# Patient Record
Sex: MALE | Race: WHITE | NOT HISPANIC OR LATINO | Employment: OTHER | URBAN - METROPOLITAN AREA
[De-identification: names, ages, dates, MRNs, and addresses within clinical notes are randomized per-mention and may not be internally consistent; named-entity substitution may affect disease eponyms.]

---

## 2017-02-06 ENCOUNTER — ALLIED HEALTH/NURSE VISIT (OUTPATIENT)
Dept: CARDIOLOGY | Facility: CLINIC | Age: 82
End: 2017-02-06
Payer: MEDICARE

## 2017-02-06 DIAGNOSIS — Z95.0 CARDIAC PACEMAKER IN SITU: Primary | ICD-10-CM

## 2017-02-06 PROCEDURE — 93294 REM INTERROG EVL PM/LDLS PM: CPT | Performed by: INTERNAL MEDICINE

## 2017-02-06 PROCEDURE — 93296 REM INTERROG EVL PM/IDS: CPT | Performed by: INTERNAL MEDICINE

## 2017-02-07 NOTE — PROGRESS NOTES
[employer] PR5040 (S) Remote PPM Device Check  : 18%  Mode: VVI        Presenting Rhythm: varying VS events with tre occ   Heart Rate: adequate heart rates per histogram  Sensing: stable    Pacing Threshold: not performed    Impedance: stable  Battery Status: 10.1 - 11.9 years remaining  Atrial Arrhythmia: chronic Afib, taking Warfarin  Ventricular Arrhythmia: 1 vent high rate. EGM shows irregular VS events lasting 3 seconds, rates 160 - 210bpm. EF 65 -70%. Reviewed findings with Andrea RN     Care Plan: F/U Merlin q 3 months. Gave results and next transmission date over the phone to german YBARRA

## 2017-05-16 ENCOUNTER — ALLIED HEALTH/NURSE VISIT (OUTPATIENT)
Dept: CARDIOLOGY | Facility: CLINIC | Age: 82
End: 2017-05-16
Payer: MEDICARE

## 2017-05-16 DIAGNOSIS — Z95.0 CARDIAC PACEMAKER IN SITU: Primary | ICD-10-CM

## 2017-05-16 PROCEDURE — 93294 REM INTERROG EVL PM/LDLS PM: CPT | Performed by: INTERNAL MEDICINE

## 2017-05-16 PROCEDURE — 93296 REM INTERROG EVL PM/IDS: CPT | Performed by: INTERNAL MEDICINE

## 2017-05-16 NOTE — MR AVS SNAPSHOT
"              After Visit Summary   2017    Jake Duane Pfleiger    MRN: [id number]           Patient Information     Date Of Birth          1931        Visit Information        Provider Department      2017 4:15 PM NEW TECH1 [facility] HEART AT [place]        Today's Diagnoses     Cardiac pacemaker in situ    -  1       Follow-ups after your visit        Who to contact     If you have questions or need follow up information about today's clinic visit or your schedule please contact [facility] directly at [phone].  Normal or non-critical lab and imaging results will be communicated to you by [employer], letter or phone within 4 business days after the clinic has received the results. If you do not hear from us within 7 days, please contact the clinic through [employer] or phone. If you have a critical or abnormal lab result, we will notify you by phone as soon as possible.  Submit refill requests through [employer] or call your pharmacy and they will forward the refill request to us. Please allow 3 business days for your refill to be completed.          Additional Information About Your Visit        MyChart Information     [employer] lets you send messages to your doctor, view your test results, renew your prescriptions, schedule appointments and more. To sign up, go to [email or web address] . Click on \"Log in\" on the left side of the screen, which will take you to the Welcome page. Then click on \"Sign up Now\" on the right side of the page.     You will be asked to enter the access code listed below, as well as some personal information. Please follow the directions to create your username and password.     Your access code is: [email or web address]  Expires: 8/15/2017  8:38 AM     Your access code will  in 90 days. If you need help or a new code, please call your [place] clinic or [phone].        Care EveryWhere ID     This is your Care " EveryWhere ID. This could be used by other organizations to access your [place] medical records  [id number]         Blood Pressure from Last 3 Encounters:   10/05/16 124/82   04/11/16 117/68   09/23/15 142/68    Weight from Last 3 Encounters:   10/05/16 91.9 kg (202 lb 11.2 oz)   04/10/16 93.1 kg (205 lb 3.2 oz)   07/07/15 93.9 kg (207 lb)              We Performed the Following     INTERROGATION DEVICE EVAL REMOTE, PACER/ICD (87801)     PM DEVICE INTERROGATE REMOTE (95930)          Today's Medication Changes          These changes are accurate as of: 5/16/17 11:59 PM.  If you have any questions, ask your nurse or doctor.               These medicines have changed or have updated prescriptions.        Dose/Directions    acetaminophen 325 MG tablet   Commonly known as:  TYLENOL   This may have changed:    - when to take this  - reasons to take this  - additional instructions   Used for:  Knee pain        Dose:  650 mg   Take 2 tablets by mouth every 8 hours. Do not mix with other tylenol containing medications.   Quantity:  250 tablet   Refills:  0       atenolol 25 MG tablet   Commonly known as:  TENORMIN   This may have changed:  how much to take   Used for:  Atrial fibrillation (H)        Dose:  25 mg   Take 1 tablet by mouth daily.   Quantity:  30 tablet   Refills:  1                Primary Care Provider Office Phone # Fax #    Herve Sweet [phone]       [facility] [place] [address] [facility] [address]        Thank you!     Thank you for choosing [facility] AT [place]  for your care. Our goal is always to provide you with excellent care. Hearing back from our patients is one way we can continue to improve our services. Please take a few minutes to complete the written survey that you may receive in the mail after your visit with us. Thank you!             Your Updated Medication List - Protect others around you: Learn how to  safely use, store and throw away your medicines at www.disposemymeds.org.          This list is accurate as of: 5/16/17 11:59 PM.  Always use your most recent med list.                   Brand Name Dispense Instructions for use    acetaminophen 325 MG tablet    TYLENOL    250 tablet    Take 2 tablets by mouth every 8 hours. Do not mix with other tylenol containing medications.       AMBIEN PO      Take 10 mg by mouth nightly as needed.       atenolol 25 MG tablet    TENORMIN    30 tablet    Take 1 tablet by mouth daily.       fish oil-omega-3 fatty acids 1000 MG capsule      Take 2 g by mouth daily       furosemide 20 MG tablet    LASIX    30 tablet    Take 1 tablet (20 mg) by mouth daily       latanoprost 0.005 % ophthalmic solution    XALATAN     Place 1 drop into the right eye At Bedtime       OXYCODONE HCL PO      Take 5 mg by mouth every 6 hours as needed       PAXIL 20 MG tablet   Generic drug:  PARoxetine      Take 20 mg by mouth every morning.       polyethylene glycol Packet    MIRALAX/GLYCOLAX     Take 1 packet by mouth three times a week       PRILOSEC PO      Take 20 mg by mouth every morning.       PRINIVIL PO      Take 10 mg by mouth       SYNTHROID PO      Take 100 mcg by mouth daily.       TRAMADOL HCL PO      Take 50 mg by mouth every 6 hours as needed       warfarin 5 MG tablet    COUMADIN     Take 5 mg by mouth every evening Every day except Monday.

## 2017-05-17 NOTE — PROGRESS NOTES
[employer] (S) Remote PPM Device Check  : 20 %, Chronic Afib, taking Warfarin  Mode: VVI        Presenting Rhythm: VS with some , rates 60-93bpm  Heart Rate: Adequate rates per histogram  Sensing: Stable    Pacing Threshold: Stable    Impedance: Stable  Battery Status: 10.3-11.9 years  Atrial Arrhythmia: N/A  Ventricular Arrhythmia: None     Care Plan: F/u PPM Merlin q 3 months. Gave patient results over the phone.  Alma,CVT

## 2017-08-22 ENCOUNTER — ALLIED HEALTH/NURSE VISIT (OUTPATIENT)
Dept: CARDIOLOGY | Facility: CLINIC | Age: 82
End: 2017-08-22
Payer: MEDICARE

## 2017-08-22 DIAGNOSIS — Z95.0 CARDIAC PACEMAKER IN SITU: Primary | ICD-10-CM

## 2017-08-22 PROCEDURE — 93296 REM INTERROG EVL PM/IDS: CPT | Performed by: INTERNAL MEDICINE

## 2017-08-22 PROCEDURE — 93294 REM INTERROG EVL PM/LDLS PM: CPT | Performed by: INTERNAL MEDICINE

## 2017-08-22 NOTE — MR AVS SNAPSHOT
"              After Visit Summary   8/22/2017    Jake Duane Pfleiger    MRN: [id number]           Patient Information     Date Of Birth          6/4/1931        Visit Information        Provider Department      8/22/2017 3:15 PM NEW TECH1 [facility] HEART [facility]        Today's Diagnoses     Cardiac pacemaker in situ    -  1       Follow-ups after your visit        Your next 10 appointments already scheduled     Oct 18, 2017  2:20 PM CDT   Pacemaker Check with CASE TELLES   [facility] ([facility])    [address]  [facility] [address] [phone]            Oct 18, 2017  3:10 PM CDT   Return Visit with LAILA Harris CNP   [facility] ([facility])    [address]  [facility] [address] [phone]              Who to contact     If you have questions or need follow up information about today's clinic visit or your schedule please contact [facility] directly at [phone].  Normal or non-critical lab and imaging results will be communicated to you by [employer], letter or phone within 4 business days after the clinic has received the results. If you do not hear from us within 7 days, please contact the clinic through [employer] or phone. If you have a critical or abnormal lab result, we will notify you by phone as soon as possible.  Submit refill requests through [employer] or call your pharmacy and they will forward the refill request to us. Please allow 3 business days for your refill to be completed.          Additional Information About Your Visit        MyChart Information     [employer] lets you send messages to your doctor, view your test results, renew your prescriptions, schedule appointments and more. To sign up, go to [email or web address] . Click on \"Log in\" on the left side of the screen, which will take " "you to the Welcome page. Then click on \"Sign up Now\" on the right side of the page.     You will be asked to enter the access code listed below, as well as some personal information. Please follow the directions to create your username and password.     Your access code is: [email or web address]  Expires: 2017  9:33 AM     Your access code will  in 90 days. If you need help or a new code, please call your [place] clinic or [phone].        Care EveryWhere ID     This is your Care EveryWhere ID. This could be used by other organizations to access your [place] medical records  [id number]         Blood Pressure from Last 3 Encounters:   10/05/16 124/82   16 117/68   09/23/15 142/68    Weight from Last 3 Encounters:   10/05/16 91.9 kg (202 lb 11.2 oz)   04/10/16 93.1 kg (205 lb 3.2 oz)   07/07/15 93.9 kg (207 lb)              We Performed the Following     INTERROGATION DEVICE EVAL REMOTE, PACER/ICD (52244)     PM DEVICE INTERROGATE REMOTE (90137)          Today's Medication Changes          These changes are accurate as of: 17 11:59 PM.  If you have any questions, ask your nurse or doctor.               These medicines have changed or have updated prescriptions.        Dose/Directions    acetaminophen 325 MG tablet   Commonly known as:  TYLENOL   This may have changed:    - when to take this  - reasons to take this  - additional instructions   Used for:  Knee pain        Dose:  650 mg   Take 2 tablets by mouth every 8 hours. Do not mix with other tylenol containing medications.   Quantity:  250 tablet   Refills:  0       atenolol 25 MG tablet   Commonly known as:  TENORMIN   This may have changed:  how much to take   Used for:  Atrial fibrillation (H)        Dose:  25 mg   Take 1 tablet by mouth daily.   Quantity:  30 tablet   Refills:  1                Primary Care Provider Office Phone # Fax #    Herve Sweet [phone]       [facility] [place] [address]  [facility] [address]        Equal Access to Services     [facility] CAMILO : Hadii teresa chatterjee camryn Somera, waaxda luqadaha, qaybta kaalmacandace lipscombmiguel ángelamanda mariadionte elias phippsrosaaman espinosa. [facility] [phone].    ATENCIÓN: Si habla español, tiene a salazar disposición servicios gratuitos de asistencia lingüística. Ozzieame al [phone].    We comply with applicable federal civil rights laws and [place] laws. We do not discriminate on the basis of race, color, national origin, age, disability sex, sexual orientation or gender identity.            Thank you!     Thank you for choosing [facility] AT [place]  for your care. Our goal is always to provide you with excellent care. Hearing back from our patients is one way we can continue to improve our services. Please take a few minutes to complete the written survey that you may receive in the mail after your visit with us. Thank you!             Your Updated Medication List - Protect others around you: Learn how to safely use, store and throw away your medicines at www.disposemymeds.org.          This list is accurate as of: 8/22/17 11:59 PM.  Always use your most recent med list.                   Brand Name Dispense Instructions for use Diagnosis    acetaminophen 325 MG tablet    TYLENOL    250 tablet    Take 2 tablets by mouth every 8 hours. Do not mix with other tylenol containing medications.    Knee pain       AMBIEN PO      Take 10 mg by mouth nightly as needed.        atenolol 25 MG tablet    TENORMIN    30 tablet    Take 1 tablet by mouth daily.    Atrial fibrillation (H)       fish oil-omega-3 fatty acids 1000 MG capsule      Take 2 g by mouth daily        furosemide 20 MG tablet    LASIX    30 tablet    Take 1 tablet (20 mg) by mouth daily    Congestive heart failure, unspecified congestive heart failure chronicity, unspecified congestive heart failure type (H)       latanoprost 0.005 % ophthalmic solution    XALATAN     Place  1 drop into the right eye At Bedtime        OXYCODONE HCL PO      Take 5 mg by mouth every 6 hours as needed        PAXIL 20 MG tablet   Generic drug:  PARoxetine      Take 20 mg by mouth every morning.        polyethylene glycol Packet    MIRALAX/GLYCOLAX     Take 1 packet by mouth three times a week        PRILOSEC PO      Take 20 mg by mouth every morning.        PRINIVIL PO      Take 10 mg by mouth        SYNTHROID PO      Take 100 mcg by mouth daily.        TRAMADOL HCL PO      Take 50 mg by mouth every 6 hours as needed        warfarin 5 MG tablet    COUMADIN     Take 5 mg by mouth every evening Every day except Monday.

## 2017-08-23 NOTE — PROGRESS NOTES
[employer] SR 1110 (S) Remote PPM Device Check  : 20%  Mode: VVI        Presenting Rhythm: VS &   Heart Rate: adequate heart rates per histogram  Sensing: stable    Pacing Threshold: not performed    Impedance: stable  Battery Status: 9.4 - 11.1 years remaining  Atrial Arrhythmia: chronic Afib, taking Warfarin  Ventricular Arrhythmia: none     Care Plan: Annual threshold and OV scheduled in October. Gave results over the phone to german YBARRA

## 2017-11-01 ENCOUNTER — OFFICE VISIT (OUTPATIENT)
Dept: CARDIOLOGY | Facility: CLINIC | Age: 82
End: 2017-11-01
Attending: INTERNAL MEDICINE
Payer: MEDICARE

## 2017-11-01 VITALS
HEIGHT: 70 IN | HEART RATE: 80 BPM | WEIGHT: 201 LBS | DIASTOLIC BLOOD PRESSURE: 90 MMHG | SYSTOLIC BLOOD PRESSURE: 150 MMHG | BODY MASS INDEX: 28.77 KG/M2

## 2017-11-01 DIAGNOSIS — I50.9 CONGESTIVE HEART FAILURE, UNSPECIFIED CONGESTIVE HEART FAILURE CHRONICITY, UNSPECIFIED CONGESTIVE HEART FAILURE TYPE: ICD-10-CM

## 2017-11-01 DIAGNOSIS — Z95.0 CARDIAC PACEMAKER IN SITU: Primary | ICD-10-CM

## 2017-11-01 PROCEDURE — 93279 PRGRMG DEV EVAL PM/LDLS PM: CPT | Performed by: INTERNAL MEDICINE

## 2017-11-01 PROCEDURE — 99214 OFFICE O/P EST MOD 30 MIN: CPT | Performed by: NURSE PRACTITIONER

## 2017-11-01 NOTE — PROGRESS NOTES
HISTORY OF PRESENT ILLNESS:  This 86-year-old male presents to [facility] today for a followup visit.  He is a patient of Dr. Ratliff seen in our clinic for permanent atrial fibrillation, chronic diastolic heart failure, and hypertension.      Jorge Luis has a longstanding history of permanent atrial fibrillation.  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  Therefore, he underwent a single-chamber permanent pacemaker at that time.  He has been started back on atenolol.  He is on warfarin for CVA prophylaxis.  He is followed closely through our Device Clinic and is shown to be ventricular paced 17% of the time with an underlying rhythm of atrial fibrillation with a controlled ventricular response.  There has been no evidence of recent dysrhythmias.      Echocardiogram demonstrated normal left ventricular function, mild aortic stenosis and diastolic dysfunction.  Last year he had an episode of heart failure exacerbation and since then has been on Lasix.  He returns today for an annual reassessment.      Jorge Luis comes in with his family today.  In general, he has had a fairly uneventful year.  He is not active due to significant hip and knee arthritic complaints and uses a walker.  He is fairly sedentary.  He denies any exertional chest pain or shortness of breath.  Occasionally, he feels the need to take a deep breath.  He has not had any orthopnea or peripheral edema.  He watches his weight on a regular basis and has been quite stable at home.  He denies palpitations, lightheadedness, dizziness or near-syncope.      In reviewing records, it appears through his primary medical doctor, his blood pressure has been well controlled.  Laboratory work has been within normal limits.      PHYSICAL EXAMINATION:  His blood pressure today is 150/90, heart rate 80 beats per minute and is irregular.  His lungs are clear.  There is no peripheral edema.  Weight is  unchanged at 201 pounds.      IMPRESSION AND PLAN:   1.  Permanent atrial fibrillation.  Status post single-chamber permanent pacemaker for significant bradycardia.  Device interrogations show that he is 17% ventricular paced with underlying atrial fibrillation with a controlled ventricular response without any elevated heart rates.  We will continue with atenolol and warfarin for CVA prophylaxis.   2.  Chronic diastolic heart failure.  Hospitalization for heart failure exacerbation last year.  He has been stable now on Lasix.  There are no significant signs and symptoms of heart failure on today's examination.   3.  Hypertension.  Blood pressure elevated today.  However, in reviewing records, this is an outlying blood pressure reading.  I have asked him to have this reassessed at his primary medical doctor next week when he goes there for an INR or at his assisted living center.  He will notify his primary medical doctor or our clinic if he continues to have persistent elevated blood pressure readings, in which we would consider increasing his lisinopril.      Thank you for allowing me to participate in this patient's care.  We will have him return next year as planned.         LAILA BARRERA, OLIMPIA             D: 2017 15:40   T: 2017 18:04   MT: al      Name:     GLENN CERRATO   MRN:      [id number]        Account:      [id number]   :      1931           Service Date: 2017      Document: [id number]

## 2017-11-01 NOTE — LETTER
11/1/2017    [facility] [place]  [address] [place] [address]    RE: Jake Duane Pfleiger       Dear Colleague,    I had the pleasure of seeing Jake Duane Pfleiger in the [facility].    HISTORY OF PRESENT ILLNESS:  This 86-year-old male presents to [facility] today for a followup visit.  He is a patient of Dr. Ratliff seen in our clinic for permanent atrial fibrillation, chronic diastolic heart failure, and hypertension.      Jorge Luis has a longstanding history of permanent atrial fibrillation.  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  Therefore, he underwent a single-chamber permanent pacemaker at that time.  He has been started back on atenolol.  He is on warfarin for CVA prophylaxis.  He is followed closely through our Device Clinic and is shown to be ventricular paced 17% of the time with an underlying rhythm of atrial fibrillation with a controlled ventricular response.  There has been no evidence of recent dysrhythmias.      Echocardiogram demonstrated normal left ventricular function, mild aortic stenosis and diastolic dysfunction.  Last year he had an episode of heart failure exacerbation and since then has been on Lasix.  He returns today for an annual reassessment.      Jorge Luis comes in with his family today.  In general, he has had a fairly uneventful year.  He is not active due to significant hip and knee arthritic complaints and uses a walker.  He is fairly sedentary.  He denies any exertional chest pain or shortness of breath.  Occasionally, he feels the need to take a deep breath.  He has not had any orthopnea or peripheral edema.  He watches his weight on a regular basis and has been quite stable at home.  He denies palpitations, lightheadedness, dizziness or near-syncope.      In reviewing records, it appears through his primary medical doctor, his blood  pressure has been well controlled.  Laboratory work has been within normal limits.      PHYSICAL EXAMINATION:  His blood pressure today is 150/90, heart rate 80 beats per minute and is irregular.  His lungs are clear.  There is no peripheral edema.  Weight is unchanged at 201 pounds.   Outpatient Encounter Prescriptions as of 11/1/2017   Medication Sig Dispense Refill     furosemide (LASIX) 20 MG tablet Take 1 tablet (20 mg) by mouth daily 30 tablet 1     Lisinopril (PRINIVIL PO) Take 10 mg by mouth       polyethylene glycol (MIRALAX/GLYCOLAX) packet Take 1 packet by mouth three times a week        TRAMADOL HCL PO Take 50 mg by mouth every 6 hours as needed        latanoprost (XALATAN) 0.005 % ophthalmic solution Place 1 drop into the right eye At Bedtime       warfarin (COUMADIN) 5 MG tablet Take 5 mg by mouth every evening Every day except Monday.       Omeprazole (PRILOSEC PO) Take 20 mg by mouth every morning.       acetaminophen (TYLENOL) 325 MG tablet Take 2 tablets by mouth every 8 hours. Do not mix with other tylenol containing medications. (Patient taking differently: Take 650 mg by mouth every 8 hours as needed Do not mix with other tylenol containing medications.) 250 tablet      atenolol (TENORMIN) 25 MG tablet Take 1 tablet by mouth daily. (Patient taking differently: Take 50 mg by mouth daily ) 30 tablet 1     fish oil-omega-3 fatty acids (FISH OIL) 1000 MG capsule Take 2 g by mouth daily        paroxetine (PAXIL) 20 MG tablet Take 20 mg by mouth every morning.       Levothyroxine Sodium (SYNTHROID PO) Take 100 mcg by mouth daily.       Zolpidem Tartrate (AMBIEN PO) Take 5 mg by mouth nightly as needed        [DISCONTINUED] OXYCODONE HCL PO Take 5 mg by mouth every 6 hours as needed        No facility-administered encounter medications on file as of 11/1/2017.       IMPRESSION AND PLAN:   1.  Permanent atrial fibrillation.  Status post single-chamber permanent pacemaker for significant bradycardia.   Device interrogations show that he is 17% ventricular paced with underlying atrial fibrillation with a controlled ventricular response without any elevated heart rates.  We will continue with atenolol and warfarin for CVA prophylaxis.   2.  Chronic diastolic heart failure.  Hospitalization for heart failure exacerbation last year.  He has been stable now on Lasix.  There are no significant signs and symptoms of heart failure on today's examination.   3.  Hypertension.  Blood pressure elevated today.  However, in reviewing records, this is an outlying blood pressure reading.  I have asked him to have this reassessed at his primary medical doctor next week when he goes there for an INR or at his assisted living center.  He will notify his primary medical doctor or our clinic if he continues to have persistent elevated blood pressure readings, in which we would consider increasing his lisinopril.      Thank you for allowing me to participate in this patient's care.  We will have him return next year as planned.     Sincerely,    LAILA Pa CNP     [facility]

## 2017-11-01 NOTE — MR AVS SNAPSHOT
After Visit Summary   11/1/2017    Jake Duane Pfleiger    MRN: [id number]           Patient Information     Date Of Birth          6/4/1931        Visit Information        Provider Department      11/1/2017 2:20 PM RU DCR2 [facility]        Today's Diagnoses     Cardiac pacemaker in situ    -  1       Follow-ups after your visit        Your next 10 appointments already scheduled     Nov 01, 2017  3:10 PM CDT   Return Visit with LAILA Harris CNP   [facility] ([facility])    [address]  [facility] [id number] [phone]            Feb 05, 2018  9:15 AM CST   Remote PPM Check with NEW TECH1   [facility] ([facility])    [address]  [facility] [id number] [phone]           This appointment is for a remote check of your pacemaker.  This is not an appointment at the office.              Who to contact     If you have questions or need follow up information about today's clinic visit or your schedule please contact [facility] directly at [phone].  Normal or non-critical lab and imaging results will be communicated to you by [employer], letter or phone within 4 business days after the clinic has received the results. If you do not hear from us within 7 days, please contact the clinic through [employer] or phone. If you have a critical or abnormal lab result, we will notify you by phone as soon as possible.  Submit refill requests through [employer] or call your pharmacy and they will forward the refill request to us. Please allow 3 business days for your refill to be completed.          Additional Information About Your Visit        MyChart Information     [employer] lets you send messages to your doctor, view your test results, renew your prescriptions, schedule appointments and  "more. To sign up, go to [email or web address] . Click on \"Log in\" on the left side of the screen, which will take you to the Welcome page. Then click on \"Sign up Now\" on the right side of the page.     You will be asked to enter the access code listed below, as well as some personal information. Please follow the directions to create your username and password.     Your access code is: [email or web address]  Expires: 2017  9:33 AM     Your access code will  in 90 days. If you need help or a new code, please call your [place] clinic or [phone].        Care EveryWhere ID     This is your Care EveryWhere ID. This could be used by other organizations to access your [place] medical records  [id number]         Blood Pressure from Last 3 Encounters:   10/05/16 124/82   16 117/68   09/23/15 142/68    Weight from Last 3 Encounters:   10/05/16 91.9 kg (202 lb 11.2 oz)   04/10/16 93.1 kg (205 lb 3.2 oz)   07/07/15 93.9 kg (207 lb)              We Performed the Following     PM DEVICE PROGRAMMING EVAL, SINGLE LEAD PACER (03613)          Today's Medication Changes          These changes are accurate as of: 17  2:45 PM.  If you have any questions, ask your nurse or doctor.               These medicines have changed or have updated prescriptions.        Dose/Directions    acetaminophen 325 MG tablet   Commonly known as:  TYLENOL   This may have changed:    - when to take this  - reasons to take this  - additional instructions   Used for:  Knee pain        Dose:  650 mg   Take 2 tablets by mouth every 8 hours. Do not mix with other tylenol containing medications.   Quantity:  250 tablet   Refills:  0       atenolol 25 MG tablet   Commonly known as:  TENORMIN   This may have changed:  how much to take   Used for:  Atrial fibrillation (H)        Dose:  25 mg   Take 1 tablet by mouth daily.   Quantity:  30 tablet   Refills:  1                Primary Care Provider Office Phone # Fax #    Herve Sweet " [phone]       [facility] [address]  [place] [facility] [address]        Equal Access to Services     MELANY PAGE : Hadii teresa chatterjee kaydeno Sopreetali, waaxda luqadaha, qaybta kaalmada aderobin, casandra aguayoisis francisca. So [facility] [phone].    ATENCIÓN: Si habla español, tiene a salazar disposición servicios gratuitos de asistencia lingüística. Llame al [phone].    We comply with applicable federal civil rights laws and [place] laws. We do not discriminate on the basis of race, color, national origin, age, disability, sex, sexual orientation, or gender identity.            Thank you!     Thank you for choosing [facility]  for your care. Our goal is always to provide you with excellent care. Hearing back from our patients is one way we can continue to improve our services. Please take a few minutes to complete the written survey that you may receive in the mail after your visit with us. Thank you!             Your Updated Medication List - Protect others around you: Learn how to safely use, store and throw away your medicines at www.disposemymeds.org.          This list is accurate as of: 11/1/17  2:45 PM.  Always use your most recent med list.                   Brand Name Dispense Instructions for use Diagnosis    acetaminophen 325 MG tablet    TYLENOL    250 tablet    Take 2 tablets by mouth every 8 hours. Do not mix with other tylenol containing medications.    Knee pain       AMBIEN PO      Take 10 mg by mouth nightly as needed.        atenolol 25 MG tablet    TENORMIN    30 tablet    Take 1 tablet by mouth daily.    Atrial fibrillation (H)       fish oil-omega-3 fatty acids 1000 MG capsule      Take 2 g by mouth daily        furosemide 20 MG tablet    LASIX    30 tablet    Take 1 tablet (20 mg) by mouth daily    Congestive heart failure, unspecified congestive heart failure chronicity, unspecified congestive heart  failure type (H)       latanoprost 0.005 % ophthalmic solution    XALATAN     Place 1 drop into the right eye At Bedtime        OXYCODONE HCL PO      Take 5 mg by mouth every 6 hours as needed        PAXIL 20 MG tablet   Generic drug:  PARoxetine      Take 20 mg by mouth every morning.        polyethylene glycol Packet    MIRALAX/GLYCOLAX     Take 1 packet by mouth three times a week        PRILOSEC PO      Take 20 mg by mouth every morning.        PRINIVIL PO      Take 10 mg by mouth        SYNTHROID PO      Take 100 mcg by mouth daily.        TRAMADOL HCL PO      Take 50 mg by mouth every 6 hours as needed        warfarin 5 MG tablet    COUMADIN     Take 5 mg by mouth every evening Every day except Monday.

## 2017-11-01 NOTE — PROGRESS NOTES
[employer] Accent Pacemaker Device Check  : 17 %  Mode: VVI        Underlying Rhythm: afib with controlled vent rate  Heart Rate: adequate variation per histogram  Sensing: stable    Pacing Threshold: stable - auto capture off due to afib    Impedance: stable  Battery Status: 9 - 11 years  Atrial Arrhythmia: afib - on couamdin  Ventricular Arrhythmia: none  Setting Change: none    Care Plan: f/u 3 months remote PPM check and with Diamond NP today. Daysi

## 2017-11-01 NOTE — MR AVS SNAPSHOT
After Visit Summary   11/1/2017    Jake Duane Pfleiger    MRN: [id number]           Patient Information     Date Of Birth          6/4/1931        Visit Information        Provider Department      11/1/2017 3:10 PM Diamond Solo APRN CNP [facility]        Today's Diagnoses     Congestive heart failure, unspecified congestive heart failure chronicity, unspecified congestive heart failure type (H)           Follow-ups after your visit        Additional Services     Follow-Up with Electrophysiologist                 Your next 10 appointments already scheduled     Feb 05, 2018  9:15 AM CST   Remote PPM Check with NEW TECH1   [facility] ([facility])    [address]  [facility] [address] [phone]           This appointment is for a remote check of your pacemaker.  This is not an appointment at the office.              Future tests that were ordered for you today     Open Future Orders        Priority Expected Expires Ordered    Follow-Up with Electrophysiologist Routine 11/1/2018 11/2/2018 11/1/2017            Who to contact     If you have questions or need follow up information about today's clinic visit or your schedule please contact [facility] directly at [phone].  Normal or non-critical lab and imaging results will be communicated to you by MyChart, letter or phone within 4 business days after the clinic has received the results. If you do not hear from us within 7 days, please contact the clinic through MyChart or phone. If you have a critical or abnormal lab result, we will notify you by phone as soon as possible.  Submit refill requests through [employer] or call your pharmacy and they will forward the refill request to us. Please allow 3 business days for your refill to be completed.          Additional Information About Your Visit       "  MyChart Information     [employer] lets you send messages to your doctor, view your test results, renew your prescriptions, schedule appointments and more. To sign up, go to [email or web address] . Click on \"Log in\" on the left side of the screen, which will take you to the Welcome page. Then click on \"Sign up Now\" on the right side of the page.     You will be asked to enter the access code listed below, as well as some personal information. Please follow the directions to create your username and password.     Your access code is: [email or web address]  Expires: 2017  9:33 AM     Your access code will  in 90 days. If you need help or a new code, please call your [place] clinic or [phone].        Care EveryWhere ID     This is your Care EveryWhere ID. This could be used by other organizations to access your [place] medical records  [id number]        Your Vitals Were     Pulse Height BMI (Body Mass Index)             80 1.778 m (5' 10\") 28.84 kg/m2          Blood Pressure from Last 3 Encounters:   17 150/90   10/05/16 124/82   16 117/68    Weight from Last 3 Encounters:   17 91.2 kg (201 lb)   10/05/16 91.9 kg (202 lb 11.2 oz)   04/10/16 93.1 kg (205 lb 3.2 oz)              We Performed the Following     Follow-Up with Cardiac Advanced Practice Provider          Today's Medication Changes          These changes are accurate as of: 17  3:41 PM.  If you have any questions, ask your nurse or doctor.               These medicines have changed or have updated prescriptions.        Dose/Directions    acetaminophen 325 MG tablet   Commonly known as:  TYLENOL   This may have changed:    - when to take this  - reasons to take this  - additional instructions   Used for:  Knee pain        Dose:  650 mg   Take 2 tablets by mouth every 8 hours. Do not mix with other tylenol containing medications.   Quantity:  250 tablet   Refills:  0       atenolol 25 MG tablet   Commonly known as:  TENORMIN "   This may have changed:  how much to take   Used for:  Atrial fibrillation (H)        Dose:  25 mg   Take 1 tablet by mouth daily.   Quantity:  30 tablet   Refills:  1         Stop taking these medicines if you haven't already. Please contact your care team if you have questions.     OXYCODONE HCL PO   Stopped by:  Diamond Solo, LAILA CNP                    Primary Care Provider Office Phone # Fax #    Herve Sweet [phone]       [facility] [place] [id number]   [place] [facility] [address]        Equal Access to Services     [facility]: Hadii aad ku hadasho Soomaali, waaxda luqadaha, qaybta kaalmada adeegyada, waxay idiin hayaan deisi koch . So [facility] [phone].    ATENCIÓN: Si habla español, tiene a salazar disposición servicios gratuitos de asistencia lingüística. [facility] [phone].    We comply with applicable federal civil rights laws and [place] laws. We do not discriminate on the basis of race, color, national origin, age, disability, sex, sexual orientation, or gender identity.            Thank you!     Thank you for choosing [facility]  for your care. Our goal is always to provide you with excellent care. Hearing back from our patients is one way we can continue to improve our services. Please take a few minutes to complete the written survey that you may receive in the mail after your visit with us. Thank you!             Your Updated Medication List - Protect others around you: Learn how to safely use, store and throw away your medicines at www.disposemymeds.org.          This list is accurate as of: 11/1/17  3:41 PM.  Always use your most recent med list.                   Brand Name Dispense Instructions for use Diagnosis    acetaminophen 325 MG tablet    TYLENOL    250 tablet    Take 2 tablets by mouth every 8 hours. Do not mix with other tylenol containing medications.    Knee pain       AMBIEN PO       Take 5 mg by mouth nightly as needed        atenolol 25 MG tablet    TENORMIN    30 tablet    Take 1 tablet by mouth daily.    Atrial fibrillation (H)       fish oil-omega-3 fatty acids 1000 MG capsule      Take 2 g by mouth daily        furosemide 20 MG tablet    LASIX    30 tablet    Take 1 tablet (20 mg) by mouth daily    Congestive heart failure, unspecified congestive heart failure chronicity, unspecified congestive heart failure type (H)       latanoprost 0.005 % ophthalmic solution    XALATAN     Place 1 drop into the right eye At Bedtime        PAXIL 20 MG tablet   Generic drug:  PARoxetine      Take 20 mg by mouth every morning.        polyethylene glycol Packet    MIRALAX/GLYCOLAX     Take 1 packet by mouth three times a week        PRILOSEC PO      Take 20 mg by mouth every morning.        PRINIVIL PO      Take 10 mg by mouth        SYNTHROID PO      Take 100 mcg by mouth daily.        TRAMADOL HCL PO      Take 50 mg by mouth every 6 hours as needed        warfarin 5 MG tablet    COUMADIN     Take 5 mg by mouth every evening Every day except Monday.

## 2017-11-01 NOTE — PROGRESS NOTES
HPI and Plan: #[id number]  See dictation    Orders Placed This Encounter   Procedures     Follow-Up with Electrophysiologist       No orders of the defined types were placed in this encounter.      Medications Discontinued During This Encounter   Medication Reason     OXYCODONE HCL PO          Encounter Diagnosis   Name Primary?     Congestive heart failure, unspecified congestive heart failure chronicity, unspecified congestive heart failure type (H)        CURRENT MEDICATIONS:  Current Outpatient Prescriptions   Medication Sig Dispense Refill     furosemide (LASIX) 20 MG tablet Take 1 tablet (20 mg) by mouth daily 30 tablet 1     Lisinopril (PRINIVIL PO) Take 10 mg by mouth       polyethylene glycol (MIRALAX/GLYCOLAX) packet Take 1 packet by mouth three times a week        TRAMADOL HCL PO Take 50 mg by mouth every 6 hours as needed        latanoprost (XALATAN) 0.005 % ophthalmic solution Place 1 drop into the right eye At Bedtime       warfarin (COUMADIN) 5 MG tablet Take 5 mg by mouth every evening Every day except Monday.       Omeprazole (PRILOSEC PO) Take 20 mg by mouth every morning.       acetaminophen (TYLENOL) 325 MG tablet Take 2 tablets by mouth every 8 hours. Do not mix with other tylenol containing medications. (Patient taking differently: Take 650 mg by mouth every 8 hours as needed Do not mix with other tylenol containing medications.) 250 tablet      atenolol (TENORMIN) 25 MG tablet Take 1 tablet by mouth daily. (Patient taking differently: Take 50 mg by mouth daily ) 30 tablet 1     fish oil-omega-3 fatty acids (FISH OIL) 1000 MG capsule Take 2 g by mouth daily        paroxetine (PAXIL) 20 MG tablet Take 20 mg by mouth every morning.       Levothyroxine Sodium (SYNTHROID PO) Take 100 mcg by mouth daily.       Zolpidem Tartrate (AMBIEN PO) Take 5 mg by mouth nightly as needed          ALLERGIES   No Known Allergies    PAST MEDICAL HISTORY:  Past Medical History:   Diagnosis Date     Anxiety       "Arthritis      Atrial fibrillation (H)      Cardiomyopathy (H)      Gastro-oesophageal reflux disease      Hypertension      Pacemaker     [employer] Dual Pacemaker     Right patella fracture      Tachy-jairo syndrome (H)      Thyroid disease     Hypothyrodism        PAST SURGICAL HISTORY:  Past Surgical History:   Procedure Laterality Date     ARTHROPLASTY HIP  1/21/2014    left hip replacement     ARTHROPLASTY KNEE      right      CHOLECYSTECTOMY       EYE SURGERY      right eye cataract removal      IMPLANT PACEMAKER  03/2012    dual chamber      ORTHOPEDIC SURGERY      back surgery        FAMILY HISTORY:  Family History   Problem Relation Age of Onset     Other Cancer Mother      Unknown/Adopted Father        SOCIAL HISTORY:  Social History     Social History     Marital status:      Spouse name: N/A     Number of children: N/A     Years of education: N/A     Social History Main Topics     Smoking status: Former Smoker     Packs/day: 0.50     Years: 25.00     Types: Cigarettes, Pipe, Cigars     Quit date: 1/13/1982     Smokeless tobacco: Never Used     Alcohol use No     Drug use: No     Sexual activity: Not Asked     Other Topics Concern     Caffeine Concern No     occ - mostly decaf     Special Diet Yes     smaller portions     Exercise No     some walking     Social History Narrative       Review of Systems:  Skin:  Positive for bruising     Eyes:  Positive for glasses    ENT:  Positive for hearing loss    Respiratory:  Positive for shortness of breath;dyspnea on exertion     Cardiovascular:    edema;Positive for edema ankles  Gastroenterology: Positive for constipation    Genitourinary:         Musculoskeletal:  Positive for back pain;joint pain shoulder pain knee pain hip pain  Neurologic:         Psychiatric:  Negative for      Heme/Lymph/Imm:  Negative      Endocrine:  Positive for thyroid disorder      Physical Exam:  Vitals: /90 (BP Location: Left arm)  Pulse 80  Ht 1.778 m (5' 10\")  Wt " 91.2 kg (201 lb)  BMI 28.84 kg/m2    Constitutional:  cooperative;in no acute distress overweight      Skin:  warm and dry to the touch   pacemaker incision in the left infraclavicular area was well-healed      Head:  normocephalic        Eyes:  pupils equal and round        Lymph:      ENT:  no pallor or cyanosis, dentition good        Neck:  JVP normal        Respiratory:  clear to auscultation;normal symmetry         Cardiac: normal S1 and S2 irregularly irregular rhythm distant heart sounds            pulses full and equal                                        GI:  abdomen soft;BS normoactive        Extremities and Muscular Skeletal:  no edema              Neurological:  affect appropriate   uses walker    Psych:  affect appropriate, oriented to time, person and place          [facility]  [address] [place] [address]

## 2018-02-13 ENCOUNTER — ALLIED HEALTH/NURSE VISIT (OUTPATIENT)
Dept: CARDIOLOGY | Facility: CLINIC | Age: 83
End: 2018-02-13
Payer: MEDICARE

## 2018-02-13 DIAGNOSIS — Z95.0 CARDIAC PACEMAKER IN SITU: Primary | ICD-10-CM

## 2018-02-13 PROCEDURE — 93296 REM INTERROG EVL PM/IDS: CPT | Performed by: INTERNAL MEDICINE

## 2018-02-13 PROCEDURE — 93294 REM INTERROG EVL PM/LDLS PM: CPT | Performed by: INTERNAL MEDICINE

## 2018-02-13 NOTE — MR AVS SNAPSHOT
"              After Visit Summary   2018    Jake Duane Pfleiger    MRN: [id number]           Patient Information     Date Of Birth          1931        Visit Information        Provider Department      2018 7:45 AM NEW TECH1 [facility]        Today's Diagnoses     Cardiac pacemaker in situ    -  1       Follow-ups after your visit        Who to contact     If you have questions or need follow up information about today's clinic visit or your schedule please contact [facility] directly at [phone].  Normal or non-critical lab and imaging results will be communicated to you by Copan Systemshart, letter or phone within 4 business days after the clinic has received the results. If you do not hear from us within 7 days, please contact the clinic through Semant.iot or phone. If you have a critical or abnormal lab result, we will notify you by phone as soon as possible.  Submit refill requests through Efficas or call your pharmacy and they will forward the refill request to us. Please allow 3 business days for your refill to be completed.          Additional Information About Your Visit        MyChart Information     Efficas lets you send messages to your doctor, view your test results, renew your prescriptions, schedule appointments and more. To sign up, go to [email or web address] . Click on \"Log in\" on the left side of the screen, which will take you to the Welcome page. Then click on \"Sign up Now\" on the right side of the page.     You will be asked to enter the access code listed below, as well as some personal information. Please follow the directions to create your username and password.     Your access code is: [email or web address]  Expires: 5/15/2018  8:12 AM     Your access code will  in 90 days. If you need help or a new code, please call your [place] clinic or [phone].        Care EveryWhere ID     This is your Care " EveryWhere ID. This could be used by other organizations to access your [place] medical records  [id number]         Blood Pressure from Last 3 Encounters:   11/01/17 150/90   10/05/16 124/82   04/11/16 117/68    Weight from Last 3 Encounters:   11/01/17 91.2 kg (201 lb)   10/05/16 91.9 kg (202 lb 11.2 oz)   04/10/16 93.1 kg (205 lb 3.2 oz)              We Performed the Following     INTERROGATION DEVICE EVAL REMOTE, PACER/ICD (65379)     PM DEVICE INTERROGATE REMOTE (43147)          Today's Medication Changes          These changes are accurate as of 2/13/18 11:59 PM.  If you have any questions, ask your nurse or doctor.               These medicines have changed or have updated prescriptions.        Dose/Directions    acetaminophen 325 MG tablet   Commonly known as:  TYLENOL   This may have changed:    - when to take this  - reasons to take this  - additional instructions   Used for:  Knee pain        Dose:  650 mg   Take 2 tablets by mouth every 8 hours. Do not mix with other tylenol containing medications.   Quantity:  250 tablet   Refills:  0       atenolol 25 MG tablet   Commonly known as:  TENORMIN   This may have changed:  how much to take   Used for:  Atrial fibrillation (H)        Dose:  25 mg   Take 1 tablet by mouth daily.   Quantity:  30 tablet   Refills:  1                Primary Care Provider Office Phone # Fax Linwood Sweet [phone]       [facility] [place] [address] [place] [address]        Equal Access to Services     MELANY PAGE AH: Hadii aad ku hadasho Soomaali, waaxda luqadaha, qaybta kaalmada adeegyada, waxay idiin haymallyn deisi espinosa. So [facility] [phone].    ATENCIÓN: Si habla español, tiene a salazar disposición servicios gratuitos de asistencia lingüística. Llame al [phone].    We comply with applicable federal civil rights laws and [place] laws. We do not discriminate on the basis of race, color, national origin, age,  disability, sex, sexual orientation, or gender identity.            Thank you!     Thank you for choosing [facility]  for your care. Our goal is always to provide you with excellent care. Hearing back from our patients is one way we can continue to improve our services. Please take a few minutes to complete the written survey that you may receive in the mail after your visit with us. Thank you!             Your Updated Medication List - Protect others around you: Learn how to safely use, store and throw away your medicines at www.disposemymeds.org.          This list is accurate as of 2/13/18 11:59 PM.  Always use your most recent med list.                   Brand Name Dispense Instructions for use Diagnosis    acetaminophen 325 MG tablet    TYLENOL    250 tablet    Take 2 tablets by mouth every 8 hours. Do not mix with other tylenol containing medications.    Knee pain       AMBIEN PO      Take 5 mg by mouth nightly as needed        atenolol 25 MG tablet    TENORMIN    30 tablet    Take 1 tablet by mouth daily.    Atrial fibrillation (H)       fish oil-omega-3 fatty acids 1000 MG capsule      Take 2 g by mouth daily        furosemide 20 MG tablet    LASIX    30 tablet    Take 1 tablet (20 mg) by mouth daily    Congestive heart failure, unspecified congestive heart failure chronicity, unspecified congestive heart failure type (H)       latanoprost 0.005 % ophthalmic solution    XALATAN     Place 1 drop into the right eye At Bedtime        PAXIL 20 MG tablet   Generic drug:  PARoxetine      Take 20 mg by mouth every morning.        polyethylene glycol Packet    MIRALAX/GLYCOLAX     Take 1 packet by mouth three times a week        PRILOSEC PO      Take 20 mg by mouth every morning.        PRINIVIL PO      Take 10 mg by mouth        SYNTHROID PO      Take 100 mcg by mouth daily.        TRAMADOL HCL PO      Take 50 mg by mouth every 6 hours as needed        warfarin 5 MG tablet     COUMADIN     Take 5 mg by mouth every evening Every day except Monday.

## 2018-02-14 NOTE — PROGRESS NOTES
[facility] [employer] (S) Remote PPM Device Check  : 15 %, chronic Afib, taking Warfarin  Mode: VVI        Presenting Rhythm: Varying VS events, rates 64-105bpm  Heart Rate: Adequate rates per histogram  Sensing: Stable     Pacing Threshold: Stable    Impedance: Stable  Battery Status: 9.7-11.4 years  Atrial Arrhythmia: N/A  Ventricular Arrhythmia: None     Care Plan: F/u PPM Merlin q 3 months. Gave patient results over the phone. Alma,CVT

## 2018-05-22 ENCOUNTER — ALLIED HEALTH/NURSE VISIT (OUTPATIENT)
Dept: CARDIOLOGY | Facility: CLINIC | Age: 83
End: 2018-05-22
Payer: MEDICARE

## 2018-05-22 DIAGNOSIS — Z95.0 CARDIAC PACEMAKER IN SITU: Primary | ICD-10-CM

## 2018-05-22 PROCEDURE — 93296 REM INTERROG EVL PM/IDS: CPT | Performed by: INTERNAL MEDICINE

## 2018-05-22 PROCEDURE — 93294 REM INTERROG EVL PM/LDLS PM: CPT | Performed by: INTERNAL MEDICINE

## 2018-05-22 NOTE — MR AVS SNAPSHOT
"              After Visit Summary   5/22/2018    Jake Duane Pfleiger    MRN: [id number]           Patient Information     Date Of Birth          6/4/1931        Visit Information        Provider Department      5/22/2018 4:00 PM VIRGEN EDWARDS [facility]        Today's Diagnoses     Cardiac pacemaker in situ    -  1       Follow-ups after your visit        Your next 10 appointments already scheduled     Aug 28, 2018 11:45 AM CDT   Remote PPM Check with VIRGEN EDWARDS   [facility] ([facility])    [address] [place], [place] [id number]  [facility] [id number] [phone] OPT 2           This appointment is for a remote check of your pacemaker.  This is not an appointment at the office.              Who to contact     If you have questions or need follow up information about today's clinic visit or your schedule please contact [facility] directly at [phone].  Normal or non-critical lab and imaging results will be communicated to you by Cubiclehart, letter or phone within 4 business days after the clinic has received the results. If you do not hear from us within 7 days, please contact the clinic through Cubiclehart or phone. If you have a critical or abnormal lab result, we will notify you by phone as soon as possible.  Submit refill requests through Klir Technologies or call your pharmacy and they will forward the refill request to us. Please allow 3 business days for your refill to be completed.          Additional Information About Your Visit        CubicleharCrush on original products Information     Klir Technologies lets you send messages to your doctor, view your test results, renew your prescriptions, schedule appointments and more. To sign up, go to [email or web address] . Click on \"Log in\" on the left side of the screen, which will take you to the Welcome page. Then click on \"Sign up Now\" on the right side of the page.     You will be asked to enter " the access code listed below, as well as some personal information. Please follow the directions to create your username and password.     Your access code is: [email or web address]  Expires: 2018  9:48 AM     Your access code will  in 90 days. If you need help or a new code, please call your [place] clinic or [phone].        Care EveryWhere ID     This is your Care EveryWhere ID. This could be used by other organizations to access your [place] medical records  [id number]         Blood Pressure from Last 3 Encounters:   17 150/90   10/05/16 124/82   16 117/68    Weight from Last 3 Encounters:   17 91.2 kg (201 lb)   10/05/16 91.9 kg (202 lb 11.2 oz)   04/10/16 93.1 kg (205 lb 3.2 oz)              We Performed the Following     INTERROGATION DEVICE EVAL REMOTE, PACER/ICD (17920)     PM DEVICE INTERROGATE REMOTE (17286)          Today's Medication Changes          These changes are accurate as of 18 11:59 PM.  If you have any questions, ask your nurse or doctor.               These medicines have changed or have updated prescriptions.        Dose/Directions    acetaminophen 325 MG tablet   Commonly known as:  TYLENOL   This may have changed:    - when to take this  - reasons to take this  - additional instructions   Used for:  Knee pain        Dose:  650 mg   Take 2 tablets by mouth every 8 hours. Do not mix with other tylenol containing medications.   Quantity:  250 tablet   Refills:  0       atenolol 25 MG tablet   Commonly known as:  TENORMIN   This may have changed:  how much to take   Used for:  Atrial fibrillation (H)        Dose:  25 mg   Take 1 tablet by mouth daily.   Quantity:  30 tablet   Refills:  1                Primary Care Provider Office Phone # Fax #    Herve FerraraChaunceykaydengiuseppe [phone]       [facility] [place] [address] [facility] [address]        Equal Access to Services     MELANY PAGE AH: haley Reza  jasonstevo osman connercasandra graves ah. So [facility] [phone].    ATENCIÓN: Si dilia jackson, tiene a salazar disposición servicios gratuitos de asistencia lingüística. Rachel al [phone].    We comply with applicable federal civil rights laws and [place] laws. We do not discriminate on the basis of race, color, national origin, age, disability, sex, sexual orientation, or gender identity.            Thank you!     Thank you for choosing [facility]  for your care. Our goal is always to provide you with excellent care. Hearing back from our patients is one way we can continue to improve our services. Please take a few minutes to complete the written survey that you may receive in the mail after your visit with us. Thank you!             Your Updated Medication List - Protect others around you: Learn how to safely use, store and throw away your medicines at www.disposemymeds.org.          This list is accurate as of 5/22/18 11:59 PM.  Always use your most recent med list.                   Brand Name Dispense Instructions for use Diagnosis    acetaminophen 325 MG tablet    TYLENOL    250 tablet    Take 2 tablets by mouth every 8 hours. Do not mix with other tylenol containing medications.    Knee pain       AMBIEN PO      Take 5 mg by mouth nightly as needed        atenolol 25 MG tablet    TENORMIN    30 tablet    Take 1 tablet by mouth daily.    Atrial fibrillation (H)       fish oil-omega-3 fatty acids 1000 MG capsule      Take 2 g by mouth daily        furosemide 20 MG tablet    LASIX    30 tablet    Take 1 tablet (20 mg) by mouth daily    Congestive heart failure, unspecified congestive heart failure chronicity, unspecified congestive heart failure type (H)       latanoprost 0.005 % ophthalmic solution    XALATAN     Place 1 drop into the right eye At Bedtime        PAXIL 20 MG tablet   Generic drug:  PARoxetine      Take 20 mg by mouth  every morning.        polyethylene glycol Packet    MIRALAX/GLYCOLAX     Take 1 packet by mouth three times a week        PRILOSEC PO      Take 20 mg by mouth every morning.        PRINIVIL PO      Take 10 mg by mouth        SYNTHROID PO      Take 100 mcg by mouth daily.        TRAMADOL HCL PO      Take 50 mg by mouth every 6 hours as needed        warfarin 5 MG tablet    COUMADIN     Take 5 mg by mouth every evening Every day except Monday.

## 2018-05-23 NOTE — PROGRESS NOTES
[employer] SR 1110 (S) Remote PPM Device Check  : 11%  Mode: VVI        Presenting Rhythm: permanent Afib with varying vent response (75 - 95bpm)  Heart Rate: adequate heart rates per histogram  Sensing: stable    Pacing Threshold: not performed    Impedance: stable  Battery Status: 9.3 - 11.3 years remaining  Atrial Arrhythmia: permanent Afib, taking Warfarin  Ventricular Arrhythmia: none     Care Plan: F/U Merlin q 3 months. Order in for annual OV to be scheduled in November.  Gave results and next transmission date over the phone to german YBARRA

## 2018-08-28 ENCOUNTER — ALLIED HEALTH/NURSE VISIT (OUTPATIENT)
Dept: CARDIOLOGY | Facility: CLINIC | Age: 83
End: 2018-08-28
Payer: MEDICARE

## 2018-08-28 DIAGNOSIS — Z95.0 CARDIAC PACEMAKER IN SITU: Primary | ICD-10-CM

## 2018-08-28 PROCEDURE — 93294 REM INTERROG EVL PM/LDLS PM: CPT | Performed by: INTERNAL MEDICINE

## 2018-08-28 PROCEDURE — 93296 REM INTERROG EVL PM/IDS: CPT | Performed by: INTERNAL MEDICINE

## 2018-08-28 NOTE — MR AVS SNAPSHOT
"              After Visit Summary   8/28/2018    Jake Duane Pfleiger    MRN: [id number]           Patient Information     Date Of Birth          6/4/1931        Visit Information        Provider Department      8/28/2018 11:45 AM NEW TECH1 [facility]        Today's Diagnoses     Cardiac pacemaker in situ    -  1       Follow-ups after your visit        Additional Services     Follow-Up with Device Clinic                 Future tests that were ordered for you today     Open Future Orders        Priority Expected Expires Ordered    Follow-Up with Device Clinic Routine 12/5/2018 8/29/2019 8/29/2018            Who to contact     If you have questions or need follow up information about today's clinic visit or your schedule please contact [facility] directly at [phone].  Normal or non-critical lab and imaging results will be communicated to you by EcoSwarmhart, letter or phone within 4 business days after the clinic has received the results. If you do not hear from us within 7 days, please contact the clinic through EcoSwarmhart or phone. If you have a critical or abnormal lab result, we will notify you by phone as soon as possible.  Submit refill requests through Hycrete or call your pharmacy and they will forward the refill request to us. Please allow 3 business days for your refill to be completed.          Additional Information About Your Visit        EcoSwarmhart Information     Hycrete lets you send messages to your doctor, view your test results, renew your prescriptions, schedule appointments and more. To sign up, go to [email or web address] . Click on \"Log in\" on the left side of the screen, which will take you to the Welcome page. Then click on \"Sign up Now\" on the right side of the page.     You will be asked to enter the access code listed below, as well as some personal information. Please follow the directions to create your username and " password.     Your access code is: [email or web address]  Expires: 2018  8:39 AM     Your access code will  in 90 days. If you need help or a new code, please call your [place] clinic or [phone].        Care EveryWhere ID     This is your Care EveryWhere ID. This could be used by other organizations to access your [place] medical records  [id number]         Blood Pressure from Last 3 Encounters:   17 150/90   10/05/16 124/82   16 117/68    Weight from Last 3 Encounters:   17 91.2 kg (201 lb)   10/05/16 91.9 kg (202 lb 11.2 oz)   04/10/16 93.1 kg (205 lb 3.2 oz)              We Performed the Following     INTERROGATION DEVICE EVAL REMOTE, PACER/ICD (02663)     PM DEVICE INTERROGATE REMOTE (12796)          Today's Medication Changes          These changes are accurate as of 18 11:59 PM.  If you have any questions, ask your nurse or doctor.               These medicines have changed or have updated prescriptions.        Dose/Directions    acetaminophen 325 MG tablet   Commonly known as:  TYLENOL   This may have changed:    - when to take this  - reasons to take this  - additional instructions   Used for:  Knee pain        Dose:  650 mg   Take 2 tablets by mouth every 8 hours. Do not mix with other tylenol containing medications.   Quantity:  250 tablet   Refills:  0       atenolol 25 MG tablet   Commonly known as:  TENORMIN   This may have changed:  how much to take   Used for:  Atrial fibrillation (H)        Dose:  25 mg   Take 1 tablet by mouth daily.   Quantity:  30 tablet   Refills:  1                Primary Care Provider Office Phone # Fax #    Herve Sweet [phone]       [facility] [place] [address] [facility] [address]        Equal Access to Services     [facility] CAMILO AH: Lalo moniqueo Somera, waaxda luqadaha, qaybta kaalmada adeegyada, waxay elias espinosa. So [facility] [phone].    ATENCIÓN: Si taviala  español, tiene a salazar disposición servicios gratuitos de asistencia lingüística. Rachel nicholas [phone].    We comply with applicable federal civil rights laws and [place] laws. We do not discriminate on the basis of race, color, national origin, age, disability, sex, sexual orientation, or gender identity.            Thank you!     Thank you for choosing [facility]  for your care. Our goal is always to provide you with excellent care. Hearing back from our patients is one way we can continue to improve our services. Please take a few minutes to complete the written survey that you may receive in the mail after your visit with us. Thank you!             Your Updated Medication List - Protect others around you: Learn how to safely use, store and throw away your medicines at www.disposemymeds.org.          This list is accurate as of 8/28/18 11:59 PM.  Always use your most recent med list.                   Brand Name Dispense Instructions for use Diagnosis    acetaminophen 325 MG tablet    TYLENOL    250 tablet    Take 2 tablets by mouth every 8 hours. Do not mix with other tylenol containing medications.    Knee pain       AMBIEN PO      Take 5 mg by mouth nightly as needed        atenolol 25 MG tablet    TENORMIN    30 tablet    Take 1 tablet by mouth daily.    Atrial fibrillation (H)       fish oil-omega-3 fatty acids 1000 MG capsule      Take 2 g by mouth daily        furosemide 20 MG tablet    LASIX    30 tablet    Take 1 tablet (20 mg) by mouth daily    Congestive heart failure, unspecified congestive heart failure chronicity, unspecified congestive heart failure type (H)       latanoprost 0.005 % ophthalmic solution    XALATAN     Place 1 drop into the right eye At Bedtime        PAXIL 20 MG tablet   Generic drug:  PARoxetine      Take 20 mg by mouth every morning.        polyethylene glycol Packet    MIRALAX/GLYCOLAX     Take 1 packet by mouth three times a week         PRILOSEC PO      Take 20 mg by mouth every morning.        PRINIVIL PO      Take 10 mg by mouth        SYNTHROID PO      Take 100 mcg by mouth daily.        TRAMADOL HCL PO      Take 50 mg by mouth every 6 hours as needed        warfarin 5 MG tablet    COUMADIN     Take 5 mg by mouth every evening Every day except Monday.

## 2018-08-29 NOTE — PROGRESS NOTES
[facility] [employer] (S) Remote PPM Device Check  : 17 %, Permanent AFib, taking Warfarin  Mode: VVI        Presenting Rhythm: VS (AFIB), rates 61-94bpm  Heart Rate: Adequate rates per histogram  Sensing: Stable    Pacing Threshold: Stable    Impedance: Stable  Battery Status: 9.5-11.3 years  Atrial Arrhythmia: N/A  Ventricular Arrhythmia: None     Care Plan: F/u annual threshold in 3 months, order placed. Gave patient results over the phone.  EJuventinoRdz,CVT

## 2018-10-02 ENCOUNTER — HOSPITAL ENCOUNTER (INPATIENT)
Facility: CLINIC | Age: 83
LOS: 4 days | Discharge: HOME | DRG: 378 | End: 2018-10-07
Attending: EMERGENCY MEDICINE | Admitting: HOSPITALIST
Payer: MEDICARE

## 2018-10-02 DIAGNOSIS — K92.2 LOWER GI BLEED: ICD-10-CM

## 2018-10-02 LAB
ALBUMIN SERPL-MCNC: 3.4 G/DL (ref 3.4–5)
ALP SERPL-CCNC: 65 U/L (ref 40–150)
ALT SERPL W P-5'-P-CCNC: 15 U/L (ref 0–70)
ANION GAP SERPL CALCULATED.3IONS-SCNC: 4 MMOL/L (ref 3–14)
AST SERPL W P-5'-P-CCNC: 17 U/L (ref 0–45)
BASOPHILS # BLD AUTO: 0 10E9/L (ref 0–0.2)
BASOPHILS NFR BLD AUTO: 0.4 %
BILIRUB SERPL-MCNC: 0.6 MG/DL (ref 0.2–1.3)
BUN SERPL-MCNC: 19 MG/DL (ref 7–30)
CALCIUM SERPL-MCNC: 8.2 MG/DL (ref 8.5–10.1)
CHLORIDE SERPL-SCNC: 104 MMOL/L (ref 94–109)
CO2 SERPL-SCNC: 28 MMOL/L (ref 20–32)
CREAT SERPL-MCNC: 0.94 MG/DL (ref 0.66–1.25)
DIFFERENTIAL METHOD BLD: ABNORMAL
EOSINOPHIL # BLD AUTO: 0 10E9/L (ref 0–0.7)
EOSINOPHIL NFR BLD AUTO: 0.4 %
ERYTHROCYTE [DISTWIDTH] IN BLOOD BY AUTOMATED COUNT: 14.2 % (ref 10–15)
GFR SERPL CREATININE-BSD FRML MDRD: 75 ML/MIN/1.7M2
GLUCOSE SERPL-MCNC: 91 MG/DL (ref 70–99)
HCT VFR BLD AUTO: 32.8 % (ref 40–53)
HGB BLD-MCNC: 10.2 G/DL (ref 13.3–17.7)
HGB BLD-MCNC: 8.8 G/DL (ref 13.3–17.7)
HGB BLD-MCNC: 9.3 G/DL (ref 13.3–17.7)
IMM GRANULOCYTES # BLD: 0 10E9/L (ref 0–0.4)
IMM GRANULOCYTES NFR BLD: 0.6 %
INR PPP: 2.53 (ref 0.86–1.14)
INR PPP: 2.64 (ref 0.86–1.14)
INTERPRETATION ECG - MUSE: NORMAL
LACTATE BLD-SCNC: 1.1 MMOL/L (ref 0.7–2)
LYMPHOCYTES # BLD AUTO: 0.7 10E9/L (ref 0.8–5.3)
LYMPHOCYTES NFR BLD AUTO: 9.4 %
MCH RBC QN AUTO: 28.3 PG (ref 26.5–33)
MCHC RBC AUTO-ENTMCNC: 31.1 G/DL (ref 31.5–36.5)
MCV RBC AUTO: 91 FL (ref 78–100)
MONOCYTES # BLD AUTO: 0.6 10E9/L (ref 0–1.3)
MONOCYTES NFR BLD AUTO: 8.4 %
NEUTROPHILS # BLD AUTO: 5.7 10E9/L (ref 1.6–8.3)
NEUTROPHILS NFR BLD AUTO: 80.8 %
NRBC # BLD AUTO: 0 10*3/UL
NRBC BLD AUTO-RTO: 0 /100
PLATELET # BLD AUTO: 200 10E9/L (ref 150–450)
POTASSIUM SERPL-SCNC: 4.7 MMOL/L (ref 3.4–5.3)
PROT SERPL-MCNC: 6 G/DL (ref 6.8–8.8)
RBC # BLD AUTO: 3.6 10E12/L (ref 4.4–5.9)
SODIUM SERPL-SCNC: 136 MMOL/L (ref 133–144)
WBC # BLD AUTO: 7 10E9/L (ref 4–11)

## 2018-10-02 PROCEDURE — 96361 HYDRATE IV INFUSION ADD-ON: CPT

## 2018-10-02 PROCEDURE — 85610 PROTHROMBIN TIME: CPT | Performed by: EMERGENCY MEDICINE

## 2018-10-02 PROCEDURE — 86923 COMPATIBILITY TEST ELECTRIC: CPT | Performed by: EMERGENCY MEDICINE

## 2018-10-02 PROCEDURE — 96374 THER/PROPH/DIAG INJ IV PUSH: CPT

## 2018-10-02 PROCEDURE — 86850 RBC ANTIBODY SCREEN: CPT | Performed by: EMERGENCY MEDICINE

## 2018-10-02 PROCEDURE — 85025 COMPLETE CBC W/AUTO DIFF WBC: CPT | Performed by: EMERGENCY MEDICINE

## 2018-10-02 PROCEDURE — 25000132 ZZH RX MED GY IP 250 OP 250 PS 637: Mod: GY | Performed by: PHYSICIAN ASSISTANT

## 2018-10-02 PROCEDURE — 99219 ZZC INITIAL OBSERVATION CARE,LEVL II: CPT | Performed by: PHYSICIAN ASSISTANT

## 2018-10-02 PROCEDURE — 93005 ELECTROCARDIOGRAM TRACING: CPT

## 2018-10-02 PROCEDURE — 36415 COLL VENOUS BLD VENIPUNCTURE: CPT | Performed by: PHYSICIAN ASSISTANT

## 2018-10-02 PROCEDURE — 25000128 H RX IP 250 OP 636: Performed by: EMERGENCY MEDICINE

## 2018-10-02 PROCEDURE — 96365 THER/PROPH/DIAG IV INF INIT: CPT

## 2018-10-02 PROCEDURE — 25000128 H RX IP 250 OP 636: Performed by: PHYSICIAN ASSISTANT

## 2018-10-02 PROCEDURE — 99285 EMERGENCY DEPT VISIT HI MDM: CPT | Mod: 25

## 2018-10-02 PROCEDURE — 80053 COMPREHEN METABOLIC PANEL: CPT | Performed by: EMERGENCY MEDICINE

## 2018-10-02 PROCEDURE — A9270 NON-COVERED ITEM OR SERVICE: HCPCS | Mod: GY | Performed by: PHYSICIAN ASSISTANT

## 2018-10-02 PROCEDURE — G0378 HOSPITAL OBSERVATION PER HR: HCPCS

## 2018-10-02 PROCEDURE — 86900 BLOOD TYPING SEROLOGIC ABO: CPT | Performed by: PHYSICIAN ASSISTANT

## 2018-10-02 PROCEDURE — 86900 BLOOD TYPING SEROLOGIC ABO: CPT | Performed by: EMERGENCY MEDICINE

## 2018-10-02 PROCEDURE — C9113 INJ PANTOPRAZOLE SODIUM, VIA: HCPCS | Performed by: EMERGENCY MEDICINE

## 2018-10-02 PROCEDURE — 83605 ASSAY OF LACTIC ACID: CPT | Performed by: EMERGENCY MEDICINE

## 2018-10-02 PROCEDURE — 85610 PROTHROMBIN TIME: CPT | Performed by: PHYSICIAN ASSISTANT

## 2018-10-02 PROCEDURE — 86901 BLOOD TYPING SEROLOGIC RH(D): CPT | Performed by: EMERGENCY MEDICINE

## 2018-10-02 PROCEDURE — 85018 HEMOGLOBIN: CPT | Performed by: PHYSICIAN ASSISTANT

## 2018-10-02 PROCEDURE — 86901 BLOOD TYPING SEROLOGIC RH(D): CPT | Performed by: PHYSICIAN ASSISTANT

## 2018-10-02 PROCEDURE — 86850 RBC ANTIBODY SCREEN: CPT | Performed by: PHYSICIAN ASSISTANT

## 2018-10-02 PROCEDURE — 96376 TX/PRO/DX INJ SAME DRUG ADON: CPT

## 2018-10-02 RX ORDER — LEVOTHYROXINE SODIUM 100 UG/1
100 TABLET ORAL DAILY
Status: DISCONTINUED | OUTPATIENT
Start: 2018-10-03 | End: 2018-10-07 | Stop reason: HOSPADM

## 2018-10-02 RX ORDER — POLYETHYLENE GLYCOL 3350 17 G/17G
17 POWDER, FOR SOLUTION ORAL DAILY PRN
Status: DISCONTINUED | OUTPATIENT
Start: 2018-10-02 | End: 2018-10-07 | Stop reason: HOSPADM

## 2018-10-02 RX ORDER — LISINOPRIL 10 MG/1
10 TABLET ORAL DAILY
Status: DISCONTINUED | OUTPATIENT
Start: 2018-10-03 | End: 2018-10-07 | Stop reason: HOSPADM

## 2018-10-02 RX ORDER — ATENOLOL 25 MG/1
25 TABLET ORAL DAILY
Status: DISCONTINUED | OUTPATIENT
Start: 2018-10-03 | End: 2018-10-07 | Stop reason: HOSPADM

## 2018-10-02 RX ORDER — ONDANSETRON 2 MG/ML
4 INJECTION INTRAMUSCULAR; INTRAVENOUS EVERY 30 MIN PRN
Status: DISCONTINUED | OUTPATIENT
Start: 2018-10-02 | End: 2018-10-02

## 2018-10-02 RX ORDER — PAROXETINE 20 MG/1
20 TABLET, FILM COATED ORAL EVERY MORNING
Status: DISCONTINUED | OUTPATIENT
Start: 2018-10-03 | End: 2018-10-07 | Stop reason: HOSPADM

## 2018-10-02 RX ORDER — ATENOLOL 50 MG/1
25 TABLET ORAL DAILY
Status: ON HOLD | COMMUNITY
End: 2019-11-30

## 2018-10-02 RX ORDER — NYSTATIN 100000 U/G
CREAM TOPICAL 2 TIMES DAILY PRN
Status: ON HOLD | COMMUNITY
End: 2019-03-08

## 2018-10-02 RX ORDER — SODIUM CHLORIDE 9 MG/ML
INJECTION, SOLUTION INTRAVENOUS CONTINUOUS
Status: DISCONTINUED | OUTPATIENT
Start: 2018-10-02 | End: 2018-10-03

## 2018-10-02 RX ORDER — NALOXONE HYDROCHLORIDE 0.4 MG/ML
.1-.4 INJECTION, SOLUTION INTRAMUSCULAR; INTRAVENOUS; SUBCUTANEOUS
Status: DISCONTINUED | OUTPATIENT
Start: 2018-10-02 | End: 2018-10-07 | Stop reason: HOSPADM

## 2018-10-02 RX ORDER — ONDANSETRON 4 MG/1
4 TABLET, ORALLY DISINTEGRATING ORAL EVERY 6 HOURS PRN
Status: DISCONTINUED | OUTPATIENT
Start: 2018-10-02 | End: 2018-10-07 | Stop reason: HOSPADM

## 2018-10-02 RX ORDER — ONDANSETRON 2 MG/ML
4 INJECTION INTRAMUSCULAR; INTRAVENOUS EVERY 6 HOURS PRN
Status: DISCONTINUED | OUTPATIENT
Start: 2018-10-02 | End: 2018-10-07 | Stop reason: HOSPADM

## 2018-10-02 RX ORDER — BRIMONIDINE TARTRATE 1.5 MG/ML
1 SOLUTION/ DROPS OPHTHALMIC 2 TIMES DAILY
Status: DISCONTINUED | OUTPATIENT
Start: 2018-10-02 | End: 2018-10-03 | Stop reason: CLARIF

## 2018-10-02 RX ORDER — ACETAMINOPHEN 325 MG/1
650 TABLET ORAL 2 TIMES DAILY
Status: DISCONTINUED | OUTPATIENT
Start: 2018-10-02 | End: 2018-10-07 | Stop reason: HOSPADM

## 2018-10-02 RX ORDER — SODIUM CHLORIDE 9 MG/ML
1000 INJECTION, SOLUTION INTRAVENOUS CONTINUOUS
Status: DISCONTINUED | OUTPATIENT
Start: 2018-10-02 | End: 2018-10-02

## 2018-10-02 RX ORDER — BRIMONIDINE TARTRATE 1.5 MG/ML
1 SOLUTION/ DROPS OPHTHALMIC 2 TIMES DAILY
Status: ON HOLD | COMMUNITY
End: 2021-10-09

## 2018-10-02 RX ORDER — SENNOSIDES 8.6 MG
650 CAPSULE ORAL 2 TIMES DAILY
Status: ON HOLD | COMMUNITY
End: 2019-03-08

## 2018-10-02 RX ORDER — ZOLPIDEM TARTRATE 5 MG/1
5 TABLET ORAL AT BEDTIME
Status: DISCONTINUED | OUTPATIENT
Start: 2018-10-02 | End: 2018-10-03

## 2018-10-02 RX ORDER — LIDOCAINE 40 MG/G
CREAM TOPICAL
Status: DISCONTINUED | OUTPATIENT
Start: 2018-10-02 | End: 2018-10-02

## 2018-10-02 RX ORDER — ACETAMINOPHEN 325 MG/1
650 TABLET ORAL EVERY 4 HOURS PRN
Status: DISCONTINUED | OUTPATIENT
Start: 2018-10-02 | End: 2018-10-07 | Stop reason: HOSPADM

## 2018-10-02 RX ORDER — TRAMADOL HYDROCHLORIDE 50 MG/1
50 TABLET ORAL EVERY 6 HOURS PRN
Status: DISCONTINUED | OUTPATIENT
Start: 2018-10-02 | End: 2018-10-07 | Stop reason: HOSPADM

## 2018-10-02 RX ADMIN — PANTOPRAZOLE SODIUM 40 MG: 40 INJECTION, POWDER, FOR SOLUTION INTRAVENOUS at 11:50

## 2018-10-02 RX ADMIN — ACETAMINOPHEN 650 MG: 325 TABLET, FILM COATED ORAL at 20:30

## 2018-10-02 RX ADMIN — SODIUM CHLORIDE: 9 INJECTION, SOLUTION INTRAVENOUS at 15:58

## 2018-10-02 RX ADMIN — POLYETHYLENE GLYCOL-3350 AND ELECTROLYTES 4000 ML: 236; 6.74; 5.86; 2.97; 22.74 POWDER, FOR SOLUTION ORAL at 20:25

## 2018-10-02 RX ADMIN — PHYTONADIONE 2 MG: 10 INJECTION, EMULSION INTRAMUSCULAR; INTRAVENOUS; SUBCUTANEOUS at 20:22

## 2018-10-02 RX ADMIN — SODIUM CHLORIDE 1000 ML: 9 INJECTION, SOLUTION INTRAVENOUS at 11:39

## 2018-10-02 ASSESSMENT — ENCOUNTER SYMPTOMS
SHORTNESS OF BREATH: 0
NAUSEA: 0
ANAL BLEEDING: 1
VOMITING: 0
ABDOMINAL PAIN: 0
FEVER: 0
BLOOD IN STOOL: 1
DIARRHEA: 1

## 2018-10-02 NOTE — PLAN OF CARE
Problem: Patient Care Overview  Goal: Plan of Care/Patient Progress Review  Outcome: No Change  ROOM # 227    Living Situation (if not independent, order SW consult): [facility]   Facility name: [facility] [place]   : Duane (son) - [phone] or Monica (Daughter in Law) - [phone]    Activity level at baseline: Rolling Walker   Activity level on admit: SBA with Walker       Patient registered to observation; given Patient Bill of Rights; given the opportunity to ask questions about observation status and their plan of care.  Patient has been oriented to the observation room, bathroom and call light is in place.    Discussed discharge goals and expectations with patient/family.

## 2018-10-02 NOTE — IP AVS SNAPSHOT
MRN:[id number]                      After Visit Summary   10/2/2018    Jake Duane Pfleiger    MRN: [id number]           Thank you!     Thank you for choosing [facility] for your care. Our goal is always to provide you with excellent care. Hearing back from our patients is one way we can continue to improve our services. Please take a few minutes to complete the written survey that you may receive in the mail after you visit. If you would like to speak to someone directly about your visit please contact Patient Relations at [phone]. Thank you!          Patient Information     Date Of Birth          6/4/1931        Designated Caregiver       Most Recent Value    Caregiver    Will someone help with your care after discharge? yes    Name of designated caregiver Son-Duane    Phone number of caregiver unknown    Caregiver address unknown      About your hospital stay     You were admitted on:  October 2, 2018 You last received care in the:  [place] [address] [facility]    You were discharged on:  October 7, 2018        Reason for your hospital stay       You were admitted for lower GI bleeding which appears to have been due to diverticula.  You underwent 2 colonoscopies and was seen by a GI specialist.  After discussion, it seems most reasonable not to restart her Coumadin in the short-term but rather recheck lab work in clinic and reevaluate for any evidence of ongoing bleeding.                  Who to Call     For medical emergencies, please call 541.  For non-urgent questions about your medical care, please call your primary care provider or clinic, [phone]  For questions related to your surgery, please call your surgery clinic        Attending Provider     Provider Specialty    Wil Avery MD Emergency Medicine    Luis Berrios MD Internal Medicine       Primary Care Provider Office Phone # Fax #    Herve Micki [phone]      After Care  Instructions     Activity       Your activity upon discharge: activity as tolerated            Diet       Follow this diet upon discharge: Orders Placed This Encounter  Regular diet                  Follow-up Appointments     Follow-up and recommended labs and tests        Follow up with primary care provider, Herve Sweet, within 7-14 days for hospital follow- up.  The following labs/tests are recommended: Recheck hemoglobin and discuss whether eventually restarting your Coumadin is reasonable at that point..                  Further instructions from your care team         Understanding Diverticulosis and Diverticulitis     Pouches or diverticula usually occur in the lower part of the colon called the sigmoid.      Diverticulitis occurs when the pouches become inflamed.     The colon (large intestine) is the last part of the digestive tract. It absorbs water from stool and changes it from a liquid to a solid. In certain cases, small pouches called diverticula can form in the colon wall. This condition is called diverticulosis. The pouches can become infected. If this happens, it becomes a more serious problem called diverticulitis. These problems can be painful. But they can be managed.   Managing Your Condition  Diet changes or taking medications are often tried first. These may be enough to bring relief. If the case is bad, surgery may be done. You and your doctor can discuss the plan that is best for you.  If You Have Diverticulosis  Diet changes are often enough to control symptoms. The main changes are adding fiber (roughage) and drinking more water. Fiber absorbs water as it travels through your colon. This helps your stool stay soft and move smoothly. Water helps this process. If needed, you may be told to take over-the-counter stool softeners. To help relieve pain, antispasmodic medications may be prescribed.  If You Have Diverticulitis  Treatment depends on how bad your symptoms are.  For mild  symptoms: You may be put on a liquid diet for a short time. You may also be prescribed antibiotics. If these two steps relieve your symptoms, you may then be prescribed a high-fiber diet. If you still have symptoms, your doctor will discuss further treatment options with you.  For severe symptoms: You may need to be admitted to the hospital. There, you can be given IV antibiotics and fluids. Once symptoms are under control, the above treatments may be tried. If these don t control your condition, your doctor may discuss the option of having surgery with you.  [place] to Colon Health  Help keep your colon healthy with a diet that includes plenty of high-fiber fruits, vegetables, and whole grains. Drink plenty of liquids like water and juice. Your doctor may also recommend avoiding seeds and nuts.          [id number] [facility], [address] [place] [address], [place] [id number]. All rights reserved. This information is not intended as a substitute for professional medical care. Always follow your healthcare professional's instructions.        HIGH FIBER DIET  Fiber is present in all fruits, vegetables, cereals and grains. Fiber passes through the body undigested. A high fiber diet helps food move through the intestinal tract. The added bulk is helpful in preventing constipation. In people with diverticulosis it serves to clean out the pouches along the colon wall while preventing new ones from forming. A high fiber diet also reduces the risk of colon cancer, decreases blood cholesterol and prevents high blood sugar in people with diabetes.    The foods listed below are high in fiber and should be included in your diet. If you are not used to high fiber foods, start with 1 or 2 foods from this list. Every 3-4 days add a new one to your diet until you are eating 4 high fiber foods per day. This should give you 20-35 Gm of fiber/day. It is also important to drink a lot of water when you are on this diet (6-8 glasses a day).  Water causes the fiber to swell and increases the benefit.    FOODS HIGH IN DIETARY FIBER:  BREADS: Made with 100% whole wheat flour; digna, wheat or rye crackers; tortillas, bran muffins  CEREALS: Whole grain cereal with bran (Chex, Raisin Bran, Corn Bran), oatmeal, rolled oats, granola, wheat flakes, brown rice  NUTS: Any nuts  FRUITS: All fresh fruits along with edible skins, (bananas, citrus fruit, mangoes, pears, prunes, raisins, apples, pineapple, apricot, melon, jams and marmalades), fruit juices (especially prune juice)  VEGETABLES: All types, preferably raw or lightly cooked: especially, celery, eggplant, potatoes, spinach, broccoli, brussel sprouts, winter squash, carrots, cauliflower, soybeans, lentils, fresh and dried beans of all kinds  OTHER: Popcorn, any spices      [id number] [place] [address]. All rights reserved. This information is not intended as a substitute for professional medical care. Always follow your healthcare professional's instructions.    Pending Results     No orders found from 9/30/2018 to 10/3/2018.            Statement of Approval     Ordered          10/07/18 1021  I have reviewed and agree with all the recommendations and orders detailed in this document.  EFFECTIVE NOW     Approved and electronically signed by:  Allen Mart MD             Admission Information     Date & Time Provider Department Dept. Phone    10/2/2018 Luis Berrios MD [place] [address] [facility] [phone]      Your Vitals Were     Blood Pressure Pulse Temperature Respirations Height Weight    160/83 (BP Location: Right arm) 95 96.8  F (36  C) (Oral) 16 1.829 m (6') 87.1 kg (192 lb)    Pulse Oximetry BMI (Body Mass Index)                97% 26.04 kg/m2          MyChart Information     MyChart lets you send messages to your doctor, view your test results, renew your prescriptions, schedule appointments and more. To sign up, go to  [email or web address] . Click on \"Log in\" on the left side of the screen, which will take you to the Welcome page. Then click on \"Sign up Now\" on the right side of the page.     You will be asked to enter the access code listed below, as well as some personal information. Please follow the directions to create your username and password.     Your access code is: [email or web address]  Expires: 2018  8:39 AM     Your access code will  in 90 days. If you need help or a new code, please call your [place] clinic or [phone].        Care EveryWhere ID     This is your Care EveryWhere ID. This could be used by other organizations to access your [place] medical records  [id number]        Equal Access to Services     MELANY PAGE : Lalo Rodriguez, haley caraballo, osman vasquez, casandra koch . [facility] [phone].    ATENCIÓN: Si habla español, tiene a salazar disposición servicios gratuitos de asistencia lingüística. Rachel al [phone].    We comply with applicable federal civil rights laws and [place] laws. We do not discriminate on the basis of race, color, national origin, age, disability, sex, sexual orientation, or gender identity.               Review of your medicines      CONTINUE these medicines which have NOT CHANGED        Dose / Directions    AMBIEN PO        Dose:  5 mg   Take 5 mg by mouth At Bedtime   Refills:  0       atenolol 25 MG tablet   Commonly known as:  TENORMIN        Dose:  25 mg   Take 25 mg by mouth daily   Refills:  0       brimonidine 0.15 % ophthalmic solution   Commonly known as:  ALPHAGAN-P        Dose:  1 drop   Place 1 drop into the right eye 2 times daily   Refills:  0       fish oil-omega-3 fatty acids 1000 MG capsule        Dose:  2 g   Take 2 g by mouth daily   Refills:  0       furosemide 20 MG tablet   Commonly known as:  LASIX   Used for:  Congestive heart failure, unspecified congestive heart failure chronicity, " unspecified congestive heart failure type        Dose:  20 mg   Take 1 tablet (20 mg) by mouth daily   Quantity:  30 tablet   Refills:  1       nystatin cream   Commonly known as:  MYCOSTATIN        Apply topically 2 times daily as needed for dry skin   Refills:  0       PAXIL 20 MG tablet   Generic drug:  PARoxetine        Dose:  20 mg   Take 20 mg by mouth every morning.   Refills:  0       polyethylene glycol Packet   Commonly known as:  MIRALAX/GLYCOLAX        Dose:  1 packet   Take 1 packet by mouth as needed   Refills:  0       PRILOSEC PO        Dose:  20 mg   Take 20 mg by mouth every morning.   Refills:  0       PRINIVIL PO        Dose:  10 mg   Take 10 mg by mouth daily   Refills:  0       SYNTHROID PO        Dose:  100 mcg   Take 100 mcg by mouth daily.   Refills:  0       TRAMADOL HCL PO        Dose:  50 mg   Take 50 mg by mouth as needed   Refills:  0       TYLENOL 8 HOUR ARTHRITIS PAIN 650 MG CR tablet   Generic drug:  acetaminophen        Dose:  650 mg   Take 650 mg by mouth 2 times daily   Refills:  0         STOP taking     warfarin 5 MG tablet   Commonly known as:  COUMADIN                    Protect others around you: Learn how to safely use, store and throw away your medicines at www.disposemymeds.org.             Medication List: This is a list of all your medications and when to take them. Check marks below indicate your daily home schedule. Keep this list as a reference.      Medications           Morning Afternoon Evening Bedtime As Needed    AMBIEN PO   Take 5 mg by mouth At Bedtime   Last time this was given:  5 mg on 10/7/2018  1:19 AM   Next Dose Due:  Resume as Prescribed tonight 10/7/2018 at bedtime                                atenolol 25 MG tablet   Commonly known as:  TENORMIN   Take 25 mg by mouth daily   Last time this was given:  25 mg on 10/7/2018  7:56 AM   Next Dose Due:  Resume as Prescribed tomorrow 10/8/2018                                brimonidine 0.15 % ophthalmic  solution   Commonly known as:  ALPHAGAN-P   Place 1 drop into the right eye 2 times daily   Next Dose Due:  Resume as Prescribed                                fish oil-omega-3 fatty acids 1000 MG capsule   Take 2 g by mouth daily   Next Dose Due:  Resume as Prescribed                                furosemide 20 MG tablet   Commonly known as:  LASIX   Take 1 tablet (20 mg) by mouth daily   Next Dose Due:  Resume as Prescribed                                nystatin cream   Commonly known as:  MYCOSTATIN   Apply topically 2 times daily as needed for dry skin   Next Dose Due:  Resume as Prescribed                                 PAXIL 20 MG tablet   Take 20 mg by mouth every morning.   Last time this was given:  20 mg on 10/7/2018  7:56 AM   Generic drug:  PARoxetine   Next Dose Due:  Resume as Prescribed tomorrow 10/8/2018                                polyethylene glycol Packet   Commonly known as:  MIRALAX/GLYCOLAX   Take 1 packet by mouth as needed   Next Dose Due:  Resume as Prescribed                                PRILOSEC PO   Take 20 mg by mouth every morning.   Next Dose Due:  Resume as Prescribed                                 PRINIVIL PO   Take 10 mg by mouth daily   Last time this was given:  10 mg on 10/7/2018  7:56 AM   Next Dose Due:  Resume as Prescribed tomorrow 10/8/2018                                SYNTHROID PO   Take 100 mcg by mouth daily.   Last time this was given:  100 mcg on 10/7/2018  7:56 AM   Next Dose Due:  Resume as Prescribed tomorrow 10/8/2018                                TRAMADOL HCL PO   Take 50 mg by mouth as needed   Next Dose Due:  Resume as Prescribed                                TYLENOL 8 HOUR ARTHRITIS PAIN 650 MG CR tablet   Take 650 mg by mouth 2 times daily   Generic drug:  acetaminophen   Next Dose Due:  Resume as Prescribed

## 2018-10-02 NOTE — ED NOTES
Patient presents from home with rectal bleeding. Patient states bright, red, diarrhea stool started yesterday. Patient does have a history of hemorrhoids. Patient states he has also felt dizzy and weak since yesterday. ABCDs intact, alert and oriented x 4.

## 2018-10-02 NOTE — ED PROVIDER NOTES
History     Chief Complaint:  Rectal Bleeding    HPI   Jake Duane Pfleiger is a 87 year old male anticoagulated on Coumadin for afib who presents to the emergency department today for evaluation of rectal bleeding. The patient reports that yesterday he started having bloody diarrhea, he describes it as bright red and some darker stools. He reports one episode of diarrhea today and 3 yesterday. He notes a similar episode of bleeding which only lasted one day as well as a history of hemorrhoids. He denies any abdominal pain, fever, vomiting, chest pain, or shortness of breath.     Allergies:  No Known Drug Allergies     Medications:    Tylenol  Tenormin  Lasix  Xalatan  Synthroid  Lisinopril  Prilosec  Paxil  Miralax  Tramadol  Coumadin  Ambien    Past Medical History:    Anxiety  Arthritis  Atrial fibrillation  Cardiomyopathy  GERD  Hypertension  Pacemaker  Right patella fracture  Tachy-jairo syndrome  Thyroid disease    Past Surgical History:    Left hip replacement  Right knee replacement  Cholecystectomy  Right eye cataract removal   Dual chamber pacemaker implant  Back surgery    Family History:    Mother: Cancer    Social History:  Smoking Status: Former Smoker, quit in 1982  Smokeless Tobacco: Never Used  Alcohol Use: Negative  Marital Status:     PCP: Dr. Saenz    Review of Systems   Constitutional: Negative for fever.   Respiratory: Negative for shortness of breath.    Cardiovascular: Negative for chest pain.   Gastrointestinal: Positive for anal bleeding, blood in stool and diarrhea. Negative for abdominal pain, nausea and vomiting.   All other systems reviewed and are negative.      Physical Exam     Patient Vitals for the past 24 hrs:   BP Temp Temp src Heart Rate Resp SpO2   10/02/18 1300 - - - 77 20 99 %   10/02/18 1245 135/90 - - 79 - 97 %   10/02/18 1230 114/80 - - - - -   10/02/18 1215 118/75 - - 71 - 93 %   10/02/18 1200 130/69 - - 77 13 100 %   10/02/18 1147 - - - 72 - 98 %   10/02/18 1146  - - - - - 97 %   10/02/18 1145 128/76 - - - - -   10/02/18 1130 111/70 - - 74 14 98 %   10/02/18 1115 121/57 - - - - (!) 85 %   10/02/18 1106 121/69 98  F (36.7  C) Oral 70 16 98 %         Physical Exam   Constitutional: He appears well-developed and well-nourished.   HENT:   Right Ear: External ear normal.   Left Ear: External ear normal.   Mouth/Throat: Oropharynx is clear and moist. No oropharyngeal exudate.   TM's clear bilaterally   Eyes: Conjunctivae are normal. Pupils are equal, round, and reactive to light. No scleral icterus.   Neck: Normal range of motion. Neck supple.   Cardiovascular: Normal rate, regular rhythm, normal heart sounds and intact distal pulses.  Exam reveals no gallop and no friction rub.    No murmur heard.  Pulmonary/Chest: Effort normal and breath sounds normal. No respiratory distress. He has no wheezes. He has no rales.   Abdominal: Soft. Bowel sounds are normal. He exhibits no distension and no mass. There is no tenderness.   Genitourinary:   Genitourinary Comments: Bright red blood in the rectum. No black stool.    Musculoskeletal: Normal range of motion. He exhibits no edema.   Neurological: He is alert.   Skin: Skin is warm and dry. No rash noted.   Psychiatric: He has a normal mood and affect.       Emergency Department Course     ECG:  ECG taken at 1119, ECG read at 1124  Atrial fibrillation with occasional ventricular-paced complexes  Abnormal ECG  Rate 66 bpm. HI interval * ms. QRS duration 80 ms. QT/QTc 402/421 ms. P-R-T axes * 25 34.    Laboratory:  Laboratory findings were communicated with the patient who voiced understanding of the findings.    CBC: WBC 7.0, HGB 10.2 (L),   CMP: Calcium 8.2 (L), Protein Total 6.0 (L) o/w WNL (Creatinine 0.94)  INR: 2.64 (H)  Lactic Acid (Resulted: 1151): 1.1  ABO/Rh type and screen: ABO: O, Rh(D): Pos, Antibody Screen: Neg    Interventions:  1139 NS 1000 ml IV  1150 Protonix 40 mg IV    Emergency Department Course:    1105 Nursing  notes and vitals reviewed.    1111 I performed an exam of the patient as documented above.     1132 IV was inserted and blood was drawn for laboratory testing, results above.    1256 I spoke with Dr. Burroughs of the gastroenterology service regarding patient's presentation, findings, and plan of care.    1310 I discussed the treatment plan with the patient. They expressed understanding of this plan and consented to admission. I discussed the patient with PRACHI Adan for Dr. Berrios, who will admit the patient to a monitored bed for further evaluation and treatment.    I personally reviewed the laboratory and ECG results with the patient and answered all related questions prior to admission.    Impression & Plan      Medical Decision Making:  Jake Duane Pfleiger is a 87 year old male who presents to the emergency department today for evaluation of lower GI bleeding since yesterday. Patient had bright blood in rectum on my exam and eventually had one bowel movement that was soft and mushy and mixed in with red blood. He had no abdominal pain therefore likely lower GI bleed probably due to Coumadin use. He was not hypotensive or tachycardic. Blood pressure has been stable. He was kept NPO here. I did talk to Dr. Burroughs who will evaluated him. Patient admitted to the observation unit for further evaluation and treatment.     Diagnosis:    ICD-10-CM    1. Lower GI bleed K92.2      Disposition:   The patient is admitted into the care of Dr. Berrios.    Scribe Disclosure:  uSzi PERRY, am serving as a scribe at 11:23 AM on 10/2/2018 to document services personally performed by Wil Avery MD based on my observations and the provider's statements to me.    [facility] EMERGENCY DEPARTMENT       Wil Avery MD  10/02/18 6241

## 2018-10-02 NOTE — PLAN OF CARE
Problem: Patient Care Overview  Goal: Plan of Care/Patient Progress Review  Outcome: No Change  PRIMARY DIAGNOSIS: GI BLEED    OUTPATIENT/OBSERVATION GOALS TO BE MET BEFORE DISCHARGE  Orthostatic performed: Yes:            Lying Orthostatic BP: 132/53, HR 76          Sitting Orthostatic BP: 142/71, HR 82           Standing Orthostatic BP: 140/63, HR 85     1. Stable Hgb Yes.   Recent Labs   Lab Test  10/02/18   1132     HGB  10.2*         2. Resolved or declined bleeding episodes: Yes Last episode: In ED this AM.    3. Appropriate testing complete: N/A    4. Cleared for discharge by consultants (if involved): No - GI following.     5. Safe discharge environment identified: Yes    Vitals stable. Reports very mild abdominal discomfort with palpation, otherwise denies pain. Denies nausea. No bowel movement since admission to unit. Reports mild dizziness with position changes, but resolves quickly. Orthos negative (see above). IVF infusing at 75 mL/hour. Continue clear liquid diet, monitor for stools, and re-check hemoglobin at 1800.     Discharge Planner Nurse   Safe discharge environment identified: Yes  Barriers to discharge: No       Entered by: Davida Strauss 10/02/2018      Please review provider order for any additional goals.   Nurse to notify provider when observation goals have been met and patient is ready for discharge.

## 2018-10-02 NOTE — H&P
[facility]  Observation Unit  H & P      Patient Name: Jake Duane Pfleiger MRN# [id number]   Age: 87 year old YOB: 1931     Date of Admission:10/2/2018    Primary care provider: Herve Sweet  Date of Service: 10/2/2018         Assessment and Plan:   Jake Duane Pfleiger is a 87 year old male with a history of Chronic Atrial Fibrillation s/p PPM, HTN, Diastolic CHF, GERD, Hypothyroidism, Depression, Insomnia who presents to the ED today 2/2 Rectal Bleeding.      Acute Gastrointestinal Bleeding -  Pt presenting with BRBPR for two days with no associated abdominal pain.  History of hemorrhoids, diverticulosis and GERD currently on warfarin for chronic afib.  Hemoglobin on admission 10.2 with baseline hemoglobin ~12.  Suspect lower source based on painless bright red bleeding per history.  GI consulted in the ED and will see in evaluation.    - 2 large bore IV  - type and screen/cross  - serial hemoglobin  - hold warfarin  - IV Protonix  - npo until seen by GI  - GI consult    Chronic Atrial Fibrillation - rate controlled.  Continue home Atenolol.  INR therapeutic.  Will hold in the setting of recent bleeding.  Monitor on telemetry.    Chronic Anemia - hgb 10.2 on admission with recent baseline 10-12. Continue serial hgb checks as above.    Bradycardia s/p Single Chamber PPM - monitor on telemetry    Chronic Diastolic CHF, HTN - appears euvolemic.  BP stable.  Continue home Lisinopril and Atenolol.  Hold Lasix for now.    Hypothyroidism - continue home Levothyroxine    Depression, Anxiety, Insomnia - stable.  Continue home Ambien, Paroxetine.      CODE: Full  Diet/IVF: npo  GI ppx:  protonix  DVT ppx: scd    Dalia Knight MS PA-C  Physician Assistant   Hospitalist Service  Pager: [phone]           Chief Complaint:   Rectal Bleeding         HPI:   87 year old male with a history of Chronic Atrial Fibrillation s/p PPM, HTN, Diastolic CHF, GERD, Hypothyroidism, Depression,  Insomnia who presents to the ED today 2/2 Rectal Bleeding.    Patient reports he developed rectal bleeding yesterday morning.  He had three soft stools with some dark red stool and bright red blood present.  He has had two stools so far today which he reports are more bright red in color.  He denies abdominal pain, nausea, emesis, chest pain, shortness of breath, lightheadedness, fevers, chills.  He reports a similar bleeding episode 4-5 months ago which resolved spontaneously, so he did not seek treatment.  He has a history of hemorrhoids.  Last colonoscopy was approximately 7 years ago.  He denies any recent reflux symptoms, etoh use, NSAIDs use.  He is on warfarin for afib and reports his INR has been stable recently.      ED work up revealed patient hemodynamically stable, afebrile on room air.  Laboratory work up revealed Hgb 10.2, INR 2.64 with otherwise normal CMP, Lactic Acid, CBC.  Patient received IVF, Protonix and admitted for further management.         Past Medical History:     Past Medical History:   Diagnosis Date     Anxiety      Arthritis      Atrial fibrillation (H)      Cardiomyopathy (H)      Gastro-oesophageal reflux disease      Hypertension      Pacemaker     [employer] Dual Pacemaker     Right patella fracture      Tachy-jairo syndrome (H)      Thyroid disease     Hypothyrodism           Past Surgical History:     Past Surgical History:   Procedure Laterality Date     ARTHROPLASTY HIP  1/21/2014    left hip replacement     ARTHROPLASTY KNEE      right      CHOLECYSTECTOMY       EYE SURGERY      right eye cataract removal      IMPLANT PACEMAKER  03/2012    dual chamber      ORTHOPEDIC SURGERY      back surgery           Social History:     Social History     Social History     Marital status:      Spouse name: N/A     Number of children: N/A     Years of education: N/A     Occupational History     Not on file.     Social History Main Topics     Smoking status: Former Smoker     Packs/day:  0.50     Years: 25.00     Types: Cigarettes, Pipe, Cigars     Quit date: 1/13/1982     Smokeless tobacco: Never Used     Alcohol use No     Drug use: No     Sexual activity: Not on file     Other Topics Concern     Caffeine Concern No     occ - mostly decaf     Special Diet Yes     smaller portions     Exercise No     some walking     Social History Narrative          Family History:     Family History   Problem Relation Age of Onset     Other Cancer Mother      Unknown/Adopted Father           Allergies:    No Known Allergies       Medications:     Prior to Admission medications    Medication Sig Last Dose Taking? Auth Provider   acetaminophen (TYLENOL) 325 MG tablet Take 2 tablets by mouth every 8 hours. Do not mix with other tylenol containing medications.  Patient taking differently: Take 650 mg by mouth every 8 hours as needed Do not mix with other tylenol containing medications.  Yes Sarabjit Euceda, DO   atenolol (TENORMIN) 25 MG tablet Take 1 tablet by mouth daily.  Patient taking differently: Take 50 mg by mouth daily   Yes Sarabjit Euceda,    fish oil-omega-3 fatty acids (FISH OIL) 1000 MG capsule Take 2 g by mouth daily   Yes Unknown, Entered By History   furosemide (LASIX) 20 MG tablet Take 1 tablet (20 mg) by mouth daily  Yes Juwan Maher MD   latanoprost (XALATAN) 0.005 % ophthalmic solution Place 1 drop into the right eye At Bedtime  Yes Unknown, Entered By History   Levothyroxine Sodium (SYNTHROID PO) Take 100 mcg by mouth daily.  Yes Unknown, Entered By History   Lisinopril (PRINIVIL PO) Take 10 mg by mouth  Yes Reported, Patient   Omeprazole (PRILOSEC PO) Take 20 mg by mouth every morning.  Yes Reported, Patient   paroxetine (PAXIL) 20 MG tablet Take 20 mg by mouth every morning.  Yes Unknown, Entered By History   polyethylene glycol (MIRALAX/GLYCOLAX) packet Take 1 packet by mouth three times a week   Yes Reported, Patient   TRAMADOL HCL PO Take 50 mg by mouth every 6 hours as needed    Yes Reported, Patient   warfarin (COUMADIN) 5 MG tablet Take 5 mg by mouth every evening Every day except Monday.  Yes Sarabjit Euceda DO   Zolpidem Tartrate (AMBIEN PO) Take 10 mg by mouth nightly as needed   Yes Unknown, Entered By History          Review of Systems:   A complete ROS was performed and is negative other than what is stated in the HPI.       Physical Exam:   Blood pressure 135/90, temperature 98  F (36.7  C), temperature source Oral, resp. rate 20, SpO2 99 %. on room air  General: Alert, interactive, NAD, lying in bed with family at the bedside  HEENT: AT/NC, sclera anicteric, PERRL, EOMI  Chest/Resp: clear to auscultation bilaterally, no crackles or wheezes  Heart/CV: irregular rate and rhythm, 2/6 murmur  Abdomen/GI: Soft, nontender, nondistended. +BS.  No rebound or guarding.  Extremities/MSK: Trace bilateral LE ankle edema  Skin: Warm and dry  Neuro: Alert & oriented, no focal deficits, moves all extremities equally         Labs:   ROUTINE ICU LABS (Last four results)  CMP  Recent Labs  Lab 10/02/18  1132      POTASSIUM 4.7   CHLORIDE 104   CO2 28   ANIONGAP 4   GLC 91   BUN 19   CR 0.94   GFRESTIMATED 75   GFRESTBLACK >90   MANAS 8.2*   PROTTOTAL 6.0*   ALBUMIN 3.4   BILITOTAL 0.6   ALKPHOS 65   AST 17   ALT 15     CBC  Recent Labs  Lab 10/02/18  1132   WBC 7.0   RBC 3.60*   HGB 10.2*   HCT 32.8*   MCV 91   MCH 28.3   MCHC 31.1*   RDW 14.2        INR  Recent Labs  Lab 10/02/18  1132   INR 2.64*

## 2018-10-02 NOTE — CONSULTS
Gastroenterology Consultation      Jake Duane Pfleiger MRN# [id number]   YOB: 1931 Age: 87 year old   Date of Admission: 10/2/2018     Reason for consult: I was asked by Dalia Knight to evaluate this patient for gastrointestinal bleeding.               History of Present Illness:   This patient is a 87 year old male who presents with a GI bleed.  He had fresh blood one day a few months ago.  Yesterday he had three stools that were all bright red liquid.  Today, he had two more.  He has been a little dizzy, otherwise he feels well.  He takes Prilosec daily which controls his heartburn.  He has no dysphagia or problems eating.  He takes no NSAIDS and has never had an ulcer.  His last colonoscopy was about ten years ago.   He has diverticulosis.  There is no family history of colon cancer.  He has not had any abdominal pain.  His vitals are stable and his hemoglobin is down three grams from baseline.  He is therapeutic on coumadin.              Past Medical History:     Past Medical History:   Diagnosis Date     Anxiety      Arthritis      Atrial fibrillation (H)      Cardiomyopathy (H)      Gastro-oesophageal reflux disease      Hypertension      Pacemaker     [employer] Dual Pacemaker     Right patella fracture      Tachy-jairo syndrome (H)      Thyroid disease     Hypothyrodism              Past Surgical History:     Past Surgical History:   Procedure Laterality Date     ARTHROPLASTY HIP  1/21/2014    left hip replacement     ARTHROPLASTY KNEE      right      CHOLECYSTECTOMY       EYE SURGERY      right eye cataract removal      IMPLANT PACEMAKER  03/2012    dual chamber      ORTHOPEDIC SURGERY      back surgery                Social History:     Social History     Social History     Marital status:      Spouse name: N/A     Number of children: N/A     Years of education: N/A     Occupational History     Not on file.     Social History Main Topics     Smoking status: Former Smoker     Packs/day:  0.50     Years: 25.00     Types: Cigarettes, Pipe, Cigars     Quit date: 1/13/1982     Smokeless tobacco: Never Used     Alcohol use No     Drug use: No     Sexual activity: Not on file     Other Topics Concern     Caffeine Concern No     occ - mostly decaf     Special Diet Yes     smaller portions     Exercise No     some walking     Social History Narrative             Family History:     Family History   Problem Relation Age of Onset     Other Cancer Mother      Unknown/Adopted Father              Allergies:    No Known Allergies          Medications:     Current Outpatient Prescriptions   Medication Sig Dispense Refill     acetaminophen (TYLENOL) 325 MG tablet Take 2 tablets by mouth every 8 hours. Do not mix with other tylenol containing medications. (Patient taking differently: Take 650 mg by mouth every 8 hours as needed Do not mix with other tylenol containing medications.) 250 tablet      atenolol (TENORMIN) 25 MG tablet Take 1 tablet by mouth daily. (Patient taking differently: Take 50 mg by mouth daily ) 30 tablet 1     fish oil-omega-3 fatty acids (FISH OIL) 1000 MG capsule Take 2 g by mouth daily        furosemide (LASIX) 20 MG tablet Take 1 tablet (20 mg) by mouth daily 30 tablet 1     latanoprost (XALATAN) 0.005 % ophthalmic solution Place 1 drop into the right eye At Bedtime       Levothyroxine Sodium (SYNTHROID PO) Take 100 mcg by mouth daily.       Lisinopril (PRINIVIL PO) Take 10 mg by mouth       Omeprazole (PRILOSEC PO) Take 20 mg by mouth every morning.       paroxetine (PAXIL) 20 MG tablet Take 20 mg by mouth every morning.       polyethylene glycol (MIRALAX/GLYCOLAX) packet Take 1 packet by mouth three times a week        TRAMADOL HCL PO Take 50 mg by mouth every 6 hours as needed        warfarin (COUMADIN) 5 MG tablet Take 5 mg by mouth every evening Every day except Monday.       Zolpidem Tartrate (AMBIEN PO) Take 10 mg by mouth nightly as needed                Review of Systems:   10-point  review of systems negative except as noted in HPI.            Physical Exam:   Vitals were reviewed  /80  Temp 98  F (36.7  C) (Oral)  Resp (!) 7  SpO2 92%  Constitutional:   No distress     Heent:   NC/AT, sclera anicteric     Neck:   No adenopathy, thyroid not palpable   Respiratory:   CTA anteriorly     Cardiovascular:   RRR, no murmur     Gastrointestinal:   Active BS, soft, minimal tenderness LLQ, no HSM     Extremities:   No clubbing, cyanosis or edema   Neuro:   Grossly nonfocal     Skin:   No rashes or ecchymoses   Psychiatry:        No evidence of anxiety or depression         Data:     ROUTINE IP LABS  BMP  Last Comprehensive Metabolic Panel:  Sodium   Date Value Ref Range Status   10/02/2018 136 133 - 144 mmol/L Final     Potassium   Date Value Ref Range Status   10/02/2018 4.7 3.4 - 5.3 mmol/L Final     Chloride   Date Value Ref Range Status   10/02/2018 104 94 - 109 mmol/L Final     Carbon Dioxide   Date Value Ref Range Status   10/02/2018 28 20 - 32 mmol/L Final     Anion Gap   Date Value Ref Range Status   10/02/2018 4 3 - 14 mmol/L Final     Glucose   Date Value Ref Range Status   10/02/2018 91 70 - 99 mg/dL Final     Urea Nitrogen   Date Value Ref Range Status   10/02/2018 19 7 - 30 mg/dL Final     Creatinine   Date Value Ref Range Status   10/02/2018 0.94 0.66 - 1.25 mg/dL Final     GFR Estimate   Date Value Ref Range Status   10/02/2018 75 >60 mL/min/1.7m2 Final     Comment:     Non  GFR Calc     Calcium   Date Value Ref Range Status   10/02/2018 8.2 (L) 8.5 - 10.1 mg/dL Final       CBC  Lab Results   Component Value Date    WBC 7.0 10/02/2018     Lab Results   Component Value Date    RBC 3.60 10/02/2018     Lab Results   Component Value Date    HGB 10.2 10/02/2018     Lab Results   Component Value Date    HCT 32.8 10/02/2018     No components found for: MCT  Lab Results   Component Value Date    MCV 91 10/02/2018     Lab Results   Component Value Date    MCH 28.3  10/02/2018     Lab Results   Component Value Date    MCHC 31.1 10/02/2018     Lab Results   Component Value Date    RDW 14.2 10/02/2018     Lab Results   Component Value Date     10/02/2018       INR  Lab Results   Component Value Date    INR 2.64 10/02/2018       PANCREASNo lab results found in last 7 days.  LFT  Recent Labs  Lab 10/02/18  1132   PROTTOTAL 6.0*   ALBUMIN 3.4   BILITOTAL 0.6   ALKPHOS 65   AST 17   ALT 15                Assessment and Plan:   I suspect the bleeding is diverticular, or at least colonic.  Prilosec should prevent most upper GI sources of bleeding, and if he was bleeding bright red from an UGI source, his hemoglobin should be lower and his vitals unstable.  I would like to hold his coumadin and watch him on a clear liquid diet.  As long as the bleeding tapers off, I would not do anything further.  If there is increased bleeding, I would give him Golytely.     Kobi Burroughs MD  [facility]  Office:  [phone]

## 2018-10-02 NOTE — PHARMACY-ADMISSION MEDICATION HISTORY
Admission medication history interview status for this patient is complete. See [facility] admission navigator for allergy information, prior to admission medications and immunization status.     Medication history interview source(s):Patient & daughter-in-law  Medication history resources (including written lists, pill bottles, clinic record): Care Everywhere records, pt's own med list.  Primary pharmacy: [facility] mail order    Changes made to PTA medication list:  Added:  Nystatin cream, Alphagan P  Deleted:  Xalatan  Changed:   - Tylenol 650 q8h ---> 650mg BID  - Miralax three times a week ---> prn  - Tramadol 50mg q6h prn ---> 50mg prn  - Warfarin 5mg daily except Monday -----> 5mg daily  - Ambien 10mg at bedtime prn ---> 5mg qhs    Actions taken by pharmacist (provider contacted, etc): Called [facility] mail order to verify eye drops.     Additional medication history information: None    Medication reconciliation/reorder completed by provider prior to medication history? No    Do you take OTC medications (eg tylenol, ibuprofen, fish oil, eye/ear drops, etc)?  Yes, as listed.    For patients on insulin therapy:  No     Prior to Admission medications    Medication Sig Last Dose Taking? Auth Provider   acetaminophen (TYLENOL 8 HOUR ARTHRITIS PAIN) 650 MG CR tablet Take 650 mg by mouth 2 times daily 10/1/2018 at   Yes Unknown, Entered By History   atenolol (TENORMIN) 25 MG tablet Take 25 mg by mouth daily 10/2/2018 at am Yes Unknown, Entered By History   brimonidine (ALPHAGAN-P) 0.15 % ophthalmic solution Place 1 drop into the right eye 2 times daily 10/1/2018 at pm Yes Unknown, Entered By History   fish oil-omega-3 fatty acids (FISH OIL) 1000 MG capsule Take 2 g by mouth daily  10/1/2018 at   Yes Unknown, Entered By History   furosemide (LASIX) 20 MG tablet Take 1 tablet (20 mg) by mouth daily 10/2/2018 at am Yes Juwan Maher MD   Levothyroxine Sodium (SYNTHROID PO) Take 100 mcg by mouth daily. 10/2/2018 at am Yes  Unknown, Entered By History   Lisinopril (PRINIVIL PO) Take 10 mg by mouth daily  10/2/2018 at am Yes Reported, Patient   nystatin (MYCOSTATIN) cream Apply topically 2 times daily as needed for dry skin prn Yes Unknown, Entered By History   Omeprazole (PRILOSEC PO) Take 20 mg by mouth every morning. 10/2/2018 at am Yes Reported, Patient   paroxetine (PAXIL) 20 MG tablet Take 20 mg by mouth every morning. 10/2/2018 at am Yes Unknown, Entered By History   polyethylene glycol (MIRALAX/GLYCOLAX) packet Take 1 packet by mouth as needed  prn Yes Reported, Patient   TRAMADOL HCL PO Take 50 mg by mouth as needed  prn Yes Reported, Patient   warfarin (COUMADIN) 5 MG tablet Take 5 mg by mouth every evening  10/1/2018 at pm Yes Sarabjit Euceda DO   Zolpidem Tartrate (AMBIEN PO) Take 5 mg by mouth At Bedtime  10/1/2018 at hs Yes Unknown, Entered By History

## 2018-10-02 NOTE — IP AVS SNAPSHOT
[place] [address] [facility]    [address]    [facility] [address]    Phone:  [phone]    Fax:  [phone]                                       After Visit Summary   10/2/2018    Jake Duane Pfleiger    MRN: [id number]           After Visit Summary Signature Page     I have received my discharge instructions, and my questions have been answered. I have discussed any challenges I see with this plan with the nurse or doctor.    ..........................................................................................................................................  Patient/Patient Representative Signature      ..........................................................................................................................................  Patient Representative Print Name and Relationship to Patient    ..................................................               ................................................  Date                                   Time    ..........................................................................................................................................  Reviewed by Signature/Title    ...................................................              ..............................................  Date                                               Time          22EPIC Rev 08/18

## 2018-10-02 NOTE — ED NOTES
"[facility]  ED Nurse Handoff Report    Jake Duane Pfleiger is a 87 year old male anticoagulated on Coumadin for afib who presents to the emergency department today for evaluation of rectal bleeding. The patient reports that yesterday he started having bloody diarrhea, he describes it as bright red and some darker stools. He reports one episode of diarrhea today and 3 yesterday. He notes a similar episode of bleeding which only lasted one day as well as a history of hemorrhoids. He denies any abdominal pain, fever, vomiting, chest pain, or shortness of breath.       ED Chief complaint: Rectal Bleeding  . ED Diagnosis:   Final diagnoses:   None     Allergies: No Known Allergies    Code Status: Full Code  Activity level - Baseline/Home:  Independent. Activity Level - Current:   Stand with Assist. Lift room needed: No. Bariatric: No   Needed: No   Isolation: No. Infection: Not Applicable.     Vital Signs:   Vitals:    10/02/18 1215 10/02/18 1230 10/02/18 1245 10/02/18 1300   BP: 118/75 114/80 135/90    Resp:    20   Temp:       TempSrc:       SpO2: 93%  97% 99%       Cardiac Rhythm:  ,      Pain level: 0-10 Pain Scale: 0  Patient confused: No. Patient Falls Risk: Yes.   Elimination Status: Has voided   Patient Report - Initial Complaint: \"bright red blood\" in my stool. Focused Assessment: Patient presents from home with rectal bleeding. Patient states bright, red, diarrhea stool started yesterday. Patient does have a history of hemorrhoids. Patient states he has also felt dizzy and weak since yesterday. ABCDs intact, alert and oriented x 4.  Tests Performed: blood labs. Abnormal Results:     Labs Ordered and Resulted from Time of ED Arrival Up to the Time of Departure from the ED   CBC WITH PLATELETS DIFFERENTIAL - Abnormal; Notable for the following:        Result Value    RBC Count 3.60 (*)     Hemoglobin 10.2 (*)     Hematocrit 32.8 (*)     MCHC 31.1 (*)     Absolute Lymphocytes 0.7 (*)     " All other components within normal limits   COMPREHENSIVE METABOLIC PANEL - Abnormal; Notable for the following:     Calcium 8.2 (*)     Protein Total 6.0 (*)     All other components within normal limits   INR - Abnormal; Notable for the following:     INR 2.64 (*)     All other components within normal limits   LACTIC ACID WHOLE BLOOD   CARDIAC CONTINUOUS MONITORING   PULSE OXIMETRY NURSING   PERIPHERAL IV CATHETER   ABO/RH TYPE AND SCREEN     .   Treatments provided: IV fluids, protonix  Family Comments: Daughter in law at bedside   OBS brochure/video discussed/provided to patient:  Yes  ED Medications:   Medications   0.9% sodium chloride BOLUS (0 mLs Intravenous Stopped 10/2/18 1305)     Followed by   sodium chloride 0.9% infusion (not administered)   ondansetron (ZOFRAN) injection 4 mg (not administered)   pantoprazole (PROTONIX) 40 mg IV push injection (40 mg Intravenous Given 10/2/18 1150)     Drips infusing:  No  For the majority of the shift, the patient's behavior Green. Interventions performed were frequent rounding.     Severe Sepsis OR Septic Shock Diagnosis Present: No      ED Nurse Name/Phone Number: Mariann Mondragon,   1:06 PM    RECEIVING UNIT ED HANDOFF REVIEW    Above ED Nurse Handoff Report was reviewed: Yes  Reviewed by: Mercedes Woods on October 2, 2018 at 2:20 PM

## 2018-10-02 NOTE — ED NOTES
Bed: ED02  Expected date: 10/2/18  Expected time:   Means of arrival: Ambulance  Comments:  Alistair 54

## 2018-10-03 LAB
ABO + RH BLD: NORMAL
ABO + RH BLD: NORMAL
BLD GP AB SCN SERPL QL: NORMAL
BLD PROD TYP BPU: NORMAL
BLD PROD TYP BPU: NORMAL
BLD UNIT ID BPU: 0
BLOOD BANK CMNT PATIENT-IMP: NORMAL
BLOOD PRODUCT CODE: NORMAL
BPU ID: NORMAL
COLONOSCOPY: NORMAL
HGB BLD-MCNC: 7.5 G/DL (ref 13.3–17.7)
HGB BLD-MCNC: 9 G/DL (ref 13.3–17.7)
INR PPP: 1.76 (ref 0.86–1.14)
NUM BPU REQUESTED: 2
SPECIMEN EXP DATE BLD: NORMAL
TRANSFUSION STATUS PATIENT QL: NORMAL
TRANSFUSION STATUS PATIENT QL: NORMAL

## 2018-10-03 PROCEDURE — A9270 NON-COVERED ITEM OR SERVICE: HCPCS | Mod: GY | Performed by: PHYSICIAN ASSISTANT

## 2018-10-03 PROCEDURE — P9016 RBC LEUKOCYTES REDUCED: HCPCS | Performed by: EMERGENCY MEDICINE

## 2018-10-03 PROCEDURE — 25000128 H RX IP 250 OP 636: Performed by: PHYSICIAN ASSISTANT

## 2018-10-03 PROCEDURE — 36415 COLL VENOUS BLD VENIPUNCTURE: CPT | Performed by: PHYSICIAN ASSISTANT

## 2018-10-03 PROCEDURE — 85610 PROTHROMBIN TIME: CPT | Performed by: PHYSICIAN ASSISTANT

## 2018-10-03 PROCEDURE — 96361 HYDRATE IV INFUSION ADD-ON: CPT

## 2018-10-03 PROCEDURE — 12000000 ZZH R&B MED SURG/OB

## 2018-10-03 PROCEDURE — G0500 MOD SEDAT ENDO SERVICE >5YRS: HCPCS | Performed by: INTERNAL MEDICINE

## 2018-10-03 PROCEDURE — 45378 DIAGNOSTIC COLONOSCOPY: CPT | Performed by: INTERNAL MEDICINE

## 2018-10-03 PROCEDURE — G0378 HOSPITAL OBSERVATION PER HR: HCPCS

## 2018-10-03 PROCEDURE — C9113 INJ PANTOPRAZOLE SODIUM, VIA: HCPCS | Performed by: PHYSICIAN ASSISTANT

## 2018-10-03 PROCEDURE — 25000132 ZZH RX MED GY IP 250 OP 250 PS 637: Mod: GY | Performed by: PHYSICIAN ASSISTANT

## 2018-10-03 PROCEDURE — 85018 HEMOGLOBIN: CPT | Performed by: PHYSICIAN ASSISTANT

## 2018-10-03 PROCEDURE — 99233 SBSQ HOSP IP/OBS HIGH 50: CPT | Performed by: PHYSICIAN ASSISTANT

## 2018-10-03 PROCEDURE — 25000128 H RX IP 250 OP 636: Performed by: INTERNAL MEDICINE

## 2018-10-03 PROCEDURE — 0DJD8ZZ INSPECTION OF LOWER INTESTINAL TRACT, VIA NATURAL OR ARTIFICIAL OPENING ENDOSCOPIC: ICD-10-PCS | Performed by: INTERNAL MEDICINE

## 2018-10-03 PROCEDURE — 99207 ZZC CDG-MDM COMPONENT: MEETS MODERATE - UP CODED: CPT | Performed by: PHYSICIAN ASSISTANT

## 2018-10-03 PROCEDURE — 96376 TX/PRO/DX INJ SAME DRUG ADON: CPT

## 2018-10-03 RX ORDER — LIDOCAINE 40 MG/G
CREAM TOPICAL
Status: DISCONTINUED | OUTPATIENT
Start: 2018-10-03 | End: 2018-10-07 | Stop reason: HOSPADM

## 2018-10-03 RX ORDER — NALOXONE HYDROCHLORIDE 0.4 MG/ML
.1-.4 INJECTION, SOLUTION INTRAMUSCULAR; INTRAVENOUS; SUBCUTANEOUS
Status: ACTIVE | OUTPATIENT
Start: 2018-10-03 | End: 2018-10-04

## 2018-10-03 RX ORDER — ZOLPIDEM TARTRATE 5 MG/1
5 TABLET ORAL
Status: DISCONTINUED | OUTPATIENT
Start: 2018-10-03 | End: 2018-10-07 | Stop reason: HOSPADM

## 2018-10-03 RX ORDER — BRIMONIDINE TARTRATE 2 MG/ML
1 SOLUTION/ DROPS OPHTHALMIC 2 TIMES DAILY
Status: DISCONTINUED | OUTPATIENT
Start: 2018-10-03 | End: 2018-10-07 | Stop reason: HOSPADM

## 2018-10-03 RX ORDER — FENTANYL CITRATE 50 UG/ML
INJECTION, SOLUTION INTRAMUSCULAR; INTRAVENOUS PRN
Status: DISCONTINUED | OUTPATIENT
Start: 2018-10-03 | End: 2018-10-03 | Stop reason: HOSPADM

## 2018-10-03 RX ORDER — FLUMAZENIL 0.1 MG/ML
0.2 INJECTION, SOLUTION INTRAVENOUS
Status: ACTIVE | OUTPATIENT
Start: 2018-10-03 | End: 2018-10-04

## 2018-10-03 RX ADMIN — ACETAMINOPHEN 650 MG: 325 TABLET, FILM COATED ORAL at 10:00

## 2018-10-03 RX ADMIN — ATENOLOL 25 MG: 25 TABLET ORAL at 10:00

## 2018-10-03 RX ADMIN — ZOLPIDEM TARTRATE 5 MG: 5 TABLET, COATED ORAL at 00:04

## 2018-10-03 RX ADMIN — LISINOPRIL 10 MG: 10 TABLET ORAL at 10:00

## 2018-10-03 RX ADMIN — PAROXETINE HYDROCHLORIDE 20 MG: 20 TABLET, FILM COATED ORAL at 10:01

## 2018-10-03 RX ADMIN — PANTOPRAZOLE SODIUM 40 MG: 40 INJECTION, POWDER, FOR SOLUTION INTRAVENOUS at 00:05

## 2018-10-03 RX ADMIN — ACETAMINOPHEN 650 MG: 325 TABLET, FILM COATED ORAL at 20:25

## 2018-10-03 RX ADMIN — PANTOPRAZOLE SODIUM 40 MG: 40 INJECTION, POWDER, FOR SOLUTION INTRAVENOUS at 15:39

## 2018-10-03 RX ADMIN — LEVOTHYROXINE SODIUM 100 MCG: 100 TABLET ORAL at 10:00

## 2018-10-03 ASSESSMENT — ACTIVITIES OF DAILY LIVING (ADL)
BATHING: 1-->ASSISTIVE EQUIPMENT
SWALLOWING: 0-->SWALLOWS FOODS/LIQUIDS WITHOUT DIFFICULTY
FALL_HISTORY_WITHIN_LAST_SIX_MONTHS: YES
NUMBER_OF_TIMES_PATIENT_HAS_FALLEN_WITHIN_LAST_SIX_MONTHS: 2
WHICH_OF_THE_ABOVE_FUNCTIONAL_RISKS_HAD_A_RECENT_ONSET_OR_CHANGE?: AMBULATION
TOILETING: 1-->ASSISTIVE EQUIPMENT
RETIRED_COMMUNICATION: 0-->UNDERSTANDS/COMMUNICATES WITHOUT DIFFICULTY
COGNITION: 0 - NO COGNITION ISSUES REPORTED
AMBULATION: 1-->ASSISTIVE EQUIPMENT
TRANSFERRING: 1-->ASSISTIVE EQUIPMENT
RETIRED_EATING: 0-->INDEPENDENT
DRESS: 0-->INDEPENDENT

## 2018-10-03 NOTE — PLAN OF CARE
Problem: Patient Care Overview  Goal: Plan of Care/Patient Progress Review  Outcome: No Change  PRIMARY DIAGNOSIS: GI BLEED    OUTPATIENT/OBSERVATION GOALS TO BE MET BEFORE DISCHARGE  Orthostatic performed: Yes:     Lying Orthostatic BP: 132/53    Sitting Orthostatic BP: 142/71    Standing Orthostatic BP: 140/63     1. Stable Hgb No.   Recent Labs   Lab Test  10/02/18   1800  10/02/18   1132    HGB  9.3*  10.2*        2. Resolved or declined bleeding episodes: No,  1 large bloody stool this evening. (400 mL of loose stool) Last episode: around 5625-9767    3. Appropriate testing complete: No    4. Cleared for discharge by consultants (if involved): No    5. Safe discharge environment identified: Yes    Denies pain. 1 episode of large bloody stool this evening; PA notified. Vitamin K given, Go-lytely prep started at 20:30. Hemoglobin/INR re-check ordered for 2200. Asymptomatic after large bloody stool; vitals remained stable (Temp: 98  F (36.7  C) Temp src: Oral BP: 133/80   Heart Rate: 78 Resp: 16 SpO2: 92 % O2 Device: None (Room air)  ). Await lab re-checks, monitor for symptoms. GI following.     Discharge Planner Nurse   Safe discharge environment identified: Yes  Barriers to discharge: No       Entered by: Davida Strauss 10/02/2018 8:49 PM     Please review provider order for any additional goals.   Nurse to notify provider when observation goals have been met and patient is ready for discharge.

## 2018-10-03 NOTE — PROGRESS NOTES
[facility]  Observation Unit  Progress Note    Date of Service: 10/3/2018    Patient: Jake Duane Pfleiger  MRN: [id number]  Admission Date: 10/2/2018  Hospital Day # 2    Assessment & Plan: Jake Duane Pfleiger is a 87 year old male with a history of Chronic Atrial Fibrillation s/p PPM, HTN, Diastolic CHF, GERD, Hypothyroidism, Depression, Insomnia, Diverticulosis who presented to the ED on 10/2 2/2 Rectal Bleeding.    Acute GI Bleed, Acute Blood Loss Anemia - pt presented with a two day history of BRBPR with no associated abdominal pain.  History of hemorrhoids, diverticulosis and GERD on warfarin for chronic afib pta.  Hgb on admission 10.2 down to 7.5 this morning with additional bloody stools overnight.  Patient given Vitamin K and a colonoscopy prep overnight.  GI consulted with plan for Colonoscopy today.  - continue to hold warfarin  - continue IV Protonix  - npo  - colonoscopy today  - transfuse one PRBC now and repeat hgb later today    Chronic Atrial Fibrillation - rate controlled.  Continue home Atenolol.  - required vitamin K due to ongoing bleeding on 10/2.    - continue to hold warfarin due to acute bleed  - repeat INR in am  - telemetry monitoring     S/p Single Chamber PPM - monitor on telemetry     Chronic Diastolic CHF, HTN - appears euvolemic.  BP stable.  Continue home Lisinopril and Atenolol.  Hold Lasix.     Hypothyroidism - continue home Levothyroxine     Depression, Anxiety, Insomnia - stable.  Continue home Ambien, Paroxetine    CODE: Full  DVT: scd  Diet/fluids: npo  GI prophylaxis:  protonix      Dalia Knight MS PA-C  Hospitalist Physician Assistant  [facility]  Pager: [phone]      Subjective & Interval Hx:    Patient denies pain.  Had some dizziness with ambulation but reports this is his chronic dizziness when initially standing.  No chest pain, shortness of breath, nausea.  Son present and wishing to proceed with colonoscopy.    Last 24 hr care team  notes reviewed.   ROS:  4 point ROS including Respiratory, CV, GI and , other than that noted in the HPI, is negative.    Physical Exam:    Blood pressure 148/74, temperature 96.9  F (36.1  C), temperature source Oral, resp. rate 16, height 1.829 m (6'), weight 87.1 kg (192 lb), SpO2 96 %.  General: Alert, interactive, NAD, lying in bed with son at the bedside.  HEENT: AT/NC, sclera anicteric, PERRL, EOMI  Resp: clear to auscultation bilaterally, no crackles or wheezes  Cardiac: regular rate and rhythm, no murmur  Abdomen: Soft, nontender, nondistended. +BS.  No HSM or masses, no rebound or guarding.  Extremities: No LE edema  Skin: Warm and dry, pale  Neuro: Alert & oriented, moves all extremities equally    Labs & Images:  Reviewed in Epic   Medications:    Current Facility-Administered Medications   Medication     acetaminophen (TYLENOL) tablet 650 mg     acetaminophen (TYLENOL) tablet 650 mg     atenolol (TENORMIN) tablet 25 mg     brimonidine (ALPHAGAN) 0.2 % ophthalmic solution 1 drop     HOLD: warfarin (COUMADIN) therapy     influenza Vac Split High-Dose (FLUZONE) injection 0.5 mL     levothyroxine (SYNTHROID/LEVOTHROID) tablet 100 mcg     lidocaine (LMX4) cream     lidocaine 1 % 1 mL     lisinopril (PRINIVIL/ZESTRIL) tablet 10 mg     May continue current IV fluids if patient has IV fluids infusing.     May take regular AM medications except those listed below     melatonin tablet 1 mg     naloxone (NARCAN) injection 0.1-0.4 mg     ondansetron (ZOFRAN-ODT) ODT tab 4 mg    Or     ondansetron (ZOFRAN) injection 4 mg     pantoprazole (PROTONIX) 40 mg IV push injection     PARoxetine (PAXIL) tablet 20 mg     polyethylene glycol (MIRALAX/GLYCOLAX) Packet 17 g     sodium chloride (PF) 0.9% PF flush 3 mL     sodium chloride (PF) 0.9% PF flush 3 mL     traMADol (ULTRAM) tablet 50 mg     zolpidem (AMBIEN) tablet 5 mg

## 2018-10-03 NOTE — PROGRESS NOTES
GASTROENTEROLOGY PROGRESS NOTE        SUBJECTIVE:  No abdominal pain, nausea, vomiting.  Passing red blood per rectum last evening     OBJECTIVE:    /73 (BP Location: Left arm)  Temp 98.3  F (36.8  C) (Oral)  Resp 16  Ht 1.829 m (6')  Wt 87.1 kg (192 lb)  SpO2 95%  BMI 26.04 kg/m2  Temp (24hrs), Av.1  F (36.7  C), Min:97.9  F (36.6  C), Max:98.3  F (36.8  C)    Patient Vitals for the past 72 hrs:   Weight   10/02/18 1500 87.1 kg (192 lb)       Intake/Output Summary (Last 24 hours) at 10/03/18 1046  Last data filed at 10/03/18 0950   Gross per 24 hour   Intake                0 ml   Output             4375 ml   Net            -4375 ml        PHYSICAL EXAM     Constitutional: Pale, no distress  Abdomen: Soft,  nontender         Additional Comments:  ROS, FH, SH: See initial GI consult for details.     I have reviewed the patient's new clinical lab results:     Recent Labs   Lab Test  10/03/18   0643  10/02/18   2217  10/02/18   1800  10/02/18   1132  16   0650  04/10/16   1136   WBC   --    --    --   7.0  6.7  5.9   HGB  7.5*  8.8*  9.3*  10.2*  10.4*  10.5*   MCV   --    --    --   91  83  83   PLT   --    --    --   200  238  239   INR  1.76*  2.53*   --   2.64*  2.48*  2.82*     Recent Labs   Lab Test  10/02/18   1132  10/05/16   1437  16   0650   POTASSIUM  4.7  4.6  3.6   CHLORIDE  104  102  102   CO2  28  30  29   BUN  19  17  17   ANIONGAP  4  4  7     Recent Labs   Lab Test  10/02/18   1132  12   1329   ALBUMIN  3.4  4.1   BILITOTAL  0.6  1.2   ALT  15  18   AST  17  29        ASSESSMENT/ PLAN  Jake Duane Pfleiger is an 87-year-old male who presented to the hospital with hematochezia found to be anemic with a dropping hemoglobin.    1.  Hematochezia: Suspect diverticular bleed.  Given dropping hemoglobin patient and son would like to proceed with colonoscopy.  Continue to pass red blood overnight.  He has been prepping with [facility].    --We will add for colonoscopy this  afternoon  --Monitor hemoglobin, transfusions per medicine he will be receiving 1 unit of packed red cells      Discussed with Dr. Manjeet Diaz, MARC  [facility]

## 2018-10-03 NOTE — PROCEDURES
Pre-Endoscopy History and Physical     Jake Duane Pfleiger MRN# [id number]   YOB: 1931 Age: 87 year old     Date of Procedure: 10/2/2018  Primary care provider: Herve Sweet  Type of Endoscopy: colonoscopy  Reason for Procedure: rectal bleeding  Type of Anesthesia Anticipated: Moderate (conscious) sedation    HPI:    Jorge Luis is a 87 year old male who will be undergoing the above procedure.      A history and physical has been performed. The patient's medications and allergies have been reviewed. The risks and benefits of the procedure and the sedation options and risks were discussed with the patient.  All questions were answered and informed consent was obtained.      No Known Allergies     Current Facility-Administered Medications   Medication     acetaminophen (TYLENOL) tablet 650 mg     acetaminophen (TYLENOL) tablet 650 mg     atenolol (TENORMIN) tablet 25 mg     brimonidine (ALPHAGAN) 0.2 % ophthalmic solution 1 drop     HOLD: warfarin (COUMADIN) therapy     influenza Vac Split High-Dose (FLUZONE) injection 0.5 mL     levothyroxine (SYNTHROID/LEVOTHROID) tablet 100 mcg     lidocaine (LMX4) cream     lidocaine 1 % 1 mL     lisinopril (PRINIVIL/ZESTRIL) tablet 10 mg     May continue current IV fluids if patient has IV fluids infusing.     May take regular AM medications except those listed below     melatonin tablet 1 mg     naloxone (NARCAN) injection 0.1-0.4 mg     ondansetron (ZOFRAN-ODT) ODT tab 4 mg    Or     ondansetron (ZOFRAN) injection 4 mg     pantoprazole (PROTONIX) 40 mg IV push injection     PARoxetine (PAXIL) tablet 20 mg     polyethylene glycol (MIRALAX/GLYCOLAX) Packet 17 g     sodium chloride (PF) 0.9% PF flush 3 mL     sodium chloride (PF) 0.9% PF flush 3 mL     traMADol (ULTRAM) tablet 50 mg     zolpidem (AMBIEN) tablet 5 mg       Patient Active Problem List   Diagnosis     Atrial fibrillation (H)     Hip arthritis     Cardiomyopathy (H)     Hypertension     Tachy-jairo  syndrome (H)     CHF (congestive heart failure) (H)     GI bleed        Past Medical History:   Diagnosis Date     Anxiety      Arthritis      Atrial fibrillation (H)      Cardiomyopathy (H)      Gastro-oesophageal reflux disease      Hypertension      Pacemaker     [employer] Dual Pacemaker     Right patella fracture      Tachy-jairo syndrome (H)      Thyroid disease     Hypothyrodism         Past Surgical History:   Procedure Laterality Date     ARTHROPLASTY HIP  1/21/2014    left hip replacement     ARTHROPLASTY KNEE      right      CHOLECYSTECTOMY       EYE SURGERY      right eye cataract removal      IMPLANT PACEMAKER  03/2012    dual chamber      ORTHOPEDIC SURGERY      back surgery        Social History   Substance Use Topics     Smoking status: Former Smoker     Packs/day: 0.50     Years: 25.00     Types: Cigarettes, Pipe, Cigars     Quit date: 1/13/1982     Smokeless tobacco: Never Used     Alcohol use No       Family History   Problem Relation Age of Onset     Other Cancer Mother      Unknown/Adopted Father             Medications:     Prescriptions Prior to Admission   Medication Sig Dispense Refill Last Dose     acetaminophen (TYLENOL 8 HOUR ARTHRITIS PAIN) 650 MG CR tablet Take 650 mg by mouth 2 times daily   10/1/2018 at       atenolol (TENORMIN) 25 MG tablet Take 25 mg by mouth daily   10/2/2018 at am     brimonidine (ALPHAGAN-P) 0.15 % ophthalmic solution Place 1 drop into the right eye 2 times daily   10/1/2018 at pm     fish oil-omega-3 fatty acids (FISH OIL) 1000 MG capsule Take 2 g by mouth daily    10/1/2018 at       furosemide (LASIX) 20 MG tablet Take 1 tablet (20 mg) by mouth daily 30 tablet 1 10/2/2018 at am     Levothyroxine Sodium (SYNTHROID PO) Take 100 mcg by mouth daily.   10/2/2018 at am     Lisinopril (PRINIVIL PO) Take 10 mg by mouth daily    10/2/2018 at am     nystatin (MYCOSTATIN) cream Apply topically 2 times daily as needed for dry skin   prn     Omeprazole (PRILOSEC PO) Take 20  mg by mouth every morning.   10/2/2018 at am     paroxetine (PAXIL) 20 MG tablet Take 20 mg by mouth every morning.   10/2/2018 at am     polyethylene glycol (MIRALAX/GLYCOLAX) packet Take 1 packet by mouth as needed    prn     TRAMADOL HCL PO Take 50 mg by mouth as needed    prn     warfarin (COUMADIN) 5 MG tablet Take 5 mg by mouth every evening    10/1/2018 at pm     Zolpidem Tartrate (AMBIEN PO) Take 5 mg by mouth At Bedtime    10/1/2018 at hs       Scheduled Medications:    acetaminophen  650 mg Oral BID     atenolol  25 mg Oral Daily     brimonidine  1 drop Right Eye BID     influenza Vac Split High-Dose  0.5 mL Intramuscular Prior to discharge     levothyroxine (SYNTHROID/LEVOTHROID) tablet 100 mcg  100 mcg Oral Daily     lisinopril (PRINIVIL/ZESTRIL) tablet 10 mg  10 mg Oral Daily     pantoprazole  40 mg Intravenous Q12H     PARoxetine  20 mg Oral QAM     sodium chloride (PF)  3 mL Intracatheter Q8H       PRN:  acetaminophen, HOLD MEDICATION, lidocaine 4%, lidocaine (buffered or not buffered), - MEDICATION INSTRUCTIONS -, - MEDICATION INSTRUCTIONS -, melatonin, naloxone, ondansetron **OR** ondansetron, polyethylene glycol, sodium chloride (PF), traMADol (ULTRAM) tablet 50 mg, zolpidem (AMBIEN) tablet 5 mg    PHYSICAL EXAM:   /75  Pulse 80  Temp 97.6  F (36.4  C) (Oral)  Resp 17  Ht 1.829 m (6')  Wt 87.1 kg (192 lb)  SpO2 98%  BMI 26.04 kg/m2 Estimated body mass index is 26.04 kg/(m^2) as calculated from the following:    Height as of this encounter: 1.829 m (6').    Weight as of this encounter: 87.1 kg (192 lb).   RESP: lungs clear to auscultation - no rales, rhonchi or wheezes  CV: regular rates and rhythm    IMPRESSION   ASA Class 2 - Mild systemic disease      Signed Electronically by: Reese Burroughs MD  October 3, 2018    .

## 2018-10-03 NOTE — DISCHARGE INSTRUCTIONS
Understanding Diverticulosis and Diverticulitis     Pouches or diverticula usually occur in the lower part of the colon called the sigmoid.      Diverticulitis occurs when the pouches become inflamed.     The colon (large intestine) is the last part of the digestive tract. It absorbs water from stool and changes it from a liquid to a solid. In certain cases, small pouches called diverticula can form in the colon wall. This condition is called diverticulosis. The pouches can become infected. If this happens, it becomes a more serious problem called diverticulitis. These problems can be painful. But they can be managed.   Managing Your Condition  Diet changes or taking medications are often tried first. These may be enough to bring relief. If the case is bad, surgery may be done. You and your doctor can discuss the plan that is best for you.  If You Have Diverticulosis  Diet changes are often enough to control symptoms. The main changes are adding fiber (roughage) and drinking more water. Fiber absorbs water as it travels through your colon. This helps your stool stay soft and move smoothly. Water helps this process. If needed, you may be told to take over-the-counter stool softeners. To help relieve pain, antispasmodic medications may be prescribed.  If You Have Diverticulitis  Treatment depends on how bad your symptoms are.  For mild symptoms: You may be put on a liquid diet for a short time. You may also be prescribed antibiotics. If these two steps relieve your symptoms, you may then be prescribed a high-fiber diet. If you still have symptoms, your doctor will discuss further treatment options with you.  For severe symptoms: You may need to be admitted to the hospital. There, you can be given IV antibiotics and fluids. Once symptoms are under control, the above treatments may be tried. If these don t control your condition, your doctor may discuss the option of having surgery with you.  [place] to Colon  Health  Help keep your colon healthy with a diet that includes plenty of high-fiber fruits, vegetables, and whole grains. Drink plenty of liquids like water and juice. Your doctor may also recommend avoiding seeds and nuts.          [id number] Patricia Doll, [address] [place] [address], [place] [address]. All rights reserved. This information is not intended as a substitute for professional medical care. Always follow your healthcare professional's instructions.        HIGH FIBER DIET  Fiber is present in all fruits, vegetables, cereals and grains. Fiber passes through the body undigested. A high fiber diet helps food move through the intestinal tract. The added bulk is helpful in preventing constipation. In people with diverticulosis it serves to clean out the pouches along the colon wall while preventing new ones from forming. A high fiber diet also reduces the risk of colon cancer, decreases blood cholesterol and prevents high blood sugar in people with diabetes.    The foods listed below are high in fiber and should be included in your diet. If you are not used to high fiber foods, start with 1 or 2 foods from this list. Every 3-4 days add a new one to your diet until you are eating 4 high fiber foods per day. This should give you 20-35 Gm of fiber/day. It is also important to drink a lot of water when you are on this diet (6-8 glasses a day). Water causes the fiber to swell and increases the benefit.    FOODS HIGH IN DIETARY FIBER:  BREADS: Made with 100% whole wheat flour; dgina, wheat or rye crackers; tortillas, bran muffins  CEREALS: Whole grain cereal with bran (Chex, Raisin Bran, Corn Bran), oatmeal, rolled oats, granola, wheat flakes, brown rice  NUTS: Any nuts  FRUITS: All fresh fruits along with edible skins, (bananas, citrus fruit, mangoes, pears, prunes, raisins, apples, pineapple, apricot, melon, jams and marmalades), fruit juices (especially prune juice)  VEGETABLES: All types, preferably raw or  lightly cooked: especially, celery, eggplant, potatoes, spinach, broccoli, brussel sprouts, winter squash, carrots, cauliflower, soybeans, lentils, fresh and dried beans of all kinds  OTHER: Popcorn, any spices      [id number] Patricia Doll, [address] [place] [address]. All rights reserved. This information is not intended as a substitute for professional medical care. Always follow your healthcare professional's instructions.

## 2018-10-03 NOTE — PLAN OF CARE
Problem: Patient Care Overview  Goal: Plan of Care/Patient Progress Review  Outcome: No Change  Problem: Patient Care Overview  Goal: Plan of Care/Patient Progress Review  Outcome: No Change  PRIMARY DIAGNOSIS: Rectal BLEED     OUTPATIENT/OBSERVATION GOALS TO BE MET BEFORE DISCHARGE  1. Orthostatic performed: Yes:    2.       Lying Orthostatic BP: 132/53   3.       Sitting Orthostatic BP: 142/71   1.       Standing Orthostatic BP: 140/63      2. Stable Hgb No.         Recent Labs   Lab Test  10/02/18   2217  10/02/18   1800  10/02/18   1132   HGB  8.8*  9.3*  10.2*         3. Resolved or declined bleeding episodes: No Last episode: 2246     Appropriate testing complete: No     4. Cleared for discharge by consultants (if involved): No     5. Safe discharge environment identified: Yes     Discharge Planner Nurse   Safe discharge environment identified: No  Barriers to discharge: Yes       Entered by: Ursula Tinsley 10/03/2018 3:34 AM  Please review provider order for any additional goals.   Nurse to notify provider when observation goals have been met and patient is ready for discharge.     Patient is Alert and Oriented x4. Vitals are Temp: 98  F (36.7  C) Temp src: Oral BP: 125/59   Heart Rate: 81 Resp: 16 SpO2: 99 % O2 Device: None (Room air)   Denying pain.  They are SBA with Gait Belt and Walker. Pt is a NPO Patient has Normal Saline 0.9% running at 75 mL per hour.  MD stated to have pt continue golytely until 0200 and then NPO after. Pt denied dizziness. Pt stated they feel  weak. Pt had 3 bloody stools, 1,100cc overnight. Plan: monitor Bleeding episodes, monitor BP. Hgb and INR to be taken in AM. Unless SBP is less than 100. Per PA verbal orders.

## 2018-10-03 NOTE — PLAN OF CARE
Problem: Patient Care Overview  Goal: Plan of Care/Patient Progress Review  PRIMARY DIAGNOSIS: GI BLEED    OUTPATIENT/OBSERVATION GOALS TO BE MET BEFORE DISCHARGE  1. Orthostatic performed: No    2. Stable Hgb Yes.   Recent Labs   Lab Test  10/03/18   0643  10/02/18   2217  10/02/18   1800   HGB  7.5*  8.8*  9.3*       3. Resolved or declined bleeding episodes: with moderate bleeding Last episode:2 am     4. Appropriate testing complete: No- GI consult     5. Cleared for discharge by consultants (if involved): No- GI following     6. Safe discharge environment identified: Yes    Discharge Planner Nurse   Safe discharge environment identified: Yes  Barriers to discharge: Yes- hgb 7.5       Entered by: Nikki Burger 10/03/2018 11:56 AM     Please review provider order for any additional goals.   Nurse to notify provider when observation goals have been met and patient is ready for discharge.    Hgb trending downward- now 7.5 was 10.2 on admit. Dizziness with ambulation. Patient had 1200 of stool out overnight. Lives alone at senior living facility. Type and screen already done. SL now. GI following. Alert and oriented.

## 2018-10-03 NOTE — PLAN OF CARE
Problem: Patient Care Overview  Goal: Plan of Care/Patient Progress Review  PRIMARY DIAGNOSIS: GI BLEED     OUTPATIENT/OBSERVATION GOALS TO BE MET BEFORE DISCHARGE  1. Orthostatic performed: No     2. Stable Hgb Yes.         Recent Labs   Lab Test  10/03/18   0643  10/02/18   2217  10/02/18   1800   HGB  7.5*  8.8*  9.3*         3. Resolved or declined bleeding episodes: with moderate bleeding Last episode:9 am- doing GI prep though      4. Appropriate testing complete: No- GI consult      5. Cleared for discharge by consultants (if involved): No- GI following      6. Safe discharge environment identified: Yes     Discharge Planner Nurse   Safe discharge environment identified: Yes  Barriers to discharge: Yes- hgb 7.5       Entered by: Nikki Burger 10/03/2018 11:56 AM  Please review provider order for any additional goals.   Nurse to notify provider when observation goals have been met and patient is ready for discharge.     Hgb trending downward- now 7.5 was 10.2 on admit. Dizziness with ambulation. Will get 1 unit of blood, do hgb check, and reassess  if 2nd unit of blood is needed per PA. Patient finishing bowel prep- had 1/3 left this morning. Lives alone at senior living facility. SL now. GI following and will do scope. Alert and oriented. Hemoglobin checks now ordered q6. On tele- afib controlled hr 70-90s. IV protonix ordered BID. NPO except ice chips and meds.

## 2018-10-03 NOTE — PLAN OF CARE
Problem: Patient Care Overview  Goal: Plan of Care/Patient Progress Review  Outcome: No Change  PRIMARY DIAGNOSIS: Rectal BLEED    OUTPATIENT/OBSERVATION GOALS TO BE MET BEFORE DISCHARGE  Orthostatic performed: Yes:          Lying Orthostatic BP: 132/53         Sitting Orthostatic BP: 142/71   1.       Standing Orthostatic BP: 140/63     2. Stable Hgb No.   Recent Labs   Lab Test  10/02/18   2217  10/02/18   1800  10/02/18   1132   HGB  8.8*  9.3*  10.2*       3. Resolved or declined bleeding episodes: No Last episode: 2246    Appropriate testing complete: No    4. Cleared for discharge by consultants (if involved): No    5. Safe discharge environment identified: Yes    Discharge Planner Nurse   Safe discharge environment identified: No  Barriers to discharge: Yes       Entered by: Ursula Tinsley 10/03/2018 3:34 AM     Please review provider order for any additional goals.   Nurse to notify provider when observation goals have been met and patient is ready for discharge.    Patient is Alert and Oriented x4. Vitals are Temp: 98  F (36.7  C) Temp src: Oral BP: 125/59   Heart Rate: 81 Resp: 16 SpO2: 99 % O2 Device: None (Room air)   denying pain.  They are SBA with Gait Belt and Walker.    Pt is a NPO Patient has Normal Saline 0.9% running at 75 mL per hour.  MD stated to have pt continue golytely until 0200 and then NPO after. Pt denied dizziness. Pt stated they feel  weak. Plan: monitor Bleeding episodes, monitor BP. Hgb and INR to be taken in AM. Unless SBP is less than 100.

## 2018-10-04 LAB
ANION GAP SERPL CALCULATED.3IONS-SCNC: 6 MMOL/L (ref 3–14)
BLD PROD TYP BPU: NORMAL
BLD UNIT ID BPU: 0
BLOOD PRODUCT CODE: NORMAL
BPU ID: NORMAL
BUN SERPL-MCNC: 9 MG/DL (ref 7–30)
CALCIUM SERPL-MCNC: 8.1 MG/DL (ref 8.5–10.1)
CHLORIDE SERPL-SCNC: 104 MMOL/L (ref 94–109)
CO2 SERPL-SCNC: 26 MMOL/L (ref 20–32)
CREAT SERPL-MCNC: 0.81 MG/DL (ref 0.66–1.25)
ERYTHROCYTE [DISTWIDTH] IN BLOOD BY AUTOMATED COUNT: 14.3 % (ref 10–15)
GFR SERPL CREATININE-BSD FRML MDRD: >90 ML/MIN/1.7M2
GLUCOSE SERPL-MCNC: 77 MG/DL (ref 70–99)
HCT VFR BLD AUTO: 25.8 % (ref 40–53)
HGB BLD-MCNC: 10.6 G/DL (ref 13.3–17.7)
HGB BLD-MCNC: 8.3 G/DL (ref 13.3–17.7)
HGB BLD-MCNC: 8.4 G/DL (ref 13.3–17.7)
INR PPP: 1.29 (ref 0.86–1.14)
MCH RBC QN AUTO: 28.8 PG (ref 26.5–33)
MCHC RBC AUTO-ENTMCNC: 32.2 G/DL (ref 31.5–36.5)
MCV RBC AUTO: 90 FL (ref 78–100)
PLATELET # BLD AUTO: 158 10E9/L (ref 150–450)
POTASSIUM SERPL-SCNC: 3.6 MMOL/L (ref 3.4–5.3)
RBC # BLD AUTO: 2.88 10E12/L (ref 4.4–5.9)
SODIUM SERPL-SCNC: 136 MMOL/L (ref 133–144)
TRANSFUSION STATUS PATIENT QL: NORMAL
TRANSFUSION STATUS PATIENT QL: NORMAL
WBC # BLD AUTO: 6.3 10E9/L (ref 4–11)

## 2018-10-04 PROCEDURE — 85610 PROTHROMBIN TIME: CPT | Performed by: INTERNAL MEDICINE

## 2018-10-04 PROCEDURE — 25000128 H RX IP 250 OP 636: Performed by: PHYSICIAN ASSISTANT

## 2018-10-04 PROCEDURE — 25000132 ZZH RX MED GY IP 250 OP 250 PS 637: Mod: GY | Performed by: PHYSICIAN ASSISTANT

## 2018-10-04 PROCEDURE — 85027 COMPLETE CBC AUTOMATED: CPT | Performed by: INTERNAL MEDICINE

## 2018-10-04 PROCEDURE — 80048 BASIC METABOLIC PNL TOTAL CA: CPT | Performed by: INTERNAL MEDICINE

## 2018-10-04 PROCEDURE — 85018 HEMOGLOBIN: CPT | Performed by: INTERNAL MEDICINE

## 2018-10-04 PROCEDURE — 36415 COLL VENOUS BLD VENIPUNCTURE: CPT | Performed by: PHYSICIAN ASSISTANT

## 2018-10-04 PROCEDURE — P9016 RBC LEUKOCYTES REDUCED: HCPCS | Performed by: EMERGENCY MEDICINE

## 2018-10-04 PROCEDURE — A9270 NON-COVERED ITEM OR SERVICE: HCPCS | Mod: GY | Performed by: PHYSICIAN ASSISTANT

## 2018-10-04 PROCEDURE — 85018 HEMOGLOBIN: CPT | Performed by: PHYSICIAN ASSISTANT

## 2018-10-04 PROCEDURE — C9113 INJ PANTOPRAZOLE SODIUM, VIA: HCPCS | Performed by: PHYSICIAN ASSISTANT

## 2018-10-04 PROCEDURE — 99207 ZZC CDG-MDM COMPONENT: MEETS MODERATE - UP CODED: CPT | Performed by: PHYSICIAN ASSISTANT

## 2018-10-04 PROCEDURE — 36415 COLL VENOUS BLD VENIPUNCTURE: CPT | Performed by: INTERNAL MEDICINE

## 2018-10-04 PROCEDURE — 99233 SBSQ HOSP IP/OBS HIGH 50: CPT | Performed by: PHYSICIAN ASSISTANT

## 2018-10-04 PROCEDURE — 12000000 ZZH R&B MED SURG/OB

## 2018-10-04 RX ADMIN — PANTOPRAZOLE SODIUM 40 MG: 40 INJECTION, POWDER, FOR SOLUTION INTRAVENOUS at 19:08

## 2018-10-04 RX ADMIN — LEVOTHYROXINE SODIUM 100 MCG: 100 TABLET ORAL at 08:53

## 2018-10-04 RX ADMIN — ACETAMINOPHEN 650 MG: 325 TABLET, FILM COATED ORAL at 19:08

## 2018-10-04 RX ADMIN — ZOLPIDEM TARTRATE 5 MG: 5 TABLET, COATED ORAL at 23:17

## 2018-10-04 RX ADMIN — LISINOPRIL 10 MG: 10 TABLET ORAL at 08:53

## 2018-10-04 RX ADMIN — PANTOPRAZOLE SODIUM 40 MG: 40 INJECTION, POWDER, FOR SOLUTION INTRAVENOUS at 05:08

## 2018-10-04 RX ADMIN — ACETAMINOPHEN 650 MG: 325 TABLET, FILM COATED ORAL at 08:53

## 2018-10-04 RX ADMIN — PAROXETINE HYDROCHLORIDE 20 MG: 20 TABLET, FILM COATED ORAL at 08:53

## 2018-10-04 RX ADMIN — ZOLPIDEM TARTRATE 5 MG: 5 TABLET, COATED ORAL at 00:19

## 2018-10-04 RX ADMIN — ATENOLOL 25 MG: 25 TABLET ORAL at 08:53

## 2018-10-04 ASSESSMENT — ACTIVITIES OF DAILY LIVING (ADL)
ADLS_ACUITY_SCORE: 15
ADLS_ACUITY_SCORE: 15

## 2018-10-04 NOTE — PLAN OF CARE
Problem: Patient Care Overview  Goal: Plan of Care/Patient Progress Review  Vitals: wnl  Labs:  hgb 7.5 now 9 after 1 unit of blood  Neuro: wnl  GI: wnl  : did bowel prep for colonscopy today. No stools after colonscopy. Denies abd pain or nausea. Placed on full liquids after scope.   Skin: wnl, scattered bruises and scabs on body  Activity: SBA  Diet: Full liquids   Pain: denies   Plan: GI following, IV protonix, serial hemoglobins, full liquids

## 2018-10-04 NOTE — PROGRESS NOTES
[facility]  Internal Medicine  Progress Note    Date of Service: 10/4/2018    Patient: Jake Duane Pfleiger  MRN: [id number]  Admission Date: 10/2/2018  Hospital Day # 3    Assessment & Plan: Jake Duane Pfleiger is a 87 year old male with a history of Chronic Atrial Fibrillation s/p PPM, HTN, Diastolic CHF, GERD, Hypothyroidism, Depression, Insomnia, Diverticulosis who presented to the ED on 10/2 2/2 Rectal Bleeding.     Acute GI Bleed, Acute Blood Loss Anemia - pt presented 10/2 with a two day history of BRBPR with no associated abdominal pain.  Pt does have a history of hemorrhoids, diverticulosis and GERD on warfarin for chronic afib pta.    Hgb on admission 10.2 down to 7.5 s/p one unit of PRBC on 10/3 and vitamin K given on 10/2.  Colonoscopy 10/3 with Diverticulosis, one polyp with no signs of active bleeding.  Diverticular bleed suspected.    Hgb today 8.3 with ongoing bright red blood stool this morning.  - transfuse one unit prbc now  - serial hgb monitoring  - continue to hold warfarin  - continue IV Protonix  - change diet to npo  - GI notified of rebleeding episode      Chronic Atrial Fibrillation - rate controlled.    - required vitamin K due to ongoing bleeding on 10/2.  INR today 1.29    - continue to hold warfarin due to ongoing GI bleed  - repeat INR in am  - telemetry monitoring  - continue home Atenolol.      S/p Single Chamber PPM - monitor on telemetry      Chronic Diastolic CHF, HTN - appears euvolemic.  BP stable.  Continue home Lisinopril and Atenolol.  Lasix on hold.      Hypothyroidism - continue home Levothyroxine      Depression, Anxiety, Insomnia - stable.  Continue home Ambien, Paroxetine     CODE: Full  DVT: scd  Diet/fluids: npo  GI prophylaxis:  protonix      Dalia Knight MS PA-C  Hospitalist Physician Assistant  [facility]  Pager: [phone]      Subjective & Interval Hx:    Patient reports generalized fatigue.  He denies lightheadedness with  ambulation.  Denies chest pain, shortness of breath, abdominal pain, nausea.  Tolerated a liquid diet well.  Had bright red bloody output this morning.     Last 24 hr care team notes reviewed.   ROS:  4 point ROS including Respiratory, CV, GI and , other than that noted in the HPI, is negative.    Physical Exam:    Blood pressure 134/80, pulse 80, temperature 98.7  F (37.1  C), temperature source Oral, resp. rate 22, height 1.829 m (6'), weight 87.1 kg (192 lb), SpO2 95 %.  General: Alert, interactive, NAD, pale appearing  Resp: clear to auscultation bilaterally, no crackles or wheezes  Cardiac: irregular rate and rhythm, no murmur  Abdomen: Soft, nontender, nondistended. +BS.  No HSM or masses, no rebound or guarding.  Extremities: No LE edema  Skin: Warm and dry, pale  Neuro: Alert & oriented, moves all extremities equally    Labs & Images:  Reviewed in Epic   Medications:    Current Facility-Administered Medications   Medication     acetaminophen (TYLENOL) tablet 650 mg     acetaminophen (TYLENOL) tablet 650 mg     atenolol (TENORMIN) tablet 25 mg     brimonidine (ALPHAGAN) 0.2 % ophthalmic solution 1 drop     HOLD: warfarin (COUMADIN) therapy     influenza Vac Split High-Dose (FLUZONE) injection 0.5 mL     levothyroxine (SYNTHROID/LEVOTHROID) tablet 100 mcg     lidocaine (LMX4) cream     lidocaine 1 % 1 mL     lisinopril (PRINIVIL/ZESTRIL) tablet 10 mg     May continue current IV fluids if patient has IV fluids infusing.     May continue current IV fluids if patient has IV fluids infusing.     May take regular AM medications except those listed below     melatonin tablet 1 mg     naloxone (NARCAN) injection 0.1-0.4 mg     naloxone (NARCAN) injection 0.1-0.4 mg     ondansetron (ZOFRAN-ODT) ODT tab 4 mg    Or     ondansetron (ZOFRAN) injection 4 mg     pantoprazole (PROTONIX) 40 mg IV push injection     PARoxetine (PAXIL) tablet 20 mg     polyethylene glycol (MIRALAX/GLYCOLAX) Packet 17 g     sodium chloride  (PF) 0.9% PF flush 3 mL     sodium chloride (PF) 0.9% PF flush 3 mL     sodium chloride (PF) 0.9% PF flush 3 mL     traMADol (ULTRAM) tablet 50 mg     zolpidem (AMBIEN) tablet 5 mg

## 2018-10-04 NOTE — PLAN OF CARE
Problem: Patient Care Overview  Goal: Plan of Care/Patient Progress Review  Outcome: Improving  Pt A/O.  Up to bathroom with SBA and walker.  Last hemoglobin 9.  VSS.  Denies any pain.  No BM this evening. Ongoing assessment.

## 2018-10-04 NOTE — PROGRESS NOTES
GASTROENTEROLOGY PROGRESS NOTE        SUBJECTIVE: Last BM prior to colon prep. No abdominal pain or rectal bleeding. Tolerating FULL liquids.      OBJECTIVE:    /80 (BP Location: Right arm)  Pulse 80  Temp 98.7  F (37.1  C) (Oral)  Resp 22  Ht 1.829 m (6')  Wt 87.1 kg (192 lb)  SpO2 95%  BMI 26.04 kg/m2  Temp (24hrs), Av.1  F (36.7  C), Min:97.9  F (36.6  C), Max:98.3  F (36.8  C)    Patient Vitals for the past 72 hrs:   Weight   10/02/18 1500 87.1 kg (192 lb)       Intake/Output Summary (Last 24 hours) at 10/03/18 1046  Last data filed at 10/03/18 0950   Gross per 24 hour   Intake                0 ml   Output             4375 ml   Net            -4375 ml        PHYSICAL EXAM     Constitutional: No distress  Abdomen: Soft,  nontender         Additional Comments:  ROS, FH, SH: See initial GI consult for details.     I have reviewed the patient's new clinical lab results:     Recent Labs   Lab Test  10/04/18   0617  10/04/18   0016  10/03/18   1554  10/03/18   0643  10/02/18   2217   10/02/18   1132  16   0650   WBC  6.3   --    --    --    --    --   7.0  6.7   HGB  8.3*  8.4*  9.0*  7.5*  8.8*   < >  10.2*  10.4*   MCV  90   --    --    --    --    --   91  83   PLT  158   --    --    --    --    --   200  238   INR  1.29*   --    --   1.76*  2.53*   --   2.64*  2.48*    < > = values in this interval not displayed.     Recent Labs   Lab Test  10/04/18   0617  10/02/18   1132  10/05/16   1437   POTASSIUM  3.6  4.7  4.6   CHLORIDE  104  104  102   CO2  26  28  30   BUN  9  19  17   ANIONGAP  6  4  4     Recent Labs   Lab Test  10/02/18   1132  12   1329   ALBUMIN  3.4  4.1   BILITOTAL  0.6  1.2   ALT  15  18   AST  17  29     ENDOSCOPY   10/3 Colonoscopy                                                                                    Impression:        - One 3 mm polyp in the ascending colon.                             - Diverticulosis in the sigmoid colon.                             -  No specimens collected.     ASSESSMENT/ PLAN  Jake Duane Pfleiger is an 87-year-old male who presented to the hospital with hematochezia found to be anemic with a dropping hemoglobin.    1.  Hematochezia: Suspect diverticular bleed.  Colonoscopy without evidence of acute bleeding. Diverticulosis in the sigmoid colon. HgB 8.3. No further rectal bleeding. Advance diet today and can discontinue today if stable. Restart coumadin today.       Discussed with Dr. Manjeet Diaz, PA-C  [facility]

## 2018-10-04 NOTE — PLAN OF CARE
Problem: Patient Care Overview  Goal: Plan of Care/Patient Progress Review  /84  Pulse 95  Temp 98.3  F (36.8  C) (Oral)  Resp 18  Ht 1.829 m (6')  Wt 87.1 kg (192 lb)  SpO2 95%  BMI 26.04 kg/m2  Pt alert and oriented, up SBA with walker. Denies pain, numbness, tingling.  Colonoscopy 10/4  BM this morning - bright red blood  1 unit PRBC given today for Hgb 8.3 - tolerated well.  IV saline locked.  Tolerating clear liquids.  Monitor stools, Hgb - next check at 1800.  Plan to transfer to 5th floor this evening. Report called and giving to oncoming RN.

## 2018-10-04 NOTE — PLAN OF CARE
Problem: Patient Care Overview  Goal: Plan of Care/Patient Progress Review  VSS.  No pain.  No BM this shift.  SBA/W to BR.  Hgb 8.3 this AM.  Continue monitor.

## 2018-10-04 NOTE — PROGRESS NOTES
Had one bloody stool this morning, none since.  I would give him clear liquids today.  If he bleeds again, will do a colonoscopy unprepped with possible EGD.    Active BS, soft, NT/ND

## 2018-10-04 NOTE — PLAN OF CARE
Problem: Patient Care Overview  Goal: Plan of Care/Patient Progress Review  Patient transferred to 5-Med/Surg Oncology Unit with Cardiac monitor  due to change in status to Inpatient. Patient belongings given to: nursing staff to bring to new room.   Report called and given to MEME Garcia.  Family called and updated YES, spoke to son Duane about transfer to new floor.

## 2018-10-05 LAB
ANION GAP SERPL CALCULATED.3IONS-SCNC: 4 MMOL/L (ref 3–14)
BUN SERPL-MCNC: 6 MG/DL (ref 7–30)
CALCIUM SERPL-MCNC: 8.1 MG/DL (ref 8.5–10.1)
CHLORIDE SERPL-SCNC: 105 MMOL/L (ref 94–109)
CO2 SERPL-SCNC: 30 MMOL/L (ref 20–32)
COLONOSCOPY: NORMAL
CREAT SERPL-MCNC: 0.8 MG/DL (ref 0.66–1.25)
ERYTHROCYTE [DISTWIDTH] IN BLOOD BY AUTOMATED COUNT: 14.8 % (ref 10–15)
ERYTHROCYTE [DISTWIDTH] IN BLOOD BY AUTOMATED COUNT: 14.9 % (ref 10–15)
GFR SERPL CREATININE-BSD FRML MDRD: >90 ML/MIN/1.7M2
GLUCOSE SERPL-MCNC: 79 MG/DL (ref 70–99)
HCT VFR BLD AUTO: 29.8 % (ref 40–53)
HCT VFR BLD AUTO: 31.8 % (ref 40–53)
HGB BLD-MCNC: 10 G/DL (ref 13.3–17.7)
HGB BLD-MCNC: 10.1 G/DL (ref 13.3–17.7)
HGB BLD-MCNC: 9.8 G/DL (ref 13.3–17.7)
MCH RBC QN AUTO: 28.8 PG (ref 26.5–33)
MCH RBC QN AUTO: 29.3 PG (ref 26.5–33)
MCHC RBC AUTO-ENTMCNC: 31.8 G/DL (ref 31.5–36.5)
MCHC RBC AUTO-ENTMCNC: 32.9 G/DL (ref 31.5–36.5)
MCV RBC AUTO: 89 FL (ref 78–100)
MCV RBC AUTO: 91 FL (ref 78–100)
PLATELET # BLD AUTO: 155 10E9/L (ref 150–450)
PLATELET # BLD AUTO: 193 10E9/L (ref 150–450)
POTASSIUM SERPL-SCNC: 3.7 MMOL/L (ref 3.4–5.3)
RBC # BLD AUTO: 3.34 10E12/L (ref 4.4–5.9)
RBC # BLD AUTO: 3.51 10E12/L (ref 4.4–5.9)
SODIUM SERPL-SCNC: 139 MMOL/L (ref 133–144)
WBC # BLD AUTO: 10.7 10E9/L (ref 4–11)
WBC # BLD AUTO: 5.3 10E9/L (ref 4–11)

## 2018-10-05 PROCEDURE — 25000132 ZZH RX MED GY IP 250 OP 250 PS 637: Mod: GY | Performed by: HOSPITALIST

## 2018-10-05 PROCEDURE — 25000128 H RX IP 250 OP 636: Performed by: INTERNAL MEDICINE

## 2018-10-05 PROCEDURE — G0500 MOD SEDAT ENDO SERVICE >5YRS: HCPCS | Performed by: INTERNAL MEDICINE

## 2018-10-05 PROCEDURE — A9270 NON-COVERED ITEM OR SERVICE: HCPCS | Mod: GY | Performed by: PHYSICIAN ASSISTANT

## 2018-10-05 PROCEDURE — 12000000 ZZH R&B MED SURG/OB

## 2018-10-05 PROCEDURE — 85027 COMPLETE CBC AUTOMATED: CPT | Performed by: PHYSICIAN ASSISTANT

## 2018-10-05 PROCEDURE — 25000125 ZZHC RX 250: Performed by: PHYSICIAN ASSISTANT

## 2018-10-05 PROCEDURE — C9113 INJ PANTOPRAZOLE SODIUM, VIA: HCPCS | Performed by: PHYSICIAN ASSISTANT

## 2018-10-05 PROCEDURE — 85018 HEMOGLOBIN: CPT | Performed by: PHYSICIAN ASSISTANT

## 2018-10-05 PROCEDURE — 0DJD8ZZ INSPECTION OF LOWER INTESTINAL TRACT, VIA NATURAL OR ARTIFICIAL OPENING ENDOSCOPIC: ICD-10-PCS | Performed by: INTERNAL MEDICINE

## 2018-10-05 PROCEDURE — 99233 SBSQ HOSP IP/OBS HIGH 50: CPT | Performed by: HOSPITALIST

## 2018-10-05 PROCEDURE — 25000128 H RX IP 250 OP 636: Performed by: PHYSICIAN ASSISTANT

## 2018-10-05 PROCEDURE — 36415 COLL VENOUS BLD VENIPUNCTURE: CPT | Performed by: PHYSICIAN ASSISTANT

## 2018-10-05 PROCEDURE — 25000132 ZZH RX MED GY IP 250 OP 250 PS 637: Mod: GY | Performed by: PHYSICIAN ASSISTANT

## 2018-10-05 PROCEDURE — 36415 COLL VENOUS BLD VENIPUNCTURE: CPT | Performed by: HOSPITALIST

## 2018-10-05 PROCEDURE — 45378 DIAGNOSTIC COLONOSCOPY: CPT | Performed by: INTERNAL MEDICINE

## 2018-10-05 PROCEDURE — 85027 COMPLETE CBC AUTOMATED: CPT | Performed by: HOSPITALIST

## 2018-10-05 PROCEDURE — 80048 BASIC METABOLIC PNL TOTAL CA: CPT | Performed by: PHYSICIAN ASSISTANT

## 2018-10-05 RX ORDER — BRIMONIDINE TARTRATE 1.5 MG/ML
1 SOLUTION/ DROPS OPHTHALMIC 2 TIMES DAILY
Status: DISCONTINUED | OUTPATIENT
Start: 2018-10-05 | End: 2018-10-05

## 2018-10-05 RX ORDER — FENTANYL CITRATE 50 UG/ML
INJECTION, SOLUTION INTRAMUSCULAR; INTRAVENOUS PRN
Status: DISCONTINUED | OUTPATIENT
Start: 2018-10-05 | End: 2018-10-05 | Stop reason: HOSPADM

## 2018-10-05 RX ORDER — NALOXONE HYDROCHLORIDE 0.4 MG/ML
.1-.4 INJECTION, SOLUTION INTRAMUSCULAR; INTRAVENOUS; SUBCUTANEOUS
Status: ACTIVE | OUTPATIENT
Start: 2018-10-05 | End: 2018-10-06

## 2018-10-05 RX ORDER — FLUMAZENIL 0.1 MG/ML
0.2 INJECTION, SOLUTION INTRAVENOUS
Status: ACTIVE | OUTPATIENT
Start: 2018-10-05 | End: 2018-10-06

## 2018-10-05 RX ORDER — LIDOCAINE 40 MG/G
CREAM TOPICAL
Status: DISCONTINUED | OUTPATIENT
Start: 2018-10-05 | End: 2018-10-07 | Stop reason: HOSPADM

## 2018-10-05 RX ADMIN — ACETAMINOPHEN 650 MG: 325 TABLET, FILM COATED ORAL at 19:31

## 2018-10-05 RX ADMIN — ATENOLOL 25 MG: 25 TABLET ORAL at 07:57

## 2018-10-05 RX ADMIN — PANTOPRAZOLE SODIUM 40 MG: 40 INJECTION, POWDER, FOR SOLUTION INTRAVENOUS at 18:48

## 2018-10-05 RX ADMIN — LISINOPRIL 10 MG: 10 TABLET ORAL at 07:57

## 2018-10-05 RX ADMIN — PAROXETINE HYDROCHLORIDE 20 MG: 20 TABLET, FILM COATED ORAL at 07:57

## 2018-10-05 RX ADMIN — ZOLPIDEM TARTRATE 5 MG: 5 TABLET, COATED ORAL at 23:02

## 2018-10-05 RX ADMIN — MICONAZOLE NITRATE: 2 POWDER TOPICAL at 19:31

## 2018-10-05 RX ADMIN — PANTOPRAZOLE SODIUM 40 MG: 40 INJECTION, POWDER, FOR SOLUTION INTRAVENOUS at 05:52

## 2018-10-05 RX ADMIN — BRIMONIDINE TARTRATE 1 DROP: 2 SOLUTION/ DROPS OPHTHALMIC at 19:35

## 2018-10-05 RX ADMIN — LEVOTHYROXINE SODIUM 100 MCG: 100 TABLET ORAL at 07:57

## 2018-10-05 RX ADMIN — ACETAMINOPHEN 650 MG: 325 TABLET, FILM COATED ORAL at 07:57

## 2018-10-05 ASSESSMENT — ACTIVITIES OF DAILY LIVING (ADL)
ADLS_ACUITY_SCORE: 15

## 2018-10-05 NOTE — PLAN OF CARE
Problem: Patient Care Overview  Goal: Plan of Care/Patient Progress Review  Outcome: No Change  Pt tachycardic @ times. Denies pain. Denies N/V. On tele: Afib w/VVR/demand V paced PVC's ('s). D[place] & exp wheezes noted, improved w/rest, denies SOB. BS +ve x4, passing gas, no BM. BLE (toes) turned purplish & cool when sitting on the toilet, resolved when back in bed. Adequate UOP. Up w/SBA. PIV SL.

## 2018-10-05 NOTE — PROGRESS NOTES
[facility]  Internal Medicine  Progress Note      Patient: Jake Duane Pfleiger  MRN: [id number]  Admission Date: 10/2/2018  Date of Service: 10/05/2018    Jake Duane Pfleiger is a 87 year old male with a history of Chronic Atrial Fibrillation s/p PPM, HTN, Diastolic CHF, GERD, Hypothyroidism, Depression, Insomnia, Diverticulosis who presented to the ED on 10/2 2/2 Rectal Bleeding.  Patient was initially admitted to the observation unit for monitoring of diverticular bleed however he continued to have ongoing bleeding requiring 1 U PRBC transfusion and eventual transfer to inpatient for an unprepped colonoscopy today per GI. Colonoscopy showed e/o recent bleeding in the left colon but no active bleeding, site of bleed could not be localized. Plan to monitor patient overnight, if he has further bleeding-he may need repeat colonoscopy vs IR angiogram.      Assessment & Plan:   Acute GI Bleed, Acute Blood Loss Anemia 2/2 diverticular bleed - pt presented 10/2 with a two day history of BRBPR with no associated abdominal pain.  Pt does has a history of hemorrhoids, diverticulosis and GERD on warfarin for chronic afib pta.    Hgb on admission 10.2 down to 7.5 s/p one unit of PRBC on 10/3 and vitamin K given on 10/2.  Colonoscopy 10/3 with Diverticulosis, one polyp with no signs of active bleeding.  Diverticular bleed suspected. Hgb  8.3 yesterday s/p 1 U PRBC. He continued to bleed so repeat colonoscopy 10/5, with recent bleeding noted  - repeat Hb this evening then tomorrow AM  - continue to hold warfarin for 4 weeks  - continue IV Protonix  - change diet to clears  -Gi following  - Plan to monitor patient overnight, if he has further bleeding-he may need repeat colonoscopy vs IR angiogram.     Chronic Atrial Fibrillation - rate controlled.    - required vitamin K due to ongoing bleeding on 10/2.  INR 1.29 yesterday   - continue to hold warfarin due to ongoing GI bleed  - repeat INR in am  - telemetry  monitoring  - continue home Atenolol.      S/p Single Chamber PPM - monitor on telemetry      Chronic Diastolic CHF, HTN - appears euvolemic.  BP stable.  Continue home Lisinopril and Atenolol.  Lasix on hold.      Hypothyroidism - continue home Levothyroxine      Depression, Anxiety, Insomnia - stable.  Continue home Ambien, Paroxetine     CODE: Full  DVT: scd  Diet/fluids: clears  GI prophylaxis:  protonix      Subjective & Interval Hx:    Pt seen after colonoscopy  No further bloody stools post procedure   No abd pain    Plan of care discussed with pt and daughter in law over the phone.     Last 24 hr care team notes reviewed.   ROS:  4 point ROS including Respiratory, CV, GI and , other than that noted in the HPI, is negative.    Physical Exam:    Blood pressure 156/88, pulse 95, temperature 97.6  F (36.4  C), temperature source Oral, resp. rate 18, height 1.829 m (6'), weight 87.1 kg (192 lb), SpO2 95 %.  General: Alert, interactive, NAD, pale appearing  Resp: clear to auscultation bilaterally, no crackles or wheezes  Cardiac: irregular rate and rhythm, no murmur  Abdomen: Soft, nontender, nondistended. +BS.  No HSM or masses, no rebound or guarding.  Extremities: No LE edema  Skin: Warm and dry, pale  Neuro: Alert & oriented, moves all extremities equally    Labs & Images:  Reviewed in Epic   Medications:    Current Facility-Administered Medications   Medication     acetaminophen (TYLENOL) tablet 650 mg     acetaminophen (TYLENOL) tablet 650 mg     atenolol (TENORMIN) tablet 25 mg     brimonidine (ALPHAGAN) 0.2 % ophthalmic solution 1 drop     HOLD: warfarin (COUMADIN) therapy     influenza Vac Split High-Dose (FLUZONE) injection 0.5 mL     levothyroxine (SYNTHROID/LEVOTHROID) tablet 100 mcg     lidocaine (LMX4) cream     lidocaine 1 % 1 mL     lisinopril (PRINIVIL/ZESTRIL) tablet 10 mg     May continue current IV fluids if patient has IV fluids infusing.     May continue current IV fluids if patient has IV  fluids infusing.     May continue current IV fluids if patient has IV fluids infusing.     May take regular AM medications except those listed below     May take regular AM medications except those listed below     melatonin tablet 1 mg     naloxone (NARCAN) injection 0.1-0.4 mg     ondansetron (ZOFRAN-ODT) ODT tab 4 mg    Or     ondansetron (ZOFRAN) injection 4 mg     pantoprazole (PROTONIX) 40 mg IV push injection     PARoxetine (PAXIL) tablet 20 mg     polyethylene glycol (MIRALAX/GLYCOLAX) Packet 17 g     sodium chloride (PF) 0.9% PF flush 3 mL     sodium chloride (PF) 0.9% PF flush 3 mL     sodium chloride (PF) 0.9% PF flush 3 mL     traMADol (ULTRAM) tablet 50 mg     zolpidem (AMBIEN) tablet 5 mg

## 2018-10-05 NOTE — PROVIDER NOTIFICATION
Paged GI. 200 ml loose red bloody stool w/ clots.     Will continue to monitor for now. We are to call GI if another episode occurs.

## 2018-10-05 NOTE — PROGRESS NOTES
GASTROENTEROLOGY PROGRESS NOTE        SUBJECTIVE: No abdominal pain. Hematochezia last evening.      OBJECTIVE:    /88  Pulse 95  Temp 97.6  F (36.4  C) (Oral)  Resp 18  Ht 1.829 m (6')  Wt 87.1 kg (192 lb)  SpO2 95%  BMI 26.04 kg/m2  Temp (24hrs), Av.1  F (36.7  C), Min:97.9  F (36.6  C), Max:98.3  F (36.8  C)    Patient Vitals for the past 72 hrs:   Weight   10/02/18 1500 87.1 kg (192 lb)       Intake/Output Summary (Last 24 hours) at 10/03/18 1046  Last data filed at 10/03/18 0950   Gross per 24 hour   Intake                0 ml   Output             4375 ml   Net            -4375 ml        PHYSICAL EXAM     Constitutional: No distress  Abdomen: Soft,  nontender         Additional Comments:  ROS, FH, SH: See initial GI consult for details.     I have reviewed the patient's new clinical lab results:     Recent Labs   Lab Test  10/05/18   0734  10/05/18   0006  10/04/18   1826  10/04/18   0617   10/03/18   0643  10/02/18   2217   10/02/18   1132   WBC  5.3   --    --   6.3   --    --    --    --   7.0   HGB  9.8*  10.0*  10.6*  8.3*   < >  7.5*  8.8*   < >  10.2*   MCV  89   --    --   90   --    --    --    --   91   PLT  155   --    --   158   --    --    --    --   200   INR   --    --    --   1.29*   --   1.76*  2.53*   --   2.64*    < > = values in this interval not displayed.     Recent Labs   Lab Test  10/05/18   0734  10/04/18   0617  10/02/18   1132   POTASSIUM  3.7  3.6  4.7   CHLORIDE  105  104  104   CO2  30  26  28   BUN  6*  9  19   ANIONGAP  4  6  4     Recent Labs   Lab Test  10/02/18   1132  12   1329   ALBUMIN  3.4  4.1   BILITOTAL  0.6  1.2   ALT  15  18   AST  17  29     ENDOSCOPY   10/3 Colonoscopy                                                                                    Impression:        - One 3 mm polyp in the ascending colon.                             - Diverticulosis in the sigmoid colon.                             - No specimens collected.     ASSESSMENT/  OLIVIA  Jake Duane Pfleiger is an 87-year-old male who presented to the hospital with hematochezia found to be anemic with a dropping hemoglobin.    1.  Hematochezia: Suspect diverticular bleed.  Colonoscopy without evidence of acute bleeding. Diverticulosis in the sigmoid colon. HgB 8.3. No further rectal bleeding. Given hematochezia x 2 over past 24 hrs, repeat un prepped colonoscopy and possible EGD.        Discussed with Dr. Burroughs.   Jeannette Diaz PA-C  [facility]

## 2018-10-05 NOTE — PLAN OF CARE
Problem: Patient Care Overview  Goal: Plan of Care/Patient Progress Review  Outcome: No Change  Pt transferred to our floor this shift. Hgb 10.6. One loose red bloody stool with clots. GI paged, will continue to monitor. VSS. Clear liquid diet, tolerated well. Tele in place, Afib paced per tele tech. Nursing will continue to monitor.

## 2018-10-05 NOTE — PROCEDURES
Pre-Endoscopy History and Physical     Jake Duane Pfleiger MRN# [id number]   YOB: 1931 Age: 87 year old     Date of Procedure: 10/2/2018  Primary care provider: Herve Sweet  Type of Endoscopy: colonoscopy  Reason for Procedure: rectal bleeding  Type of Anesthesia Anticipated: Moderate (conscious) sedation    HPI:    Jorge Luis is a 87 year old male who will be undergoing the above procedure.      A history and physical has been performed. The patient's medications and allergies have been reviewed. The risks and benefits of the procedure and the sedation options and risks were discussed with the patient.  All questions were answered and informed consent was obtained.      No Known Allergies     Current Facility-Administered Medications   Medication     acetaminophen (TYLENOL) tablet 650 mg     acetaminophen (TYLENOL) tablet 650 mg     atenolol (TENORMIN) tablet 25 mg     brimonidine (ALPHAGAN) 0.2 % ophthalmic solution 1 drop     HOLD: warfarin (COUMADIN) therapy     influenza Vac Split High-Dose (FLUZONE) injection 0.5 mL     levothyroxine (SYNTHROID/LEVOTHROID) tablet 100 mcg     lidocaine (LMX4) cream     lidocaine (LMX4) kit     lidocaine 1 % 1 mL     lidocaine 1 % 1 mL     lisinopril (PRINIVIL/ZESTRIL) tablet 10 mg     May continue current IV fluids if patient has IV fluids infusing.     May continue current IV fluids if patient has IV fluids infusing.     May continue current IV fluids if patient has IV fluids infusing.     May take regular AM medications except those listed below     May take regular AM medications except those listed below     melatonin tablet 1 mg     naloxone (NARCAN) injection 0.1-0.4 mg     ondansetron (ZOFRAN-ODT) ODT tab 4 mg    Or     ondansetron (ZOFRAN) injection 4 mg     pantoprazole (PROTONIX) 40 mg IV push injection     PARoxetine (PAXIL) tablet 20 mg     polyethylene glycol (MIRALAX/GLYCOLAX) Packet 17 g     sodium chloride (PF) 0.9% PF flush 3 mL     sodium  chloride (PF) 0.9% PF flush 3 mL     sodium chloride (PF) 0.9% PF flush 3 mL     sodium chloride (PF) 0.9% PF flush 3 mL     sodium chloride (PF) 0.9% PF flush 3 mL     traMADol (ULTRAM) tablet 50 mg     zolpidem (AMBIEN) tablet 5 mg       Patient Active Problem List   Diagnosis     Atrial fibrillation (H)     Hip arthritis     Cardiomyopathy (H)     Hypertension     Tachy-jairo syndrome (H)     CHF (congestive heart failure) (H)     GI bleed        Past Medical History:   Diagnosis Date     Anxiety      Arthritis      Atrial fibrillation (H)      Cardiomyopathy (H)      Gastro-oesophageal reflux disease      Hypertension      Pacemaker     [employer] Dual Pacemaker     Right patella fracture      Tachy-jairo syndrome (H)      Thyroid disease     Hypothyrodism         Past Surgical History:   Procedure Laterality Date     ARTHROPLASTY HIP  1/21/2014    left hip replacement     ARTHROPLASTY KNEE      right      CHOLECYSTECTOMY       COLONOSCOPY N/A 10/3/2018    Procedure: COLONOSCOPY;  COLONOSCOPY Rm.#[id number];  Surgeon: Reese Burroughs MD;  Location:  GI     EYE SURGERY      right eye cataract removal      IMPLANT PACEMAKER  03/2012    dual chamber      ORTHOPEDIC SURGERY      back surgery        Social History   Substance Use Topics     Smoking status: Former Smoker     Packs/day: 0.50     Years: 25.00     Types: Cigarettes, Pipe, Cigars     Quit date: 1/13/1982     Smokeless tobacco: Never Used     Alcohol use No       Family History   Problem Relation Age of Onset     Other Cancer Mother      Unknown/Adopted Father             Medications:     Prescriptions Prior to Admission   Medication Sig Dispense Refill Last Dose     acetaminophen (TYLENOL 8 HOUR ARTHRITIS PAIN) 650 MG CR tablet Take 650 mg by mouth 2 times daily   10/1/2018 at       atenolol (TENORMIN) 25 MG tablet Take 25 mg by mouth daily   10/2/2018 at am     brimonidine (ALPHAGAN-P) 0.15 % ophthalmic solution Place 1 drop into the right eye 2 times  daily   10/1/2018 at pm     fish oil-omega-3 fatty acids (FISH OIL) 1000 MG capsule Take 2 g by mouth daily    10/1/2018 at       furosemide (LASIX) 20 MG tablet Take 1 tablet (20 mg) by mouth daily 30 tablet 1 10/2/2018 at am     Levothyroxine Sodium (SYNTHROID PO) Take 100 mcg by mouth daily.   10/2/2018 at am     Lisinopril (PRINIVIL PO) Take 10 mg by mouth daily    10/2/2018 at am     nystatin (MYCOSTATIN) cream Apply topically 2 times daily as needed for dry skin   prn     Omeprazole (PRILOSEC PO) Take 20 mg by mouth every morning.   10/2/2018 at am     paroxetine (PAXIL) 20 MG tablet Take 20 mg by mouth every morning.   10/2/2018 at am     polyethylene glycol (MIRALAX/GLYCOLAX) packet Take 1 packet by mouth as needed    prn     TRAMADOL HCL PO Take 50 mg by mouth as needed    prn     warfarin (COUMADIN) 5 MG tablet Take 5 mg by mouth every evening    10/1/2018 at pm     Zolpidem Tartrate (AMBIEN PO) Take 5 mg by mouth At Bedtime    10/1/2018 at hs       Scheduled Medications:    acetaminophen  650 mg Oral BID     atenolol  25 mg Oral Daily     brimonidine  1 drop Right Eye BID     influenza Vac Split High-Dose  0.5 mL Intramuscular Prior to discharge     levothyroxine (SYNTHROID/LEVOTHROID) tablet 100 mcg  100 mcg Oral Daily     lisinopril (PRINIVIL/ZESTRIL) tablet 10 mg  10 mg Oral Daily     pantoprazole  40 mg Intravenous Q12H     PARoxetine  20 mg Oral QAM     sodium chloride (PF)  3 mL Intracatheter Q8H     sodium chloride (PF)  3 mL Intracatheter Q8H       PRN:  acetaminophen, HOLD MEDICATION, lidocaine 4%, lidocaine 4%, lidocaine (buffered or not buffered), lidocaine (buffered or not buffered), - MEDICATION INSTRUCTIONS -, - MEDICATION INSTRUCTIONS -, - MEDICATION INSTRUCTIONS -, - MEDICATION INSTRUCTIONS -, - MEDICATION INSTRUCTIONS -, melatonin, naloxone, ondansetron **OR** ondansetron, polyethylene glycol, sodium chloride (PF), sodium chloride (PF), sodium chloride (PF), traMADol (ULTRAM) tablet 50  mg, zolpidem (AMBIEN) tablet 5 mg    PHYSICAL EXAM:   /88  Pulse 95  Temp 97.6  F (36.4  C) (Oral)  Resp 18  Ht 1.829 m (6')  Wt 87.1 kg (192 lb)  SpO2 95%  BMI 26.04 kg/m2 Estimated body mass index is 26.04 kg/(m^2) as calculated from the following:    Height as of this encounter: 1.829 m (6').    Weight as of this encounter: 87.1 kg (192 lb).   RESP: lungs clear to auscultation - no rales, rhonchi or wheezes  CV: regular rates and rhythm    IMPRESSION   ASA Class 2 - Mild systemic disease      Signed Electronically by: Reese Burroughs MD  October 5, 2018    .

## 2018-10-05 NOTE — PLAN OF CARE
"Problem: Patient Care Overview  Goal: Plan of Care/Patient Progress Review  Outcome: Improving  hgb today 9.8 down slightly from yesterdays, had received 2 units 10/4, up with SBA with walker, Alarms for safety, states he feels \"woosey\" at times, VSS, tele afib with controlled rate and occasional v paced, denies pain or shortness of breath, x2 dark maroon stool but bright red when wiping bottom, occasional urinary incontinence, daughter  In law Jair updated, colonoscopy completed today and per GI no active bldg, started back on clear liquids when he returned from colonscopy, AOx4      "

## 2018-10-05 NOTE — PROGRESS NOTES
SPIRITUAL HEALTH SERVICES  SPIRITUAL ASSESSMENT Progress Note  [facility] Med. Surg. 5    PRIMARY FOCUS:     Goals of care    Emotional/spiritual/Sabianism distress    Support for coping    ILLNESS CIRCUMSTANCES:   Saw pt Jorge Luis per his request for  support.  Assessed emotional/spiritual needs and resources while offering reflective conversation, which integrated elements of illness and family narratives.     Context of Serious Illness/Symptom(s) - Jorge Luis reported that he has a lower GI bleed; his first colonoscopy was negative; and he expects to have a second colonoscopy later today.    Persons/Resources Involved - He named his four children (two daughters lives out of state, and two sons live in the area), their families, and neighbors in his assisted living facility.      DISTRESS:     Emotional/Existential/Relational Distress - none expressed.    Spiritual/Yazdanism Distress - none named.    Social/Cultural/Economic Distress - none identified.    SPIRIT (Coping):     Anabaptism/[place] - Islam; affiliated with [employer] in [place].    Spiritual Practice(s) - he has received communion while here; a volunteer Eucharistic  came during the visit.    Emotional/Existential/Relational Connections - Jorge Luis likes to read (mysteries and biographies) and volunteers, calling [employer].    SENSE-MAKING:    Goals of Care - Jorge Luis expects a second colonoscopy today; if it turns out negative, he will undergo an upper GI scope.  Informed him how to request further  support.    Meaning/Sense-Making - Jorge Luis reflected on his life and narrated the following: his work history and  service, the last years of his wife's life, and the activities he enjoys since residential.      PLAN: I and other chaplains remain available per pt's request.    Nitish Donohue M.Div., [facility]  Staff   Pager [phone]

## 2018-10-06 LAB
ALBUMIN SERPL-MCNC: 2.8 G/DL (ref 3.4–5)
ALP SERPL-CCNC: 49 U/L (ref 40–150)
ALT SERPL W P-5'-P-CCNC: 12 U/L (ref 0–70)
ANION GAP SERPL CALCULATED.3IONS-SCNC: 7 MMOL/L (ref 3–14)
AST SERPL W P-5'-P-CCNC: 17 U/L (ref 0–45)
BILIRUB DIRECT SERPL-MCNC: 0.2 MG/DL (ref 0–0.2)
BILIRUB SERPL-MCNC: 0.6 MG/DL (ref 0.2–1.3)
BUN SERPL-MCNC: 8 MG/DL (ref 7–30)
CALCIUM SERPL-MCNC: 8.1 MG/DL (ref 8.5–10.1)
CHLORIDE SERPL-SCNC: 105 MMOL/L (ref 94–109)
CO2 SERPL-SCNC: 26 MMOL/L (ref 20–32)
CREAT SERPL-MCNC: 0.93 MG/DL (ref 0.66–1.25)
ERYTHROCYTE [DISTWIDTH] IN BLOOD BY AUTOMATED COUNT: 14.9 % (ref 10–15)
GFR SERPL CREATININE-BSD FRML MDRD: 77 ML/MIN/1.7M2
GLUCOSE SERPL-MCNC: 74 MG/DL (ref 70–99)
HCT VFR BLD AUTO: 27.2 % (ref 40–53)
HGB BLD-MCNC: 8.8 G/DL (ref 13.3–17.7)
HGB BLD-MCNC: 9.2 G/DL (ref 13.3–17.7)
INR PPP: 1.21 (ref 0.86–1.14)
MCH RBC QN AUTO: 29.1 PG (ref 26.5–33)
MCHC RBC AUTO-ENTMCNC: 32.4 G/DL (ref 31.5–36.5)
MCV RBC AUTO: 90 FL (ref 78–100)
PLATELET # BLD AUTO: 180 10E9/L (ref 150–450)
POTASSIUM SERPL-SCNC: 3.7 MMOL/L (ref 3.4–5.3)
PROT SERPL-MCNC: 5.3 G/DL (ref 6.8–8.8)
RBC # BLD AUTO: 3.02 10E12/L (ref 4.4–5.9)
SODIUM SERPL-SCNC: 138 MMOL/L (ref 133–144)
WBC # BLD AUTO: 5.3 10E9/L (ref 4–11)

## 2018-10-06 PROCEDURE — 90662 IIV NO PRSV INCREASED AG IM: CPT | Performed by: PHYSICIAN ASSISTANT

## 2018-10-06 PROCEDURE — 25000128 H RX IP 250 OP 636: Performed by: PHYSICIAN ASSISTANT

## 2018-10-06 PROCEDURE — 85027 COMPLETE CBC AUTOMATED: CPT | Performed by: HOSPITALIST

## 2018-10-06 PROCEDURE — C9113 INJ PANTOPRAZOLE SODIUM, VIA: HCPCS | Performed by: PHYSICIAN ASSISTANT

## 2018-10-06 PROCEDURE — 99232 SBSQ HOSP IP/OBS MODERATE 35: CPT | Performed by: INTERNAL MEDICINE

## 2018-10-06 PROCEDURE — 80048 BASIC METABOLIC PNL TOTAL CA: CPT | Performed by: HOSPITALIST

## 2018-10-06 PROCEDURE — A9270 NON-COVERED ITEM OR SERVICE: HCPCS | Mod: GY | Performed by: PHYSICIAN ASSISTANT

## 2018-10-06 PROCEDURE — 85018 HEMOGLOBIN: CPT | Performed by: INTERNAL MEDICINE

## 2018-10-06 PROCEDURE — 36415 COLL VENOUS BLD VENIPUNCTURE: CPT | Performed by: INTERNAL MEDICINE

## 2018-10-06 PROCEDURE — 25000132 ZZH RX MED GY IP 250 OP 250 PS 637: Mod: GY | Performed by: PHYSICIAN ASSISTANT

## 2018-10-06 PROCEDURE — 12000000 ZZH R&B MED SURG/OB

## 2018-10-06 PROCEDURE — 36415 COLL VENOUS BLD VENIPUNCTURE: CPT | Performed by: HOSPITALIST

## 2018-10-06 PROCEDURE — 80076 HEPATIC FUNCTION PANEL: CPT | Performed by: HOSPITALIST

## 2018-10-06 PROCEDURE — 85610 PROTHROMBIN TIME: CPT | Performed by: HOSPITALIST

## 2018-10-06 RX ADMIN — PAROXETINE HYDROCHLORIDE 20 MG: 20 TABLET, FILM COATED ORAL at 07:45

## 2018-10-06 RX ADMIN — ACETAMINOPHEN 650 MG: 325 TABLET, FILM COATED ORAL at 19:43

## 2018-10-06 RX ADMIN — PANTOPRAZOLE SODIUM 40 MG: 40 INJECTION, POWDER, FOR SOLUTION INTRAVENOUS at 07:43

## 2018-10-06 RX ADMIN — PANTOPRAZOLE SODIUM 40 MG: 40 INJECTION, POWDER, FOR SOLUTION INTRAVENOUS at 18:52

## 2018-10-06 RX ADMIN — LEVOTHYROXINE SODIUM 100 MCG: 100 TABLET ORAL at 07:44

## 2018-10-06 RX ADMIN — BRIMONIDINE TARTRATE 1 DROP: 2 SOLUTION/ DROPS OPHTHALMIC at 07:48

## 2018-10-06 RX ADMIN — ACETAMINOPHEN 650 MG: 325 TABLET, FILM COATED ORAL at 07:44

## 2018-10-06 RX ADMIN — INFLUENZA A VIRUS A/MICHIGAN/45/2015 X-275 (H1N1) ANTIGEN (FORMALDEHYDE INACTIVATED), INFLUENZA A VIRUS A/SINGAPORE/INFIMH-16-0019/2016 IVR-186 (H3N2) ANTIGEN (FORMALDEHYDE INACTIVATED), AND INFLUENZA B VIRUS B/MARYLAND/15/2016 BX-69A (A B/COLORADO/6/2017-LIKE VIRUS) ANTIGEN (FORMALDEHYDE INACTIVATED) 0.5 ML: 60; 60; 60 INJECTION, SUSPENSION INTRAMUSCULAR at 14:10

## 2018-10-06 RX ADMIN — BRIMONIDINE TARTRATE 1 DROP: 2 SOLUTION/ DROPS OPHTHALMIC at 19:43

## 2018-10-06 RX ADMIN — MICONAZOLE NITRATE: 2 POWDER TOPICAL at 16:34

## 2018-10-06 ASSESSMENT — ACTIVITIES OF DAILY LIVING (ADL)
ADLS_ACUITY_SCORE: 15

## 2018-10-06 NOTE — PROGRESS NOTES
[facility]  Internal Medicine  Progress Note  Allen Mart MD      Patient: Jake Duane Pfleiger  MRN: [id number]  Admission Date: 10/2/2018  Date of Service: 10/06/2018      Jake Duane Pfleiger is a 87 year old male with a history of Chronic Atrial Fibrillation s/p PPM, HTN, Diastolic CHF, GERD, Hypothyroidism, Depression, Insomnia, Diverticulosis who presented to the ED on 10/2 2/2 Rectal Bleeding.  Patient was initially admitted to the observation unit for monitoring of diverticular bleed however he continued to have ongoing bleeding requiring 1 U PRBC transfusion and eventual transfer to inpatient for an unprepped colonoscopy today per GI. Colonoscopy x2 showed e/o recent bleeding in the left colon but no active bleeding, site of bleed could not be localized. Plan to monitor patient overnight, if he has further bleeding-he may need tagged red cell study.    At this point he feels reasonably well and we are advancing his diet.  Pending stability overnight is possible he could discharge home tomorrow.  Given the extent of his bleeding will hold off on Coumadin at least until he can follow-up in primary clinic given risk of rebleed.     Assessment & Plan:   Acute GI Bleed, Acute Blood Loss Anemia 2/2 diverticular bleed - pt presented 10/2 with a two day history of BRBPR with no associated abdominal pain.  Pt does has a history of hemorrhoids, diverticulosis and GERD on warfarin for chronic afib pta.    He is currently status post 2 units red blood cells.  He was given vitamin K upon presentation as well to reverse his Coumadin.  -- Colonoscopy 10/3 with Diverticulosis, one polyp with no signs of active bleeding.  Diverticular bleed suspected.   -- He continued to bleed so repeat colonoscopy 10/5, with recent bleeding noted  - repeat Hb at noon then tomorrow AM  - continue to hold warfarin for 4 weeks, revisit restarting this with his primary care doctor.  -Advance diet as tolerated  -Gi  following  - Plan to monitor patient overnight, if he has further bleeding-he may need tagged red cell study versus IR eval.     Chronic Atrial Fibrillation - rate controlled.    - required vitamin K due to ongoing bleeding on 10/2.  INR normalized.  - continue to hold warfarin due to ongoing GI bleed  - telemetry monitoring  - continue home Atenolol.      S/p Single Chamber PPM - monitor on telemetry      Chronic Diastolic CHF, HTN - appears euvolemic.  BP stable.  Continue home Lisinopril and Atenolol.  Lasix on hold.      Hypothyroidism - continue home Levothyroxine      Depression, Anxiety, Insomnia - stable.  Continue home Ambien, Paroxetine     CODE: Full  DVT: scd  GI prophylaxis:  protonix  Dispo: Potentially home as soon as tomorrow.    Subjective & Interval Hx:    I assumed care, history reviewed  No further bleeding noted  He denies abdominal pain  As per GI advancing diet as tolerated  Hemoglobin drifted down to 8.8, rechecking.    Last 24 hr care team notes reviewed.   ROS:  4 point ROS including Respiratory, CV, GI and , other than that noted in the HPI, is negative.    Physical Exam:    Blood pressure 119/83, pulse 95, temperature 97.8  F (36.6  C), temperature source Oral, resp. rate 16, height 1.829 m (6'), weight 87.1 kg (192 lb), SpO2 95 %.  General: Alert, interactive, NAD, pale appearing  Resp: clear to auscultation bilaterally, no crackles or wheezes  Cardiac: irregular rate and rhythm, no murmur  Abdomen: Soft, nontender, nondistended. +BS.  No HSM or masses, no rebound or guarding.  Extremities: No LE edema  Skin: Warm and dry, pale  Neuro: Alert & oriented, moves all extremities equally    Labs & Images:  Reviewed in Epic   Medications:    Current Facility-Administered Medications   Medication     acetaminophen (TYLENOL) tablet 650 mg     acetaminophen (TYLENOL) tablet 650 mg     atenolol (TENORMIN) tablet 25 mg     brimonidine (ALPHAGAN) 0.2 % ophthalmic solution 1 drop     HOLD: warfarin  (COUMADIN) therapy     influenza Vac Split High-Dose (FLUZONE) injection 0.5 mL     levothyroxine (SYNTHROID/LEVOTHROID) tablet 100 mcg     lidocaine (LMX4) cream     lidocaine (LMX4) kit     lidocaine 1 % 1 mL     lidocaine 1 % 1 mL     lisinopril (PRINIVIL/ZESTRIL) tablet 10 mg     May continue current IV fluids if patient has IV fluids infusing.     May continue current IV fluids if patient has IV fluids infusing.     May continue current IV fluids if patient has IV fluids infusing.     May continue current IV fluids if patient has IV fluids infusing.     May take regular AM medications except those listed below     May take regular AM medications except those listed below     melatonin tablet 1 mg     miconazole (MICATIN; MICRO GUARD) 2 % powder     naloxone (NARCAN) injection 0.1-0.4 mg     naloxone (NARCAN) injection 0.1-0.4 mg     ondansetron (ZOFRAN-ODT) ODT tab 4 mg    Or     ondansetron (ZOFRAN) injection 4 mg     pantoprazole (PROTONIX) 40 mg IV push injection     PARoxetine (PAXIL) tablet 20 mg     polyethylene glycol (MIRALAX/GLYCOLAX) Packet 17 g     sodium chloride (PF) 0.9% PF flush 3 mL     sodium chloride (PF) 0.9% PF flush 3 mL     sodium chloride (PF) 0.9% PF flush 3 mL     sodium chloride (PF) 0.9% PF flush 3 mL     sodium chloride (PF) 0.9% PF flush 3 mL     traMADol (ULTRAM) tablet 50 mg     zolpidem (AMBIEN) tablet 5 mg       Recent Labs  Lab 10/06/18  0706 10/05/18  1820 10/05/18  0734 10/05/18  0006 10/04/18  1826   HGB 8.8* 10.1* 9.8* 10.0* 10.6*

## 2018-10-06 NOTE — PLAN OF CARE
Problem: Patient Care Overview  Goal: Plan of Care/Patient Progress Review  Outcome: Improving  Vitals: /67 (BP Location: Right arm)  Pulse 95  Temp 98.5  F (36.9  C) (Oral)  Resp 16  Ht 1.829 m (6')  Wt 87.1 kg (192 lb)  SpO2 95%  BMI 26.04 kg/m2  Situation/Status: Admitted for possible GI bleed. Colonoscopy done yesterday-results should old blood in rectum-d/t Diverticulitis.   Neuro: A/O x 4  Pain: Denied pain  Activity: Assist of 1 w/ gait belt and walker  Diet:  Tolerating clear liquid diet.  Tele/Cardiac:Pacemaker, Tele- A-fib-CVR, occasional V-paced.   Lungs: BT-DPZ-Twlkg, denied SOB.  GI/: No nausea/vomiting, + Flautus, BSx4-hypoactive.  Pt had 1 small bm with bright red blood on toilet tissue on pm shift yesterday, hemorrhoids present.   Skin: CMS intact, scrotum area red-powder to area.  Lines/drains: IVF -SL  Labs: HGB 10.1. Redraw this am-pending. Had 2 blood transfusion d/t low Hgb earlier in week  Plan: MD to advance diet as tolerated. Monitor for bleeding.Continue to monitor per POC

## 2018-10-06 NOTE — PROGRESS NOTES
Discharge Planner   Discharge Plans in progress: return home to independent living at [facility].   Barriers to discharge plan: medical clearance.   Follow up plan: none, unless needs change pt will discharge home transported by family.        Entered by: Michelle Parish 10/06/2018 12:11 PM

## 2018-10-06 NOTE — PLAN OF CARE
Problem: Patient Care Overview  Goal: Plan of Care/Patient Progress Review  Outcome: No Change  Pt continues to be hospitalized for GI bleed. Hgb 9.2. Advanced to full liquid diet, tolerating well. SBA w/ walker. No BM this shift thus far. Tele reading controlled Afib w/ V demand paced. Flu shot was given. Discharge TBD. Nursing will continue to monitor.

## 2018-10-06 NOTE — CONSULTS
Care Transition Initial Assessment -   Reason For Consult: discharge planning  Met with: Patient and Family    Active Problems:    GI bleed       DATA  Lives With: alone  Living Arrangements: independent living facility  Description of Support System: Supportive, Involved  Who is your support system?: Children  Support Assessment: Adequate family and caregiver support, Adequate social supports.   Identified issues/concerns regarding health management: Pt denies any concerns at this time. He reports feeling comfortable returning home to his independent living at [facility] as soon as tomorrow if cleared by medical team.          Quality Of Family Relationships: supportive, involved, helpful  Transportation Available: family or friend will provide    ASSESSMENT  Cognitive Status:  awake, alert and oriented  Concerns to be addressed:  Pt reports no concerns. His dtg is flying in today and will be staying with him for the next week. Other family also present in room. All indicate he is in independent living and receives NO services from the staff at [facility] so he is able to return when medically cleared from hospital.      PLAN  Financial costs for the patient includes none.  Patient given options and choices for discharge: return home.  Patient/family is agreeable to the plan?  Yes: pt would like to return home, has no concerns.   Patient Goals and Preferences: return home.  Patient anticipates discharging to:  His independent living apartment at [facility], transported by family.

## 2018-10-07 VITALS
HEART RATE: 95 BPM | SYSTOLIC BLOOD PRESSURE: 160 MMHG | BODY MASS INDEX: 26.01 KG/M2 | TEMPERATURE: 96.8 F | WEIGHT: 192 LBS | HEIGHT: 72 IN | OXYGEN SATURATION: 97 % | DIASTOLIC BLOOD PRESSURE: 83 MMHG | RESPIRATION RATE: 16 BRPM

## 2018-10-07 LAB
ERYTHROCYTE [DISTWIDTH] IN BLOOD BY AUTOMATED COUNT: 14.8 % (ref 10–15)
HCT VFR BLD AUTO: 32.9 % (ref 40–53)
HGB BLD-MCNC: 10.5 G/DL (ref 13.3–17.7)
MCH RBC QN AUTO: 29 PG (ref 26.5–33)
MCHC RBC AUTO-ENTMCNC: 31.9 G/DL (ref 31.5–36.5)
MCV RBC AUTO: 91 FL (ref 78–100)
PLATELET # BLD AUTO: 225 10E9/L (ref 150–450)
RBC # BLD AUTO: 3.62 10E12/L (ref 4.4–5.9)
WBC # BLD AUTO: 7.6 10E9/L (ref 4–11)

## 2018-10-07 PROCEDURE — A9270 NON-COVERED ITEM OR SERVICE: HCPCS | Mod: GY | Performed by: PHYSICIAN ASSISTANT

## 2018-10-07 PROCEDURE — 99239 HOSP IP/OBS DSCHRG MGMT >30: CPT | Performed by: INTERNAL MEDICINE

## 2018-10-07 PROCEDURE — 85027 COMPLETE CBC AUTOMATED: CPT | Performed by: INTERNAL MEDICINE

## 2018-10-07 PROCEDURE — A9270 NON-COVERED ITEM OR SERVICE: HCPCS | Mod: GY | Performed by: HOSPITALIST

## 2018-10-07 PROCEDURE — 25000132 ZZH RX MED GY IP 250 OP 250 PS 637: Mod: GY | Performed by: PHYSICIAN ASSISTANT

## 2018-10-07 PROCEDURE — 25000132 ZZH RX MED GY IP 250 OP 250 PS 637: Mod: GY | Performed by: HOSPITALIST

## 2018-10-07 PROCEDURE — 36415 COLL VENOUS BLD VENIPUNCTURE: CPT | Performed by: INTERNAL MEDICINE

## 2018-10-07 RX ORDER — PANTOPRAZOLE SODIUM 40 MG/1
40 TABLET, DELAYED RELEASE ORAL
Status: DISCONTINUED | OUTPATIENT
Start: 2018-10-07 | End: 2018-10-07 | Stop reason: HOSPADM

## 2018-10-07 RX ADMIN — BRIMONIDINE TARTRATE 1 DROP: 2 SOLUTION/ DROPS OPHTHALMIC at 08:00

## 2018-10-07 RX ADMIN — PANTOPRAZOLE SODIUM 40 MG: 40 TABLET, DELAYED RELEASE ORAL at 07:56

## 2018-10-07 RX ADMIN — ACETAMINOPHEN 650 MG: 325 TABLET, FILM COATED ORAL at 07:56

## 2018-10-07 RX ADMIN — LEVOTHYROXINE SODIUM 100 MCG: 100 TABLET ORAL at 07:56

## 2018-10-07 RX ADMIN — LISINOPRIL 10 MG: 10 TABLET ORAL at 07:56

## 2018-10-07 RX ADMIN — ZOLPIDEM TARTRATE 5 MG: 5 TABLET, COATED ORAL at 01:19

## 2018-10-07 RX ADMIN — ATENOLOL 25 MG: 25 TABLET ORAL at 07:56

## 2018-10-07 RX ADMIN — PAROXETINE HYDROCHLORIDE 20 MG: 20 TABLET, FILM COATED ORAL at 07:56

## 2018-10-07 ASSESSMENT — ACTIVITIES OF DAILY LIVING (ADL)
ADLS_ACUITY_SCORE: 15

## 2018-10-07 NOTE — PLAN OF CARE
Problem: Patient Care Overview  Goal: Plan of Care/Patient Progress Review  Outcome: No Change  VSS. A&O. Up SBA with gait belt and walker, up in chair most of shift. Full liquid diet- tolerating well. Hgb 9.2. Pt passed small amount of old blood, dark red, not bright. No bm, BS hypoactive. No c\o pain or nausea. LS clear. Voiding adequately. Plan to discharge tomorrow or Monday to his assisted living facility. Continuing to monitor.

## 2018-10-07 NOTE — DISCHARGE SUMMARY
[facility]  Discharge Summary  Name: Jake Duane Pfleiger    MRN: [id number]  YOB: 1931    Age: 87 year old  Date of Discharge:  10/7/2018  Date of Admission: 10/2/2018  Primary Care Provider: Herve Sweet  Discharge Physician:  Daniel Mart MD  Discharging Service:  Hospitalist      Hospital Course/Discharge Diagnoses:  Jake Duane Pfleiger is a 87 year old male with a history of Chronic Atrial Fibrillation s/p PPM, HTN, Diastolic CHF, GERD, Hypothyroidism, Depression, Insomnia, Diverticulosis who presented to the ED on 10/2 2/2 Rectal Bleeding.  Patient was initially admitted to the observation unit for monitoring of diverticular bleed however he continued to have ongoing bleeding requiring 1 U PRBC transfusion and eventual transfer to inpatient for an unprepped colonoscopy per GI 10/3. Colonoscopy (10/3 then again after prep on 10/5) showed e/o recent bleeding in the left colon but no active bleeding, site of bleed could not be localized but presumed diverticular per GI.He was monitored here and now hasn't had any further bleeding (or bowel movements) in the past 24+ hours.     At this point he feels well and we are advancing his diet.  He would like to discharge home today which seems reasonable.  Given the extent of his bleeding will hold off on Coumadin at least until he can follow-up in primary clinic given risk of rebleed.       Assessment & Plan:   Acute GI Bleed, Acute Blood Loss Anemia 2/2 diverticular bleed - pt presented 10/2 with a two day history of BRBPR with no associated abdominal pain.  Pt does has a history of hemorrhoids, diverticulosis and GERD on warfarin for chronic afib pta.    He is currently status post 2 units red blood cells.  He was given vitamin K upon presentation as well to reverse his Coumadin.  -- Colonoscopy 10/3 with Diverticulosis, one polyp with no signs of active bleeding.  Diverticular bleed suspected.   -- He continued to bleed so  repeat colonoscopy 10/5, with recent bleeding noted  - continue to hold warfarin for at least 2 weeks, revisit restarting this with his primary care doctor.  -Advance diet as tolerated  -Gi following  -if he has further bleeding-he may need tagged red cell study versus IR eval.      Chronic Atrial Fibrillation - rate controlled.    - required vitamin K due to ongoing bleeding on 10/2.  INR normalized.  - continue to hold warfarin due to recent GI bleed, revisit this discussion in clinic.  - continue home Atenolol.      S/p Single Chamber PPM       Chronic Diastolic CHF, HTN - appears euvolemic. Continue home Lisinopril and Atenolol.  Lasix to restart upon discontinue.      Hypothyroidism - continue home Levothyroxine      Depression, Anxiety, Insomnia - stable.  Continue home Ambien, Paroxetine        Discharge Disposition:  Discharged to home     Allergies:  No Known Allergies     Discharge Medications:        Review of your medicines      CONTINUE these medicines which have NOT CHANGED       Dose / Directions    AMBIEN PO        Dose:  5 mg   Take 5 mg by mouth At Bedtime   Refills:  0       atenolol 25 MG tablet   Commonly known as:  TENORMIN        Dose:  25 mg   Take 25 mg by mouth daily   Refills:  0       brimonidine 0.15 % ophthalmic solution   Commonly known as:  ALPHAGAN-P        Dose:  1 drop   Place 1 drop into the right eye 2 times daily   Refills:  0       fish oil-omega-3 fatty acids 1000 MG capsule        Dose:  2 g   Take 2 g by mouth daily   Refills:  0       furosemide 20 MG tablet   Commonly known as:  LASIX   Used for:  Congestive heart failure, unspecified congestive heart failure chronicity, unspecified congestive heart failure type        Dose:  20 mg   Take 1 tablet (20 mg) by mouth daily   Quantity:  30 tablet   Refills:  1       nystatin cream   Commonly known as:  MYCOSTATIN        Apply topically 2 times daily as needed for dry skin   Refills:  0       PAXIL 20 MG tablet   Generic drug:   PARoxetine        Dose:  20 mg   Take 20 mg by mouth every morning.   Refills:  0       polyethylene glycol Packet   Commonly known as:  MIRALAX/GLYCOLAX        Dose:  1 packet   Take 1 packet by mouth as needed   Refills:  0       PRILOSEC PO        Dose:  20 mg   Take 20 mg by mouth every morning.   Refills:  0       PRINIVIL PO        Dose:  10 mg   Take 10 mg by mouth daily   Refills:  0       SYNTHROID PO        Dose:  100 mcg   Take 100 mcg by mouth daily.   Refills:  0       TRAMADOL HCL PO        Dose:  50 mg   Take 50 mg by mouth as needed   Refills:  0       TYLENOL 8 HOUR ARTHRITIS PAIN 650 MG CR tablet   Generic drug:  acetaminophen        Dose:  650 mg   Take 650 mg by mouth 2 times daily   Refills:  0         STOP taking          warfarin 5 MG tablet   Commonly known as:  COUMADIN                 Condition on Discharge:  Discharge condition: Stable       Code status on discharge: Full Code     History of Illness:  See detailed admission note for full details.    Physical Exam:  Vital signs:  Temp: 96.8  F (36  C) Temp src: Oral BP: 160/83   Heart Rate: 82 Resp: 16 SpO2: 97 % O2 Device: None (Room air) Oxygen Delivery: 2 LPM Height: 182.9 cm (6') Weight: 87.1 kg (192 lb)  Estimated body mass index is 26.04 kg/(m^2) as calculated from the following:    Height as of this encounter: 1.829 m (6').    Weight as of this encounter: 87.1 kg (192 lb).    Wt Readings from Last 1 Encounters:   10/02/18 87.1 kg (192 lb)     General: Alert, awake, no acute distress.  Pleasant elderly man.  HEENT: NC/AT, eyes anicteric, external occular movements intact, face symmetric.  Dentition WNL, MM moist.  Cardiac: RRR, S1, S2.  No murmurs appreciated.  Pulmonary: Normal chest rise, normal work of breathing.  Lungs CTA BL  Abdomen: soft, non-tender, non-distended.  Bowel Sounds Present.  No guarding.  Extremities: no deformities.  Warm, well perfused.  Skin: no rashes or lesions noted.  Warm and Dry.  Neuro: No focal deficits  noted.  Speech clear.  Coordination and strength grossly normal.  Psych: Appropriate affect.  Calm and pleasant.    Procedures other than Imaging:  Colonoscopy x 2     Imaging:  Results for orders placed or performed during the hospital encounter of 04/10/16   XR Chest 2 Views    Narrative    XR CHEST 2 VW   4/10/2016 12:43 PM     HISTORY: Short of air    COMPARISON: 9/23/2015 x-ray.      Impression    IMPRESSION: Heart size is normal. No change in unipolar implanted  cardiac device. Tortuous aorta. On the lateral view there may be small  bilateral pleural effusions. Cannot exclude very mild pulmonary  vascular congestion. No focal infiltrates.    ANGELA LEDEZMA MD        Consultations:  Consultation during this admission received from gastroenterology.       Recent Lab Results:    Recent Labs  Lab 10/07/18  0720 10/06/18  1232 10/06/18  0706 10/05/18  1820   WBC 7.6  --  5.3 10.7   HGB 10.5* 9.2* 8.8* 10.1*   HCT 32.9*  --  27.2* 31.8*   MCV 91  --  90 91     --  180 193          Lab Results   Component Value Date     10/06/2018     10/05/2018     10/04/2018    Lab Results   Component Value Date    CHLORIDE 105 10/06/2018    CHLORIDE 105 10/05/2018    CHLORIDE 104 10/04/2018    Lab Results   Component Value Date    BUN 8 10/06/2018    BUN 6 10/05/2018    BUN 9 10/04/2018      Lab Results   Component Value Date    POTASSIUM 3.7 10/06/2018    POTASSIUM 3.7 10/05/2018    POTASSIUM 3.6 10/04/2018    Lab Results   Component Value Date    CO2 26 10/06/2018    CO2 30 10/05/2018    CO2 26 10/04/2018    Lab Results   Component Value Date    CR 0.93 10/06/2018    CR 0.80 10/05/2018    CR 0.81 10/04/2018          Recent Labs  Lab 10/06/18  0706 10/04/18  0617 10/03/18  0643   INR 1.21* 1.29* 1.76*          Pending Results:    Unresulted Labs Ordered in the Past 30 Days of this Admission     No orders found from 8/3/2018 to 10/3/2018.           Discharge Instructions and Follow-Up:     Discharge  Procedure Orders  Reason for your hospital stay   Order Comments: You were admitted for lower GI bleeding which appears to have been due to diverticula.  You underwent 2 colonoscopies and was seen by a GI specialist.  After discussion, it seems most reasonable not to restart her Coumadin in the short-term but rather recheck lab work in clinic and reevaluate for any evidence of ongoing bleeding.     Follow-up and recommended labs and tests    Order Comments: Follow up with primary care provider, Herve Sweet, within 7-14 days for hospital follow- up.  The following labs/tests are recommended: Recheck hemoglobin and discuss whether eventually restarting your Coumadin is reasonable at that point..     Activity   Order Comments: Your activity upon discharge: activity as tolerated   Order Specific Question Answer Comments   Is discharge order? Yes      Diet   Order Comments: Follow this diet upon discharge: Orders Placed This Encounter  Regular diet   Order Specific Question Answer Comments   Is discharge order? Yes          Total time spent in face to face contact with the patient and coordinating discharge was:  35 Minutes.

## 2018-10-07 NOTE — PROVIDER NOTIFICATION
Chani IGLESIAS regarding elevated blood pressure, pt asymptomatic, has no PRN meds available for blood pressure control

## 2018-10-07 NOTE — PLAN OF CARE
Problem: Patient Care Overview  Goal: Plan of Care/Patient Progress Review  Outcome: Improving  VSS. A&O. Up SBA with gait belt and walker. Full liquid diet-. Hgb 10.5. No bm this shift, BS faint No c\o pain or nausea. LS clear. Voiding adequately, inc at times. No IV access

## 2018-10-07 NOTE — PROGRESS NOTES
Pt to D/C to [facility] home.  Pt provided with d/c instructions, including new medications, when medications were last given, and when to take them again.  Pt also informed to f/u with PCP in 7-14 days.  Pt verbalized understanding of all d/c and f/u instructions.  All questions were answered at this time.  Copy of paperwork sent with pt.  No Medication sent with pt.  Daughter Jahaira to provide transport.  All personal belongings sent with pt.

## 2018-10-07 NOTE — PHARMACY-ANTICOAGULATION SERVICE
Clinical Pharmacy- Warfarin Discharge Note  This patient is currently on warfarin for the treatment of Atrial fibrillation.  INR Goal= 2-3  Expected length of therapy undetermined.           Anticoagulation Dose History     Recent Dosing and Labs Latest Ref Rng & Units 10/2/2018 10/2/2018 10/3/2018 10/4/2018 10/6/2018    Warfarin 5 mg - - - - - -    INR 0.86 - 1.14 2.64(H) 2.53(H) 1.76(H) 1.29(H) 1.21(H)        Pt admit w/GI bleed.  Pt warfarin stopped on admission and held on discharge.  Per AVS, pt to f/u w/PCP to be sure hgb stable and discuss when/if to resume warfarin

## 2018-10-08 DIAGNOSIS — Z95.0 CARDIAC PACEMAKER IN SITU: Primary | ICD-10-CM

## 2019-01-30 ENCOUNTER — ANCILLARY PROCEDURE (OUTPATIENT)
Dept: CARDIOLOGY | Facility: CLINIC | Age: 84
End: 2019-01-30
Attending: INTERNAL MEDICINE
Payer: MEDICARE

## 2019-01-30 ENCOUNTER — OFFICE VISIT (OUTPATIENT)
Dept: CARDIOLOGY | Facility: CLINIC | Age: 84
End: 2019-01-30
Payer: MEDICARE

## 2019-01-30 VITALS
WEIGHT: 194 LBS | SYSTOLIC BLOOD PRESSURE: 119 MMHG | DIASTOLIC BLOOD PRESSURE: 64 MMHG | HEIGHT: 72 IN | BODY MASS INDEX: 26.28 KG/M2 | HEART RATE: 64 BPM

## 2019-01-30 DIAGNOSIS — Z95.0 CARDIAC PACEMAKER IN SITU: Primary | ICD-10-CM

## 2019-01-30 DIAGNOSIS — I10 BENIGN ESSENTIAL HYPERTENSION: ICD-10-CM

## 2019-01-30 DIAGNOSIS — I48.21 PERMANENT ATRIAL FIBRILLATION (H): ICD-10-CM

## 2019-01-30 DIAGNOSIS — Z95.0 CARDIAC PACEMAKER IN SITU: ICD-10-CM

## 2019-01-30 LAB
MDC_IDC_LEAD_IMPLANT_DT: NORMAL
MDC_IDC_LEAD_LOCATION: NORMAL
MDC_IDC_LEAD_MFG: NORMAL
MDC_IDC_LEAD_MODEL: NORMAL
MDC_IDC_LEAD_POLARITY_TYPE: NORMAL
MDC_IDC_LEAD_SERIAL: NORMAL
MDC_IDC_MSMT_BATTERY_REMAINING_LONGEVITY: 126 MO
MDC_IDC_MSMT_BATTERY_STATUS: NORMAL
MDC_IDC_MSMT_BATTERY_VOLTAGE: 2.95 V
MDC_IDC_MSMT_LEADCHNL_RV_IMPEDANCE_VALUE: 412.5 OHM
MDC_IDC_MSMT_LEADCHNL_RV_PACING_THRESHOLD_AMPLITUDE: 0.75 V
MDC_IDC_MSMT_LEADCHNL_RV_PACING_THRESHOLD_AMPLITUDE: 0.75 V
MDC_IDC_MSMT_LEADCHNL_RV_PACING_THRESHOLD_PULSEWIDTH: 1 MS
MDC_IDC_MSMT_LEADCHNL_RV_PACING_THRESHOLD_PULSEWIDTH: 1 MS
MDC_IDC_MSMT_LEADCHNL_RV_SENSING_INTR_AMPL: 10.8 MV
MDC_IDC_PG_IMPLANT_DTM: NORMAL
MDC_IDC_PG_MFG: NORMAL
MDC_IDC_PG_MODEL: NORMAL
MDC_IDC_PG_SERIAL: NORMAL
MDC_IDC_PG_TYPE: NORMAL
MDC_IDC_SESS_CLINIC_NAME: NORMAL
MDC_IDC_SESS_DTM: NORMAL
MDC_IDC_SESS_TYPE: NORMAL
MDC_IDC_SET_BRADY_HYSTRATE: NORMAL
MDC_IDC_SET_BRADY_LOWRATE: 60 {BEATS}/MIN
MDC_IDC_SET_BRADY_MAX_SENSOR_RATE: 120 {BEATS}/MIN
MDC_IDC_SET_BRADY_MODE: NORMAL
MDC_IDC_SET_BRADY_NIGHT_RATE: NORMAL
MDC_IDC_SET_LEADCHNL_RV_PACING_AMPLITUDE: 2 V
MDC_IDC_SET_LEADCHNL_RV_PACING_ANODE_ELECTRODE_1: NORMAL
MDC_IDC_SET_LEADCHNL_RV_PACING_ANODE_LOCATION_1: NORMAL
MDC_IDC_SET_LEADCHNL_RV_PACING_CAPTURE_MODE: NORMAL
MDC_IDC_SET_LEADCHNL_RV_PACING_CATHODE_ELECTRODE_1: NORMAL
MDC_IDC_SET_LEADCHNL_RV_PACING_CATHODE_LOCATION_1: NORMAL
MDC_IDC_SET_LEADCHNL_RV_PACING_POLARITY: NORMAL
MDC_IDC_SET_LEADCHNL_RV_PACING_PULSEWIDTH: 1 MS
MDC_IDC_SET_LEADCHNL_RV_SENSING_ANODE_ELECTRODE_1: NORMAL
MDC_IDC_SET_LEADCHNL_RV_SENSING_ANODE_LOCATION_1: NORMAL
MDC_IDC_SET_LEADCHNL_RV_SENSING_CATHODE_ELECTRODE_1: NORMAL
MDC_IDC_SET_LEADCHNL_RV_SENSING_CATHODE_LOCATION_1: NORMAL
MDC_IDC_SET_LEADCHNL_RV_SENSING_POLARITY: NORMAL
MDC_IDC_SET_LEADCHNL_RV_SENSING_SENSITIVITY: 2 MV
MDC_IDC_STAT_BRADY_RV_PERCENT_PACED: 14 %

## 2019-01-30 PROCEDURE — 99214 OFFICE O/P EST MOD 30 MIN: CPT | Mod: 25 | Performed by: INTERNAL MEDICINE

## 2019-01-30 PROCEDURE — 93279 PRGRMG DEV EVAL PM/LDLS PM: CPT | Performed by: INTERNAL MEDICINE

## 2019-01-30 RX ORDER — WARFARIN SODIUM 5 MG/1
2.5-1 TABLET ORAL
Status: ON HOLD | COMMUNITY
Start: 2013-01-15 | End: 2019-03-08

## 2019-01-30 ASSESSMENT — MIFFLIN-ST. JEOR: SCORE: 1592.98

## 2019-01-30 NOTE — PROGRESS NOTES
Service Date: 01/30/2019      HISTORY OF PRESENT ILLNESS:    I had the pleasure of seeing Mr. Jorge Luis Ordoñez, a very pleasant 87-year-old male with the following cardiac/medical issues:   A.  Permanent atrial fibrillation with tachycardia/bradycardia issues.  He underwent single-chamber pacemaker implantation in 2012.   B.  Chronic diastolic CHF.  Hospital admission in 2016 with CHF likely related to dietary indiscretion.   C.  Hypertension.   D.  Hypothyroidism.   E.  Insomnia, on chronic treatment with Ambien.      Mr. Ordoñez lives independently in a seniors' building.  His son brought him to the clinic today.  Apparently, he has been feeling fairly well.  His mobility is limited by arthritic issues.  He has not had chest pain, syncope, near-syncope, orthopnea or unusual dyspnea.        He is mindful of his salt intake.  In fact, he tells me that he never adds salt to his food, though he eats at the senior home dining area where his preference is for soup that likely contains quite a bit of salt.  His blood pressure has been normal.      PHYSICAL EXAMINATION:   VITAL SIGNS:  Blood pressure 119/64, heart rate 64 and irregular.  Weight 88 kilos, height 183 cm.  He is a pleasant elderly male who is alert and oriented, in no distress.   HEENT:  Head normocephalic.   NECK:  Supple without carotid bruits.   LUNGS:  Clear.  No apparent crackles or wheezes.   CARDIOVASCULAR:  Irregular rhythm in the 60s-70s.  No apparent gallop or murmur.   ABDOMEN:  Moderately obese, soft, nontender.   EXTREMITIES:  Warm, well perfused, with absolutely no edema.      DIAGNOSTIC STUDIES:    I have reviewed recent pacemaker interrogations.  Device function is normal.  Ventricular pacing % has been between 14% and 19% over the years.      IMPRESSION:   1.  Permanent atrial fibrillation.  Well rate controlled.  Continue atenolol.  He is on warfarin for anticoagulation, and INRs have been followed by his primary care provider's office.   2.   Chronic diastolic CHF.  Well controlled on low-dose furosemide.  His PCP is getting annual laboratory tests including chemistries.  Most recent lab results included a creatinine of 1.1, sodium 134, potassium of 5.3.  I confirmed that he is not taking a potassium supplement.   3.  Hypertension.  His blood pressure is under good control.      RECOMMENDATIONS:   A.  Following with his PCP for borderline hyperkalemia.   B.  Continue current cardiac medications.   C.  Routine followup in the Device Clinic.   D.  He will see a provider in our clinic in 1 year.  A BMP will be performed at that time.      Time spent was 25 minutes.  More than 50% of time was discussion and counseling.         RAY BORGES MD, FACC         cc:   Herve Sweet MD     [facility] [place] [address] [place] [address]          D: 2019   T: 2019   MT: VERITO      Name:     GLENN CERRATO   MRN:      [id number]        Account:      [id number]   :      1931           Service Date: 2019      Document: [id number]

## 2019-01-30 NOTE — PROGRESS NOTES
HPI and Plan:   See dictation    Orders Placed This Encounter   Procedures     Follow-Up with Cardiac Advanced Practice Provider       Orders Placed This Encounter   Medications     warfarin (COUMADIN) 5 MG tablet     Sig: Take 2.5-10 mg by mouth       There are no discontinued medications.      Encounter Diagnosis   Name Primary?     Cardiac pacemaker in situ        CURRENT MEDICATIONS:  Current Outpatient Medications   Medication Sig Dispense Refill     acetaminophen (TYLENOL 8 HOUR ARTHRITIS PAIN) 650 MG CR tablet Take 650 mg by mouth 2 times daily       atenolol (TENORMIN) 25 MG tablet Take 25 mg by mouth daily       brimonidine (ALPHAGAN-P) 0.15 % ophthalmic solution Place 1 drop into the right eye 2 times daily       fish oil-omega-3 fatty acids (FISH OIL) 1000 MG capsule Take 2 g by mouth daily        furosemide (LASIX) 20 MG tablet Take 1 tablet (20 mg) by mouth daily 30 tablet 1     Levothyroxine Sodium (SYNTHROID PO) Take 100 mcg by mouth daily.       Lisinopril (PRINIVIL PO) Take 10 mg by mouth daily        nystatin (MYCOSTATIN) cream Apply topically 2 times daily as needed for dry skin       Omeprazole (PRILOSEC PO) Take 20 mg by mouth every morning.       paroxetine (PAXIL) 20 MG tablet Take 20 mg by mouth every morning.       polyethylene glycol (MIRALAX/GLYCOLAX) packet Take 1 packet by mouth as needed        warfarin (COUMADIN) 5 MG tablet Take 2.5-10 mg by mouth       Zolpidem Tartrate (AMBIEN PO) Take 5 mg by mouth At Bedtime        TRAMADOL HCL PO Take 50 mg by mouth as needed          ALLERGIES   No Known Allergies    PAST MEDICAL HISTORY:  Past Medical History:   Diagnosis Date     Anxiety      Arthritis      Atrial fibrillation (H)      Cardiomyopathy (H)      Depression      Diastolic CHF (H) 04/10/2016     Diverticulosis      Gastro-oesophageal reflux disease      GI bleed 10/02/2018    Lower     Hypertension      Insomnia      Nodule of lower lobe of left lung     CT Chest 1/2019-stable      Pacemaker     [employer] Dual Pacemaker     Permanent atrial fibrillation (H)      Right patella fracture      Tachy-jairo syndrome (H)      Thyroid disease     Hypothyrodism        PAST SURGICAL HISTORY:  Past Surgical History:   Procedure Laterality Date     ARTHROPLASTY HIP  2014    left hip replacement     ARTHROPLASTY KNEE      right      CHOLECYSTECTOMY       COLONOSCOPY N/A 10/3/2018    Procedure: COLONOSCOPY;  COLONOSCOPY Rm.#227;  Surgeon: Reese Burroughs MD;  Location:  GI     COLONOSCOPY N/A 10/5/2018    Procedure: COLONOSCOPY;  COLONOSCOPY  Rm.#524;  Surgeon: Reese Burroughs MD;  Location:  GI     EYE SURGERY      right eye cataract removal      IMPLANT PACEMAKER  2012    dual chamber      ORTHOPEDIC SURGERY      back surgery        FAMILY HISTORY:  Family History   Problem Relation Age of Onset     Other Cancer Mother      Unknown/Adopted Father        SOCIAL HISTORY:  Social History     Socioeconomic History     Marital status:      Spouse name: None     Number of children: None     Years of education: None     Highest education level: None   Social Needs     Financial resource strain: None     Food insecurity - worry: None     Food insecurity - inability: None     Transportation needs - medical: None     Transportation needs - non-medical: None   Occupational History     None   Tobacco Use     Smoking status: Former Smoker     Packs/day: 0.50     Years: 25.00     Pack years: 12.50     Types: Cigarettes, Pipe, Cigars     Last attempt to quit: 1982     Years since quittin.0     Smokeless tobacco: Never Used   Substance and Sexual Activity     Alcohol use: No     Alcohol/week: 0.0 oz     Drug use: No     Sexual activity: None   Other Topics Concern     Parent/sibling w/ CABG, MI or angioplasty before 65F 55M? Not Asked      Service Not Asked     Blood Transfusions Not Asked     Caffeine Concern No     Comment: occ - mostly decaf     Occupational Exposure Not  Asked     Hobby Hazards Not Asked     Sleep Concern Not Asked     Stress Concern Not Asked     Weight Concern Not Asked     Special Diet Yes     Comment: smaller portions     Back Care Not Asked     Exercise No     Comment: some walking     Bike Helmet Not Asked     Seat Belt Not Asked     Self-Exams Not Asked   Social History Narrative     None       Review of Systems:  Skin:  Positive for rash     Eyes:  Positive for glasses    ENT:  Positive for hearing loss    Respiratory:  Negative       Cardiovascular:    edema;Positive for;fatigue edema ankles  Gastroenterology: Negative      Genitourinary:         Musculoskeletal:  Positive for back pain;joint pain;arthritis    Neurologic:  Positive for numbness or tingling of hands numb/tingling in tips of fingers  Psychiatric:  Positive for sleep disturbances takes sleep aid  Heme/Lymph/Imm:  Negative      Endocrine:  Positive for thyroid disorder      [id number]

## 2019-01-30 NOTE — LETTER
1/30/2019      Herve Sweet  [facility] [place] [address]  [place] [address]      RE: Jake Duane Pfleiger       Dear Colleague,    I had the pleasure of seeing Jake Duane Pfleiger in the [facility].    Service Date: 01/30/2019      HISTORY OF PRESENT ILLNESS:    I had the pleasure of seeing Mr. Jorge Luis Ordoñez, a very pleasant 87-year-old male with the following cardiac/medical issues:   A.  Permanent atrial fibrillation with tachycardia/bradycardia issues.  He underwent single-chamber pacemaker implantation in 2012.   B.  Chronic diastolic CHF.  Hospital admission in 2016 with CHF likely related to dietary indiscretion.   C.  Hypertension.   D.  Hypothyroidism.   E.  Insomnia, on chronic treatment with Ambien.      Mr. Ordoñez lives independently in a seniors' building.  His son brought him to the clinic today.  Apparently, he has been feeling fairly well.  His mobility is limited by arthritic issues.  He has not had chest pain, syncope, near-syncope, orthopnea or unusual dyspnea.        He is mindful of his salt intake.  In fact, he tells me that he never adds salt to his food, though he eats at the senior home dining area where his preference is for soup that likely contains quite a bit of salt.  His blood pressure has been normal.      PHYSICAL EXAMINATION:   VITAL SIGNS:  Blood pressure 119/64, heart rate 64 and irregular.  Weight 88 kilos, height 183 cm.  He is a pleasant elderly male who is alert and oriented, in no distress.   HEENT:  Head normocephalic.   NECK:  Supple without carotid bruits.   LUNGS:  Clear.  No apparent crackles or wheezes.   CARDIOVASCULAR:  Irregular rhythm in the 60s-70s.  No apparent gallop or murmur.   ABDOMEN:  Moderately obese, soft, nontender.   EXTREMITIES:  Warm, well perfused, with absolutely no edema.      DIAGNOSTIC STUDIES:    I have reviewed recent pacemaker interrogations.  Device function is normal.   Ventricular pacing % has been between 14% and 19% over the years.      IMPRESSION:   1.  Permanent atrial fibrillation.  Well rate controlled.  Continue atenolol.  He is on warfarin for anticoagulation, and INRs have been followed by his primary care provider's office.   2.  Chronic diastolic CHF.  Well controlled on low-dose furosemide.  His PCP is getting annual laboratory tests including chemistries.  Most recent lab results included a creatinine of 1.1, sodium 134, potassium of 5.3.  I confirmed that he is not taking a potassium supplement.   3.  Hypertension.  His blood pressure is under good control.      RECOMMENDATIONS:   A.  Following with his PCP for borderline hyperkalemia.   B.  Continue current cardiac medications.   C.  Routine followup in the Device Clinic.   D.  He will see a provider in our clinic in 1 year.  A BMP will be performed at that time.      Time spent was 25 minutes.  More than 50% of time was discussion and counseling.         RAY BORGES MD, FACC         cc:   Herve Sweet MD     [facility] [place] [address] [place] [address]          D: 2019   T: 2019   MT: VERITO      Name:     GLENN CERRATO   MRN:      [id number]        Account:      [id number]   :      1931           Service Date: 2019      Document: [id number]           Outpatient Encounter Medications as of 2019   Medication Sig Dispense Refill     acetaminophen (TYLENOL 8 HOUR ARTHRITIS PAIN) 650 MG CR tablet Take 650 mg by mouth 2 times daily       atenolol (TENORMIN) 25 MG tablet Take 25 mg by mouth daily       brimonidine (ALPHAGAN-P) 0.15 % ophthalmic solution Place 1 drop into the right eye 2 times daily       fish oil-omega-3 fatty acids (FISH OIL) 1000 MG capsule Take 2 g by mouth daily        furosemide (LASIX) 20 MG tablet Take 1 tablet (20 mg) by mouth daily 30 tablet 1     Levothyroxine Sodium (SYNTHROID PO) Take 100 mcg by mouth daily.        Lisinopril (PRINIVIL PO) Take 10 mg by mouth daily        nystatin (MYCOSTATIN) cream Apply topically 2 times daily as needed for dry skin       Omeprazole (PRILOSEC PO) Take 20 mg by mouth every morning.       paroxetine (PAXIL) 20 MG tablet Take 20 mg by mouth every morning.       polyethylene glycol (MIRALAX/GLYCOLAX) packet Take 1 packet by mouth as needed        warfarin (COUMADIN) 5 MG tablet Take 2.5-10 mg by mouth       Zolpidem Tartrate (AMBIEN PO) Take 5 mg by mouth At Bedtime        TRAMADOL HCL PO Take 50 mg by mouth as needed        No facility-administered encounter medications on file as of 1/30/2019.        Again, thank you for allowing me to participate in the care of your patient.      Sincerely,    Abby Ratliff MD     [facility]

## 2019-01-30 NOTE — LETTER
1/30/2019    [facility] [place] [address]   [place] [address]    RE: Jake Duane Pfleiger       Dear Colleague,    I had the pleasure of seeing Jake Duane Pfleiger in the [facility].    HPI and Plan:   See dictation    Orders Placed This Encounter   Procedures     Follow-Up with Cardiac Advanced Practice Provider       Orders Placed This Encounter   Medications     warfarin (COUMADIN) 5 MG tablet     Sig: Take 2.5-10 mg by mouth       There are no discontinued medications.      Encounter Diagnosis   Name Primary?     Cardiac pacemaker in situ        CURRENT MEDICATIONS:  Current Outpatient Medications   Medication Sig Dispense Refill     acetaminophen (TYLENOL 8 HOUR ARTHRITIS PAIN) 650 MG CR tablet Take 650 mg by mouth 2 times daily       atenolol (TENORMIN) 25 MG tablet Take 25 mg by mouth daily       brimonidine (ALPHAGAN-P) 0.15 % ophthalmic solution Place 1 drop into the right eye 2 times daily       fish oil-omega-3 fatty acids (FISH OIL) 1000 MG capsule Take 2 g by mouth daily        furosemide (LASIX) 20 MG tablet Take 1 tablet (20 mg) by mouth daily 30 tablet 1     Levothyroxine Sodium (SYNTHROID PO) Take 100 mcg by mouth daily.       Lisinopril (PRINIVIL PO) Take 10 mg by mouth daily        nystatin (MYCOSTATIN) cream Apply topically 2 times daily as needed for dry skin       Omeprazole (PRILOSEC PO) Take 20 mg by mouth every morning.       paroxetine (PAXIL) 20 MG tablet Take 20 mg by mouth every morning.       polyethylene glycol (MIRALAX/GLYCOLAX) packet Take 1 packet by mouth as needed        warfarin (COUMADIN) 5 MG tablet Take 2.5-10 mg by mouth       Zolpidem Tartrate (AMBIEN PO) Take 5 mg by mouth At Bedtime        TRAMADOL HCL PO Take 50 mg by mouth as needed          ALLERGIES   No Known Allergies    PAST MEDICAL HISTORY:  Past Medical History:   Diagnosis Date     Anxiety      Arthritis      Atrial fibrillation (H)       Cardiomyopathy (H)      Depression      Diastolic CHF (H) 04/10/2016     Diverticulosis      Gastro-oesophageal reflux disease      GI bleed 10/02/2018    Lower     Hypertension      Insomnia      Nodule of lower lobe of left lung     CT Chest 2019-stable     Pacemaker     [employer] Dual Pacemaker     Permanent atrial fibrillation (H)      Right patella fracture      Tachy-jairo syndrome (H)      Thyroid disease     Hypothyrodism        PAST SURGICAL HISTORY:  Past Surgical History:   Procedure Laterality Date     ARTHROPLASTY HIP  2014    left hip replacement     ARTHROPLASTY KNEE      right      CHOLECYSTECTOMY       COLONOSCOPY N/A 10/3/2018    Procedure: COLONOSCOPY;  COLONOSCOPY Rm.#227;  Surgeon: Reese Burroughs MD;  Location:  GI     COLONOSCOPY N/A 10/5/2018    Procedure: COLONOSCOPY;  COLONOSCOPY  Rm.#524;  Surgeon: Reese Burroughs MD;  Location:  GI     EYE SURGERY      right eye cataract removal      IMPLANT PACEMAKER  2012    dual chamber      ORTHOPEDIC SURGERY      back surgery        FAMILY HISTORY:  Family History   Problem Relation Age of Onset     Other Cancer Mother      Unknown/Adopted Father        SOCIAL HISTORY:  Social History     Socioeconomic History     Marital status:      Spouse name: None     Number of children: None     Years of education: None     Highest education level: None   Social Needs     Financial resource strain: None     Food insecurity - worry: None     Food insecurity - inability: None     Transportation needs - medical: None     Transportation needs - non-medical: None   Occupational History     None   Tobacco Use     Smoking status: Former Smoker     Packs/day: 0.50     Years: 25.00     Pack years: 12.50     Types: Cigarettes, Pipe, Cigars     Last attempt to quit: 1982     Years since quittin.0     Smokeless tobacco: Never Used   Substance and Sexual Activity     Alcohol use: No     Alcohol/week: 0.0 oz     Drug use: No     Sexual  activity: None   Other Topics Concern     Parent/sibling w/ CABG, MI or angioplasty before 65F 55M? Not Asked      Service Not Asked     Blood Transfusions Not Asked     Caffeine Concern No     Comment: occ - mostly decaf     Occupational Exposure Not Asked     Hobby Hazards Not Asked     Sleep Concern Not Asked     Stress Concern Not Asked     Weight Concern Not Asked     Special Diet Yes     Comment: smaller portions     Back Care Not Asked     Exercise No     Comment: some walking     Bike Helmet Not Asked     Seat Belt Not Asked     Self-Exams Not Asked   Social History Narrative     None       Review of Systems:  Skin:  Positive for rash     Eyes:  Positive for glasses    ENT:  Positive for hearing loss    Respiratory:  Negative       Cardiovascular:    edema;Positive for;fatigue edema ankles  Gastroenterology: Negative      Genitourinary:         Musculoskeletal:  Positive for back pain;joint pain;arthritis    Neurologic:  Positive for numbness or tingling of hands numb/tingling in tips of fingers  Psychiatric:  Positive for sleep disturbances takes sleep aid  Heme/Lymph/Imm:  Negative      Endocrine:  Positive for thyroid disorder      [id number]            Thank you for allowing me to participate in the care of your patient.      Sincerely,     Abby Ratliff MD     [facility]    cc:   Samuel Asante[facility]ab[facility] [place]  [address] [place] [address]

## 2019-03-08 ENCOUNTER — HOSPITAL ENCOUNTER (INPATIENT)
Facility: CLINIC | Age: 84
LOS: 2 days | Discharge: NURSING, REHAB OR LONG-TERM CARE | DRG: 813 | End: 2019-03-10
Attending: EMERGENCY MEDICINE | Admitting: INTERNAL MEDICINE
Payer: MEDICARE

## 2019-03-08 DIAGNOSIS — I48.21 PERMANENT ATRIAL FIBRILLATION (H): Primary | ICD-10-CM

## 2019-03-08 DIAGNOSIS — K92.2 LOWER GI BLEED: ICD-10-CM

## 2019-03-08 DIAGNOSIS — D62 ANEMIA DUE TO ACUTE BLOOD LOSS: ICD-10-CM

## 2019-03-08 DIAGNOSIS — R79.1 SUPRATHERAPEUTIC INR: ICD-10-CM

## 2019-03-08 LAB
ABO + RH BLD: NORMAL
ABO + RH BLD: NORMAL
ANION GAP SERPL CALCULATED.3IONS-SCNC: 12 MMOL/L (ref 6–17)
ANION GAP SERPL CALCULATED.3IONS-SCNC: 6 MMOL/L (ref 3–14)
BASOPHILS # BLD AUTO: 0 10E9/L (ref 0–0.2)
BASOPHILS NFR BLD AUTO: 0.4 %
BLD GP AB SCN SERPL QL: NORMAL
BLD PROD TYP BPU: NORMAL
BLD PROD TYP BPU: NORMAL
BLD UNIT ID BPU: 0
BLD UNIT ID BPU: 0
BLOOD BANK CMNT PATIENT-IMP: NORMAL
BLOOD PRODUCT CODE: NORMAL
BLOOD PRODUCT CODE: NORMAL
BPU ID: NORMAL
BPU ID: NORMAL
BUN SERPL-MCNC: 21 MG/DL (ref 7–30)
BUN SERPL-MCNC: 21 MG/DL (ref 7–30)
CA-I BLD-SCNC: 4.6 MG/DL (ref 4.4–5.2)
CALCIUM SERPL-MCNC: 8.2 MG/DL (ref 8.5–10.1)
CHLORIDE BLD-SCNC: 99 MMOL/L (ref 94–109)
CHLORIDE SERPL-SCNC: 104 MMOL/L (ref 94–109)
CO2 BLD-SCNC: 24 MMOL/L (ref 20–32)
CO2 SERPL-SCNC: 26 MMOL/L (ref 20–32)
CREAT BLD-MCNC: 1 MG/DL (ref 0.66–1.25)
CREAT SERPL-MCNC: 1 MG/DL (ref 0.66–1.25)
DIFFERENTIAL METHOD BLD: ABNORMAL
EOSINOPHIL # BLD AUTO: 0 10E9/L (ref 0–0.7)
EOSINOPHIL NFR BLD AUTO: 0.4 %
ERYTHROCYTE [DISTWIDTH] IN BLOOD BY AUTOMATED COUNT: 17.3 % (ref 10–15)
GFR SERPL CREATININE-BSD FRML MDRD: 67 ML/MIN/{1.73_M2}
GFR SERPL CREATININE-BSD FRML MDRD: 71 ML/MIN/{1.73_M2}
GLUCOSE BLD-MCNC: 104 MG/DL (ref 70–99)
GLUCOSE SERPL-MCNC: 100 MG/DL (ref 70–99)
HCT VFR BLD AUTO: 22.9 % (ref 40–53)
HCT VFR BLD CALC: 23 %PCV (ref 40–53)
HGB BLD CALC-MCNC: 7.8 G/DL (ref 13.3–17.7)
HGB BLD-MCNC: 6.9 G/DL (ref 13.3–17.7)
HGB BLD-MCNC: 9.3 G/DL (ref 13.3–17.7)
IMM GRANULOCYTES # BLD: 0 10E9/L (ref 0–0.4)
IMM GRANULOCYTES NFR BLD: 0.4 %
INR PPP: 1.8 (ref 0.86–1.14)
INR PPP: 3.27 (ref 0.86–1.14)
INR PPP: 3.69 (ref 0.86–1.14)
INTERPRETATION ECG - MUSE: NORMAL
LYMPHOCYTES # BLD AUTO: 0.6 10E9/L (ref 0.8–5.3)
LYMPHOCYTES NFR BLD AUTO: 11.1 %
MAGNESIUM SERPL-MCNC: 2.1 MG/DL (ref 1.6–2.3)
MCH RBC QN AUTO: 24.6 PG (ref 26.5–33)
MCHC RBC AUTO-ENTMCNC: 30.1 G/DL (ref 31.5–36.5)
MCV RBC AUTO: 82 FL (ref 78–100)
MONOCYTES # BLD AUTO: 0.5 10E9/L (ref 0–1.3)
MONOCYTES NFR BLD AUTO: 8.4 %
NEUTROPHILS # BLD AUTO: 4.4 10E9/L (ref 1.6–8.3)
NEUTROPHILS NFR BLD AUTO: 79.3 %
NRBC # BLD AUTO: 0 10*3/UL
NRBC BLD AUTO-RTO: 0 /100
NUM BPU REQUESTED: 2
PLATELET # BLD AUTO: 224 10E9/L (ref 150–450)
POTASSIUM BLD-SCNC: 4.2 MMOL/L (ref 3.4–5.3)
POTASSIUM SERPL-SCNC: 4.2 MMOL/L (ref 3.4–5.3)
RBC # BLD AUTO: 2.81 10E12/L (ref 4.4–5.9)
SODIUM BLD-SCNC: 135 MMOL/L (ref 133–144)
SODIUM SERPL-SCNC: 136 MMOL/L (ref 133–144)
SPECIMEN EXP DATE BLD: NORMAL
TRANSFUSION STATUS PATIENT QL: NORMAL
WBC # BLD AUTO: 5.5 10E9/L (ref 4–11)

## 2019-03-08 PROCEDURE — 25800030 ZZH RX IP 258 OP 636: Performed by: EMERGENCY MEDICINE

## 2019-03-08 PROCEDURE — 85610 PROTHROMBIN TIME: CPT | Performed by: EMERGENCY MEDICINE

## 2019-03-08 PROCEDURE — 86850 RBC ANTIBODY SCREEN: CPT | Performed by: EMERGENCY MEDICINE

## 2019-03-08 PROCEDURE — 80048 BASIC METABOLIC PNL TOTAL CA: CPT | Performed by: EMERGENCY MEDICINE

## 2019-03-08 PROCEDURE — 86923 COMPATIBILITY TEST ELECTRIC: CPT | Performed by: EMERGENCY MEDICINE

## 2019-03-08 PROCEDURE — 85018 HEMOGLOBIN: CPT | Performed by: INTERNAL MEDICINE

## 2019-03-08 PROCEDURE — 36430 TRANSFUSION BLD/BLD COMPNT: CPT

## 2019-03-08 PROCEDURE — 99292 CRITICAL CARE ADDL 30 MIN: CPT

## 2019-03-08 PROCEDURE — 96361 HYDRATE IV INFUSION ADD-ON: CPT

## 2019-03-08 PROCEDURE — 96374 THER/PROPH/DIAG INJ IV PUSH: CPT

## 2019-03-08 PROCEDURE — P9016 RBC LEUKOCYTES REDUCED: HCPCS | Performed by: EMERGENCY MEDICINE

## 2019-03-08 PROCEDURE — 25000125 ZZHC RX 250: Performed by: INTERNAL MEDICINE

## 2019-03-08 PROCEDURE — 85025 COMPLETE CBC W/AUTO DIFF WBC: CPT | Performed by: EMERGENCY MEDICINE

## 2019-03-08 PROCEDURE — 12000000 ZZH R&B MED SURG/OB

## 2019-03-08 PROCEDURE — 86901 BLOOD TYPING SEROLOGIC RH(D): CPT | Performed by: EMERGENCY MEDICINE

## 2019-03-08 PROCEDURE — 85610 PROTHROMBIN TIME: CPT | Performed by: INTERNAL MEDICINE

## 2019-03-08 PROCEDURE — 25000128 H RX IP 250 OP 636: Performed by: EMERGENCY MEDICINE

## 2019-03-08 PROCEDURE — 36415 COLL VENOUS BLD VENIPUNCTURE: CPT | Performed by: EMERGENCY MEDICINE

## 2019-03-08 PROCEDURE — 99291 CRITICAL CARE FIRST HOUR: CPT | Mod: 25

## 2019-03-08 PROCEDURE — 36415 COLL VENOUS BLD VENIPUNCTURE: CPT | Performed by: INTERNAL MEDICINE

## 2019-03-08 PROCEDURE — 86900 BLOOD TYPING SEROLOGIC ABO: CPT | Performed by: EMERGENCY MEDICINE

## 2019-03-08 PROCEDURE — 25000132 ZZH RX MED GY IP 250 OP 250 PS 637: Mod: GY | Performed by: INTERNAL MEDICINE

## 2019-03-08 PROCEDURE — 96376 TX/PRO/DX INJ SAME DRUG ADON: CPT

## 2019-03-08 PROCEDURE — 93005 ELECTROCARDIOGRAM TRACING: CPT

## 2019-03-08 PROCEDURE — 80047 BASIC METABLC PNL IONIZED CA: CPT

## 2019-03-08 PROCEDURE — 99223 1ST HOSP IP/OBS HIGH 75: CPT | Mod: AI | Performed by: INTERNAL MEDICINE

## 2019-03-08 PROCEDURE — A9270 NON-COVERED ITEM OR SERVICE: HCPCS | Mod: GY | Performed by: INTERNAL MEDICINE

## 2019-03-08 PROCEDURE — 85014 HEMATOCRIT: CPT

## 2019-03-08 PROCEDURE — 83735 ASSAY OF MAGNESIUM: CPT | Performed by: EMERGENCY MEDICINE

## 2019-03-08 RX ORDER — METOCLOPRAMIDE HYDROCHLORIDE 5 MG/ML
5 INJECTION INTRAMUSCULAR; INTRAVENOUS EVERY 6 HOURS PRN
Status: DISCONTINUED | OUTPATIENT
Start: 2019-03-08 | End: 2019-03-10 | Stop reason: HOSPADM

## 2019-03-08 RX ORDER — POTASSIUM CHLORIDE 7.45 MG/ML
10 INJECTION INTRAVENOUS
Status: DISCONTINUED | OUTPATIENT
Start: 2019-03-08 | End: 2019-03-10 | Stop reason: HOSPADM

## 2019-03-08 RX ORDER — ATENOLOL 25 MG/1
25 TABLET ORAL DAILY
Status: DISCONTINUED | OUTPATIENT
Start: 2019-03-09 | End: 2019-03-10 | Stop reason: HOSPADM

## 2019-03-08 RX ORDER — FUROSEMIDE 20 MG
20 TABLET ORAL DAILY
Status: DISCONTINUED | OUTPATIENT
Start: 2019-03-09 | End: 2019-03-10 | Stop reason: HOSPADM

## 2019-03-08 RX ORDER — ONDANSETRON 2 MG/ML
4 INJECTION INTRAMUSCULAR; INTRAVENOUS EVERY 6 HOURS PRN
Status: DISCONTINUED | OUTPATIENT
Start: 2019-03-08 | End: 2019-03-10 | Stop reason: HOSPADM

## 2019-03-08 RX ORDER — MAGNESIUM SULFATE HEPTAHYDRATE 40 MG/ML
4 INJECTION, SOLUTION INTRAVENOUS EVERY 4 HOURS PRN
Status: DISCONTINUED | OUTPATIENT
Start: 2019-03-08 | End: 2019-03-10 | Stop reason: HOSPADM

## 2019-03-08 RX ORDER — BRIMONIDINE TARTRATE 2 MG/ML
1 SOLUTION/ DROPS OPHTHALMIC 2 TIMES DAILY
Status: DISCONTINUED | OUTPATIENT
Start: 2019-03-08 | End: 2019-03-10 | Stop reason: HOSPADM

## 2019-03-08 RX ORDER — METOCLOPRAMIDE 5 MG/1
5 TABLET ORAL EVERY 6 HOURS PRN
Status: DISCONTINUED | OUTPATIENT
Start: 2019-03-08 | End: 2019-03-10 | Stop reason: HOSPADM

## 2019-03-08 RX ORDER — POTASSIUM CHLORIDE 1500 MG/1
20-40 TABLET, EXTENDED RELEASE ORAL
Status: DISCONTINUED | OUTPATIENT
Start: 2019-03-08 | End: 2019-03-10 | Stop reason: HOSPADM

## 2019-03-08 RX ORDER — LEVOTHYROXINE SODIUM 100 UG/1
100 TABLET ORAL DAILY
Status: DISCONTINUED | OUTPATIENT
Start: 2019-03-09 | End: 2019-03-10 | Stop reason: HOSPADM

## 2019-03-08 RX ORDER — SENNOSIDES 8.6 MG
1300 CAPSULE ORAL 2 TIMES DAILY
Status: ON HOLD | COMMUNITY
End: 2019-11-27

## 2019-03-08 RX ORDER — ZOLPIDEM TARTRATE 5 MG/1
5 TABLET ORAL AT BEDTIME
Status: DISCONTINUED | OUTPATIENT
Start: 2019-03-08 | End: 2019-03-10 | Stop reason: HOSPADM

## 2019-03-08 RX ORDER — ACETAMINOPHEN 325 MG/1
650 TABLET ORAL EVERY 6 HOURS PRN
Status: DISCONTINUED | OUTPATIENT
Start: 2019-03-08 | End: 2019-03-10 | Stop reason: HOSPADM

## 2019-03-08 RX ORDER — POTASSIUM CL/LIDO/0.9 % NACL 10MEQ/0.1L
10 INTRAVENOUS SOLUTION, PIGGYBACK (ML) INTRAVENOUS
Status: DISCONTINUED | OUTPATIENT
Start: 2019-03-08 | End: 2019-03-10 | Stop reason: HOSPADM

## 2019-03-08 RX ORDER — OMEPRAZOLE 20 MG/1
20 TABLET, DELAYED RELEASE ORAL DAILY PRN
Status: ON HOLD | COMMUNITY
End: 2019-09-28

## 2019-03-08 RX ORDER — PAROXETINE 20 MG/1
20 TABLET, FILM COATED ORAL EVERY MORNING
Status: DISCONTINUED | OUTPATIENT
Start: 2019-03-09 | End: 2019-03-10 | Stop reason: HOSPADM

## 2019-03-08 RX ORDER — PROCHLORPERAZINE 25 MG
12.5 SUPPOSITORY, RECTAL RECTAL EVERY 12 HOURS PRN
Status: DISCONTINUED | OUTPATIENT
Start: 2019-03-08 | End: 2019-03-10 | Stop reason: HOSPADM

## 2019-03-08 RX ORDER — NALOXONE HYDROCHLORIDE 0.4 MG/ML
.1-.4 INJECTION, SOLUTION INTRAMUSCULAR; INTRAVENOUS; SUBCUTANEOUS
Status: DISCONTINUED | OUTPATIENT
Start: 2019-03-08 | End: 2019-03-10 | Stop reason: HOSPADM

## 2019-03-08 RX ORDER — LISINOPRIL 10 MG/1
10 TABLET ORAL DAILY
Status: DISCONTINUED | OUTPATIENT
Start: 2019-03-09 | End: 2019-03-10 | Stop reason: HOSPADM

## 2019-03-08 RX ORDER — PROCHLORPERAZINE MALEATE 5 MG
5 TABLET ORAL EVERY 6 HOURS PRN
Status: DISCONTINUED | OUTPATIENT
Start: 2019-03-08 | End: 2019-03-10 | Stop reason: HOSPADM

## 2019-03-08 RX ORDER — ONDANSETRON 4 MG/1
4 TABLET, ORALLY DISINTEGRATING ORAL EVERY 6 HOURS PRN
Status: DISCONTINUED | OUTPATIENT
Start: 2019-03-08 | End: 2019-03-10 | Stop reason: HOSPADM

## 2019-03-08 RX ORDER — WARFARIN SODIUM 5 MG/1
TABLET ORAL DAILY
Status: ON HOLD | COMMUNITY
End: 2019-03-10

## 2019-03-08 RX ORDER — POTASSIUM CHLORIDE 1.5 G/1.58G
20-40 POWDER, FOR SOLUTION ORAL
Status: DISCONTINUED | OUTPATIENT
Start: 2019-03-08 | End: 2019-03-10 | Stop reason: HOSPADM

## 2019-03-08 RX ORDER — TRAMADOL HYDROCHLORIDE 50 MG/1
50 TABLET ORAL EVERY 6 HOURS PRN
Status: DISCONTINUED | OUTPATIENT
Start: 2019-03-08 | End: 2019-03-10 | Stop reason: HOSPADM

## 2019-03-08 RX ORDER — POTASSIUM CHLORIDE 29.8 MG/ML
20 INJECTION INTRAVENOUS
Status: DISCONTINUED | OUTPATIENT
Start: 2019-03-08 | End: 2019-03-10 | Stop reason: HOSPADM

## 2019-03-08 RX ADMIN — PHYTONADIONE 2 MG: 1 INJECTION, EMULSION INTRAMUSCULAR; INTRAVENOUS; SUBCUTANEOUS at 13:10

## 2019-03-08 RX ADMIN — ZOLPIDEM TARTRATE 5 MG: 5 TABLET, COATED ORAL at 22:30

## 2019-03-08 RX ADMIN — BRIMONIDINE TARTRATE 1 DROP: 2 SOLUTION OPHTHALMIC at 21:08

## 2019-03-08 RX ADMIN — PHYTONADIONE 2 MG: 1 INJECTION, EMULSION INTRAMUSCULAR; INTRAVENOUS; SUBCUTANEOUS at 13:49

## 2019-03-08 RX ADMIN — SODIUM CHLORIDE, POTASSIUM CHLORIDE, SODIUM LACTATE AND CALCIUM CHLORIDE 500 ML: 600; 310; 30; 20 INJECTION, SOLUTION INTRAVENOUS at 11:47

## 2019-03-08 ASSESSMENT — ENCOUNTER SYMPTOMS
LIGHT-HEADEDNESS: 0
ABDOMINAL PAIN: 0
WEAKNESS: 1
BLOOD IN STOOL: 1

## 2019-03-08 ASSESSMENT — ACTIVITIES OF DAILY LIVING (ADL)
TOILETING: 1-->ASSISTIVE EQUIPMENT
RETIRED_EATING: 0-->INDEPENDENT
DRESS: 0-->INDEPENDENT
ADLS_ACUITY_SCORE: 18
RETIRED_COMMUNICATION: 0-->UNDERSTANDS/COMMUNICATES WITHOUT DIFFICULTY
ADLS_ACUITY_SCORE: 17
BATHING: 1-->ASSISTIVE EQUIPMENT
SWALLOWING: 0-->SWALLOWS FOODS/LIQUIDS WITHOUT DIFFICULTY

## 2019-03-08 ASSESSMENT — MIFFLIN-ST. JEOR
SCORE: 1542.13
SCORE: 1545.36

## 2019-03-08 NOTE — H&P
Patient was seen and examined by me today.  History and physical to be completed soon.  Patient is a 87-year-old male with history of atrial fibrillation on anticoagulation with Coumadin presenting with rectal bleed.  Patient has history of diverticular bleed back in October when he had colonoscopy after similar presentation.  On presentation blood pressure was hypotensive with blood pressure of 84/49 but he was beta blocked with heart rate of 68.  Hemoglobin which was obtained earlier was 6.9 and repeat   7.8.  Patient blood pressure improved and he received a unit of blood in the emergency department.  Patient is acute GI bleed is likely secondary to diverticular bleed in the setting of anticoagulation Coumadin with INR of 3.27.  Care plan was discussed with patient and patient's son at bedside.  Patient received 2 mg IV vitamin K and was admitted to our service for further care.  Plan is to continue to monitor hemoglobin, transfuse as needed for hemoglobin of 7 or less, GI consultation to assist with further management.  Issue of anticoagulation in the setting of recurrent bleeding was discussed and for now we discontinue warfarin and patient may be high risk of rebleed again if he starts again on Coumadin.  Aspirin probably a good option at this point when bleeding resolves.

## 2019-03-08 NOTE — ED NOTES
RN was in room assessing blood administration when patient started having snoring respiration and went unresponsive for 10 seconds. Blood stopped, rhythm changed from paced to to wide complex.  MD evaluated and patient is AOX4 at this time. MD wants blood restarted in 5-10 minutes.

## 2019-03-08 NOTE — ED NOTES
".  [facility]  ED Nurse Handoff Report    Jake Duane Pfleiger is a 87 year old male   ED Chief complaint: Rectal Bleeding  . ED Diagnosis:   Final diagnoses:   Lower GI bleed   Anemia due to acute blood loss   Supratherapeutic INR     Allergies: No Known Allergies    Code Status: Full Code  Activity level - Baseline/Home:  Independent. Activity Level - Current:   Stand with Assist. Lift room needed: No. Bariatric: No   Needed: No   Isolation: No. Infection: Not Applicable.     Vital Signs:   Vitals:    03/08/19 1137 03/08/19 1146 03/08/19 1200 03/08/19 1215   BP:  (!) 84/49 (!) 118/99 125/72   Pulse:   71    Resp: 16  15 23   Temp:  98  F (36.7  C)     TempSrc:  Oral     SpO2: 99%      Weight: 84.8 kg (187 lb)      Height: 1.803 m (5' 11\")          Cardiac Rhythm:  ,   Cardiac  Cardiac Rhythm: Atrial fibrillation(Pacemaker )  Pain level: 0-10 Pain Scale: 0  Patient confused: No. Patient Falls Risk: Yes.   Elimination Status: Has not voided at time of note   Patient Report - Initial Complaint: Rectal Bleeding. Focused Assessment: Jake Duane Pfleiger is a 87 year old male with a history of atrial fibrillation on Coumadin and a GI bleed about 5 months ago who presents with rectal bleeding.The patient states that in the last 3 days he has had a 2-3 episodes of bloody stools a day. The stool is loose. He reports feeling generally weak and has experienced some shortness of breath recently. The patient denies any abdominal pain,  lightheadedness, falls, or syncope. His last INR check was 10 days ago and was measured at 2.2. He had a colonoscopy 5 months ago after his most recent GI bleed (see note below). The patient lives in an assisted living facility.       Tests Performed: Labs. Abnormal Results: .  Labs Ordered and Resulted from Time of ED Arrival Up to the Time of Departure from the ED   CBC WITH PLATELETS DIFFERENTIAL - Abnormal; Notable for the following components:       Result Value    " RBC Count 2.81 (*)     Hemoglobin 6.9 (*)     Hematocrit 22.9 (*)     MCH 24.6 (*)     MCHC 30.1 (*)     RDW 17.3 (*)     Absolute Lymphocytes 0.6 (*)     All other components within normal limits   BASIC METABOLIC PANEL - Abnormal; Notable for the following components:    Glucose 100 (*)     Calcium 8.2 (*)     All other components within normal limits   INR - Abnormal; Notable for the following components:    INR 3.69 (*)     All other components within normal limits   ISTAT BASIC MET ICA HCT POCT - Abnormal; Notable for the following components:    Glucose 104 (*)     Hemoglobin 7.8 (*)     Hematocrit - POCT 23 (*)     All other components within normal limits   IV ACCESS   ABO/RH TYPE AND SCREEN   RED BLOOD CELL PREPARE ORDER UNIT   BLOOD COMPONENT   BLOOD COMPONENT   .   Treatments provided: 500cc of LR; Blood administration  Family Comments: Daughter coming, not present yet  OBS brochure/video discussed/provided to patient:  N/A  ED Medications:   Medications   lactated ringers BOLUS 500 mL (0 mLs Intravenous Stopped 3/8/19 1224)     Drips infusing:  Yes  For the majority of the shift, the patient's behavior Green. Interventions performed were Call Light provided .     Severe Sepsis OR Septic Shock Diagnosis Present: No      ED Nurse Name/Phone Number: Angela Coronel,   12:39 PM    RECEIVING UNIT ED HANDOFF REVIEW    Above ED Nurse Handoff Report was reviewed: Yes  Reviewed by: Faiza Rossi on March 8, 2019 at 2:42 PM (For MS3)

## 2019-03-08 NOTE — PHARMACY-ADMISSION MEDICATION HISTORY
Admission medication history interview status for this patient is complete. See [facility] admission navigator for allergy information, prior to admission medications and immunization status.     Medication history interview source(s):Patient  Medication history resources (including written lists, pill bottles, clinic record): Patient written card  Primary pharmacy:  mail order    Changes made to PTA medication list:  Added: Benefiber  Deleted: Nystatin, Tramadol  Changed: Warfarin dose, Tylenol 650 mg to 1300 mg    Actions taken by pharmacist (provider contacted, etc):None     Additional medication history information:None    Medication reconciliation/reorder completed by provider prior to medication history? No        Prior to Admission medications    Medication Sig Last Dose Taking? Auth Provider   acetaminophen (TYLENOL 8 HOUR ARTHRITIS PAIN) 650 MG CR tablet Take 1,300 mg by mouth 2 times daily 3/7/2019 at Unknown time Yes Unknown, Entered By History   atenolol (TENORMIN) 25 MG tablet Take 25 mg by mouth daily 3/8/2019 at Unknown time Yes Unknown, Entered By History   brimonidine (ALPHAGAN-P) 0.15 % ophthalmic solution Place 1 drop into the right eye 2 times daily 3/8/2019 at Unknown time Yes Unknown, Entered By History   fish oil-omega-3 fatty acids (FISH OIL) 1000 MG capsule Take 2 g by mouth daily  3/8/2019 at Unknown time Yes Unknown, Entered By History   furosemide (LASIX) 20 MG tablet Take 1 tablet (20 mg) by mouth daily 3/8/2019 at Unknown time Yes Juwan Maher MD   Levothyroxine Sodium (SYNTHROID PO) Take 100 mcg by mouth daily. 3/8/2019 at Unknown time Yes Unknown, Entered By History   Lisinopril (PRINIVIL PO) Take 10 mg by mouth daily  3/8/2019 at Unknown time Yes Reported, Patient   omeprazole (PRILOSEC OTC) 20 MG EC tablet Take 20 mg by mouth daily as needed  Yes Unknown, Entered By History   paroxetine (PAXIL) 20 MG tablet Take 20 mg by mouth every morning. 3/8/2019 at Unknown time Yes  Unknown, Entered By History   warfarin (COUMADIN) 5 MG tablet Take by mouth daily 5 mg Sunday, Monday, Wednesday, Friday and 2.5 mg Tuesday, Thursday, Saturday or as directed by INR clinic 3/7/2019 at 2.5 mg Yes Unknown, Entered By History   Wheat Dextrin (BENEFIBER PO) Take by mouth daily as needed  Yes Unknown, Entered By History   Zolpidem Tartrate (AMBIEN PO) Take 5 mg by mouth At Bedtime  3/7/2019 at Unknown time Yes Unknown, Entered By History   polyethylene glycol (MIRALAX/GLYCOLAX) packet Take 1 packet by mouth as needed    Reported, Patient

## 2019-03-08 NOTE — ED NOTES
Blood consent signed by patient, MD reviewed all questions and concerns. RN signed. Will administer when blood is available

## 2019-03-08 NOTE — ED PROVIDER NOTES
History     Chief Complaint:  Rectal bleed      HPI   Jake Duane Pfleiger is a 87 year old male with a history of atrial fibrillation on Coumadin and a GI bleed about 5 months ago who presents with rectal bleeding.The patient states that in the last 3 days he has had a 2-3 episodes of bloody stools a day. The stool is loose. He reports feeling generally weak and has experienced some shortness of breath recently. The patient denies any abdominal pain,  lightheadedness, falls, or syncope. His last INR check was 10 days ago and was measured at 2.2. He had a colonoscopy 5 months ago after his most recent GI bleed (see note below). The patient lives in an assisted living facility.     COLONOSCOPY 10/05/2018 12:12 PM    [facility]   _______________________________________________________________________________                                                                                     Findings:        Hematin (altered blood/coffee-ground-like material) was found in the        rectum, in the sigmoid colon, in the descending colon and in the distal        transverse colon. The mid transverse had no blood.        Multiple diverticula were found in the sigmoid colon and descending        colon.                                                                                     Impression:               - Preparation of the colon was poor.                             - Blood in the rectum, in the sigmoid colon, in the                             descending colon and in the distal transverse colon.                             - Diverticulosis in the sigmoid colon and in the                             descending colon.                             - No specimens collected.      Medications:    Tylenol  Tenormin  Lasix  Xalatan  Synthroid  Lisinopril  Prilosec  Paxil  Miralax  Tramadol  Coumadin  Ambien     Past Medical History:    Anxiety  Arthritis  Atrial  "fibrillation  Cardiomyopathy  GERD  Hypertension  Pacemaker  Right patella fracture  Tachy-jairo syndrome  Thyroid disease  GI Bleed     Past Surgical History:    Left hip replacement  Right knee replacement  Cholecystectomy  Right eye cataract removal   Dual chamber pacemaker implant  Back surgery     Family History:    Mother: Cancer     Social History:  Patient presents alone  Former smoker  Negative for alcohol use.  Marital Status:       Review of Systems   Gastrointestinal: Positive for blood in stool. Negative for abdominal pain.   Neurological: Positive for weakness. Negative for syncope and light-headedness.   All other systems reviewed and are negative.      Physical Exam     Patient Vitals for the past 24 hrs:   BP Temp Temp src Pulse Heart Rate Resp SpO2 Height Weight   03/08/19 1315 114/70 98  F (36.7  C) -- 61 -- 14 -- -- --   03/08/19 1300 113/51 -- -- -- 68 21 97 % -- --   03/08/19 1257 113/51 97.9  F (36.6  C) -- 67 -- 14 -- -- --   03/08/19 1245 140/80 -- -- 68 70 21 94 % -- --   03/08/19 1230 125/75 -- -- 68 73 26 93 % -- --   03/08/19 1215 125/72 -- -- -- 70 23 -- -- --   03/08/19 1200 (!) 118/99 -- -- 71 66 15 -- -- --   03/08/19 1146 (!) 84/49 98  F (36.7  C) Oral -- -- -- -- -- --   03/08/19 1145 (!) 84/49 -- -- 68 -- -- 97 % -- --   03/08/19 1137 -- -- -- -- 63 16 99 % 1.803 m (5' 11\") 84.8 kg (187 lb)         Physical Exam  Vital signs and nursing notes reviewed.     Constitutional: laying on gurney appears  pale  HENT: Oropharynx is clear and moist  Eyes: Conjunctivae are normal bilaterally. Pupils equal  Neck: normal range of motion  Cardiovascular: Normal rate, regular rhythm, normal heart sounds.   Pulmonary/Chest: Effort normal and breath sounds normal. No respiratory distress.   Abdominal: Soft. Bowel sounds are normal. No tenderness to palpation. No rebound or guarding.   Musculoskeletal: No joint swelling or edema.   Neurological: Alert and oriented. No focal weakness  Skin: " Skin is warm and dry. No rash noted. Pale.  Psych: normal affect   Rectal: Hematochezia colored stool on rectal exam, no rectal mass    Emergency Department Course   ECG:   Time: 1322  Vent. Rate 71 bpm. MO interval *. QRS duration 82. QT/QTc 428/465. P-R-T axis * 29 46. Atrial fibrillation with occasional ventricular paced-complexes. Nonspecific T wave abnormality. Prolonged QT. Abnormal ECG. Read time: 1325      Laboratory:  ISTAT BMP: Glucose: 104 (H), HGB: 7.8 (L), Hemetocrit: 23 (L), o/w WNL  CBC: WBC: 5.5, HGB: 6.9 (LL), PLT: 224  BMP: Glucose 100  (H), Calcium: 8.2 (L), o/w WNL (Creatinine: 1.00)  ABO/Rh: O+  INR: 3.69 (H)    Interventions:  1145 - NS 0.5 L IV     Emergency Department Course:  Nursing notes and vitals reviewed.  1134: I performed an exam of the patient as documented above.     Findings and plan explained to the Patient who consents to admission.     1257: Discussed the patient with PRACHI Delatorre, who will admit the patient to a medical bed for further monitoring, evaluation, and treatment.   Impression & Plan    Medical Decision Making:  Jake Duane Pfleiger is a 87 year old male who presents with lower GI bleeding. The patient reports that he has had 2-3 episodes of bloody loose stool over the last 3 days. He felt somewhat more lightheaded and weak today, so he came in for evaluation. On initial presentation, he was having lower blood pressure readings. However, after half liter of lactated ringers, his blood pressure normalized. He is not tachycardic and he has no abdominal pain. His rectal exam did show hematochezic stool. His last bowel movement was approximately 3 hours ago. He has not had any bloody stools in the emergency department. Patient is anemic and has a history of diverticulosis. So I suspect he has had an acute diverticular bleed. There is no signs of significant active bleeding at this time, but the patient is also on Coumadin. I did give him a dose of Vitamin K based on our  Coumadin reversal protocol.  PRBC transfusion consent was obtained from patient and blood transfusion was initiated in the emergency department. Discussed findings and plan with the patient and he is in agreement with admission. I discussed the case with the hospitalist service who will admit the patient to the tele floor.       Diagnosis:    ICD-10-CM   1. Lower GI bleed K92.2   2. Anemia due to acute blood loss D62   3. Supratherapeutic INR R79.1       I, Ag Barnes, am serving as a scribe on 3/8/2019 at 12:46 PM to personally document services performed by Pablito Rinaldi MD based on my observations and the provider's statements to me.       Ag Barnes  3/8/2019   [facility] EMERGENCY DEPARTMENT       Pablito Rinaldi MD  03/08/19 5582

## 2019-03-08 NOTE — H&P
Hospitalist Admission Note([facility])    Name: Jake Duane Pfleiger    MRN: [id number]          YOB: 1931    Age: 87 year old  Date of admission: 3/8/2019  Primary care provider: Herve Sweet              Assessment:       Brief summary of admission assessment:Jake Duane Pfleiger is a 87 year old  male with a significant past medical history of atrial fibrillation on anticoagulation with Coumadin, history of diverticular bleed and previous admission for similar presentation currently presenting with GI bleed.  Evaluation in the emergency department showed hypertension, anemia and patient was started on blood, given vitamin K for elevated INR and was admitted to our service.     Admission diagnoses:      #1.GI bleed: Lower, suspect diverticular bleed due to coagulopathy.  INR was 3.69 on presentation and vitamin K was given.  Patient had a similar admission last October when he had a colonoscopy after similar presentation.    #2.  Acute blood loss anemia secondary to GI bleed    #3.  Coagulopathy secondary to anticoagulation for atrial fibrillation with Coumadin    #4.  Atrial fibrillation with controlled ventricular response    Comorbid medical conditions: Comorbidities include history of congestive heart failure currently euvolemic, hypertension, hypothyroidism, depression, anxiety, insomnia,              Plan/MDM:       > Admission Status: Will admit patient to hospitalist service as inpatient as patient likely need over two mid night stays in the hospital.     >Care plan:    --Admit to telemetry bed, continue to monitor hemoglobin, transfuse as needed for hemoglobin of 7 or less, continue to monitor INR.  Stop Coumadin due to increased risk of bleeding, discuss further management of anticoagulation in the next 1 day or 2, obtain GI consultation to assist with further evaluation and recommendation.  Monitor blood pressure.  Continue diuretic therapy due to peripheral edema  chronic CHF with parameters.    >Supportive care:Respiratory therapy    >Diet:Diet advanced    >Activity:Advance activity as tolerated    >Education/Counseling :Discussed treatment plan with the patient    >Consults:Inpatient consult with gastroenterology    >VTE prophylactic measures:prophylaxis against venous thromboembolism    >Therapies:Physical therapy      >Additional orders:    --Care plan discussed with the patient/family and agreed to care plan   --Patient will be transferred to care of hospitalist attending for further evaluation and management as appropriate   --Old medical orders reviewed   --imaging result independently reviewed by me     (See orders placed for this visit by me )     - Home medication reviewed and will be continued as appropriate once pharmacy reconciliation is completed         Code Status/Disposition:     >Code Status:Full Code      >Disposition:anticipate discharge to home and Anticipate discharge in 3 days        Disclaimer: This note consists of symbols derived from keyboarding, dictation and/or voice recognition software. As a result, there may be errors in the script that have gone undetected. Please consider this when interpreting information found in this chart.             Chief Complaint:     GI bleed     History is obtained from the patient          History of Present Illness:      This patient is a 87 year old  male with a significant past medical history of history of atrial for ablation on anticoagulation with Coumadin, diverticulosis and diverticular bleed who presents with the following condition requiring a hospital admission:      Acute GI  bleeding with blood loss anemia    Patient is a 87-year-old male with history of atrial fibrillation on Coumadin, previous GI bleed from diverticular  rectal bleeding currently presenting with complaint of bloody stool over the last 3 days now.  Patient denies significant abdominal pain, developed some loose stool with some blood  in it.  He has been having generalized weakness but denies any chest pain or shortness of breath.  Otherwise no change in his lower extremity edema or any symptoms of urinary frequency urgency or dysuria.  Evaluation in the emergency department showed evidence of hypertension which was transient as well as hemoglobin 6.9.  Patient was given a unit of blood, vitamin K was given for INR of 3.69.  Patient was admitted to our service for further care.           Past Medical History:     Past Medical History:   Diagnosis Date     Anxiety      Arthritis      Atrial fibrillation (H)      Cardiomyopathy (H)      Depression      Diastolic CHF (H) 04/10/2016     Diverticulosis      Gastro-oesophageal reflux disease      GI bleed 10/02/2018    Lower     Hypertension      Insomnia      Nodule of lower lobe of left lung     CT Chest 2019-stable     Pacemaker     [employer] Dual Pacemaker     Permanent atrial fibrillation (H)      Right patella fracture      Tachy-jairo syndrome (H)      Thyroid disease     Hypothyrodism             Past Surgical History:     Past Surgical History:   Procedure Laterality Date     ARTHROPLASTY HIP  2014    left hip replacement     ARTHROPLASTY KNEE      right      CHOLECYSTECTOMY       COLONOSCOPY N/A 10/3/2018    Procedure: COLONOSCOPY;  COLONOSCOPY Rm.#[id number];  Surgeon: Reese Burroughs MD;  Location:  GI     COLONOSCOPY N/A 10/5/2018    Procedure: COLONOSCOPY;  COLONOSCOPY  Rm.#[id number];  Surgeon: Reese Burroughs MD;  Location:  GI     EYE SURGERY      right eye cataract removal      IMPLANT PACEMAKER  2012    dual chamber      ORTHOPEDIC SURGERY      back surgery              Social History:     Social History     Tobacco Use     Smoking status: Former Smoker     Packs/day: 0.50     Years: 25.00     Pack years: 12.50     Types: Cigarettes, Pipe, Cigars     Last attempt to quit: 1982     Years since quittin.1     Smokeless tobacco: Never Used   Substance Use Topics      Alcohol use: No     Alcohol/week: 0.0 oz             Family History:     Family History   Problem Relation Age of Onset     Other Cancer Mother      Unknown/Adopted Father             Allergies:   No Known Allergies          Medications:        Prior to Admission medications    Medication Sig Last Dose Taking? Auth Provider   acetaminophen (TYLENOL 8 HOUR ARTHRITIS PAIN) 650 MG CR tablet Take 1,300 mg by mouth 2 times daily 3/8/2019 at Unknown time Yes Unknown, Entered By History   atenolol (TENORMIN) 25 MG tablet Take 25 mg by mouth daily   Unknown, Entered By History   brimonidine (ALPHAGAN-P) 0.15 % ophthalmic solution Place 1 drop into the right eye 2 times daily   Unknown, Entered By History   fish oil-omega-3 fatty acids (FISH OIL) 1000 MG capsule Take 2 g by mouth daily    Unknown, Entered By History   furosemide (LASIX) 20 MG tablet Take 1 tablet (20 mg) by mouth daily   Juwan Maher MD   Levothyroxine Sodium (SYNTHROID PO) Take 100 mcg by mouth daily.   Unknown, Entered By History   Lisinopril (PRINIVIL PO) Take 10 mg by mouth daily    Reported, Patient   nystatin (MYCOSTATIN) cream Apply topically 2 times daily as needed for dry skin   Unknown, Entered By History   Omeprazole (PRILOSEC PO) Take 20 mg by mouth every morning.   Reported, Patient   paroxetine (PAXIL) 20 MG tablet Take 20 mg by mouth every morning.   Unknown, Entered By History   polyethylene glycol (MIRALAX/GLYCOLAX) packet Take 1 packet by mouth as needed    Reported, Patient   TRAMADOL HCL PO Take 50 mg by mouth as needed    Reported, Patient   warfarin (COUMADIN) 5 MG tablet Take 2.5-10 mg by mouth   Reported, Patient   Zolpidem Tartrate (AMBIEN PO) Take 5 mg by mouth At Bedtime    Unknown, Entered By History          Review of Systems:     A Comprehensive greater than 10 system review of systems was carried out.  Pertinent positives and negatives are noted above in HPI.  Otherwise negative for contributory information.            Physical Exam:     Vital signs were reviewed    Temp:  [97.9  F (36.6  C)-98.2  F (36.8  C)] 98.1  F (36.7  C)  Pulse:  [61-81] 75  Heart Rate:  [63-80] 71  Resp:  [11-26] 11  BP: ()/(49-99) 148/78  SpO2:  [92 %-99 %] 94 %        GEN: awake, alert, cooperative, no apparent distress, oriented x 3    NECK:Supple ,no mass or thyromegaly     HEENT:  Normocephalic/atraumatic, no scleral icterus, no nasal discharge, mouth moist.    CV: Irregularly irregular heart, no murmur or gallop.    LUNGS:  Clear to auscultation bilaterally without rales/rhonchi/wheezing/retractions.  Symmetric chest rise on inhalation noted.    ABD:  Active bowel sounds, soft, non-tender/non-distended.  No rebound/guarding/rigidity.    EXT: 2-3 pedal and pretibial edema.    LGS: No cervical or axillary lymphadenopathy     SKIN:  Dry to touch, warm ,no exanthems noted in the visualized areas.    Neurologic:Grossly intact,non focal . No acute focal neurologic deficit     Psychaitric exam: Mood and affect normal     Additional significant  Findings:               Data:       All laboratory and imaging data in the past 24 hours reviewed     Results for orders placed or performed during the hospital encounter of 03/08/19   CBC + differential   Result Value Ref Range    WBC 5.5 4.0 - 11.0 10e9/L    RBC Count 2.81 (L) 4.4 - 5.9 10e12/L    Hemoglobin 6.9 (LL) 13.3 - 17.7 g/dL    Hematocrit 22.9 (L) 40.0 - 53.0 %    MCV 82 78 - 100 fl    MCH 24.6 (L) 26.5 - 33.0 pg    MCHC 30.1 (L) 31.5 - 36.5 g/dL    RDW 17.3 (H) 10.0 - 15.0 %    Platelet Count 224 150 - 450 10e9/L    Diff Method Automated Method     % Neutrophils 79.3 %    % Lymphocytes 11.1 %    % Monocytes 8.4 %    % Eosinophils 0.4 %    % Basophils 0.4 %    % Immature Granulocytes 0.4 %    Nucleated RBCs 0 0 /100    Absolute Neutrophil 4.4 1.6 - 8.3 10e9/L    Absolute Lymphocytes 0.6 (L) 0.8 - 5.3 10e9/L    Absolute Monocytes 0.5 0.0 - 1.3 10e9/L    Absolute Eosinophils 0.0 0.0 - 0.7 10e9/L     Absolute Basophils 0.0 0.0 - 0.2 10e9/L    Abs Immature Granulocytes 0.0 0 - 0.4 10e9/L    Absolute Nucleated RBC 0.0    Basic metabolic panel (BMP)   Result Value Ref Range    Sodium 136 133 - 144 mmol/L    Potassium 4.2 3.4 - 5.3 mmol/L    Chloride 104 94 - 109 mmol/L    Carbon Dioxide 26 20 - 32 mmol/L    Anion Gap 6 3 - 14 mmol/L    Glucose 100 (H) 70 - 99 mg/dL    Urea Nitrogen 21 7 - 30 mg/dL    Creatinine 1.00 0.66 - 1.25 mg/dL    GFR Estimate 67 >60 mL/min/[1.73_m2]    GFR Estimate If Black 78 >60 mL/min/[1.73_m2]    Calcium 8.2 (L) 8.5 - 10.1 mg/dL   INR   Result Value Ref Range    INR 3.69 (H) 0.86 - 1.14   Magnesium   Result Value Ref Range    Magnesium 2.1 1.6 - 2.3 mg/dL   INR   Result Value Ref Range    INR 3.27 (H) 0.86 - 1.14   EKG 12 lead   Result Value Ref Range    Interpretation ECG Click View Image link to view waveform and result    ISTAT Basic Met ICa HCT POCT   Result Value Ref Range    Sodium 135 133 - 144 mmol/L    Potassium 4.2 3.4 - 5.3 mmol/L    Chloride 99 94 - 109 mmol/L    Total CO2 24 20 - 32 mmol/L    Anion Gap 12 6 - 17 mmol/L    Glucose 104 (H) 70 - 99 mg/dL    Urea Nitrogen 21 7 - 30 mg/dL    Creatinine 1.0 0.66 - 1.25 mg/dL    GFR Estimate 71 >60 mL/min/[1.73_m2]    GFR Estimate If Black 86 >60 mL/min/[1.73_m2]    Calcium Ionized 4.6 4.4 - 5.2 mg/dL    Hemoglobin 7.8 (L) 13.3 - 17.7 g/dL    Hematocrit - POCT 23 (L) 40.0 - 53.0 %PCV   ABO/Rh type and screen   Result Value Ref Range    Units Ordered 2     ABO O     RH(D) Pos     Antibody Screen Neg     Test Valid Only At [facility]        Specimen Expires 03/11/2019    Blood component   Result Value Ref Range    Unit Number [id number]     Blood Component Type Red Blood Cells Leukocyte Reduced     Division Number 00     Status of Unit Ready for patient 03/08/2019 1240     Blood Product Code [id number]     Unit Status YRN    Blood component   Result Value Ref Range    Unit Number [id number]     Blood Component Type  Red Blood Cells LeukoReduced (Part 2)     Division Number 00     Status of Unit Released to care unit 03/08/2019 1250     Blood Product Code T2242L24     Unit Status ISS         EKG results: Atrial fibrillation with occasional ventricular paced complexes.       Patient`s old medical records reviewed and case discussed with the ED physician.    ED course-Reviewed

## 2019-03-08 NOTE — ED TRIAGE NOTES
Patient arrives with three days of bloody (tar and red) bloody stools. He notes the stool is liquid the last three days. Denies vomiting. Able to eat and drink but feels weak. AOX4

## 2019-03-08 NOTE — ED NOTES
MD aware of BP increase, okay with continuing blood administration. Patient continues to be stable, AOX4

## 2019-03-09 LAB
ANION GAP SERPL CALCULATED.3IONS-SCNC: 4 MMOL/L (ref 3–14)
BUN SERPL-MCNC: 17 MG/DL (ref 7–30)
CALCIUM SERPL-MCNC: 8.1 MG/DL (ref 8.5–10.1)
CHLORIDE SERPL-SCNC: 106 MMOL/L (ref 94–109)
CO2 SERPL-SCNC: 25 MMOL/L (ref 20–32)
CREAT SERPL-MCNC: 0.84 MG/DL (ref 0.66–1.25)
GFR SERPL CREATININE-BSD FRML MDRD: 78 ML/MIN/{1.73_M2}
GLUCOSE SERPL-MCNC: 80 MG/DL (ref 70–99)
HGB BLD-MCNC: 10.1 G/DL (ref 13.3–17.7)
HGB BLD-MCNC: 8.8 G/DL (ref 13.3–17.7)
INR PPP: 1.49 (ref 0.86–1.14)
POTASSIUM SERPL-SCNC: 4 MMOL/L (ref 3.4–5.3)
SODIUM SERPL-SCNC: 135 MMOL/L (ref 133–144)

## 2019-03-09 PROCEDURE — 12000000 ZZH R&B MED SURG/OB

## 2019-03-09 PROCEDURE — 36415 COLL VENOUS BLD VENIPUNCTURE: CPT | Performed by: INTERNAL MEDICINE

## 2019-03-09 PROCEDURE — 99232 SBSQ HOSP IP/OBS MODERATE 35: CPT | Performed by: INTERNAL MEDICINE

## 2019-03-09 PROCEDURE — 25000132 ZZH RX MED GY IP 250 OP 250 PS 637: Mod: GY | Performed by: INTERNAL MEDICINE

## 2019-03-09 PROCEDURE — 80048 BASIC METABOLIC PNL TOTAL CA: CPT | Performed by: INTERNAL MEDICINE

## 2019-03-09 PROCEDURE — 85018 HEMOGLOBIN: CPT | Performed by: INTERNAL MEDICINE

## 2019-03-09 PROCEDURE — A9270 NON-COVERED ITEM OR SERVICE: HCPCS | Mod: GY | Performed by: INTERNAL MEDICINE

## 2019-03-09 PROCEDURE — 85610 PROTHROMBIN TIME: CPT | Performed by: INTERNAL MEDICINE

## 2019-03-09 RX ADMIN — BRIMONIDINE TARTRATE 1 DROP: 2 SOLUTION OPHTHALMIC at 10:29

## 2019-03-09 RX ADMIN — ZOLPIDEM TARTRATE 5 MG: 5 TABLET, COATED ORAL at 22:22

## 2019-03-09 RX ADMIN — LISINOPRIL 10 MG: 10 TABLET ORAL at 10:28

## 2019-03-09 RX ADMIN — OMEPRAZOLE 20 MG: 20 CAPSULE, DELAYED RELEASE ORAL at 10:28

## 2019-03-09 RX ADMIN — BRIMONIDINE TARTRATE 1 DROP: 2 SOLUTION OPHTHALMIC at 22:22

## 2019-03-09 RX ADMIN — PAROXETINE HYDROCHLORIDE 20 MG: 20 TABLET, FILM COATED ORAL at 10:29

## 2019-03-09 RX ADMIN — LEVOTHYROXINE SODIUM 100 MCG: 100 TABLET ORAL at 10:29

## 2019-03-09 RX ADMIN — ATENOLOL 25 MG: 25 TABLET ORAL at 10:28

## 2019-03-09 RX ADMIN — FUROSEMIDE 20 MG: 20 TABLET ORAL at 10:29

## 2019-03-09 ASSESSMENT — ACTIVITIES OF DAILY LIVING (ADL)
ADLS_ACUITY_SCORE: 18

## 2019-03-09 ASSESSMENT — MIFFLIN-ST. JEOR: SCORE: 1566.67

## 2019-03-09 NOTE — PLAN OF CARE
A&Ox4, BP elevated, had 2 units of blood, recheck Hgb at 9.3, up with SBA, denies pain, 2 BM this shift with brown/maroon colored stool, GI consulted, Tele Afib CVR,  will continue with POC.

## 2019-03-09 NOTE — PLAN OF CARE
Pt is A&O x4.  SL left arm and right arm.  SBA.  GI consulting.  Yesterday pt received 2 units of blood Hgb is 9.3 am recheck.  Potassium and mag protocol.  Prior shift pt had 2 bm that were brown/maroon.  No BM this shift.  Pt slept well.  VSS with temp of 99.1. Pt denies pain, nausea, SOB.  Possible discharge home in 3 days.  Continue POC.

## 2019-03-09 NOTE — CONSULTS
Consult Date:  03/09/2019      CONSULTATION   REASON FOR CONSULTATION:  Hematochezia.       REQUESTING PHYSICIAN: Ashok Wilcox M.D.      HISTORY OF PRESENT ILLNESS:  Mr. Ordoñez is a pleasant 87-year-old male who was previously hospitalized for lower GI bleed thought to be due to diverticular disease in 10/2018.  He has a history of atrial fibrillation on Coumadin as well as cardiomyopathy, diastolic heart failure, reflux and hypertension.      He was in his usual state of health until Wednesday.  He began passing maroon stool.  He passed about 2 bowel movements per day.  He felt dizzy and lightheaded and therefore, presented to the emergency room at [facility].  He denies any abdominal pain, fevers, chills, nausea or vomiting.  He has some occasional shortness of breath but that is not a unusual for him.  He denies any chest pain.  He reports he has only had one stool since he has been admitted.  On admission, he was found to have a white count of 5.5, hemoglobin of 5.9 and platelets of 224.  His INR was 3.7.  This was reversed and his INR was 1.5 this morning.  He also received a blood transfusion.  His hemoglobin is 8.8 today.      He had a colonoscopy for a lower GI bleed done on 10/5/2018 by Dr. Burroughs.  There was blood found in the left colon as well as diverticulosis.  Therefore, it was felt this is most likely a diverticular bleed.      PAST MEDICAL HISTORY:   1.  Diverticular disease.   2.  Atrial fibrillation.   3.  Diastolic heart failure.   4.  Hypertension.   5.  Depression.   6.  Reflux.   7.  Insomnia.   8.  History of pacemaker placement.   9.  Patellar fracture.   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   11.  Hypothyroidism.      SOCIAL HISTORY:  The patient is a former smoker.  He does not use alcohol or illicit drugs.      FAMILY HISTORY:  No known family history of GI disease.  He is adopted.      REVIEW OF SYSTEMS:  A 10-point review of systems was done and other than those mentioned in HPI was  positive for occasional nosebleeds; pain in his back, right shoulder and knee.  Otherwise, a 10-point review of systems was negative.      PHYSICAL EXAMINATION:   VITAL SIGNS:  Temperature 98.4, pulse 73, respiratory rate 18, BP is 160/77, pulse ox 95% on room air.   GENERAL:  The patient is pleasant, awake, alert and oriented, no acute distress.   HEENT:  Normocephalic, atraumatic.  Pupils equal, round, reactive to light.  Extraocular eye muscles intact.  Sclerae anicteric.  Oropharynx is clear.  Mucous membranes are moist.   NECK:  Supple without lymphadenopathy.  There is no thyromegaly.   CHEST:  Clear to auscultation bilaterally.   HEART:  Irregular regular.   ABDOMEN:  Soft, nontender, nondistended with normoactive bowel sounds.   EXTREMITIES:  Warm without clubbing, cyanosis or edema.   NEUROLOGIC:  Cranial nerves II-XII grossly intact.   PSYCHIATRIC:  Reveals no obvious anxiety or depression.      ASSESSMENT AND PLAN:  This is an 87-year-old male with most certainly a recurrent diverticular bleed in the setting of anticoagulation.  It appears that the bleeding has slowed with reversal of his INR.  I agree with his hospitalists plan to stop Coumadin and anticoagulation with aspirin only.  At this point, no endoscopic procedure needed.   1.  Follow hemoglobin.   2. Regular diet ordered.   3.  Anticipates bleeding will stop without intervention.   4.  Recommend following in the hospital for the next 24-48 hours to monitor stability of hemoglobin.      Thank you for allowing me to participate in the care of this patient.  Please contact me with any questions or concerns.         RAISA CHRIS MD             D: 2019   T: 2019   MT: AS      Name:     GLENN CERRATO   MRN:      [id number]        Account:       [id number]   :      1931           Consult Date:  2019      Document: [id number]       cc: Ashok Sweet MD

## 2019-03-09 NOTE — PROGRESS NOTES
[facility]    Medicine Progress Note - Hospitalist Service       Date of Admission:  3/8/2019  Assessment & Plan      Jake Duane Pfleiger is a 87 year old  male with a significant past medical history of atrial fibrillation on anticoagulation with Coumadin, history of diverticular bleed and previous admission for similar presentation currently presenting with GI bleed.  Evaluation in the emergency department showed hypertension, anemia and patient was started on blood, given vitamin K for elevated INR and was admitted to our service.      Admission diagnoses:      #1.GI bleed: Lower, suspect diverticular bleed due to coagulopathy.   --No bloody bowel movement.  Hemoglobin stable after transfusion.  --GI consulted, input pending  #2.  Acute blood loss anemia secondary to GI bleed   --Status post a unit of blood transfusion, monitor hemoglobin  #3.  Coagulopathy secondary to anticoagulation for atrial fibrillation with Coumadin   --Reversed with vitamin K  #4.  Atrial fibrillation with controlled ventricular response  --Controlled, not on warfarin due to bleeding.  We discussed about risk of anticoagulation.  Patient is high risk of rebleed on warfarin and may need to discuss with his primary care doctor in a week to start low-dose aspirin which carries low risk of bleeding      Diet: Low Saturated Fat Na <2400 mg    DVT Prophylaxis: Pneumatic Compression Devices  Larson Catheter: not present  Code Status: Full Code      Disposition Plan   Expected discharge: Tomorrow, recommended to prior living arrangement once hemoglobin stable.  Entered: Ashok Wilcox MD 03/09/2019, 4:30 PM       The patient's care was discussed with the Bedside Nurse, Patient and Patient's Family.    Ashok Wilcox MD  Hospitalist Service  [facility]    ______________________________________________________________________    Interval History   Patient feels better today.  No evidence of bleeding.  No  dizziness lightheadedness or chest pain.    Data reviewed today: I reviewed all medications, new labs and imaging results over the last 24 hours. I personally reviewed no images or EKG's today.    Physical Exam   Vital Signs: Temp: 98.5  F (36.9  C) Temp src: Oral BP: (!) 142/91   Heart Rate: 80 Resp: 18 SpO2: 98 % O2 Device: None (Room air) Oxygen Delivery: 1 LPM  Weight: 188 lbs 3.2 oz  Patient is alert and oriented x3.  No distress.  Neck is supple no JVD no thyromegaly or mass.  Lungs are clear bilaterally good air entry  Irregularly irregular heart  Abdomen is soft nontender      Data   Recent Labs   Lab 03/09/19  1417 03/09/19  0631 03/08/19  2219 03/08/19  1407 03/08/19  1144 03/08/19  1136   WBC  --   --   --   --   --  5.5   HGB 10.1* 8.8* 9.3*  --  7.8* 6.9*   MCV  --   --   --   --   --  82   PLT  --   --   --   --   --  224   INR  --  1.49* 1.80* 3.27*  --  3.69*   NA  --  135  --   --  135 136   POTASSIUM  --  4.0  --   --  4.2 4.2   CHLORIDE  --  106  --   --  99 104   CO2  --  25  --   --   --  26   BUN  --  17  --   --  21 21   CR  --  0.84  --   --   --  1.00   ANIONGAP  --  4  --   --  12 6   MANAS  --  8.1*  --   --   --  8.2*   GLC  --  80  --   --  104* 100*

## 2019-03-09 NOTE — PROGRESS NOTES
SPIRITUAL HEALTH SERVICES Progress Note  [facility]  Med. Surg. 3    Saw pt Jorge Luis per his request for  support.  Jorge Luis shared his concern about his health, explaining that this is the second time he has had rectal bleeding and hopes that it will not become reoccurring.  He named his two sons and two granddaughters as being central to his support network.  Jorge Luis is Gnosticism and attends services at his assisted living facility and occasionally gets a ride to [facility].  He requested communion.  Oriented him to the [employer] [facility]' schedule.  Jorge Luis welcomed prayer.  Reviewed how he can request further  support.    No further plans; I and other chaplains remain available per pt/family request.     Nitish Donohue M.Div., [facility]  Staff   Pager [phone]

## 2019-03-10 VITALS
HEIGHT: 72 IN | WEIGHT: 185.6 LBS | BODY MASS INDEX: 25.14 KG/M2 | TEMPERATURE: 98.2 F | SYSTOLIC BLOOD PRESSURE: 143 MMHG | HEART RATE: 75 BPM | RESPIRATION RATE: 16 BRPM | OXYGEN SATURATION: 91 % | DIASTOLIC BLOOD PRESSURE: 79 MMHG

## 2019-03-10 LAB
ERYTHROCYTE [DISTWIDTH] IN BLOOD BY AUTOMATED COUNT: 17.5 % (ref 10–15)
HCT VFR BLD AUTO: 33.8 % (ref 40–53)
HGB BLD-MCNC: 10 G/DL (ref 13.3–17.7)
MCH RBC QN AUTO: 25.2 PG (ref 26.5–33)
MCHC RBC AUTO-ENTMCNC: 29.6 G/DL (ref 31.5–36.5)
MCV RBC AUTO: 85 FL (ref 78–100)
PLATELET # BLD AUTO: 213 10E9/L (ref 150–450)
RBC # BLD AUTO: 3.97 10E12/L (ref 4.4–5.9)
WBC # BLD AUTO: 8.2 10E9/L (ref 4–11)

## 2019-03-10 PROCEDURE — 85027 COMPLETE CBC AUTOMATED: CPT | Performed by: INTERNAL MEDICINE

## 2019-03-10 PROCEDURE — 99238 HOSP IP/OBS DSCHRG MGMT 30/<: CPT | Performed by: INTERNAL MEDICINE

## 2019-03-10 PROCEDURE — A9270 NON-COVERED ITEM OR SERVICE: HCPCS | Mod: GY | Performed by: INTERNAL MEDICINE

## 2019-03-10 PROCEDURE — 36415 COLL VENOUS BLD VENIPUNCTURE: CPT | Performed by: INTERNAL MEDICINE

## 2019-03-10 PROCEDURE — 99207 ZZC CDG-CODE INCORRECT PER BILLING BASED ON TIME: CPT | Performed by: INTERNAL MEDICINE

## 2019-03-10 PROCEDURE — 25000132 ZZH RX MED GY IP 250 OP 250 PS 637: Mod: GY | Performed by: INTERNAL MEDICINE

## 2019-03-10 RX ADMIN — LISINOPRIL 10 MG: 10 TABLET ORAL at 09:57

## 2019-03-10 RX ADMIN — PAROXETINE HYDROCHLORIDE 20 MG: 20 TABLET, FILM COATED ORAL at 09:57

## 2019-03-10 RX ADMIN — BRIMONIDINE TARTRATE 1 DROP: 2 SOLUTION OPHTHALMIC at 13:09

## 2019-03-10 RX ADMIN — ATENOLOL 25 MG: 25 TABLET ORAL at 09:57

## 2019-03-10 RX ADMIN — FUROSEMIDE 20 MG: 20 TABLET ORAL at 09:57

## 2019-03-10 RX ADMIN — LEVOTHYROXINE SODIUM 100 MCG: 100 TABLET ORAL at 09:57

## 2019-03-10 RX ADMIN — OMEPRAZOLE 20 MG: 20 CAPSULE, DELAYED RELEASE ORAL at 09:57

## 2019-03-10 ASSESSMENT — ACTIVITIES OF DAILY LIVING (ADL)
ADLS_ACUITY_SCORE: 18
ADLS_ACUITY_SCORE: 18
ADLS_ACUITY_SCORE: 24
ADLS_ACUITY_SCORE: 18

## 2019-03-10 ASSESSMENT — MIFFLIN-ST. JEOR: SCORE: 1554.88

## 2019-03-10 NOTE — PHARMACY-ANTICOAGULATION SERVICE
Clinical Pharmacy- Warfarin Discharge Note           Anticoagulation Dose History     Recent Dosing and Labs Latest Ref Rng & Units 10/3/2018 10/4/2018 10/6/2018 3/8/2019 3/8/2019 3/8/2019 3/9/2019    INR 0.86 - 1.14 1.76(H) 1.29(H) 1.21(H) 3.69(H) 3.27(H) 1.80(H) 1.49(H)        Patient admitted with warfarin as pta med.   Agree with plan to stop warfarin for now and discuss further plan with primary MD    The patient should have an INR checked warfarin stopped.

## 2019-03-10 NOTE — DISCHARGE SUMMARY
[facility]  Hospitalist Discharge Summary       Date of Admission:  3/8/2019  Date of Discharge:  3/10/2019  2:59 PM  Discharging Provider: Ashok Wilcox MD      Discharge Diagnoses     #1.  Acute lower GI bleed due to diverticular bleed secondary to anticoagulation with Coumadin admitted to the hospital for blood transfusion, hemoglobin monitoring, reversal of anticoagulation and GI consultation.  Patient was given a unit of blood, hemoglobin remained stable and Coumadin was stopped.  Gastroenterology consulted and it was recommended that patient can continue aspirin alone.  Patient has history of recurrent GI bleed secondary to anticoagulation in the past and understood the risk and benefit of stopping warfarin.  His primary care physician will be following him as an outpatient for further evaluation of patient's risk profile  #2.  Acute blood loss anemia secondary to GI bleed    Follow-ups Needed After Discharge   Follow-up Appointments     Follow-up and recommended labs and tests       Follow up with primary care provider, Herve Sweet, within 7   days to evaluate treatment change and for hospital follow- up.  The   following labs/tests are recommended: hgb .             Discharge Disposition   Discharged to home  Condition at discharge: Stable    Hospital Course   Jake Duane Pfleiger is a 87 year old  male with a significant past medical history of atrial fibrillation on anticoagulation with Coumadin, history of diverticular bleed and previous admission for similar presentation currently presenting with GI bleed.  Evaluation in the emergency department showed hypertension, anemia and patient was started on blood, given vitamin K for elevated INR and was admitted to our service        Consultations This Hospital Stay   GASTROENTEROLOGY IP CONSULT  GASTROENTEROLOGY IP CONSULT    Code Status   Full Code    Time Spent on this Encounter   I, Ashok Wilcox, personally saw the  patient today and spent less than or equal to 30 minutes discharging this patient.       Ashok Wilcox MD  [facility]  ______________________________________________________________________    Physical Exam   Vital Signs: Temp: 98.2  F (36.8  C) Temp src: Oral BP: 143/79   Heart Rate: 72 Resp: 16 SpO2: 91 % O2 Device: None (Room air)    Weight: 185 lbs 9.6 oz  Bleeding stopped, patient is alert and oriented x3.  No evidence of abdominal pain or bloody stool.       Primary Care Physician   Herve Sweet    Immunizations       Discharge Orders      Reason for your hospital stay    GI bleed     Follow-up and recommended labs and tests     Follow up with primary care provider, Herve Sweet, within 7 days to evaluate treatment change and for hospital follow- up.  The following labs/tests are recommended: hgb .     Activity    Your activity upon discharge: activity as tolerated     When to contact your care team    Call your primary doctor if you have any of the following: bloody stool .     Full Code     Diet    Follow this diet upon discharge: Orders Placed This Encounter      Low Saturated Fat Na <2400 mg       Significant Results and Procedures   Most Recent 3 CBC's:  Recent Labs   Lab Test 03/10/19  0622 03/09/19  1417 03/09/19  0631  03/08/19  1136 10/07/18  0720   WBC 8.2  --   --   --  5.5 7.6   HGB 10.0* 10.1* 8.8*   < > 6.9* 10.5*   MCV 85  --   --   --  82 91     --   --   --  224 225    < > = values in this interval not displayed.     Most Recent 3 BMP's:  Recent Labs   Lab Test 03/09/19  0631 03/08/19  1144 03/08/19  1136 10/06/18  0706    135 136 138   POTASSIUM 4.0 4.2 4.2 3.7   CHLORIDE 106 99 104 105   CO2 25  --  26 26   BUN 17 21 21 8   CR 0.84  --  1.00 0.93   ANIONGAP 4 12 6 7   MANAS 8.1*  --  8.2* 8.1*   GLC 80 104* 100* 74     Most Recent 2 LFT's:  Recent Labs   Lab Test 10/06/18  0706 10/02/18  1132   AST 17 17   ALT 12 15   ALKPHOS 49 65   BILITOTAL  0.6 0.6     Most Recent 3 INR's:  Recent Labs   Lab Test 03/09/19  0631 03/08/19  2219 03/08/19  1407   INR 1.49* 1.80* 3.27*       Discharge Medications   Discharge Medication List as of 3/10/2019  2:02 PM      START taking these medications    Details   aspirin (ASA) 81 MG tablet Take 1 tablet (81 mg) by mouth daily, Disp-60 tablet, R-0, E-Prescribe         CONTINUE these medications which have NOT CHANGED    Details   acetaminophen (TYLENOL 8 HOUR ARTHRITIS PAIN) 650 MG CR tablet Take 1,300 mg by mouth 2 times daily, Historical      atenolol (TENORMIN) 25 MG tablet Take 25 mg by mouth daily, Historical      brimonidine (ALPHAGAN-P) 0.15 % ophthalmic solution Place 1 drop into the right eye 2 times daily, Historical      fish oil-omega-3 fatty acids (FISH OIL) 1000 MG capsule Take 2 g by mouth daily , Historical      furosemide (LASIX) 20 MG tablet Take 1 tablet (20 mg) by mouth daily, Disp-30 tablet, R-1, E-Prescribe      Levothyroxine Sodium (SYNTHROID PO) Take 100 mcg by mouth daily., Historical      Lisinopril (PRINIVIL PO) Take 10 mg by mouth daily , Historical      omeprazole (PRILOSEC OTC) 20 MG EC tablet Take 20 mg by mouth daily as needed, Historical      paroxetine (PAXIL) 20 MG tablet Take 20 mg by mouth every morning., Historical      polyethylene glycol (MIRALAX/GLYCOLAX) packet Take 1 packet by mouth as needed , Historical      Wheat Dextrin (BENEFIBER PO) Take by mouth daily as needed, Historical      Zolpidem Tartrate (AMBIEN PO) Take 5 mg by mouth At Bedtime , Historical         STOP taking these medications       Omeprazole (PRILOSEC PO) Comments:   Reason for Stopping:         TRAMADOL HCL PO Comments:   Reason for Stopping:         warfarin (COUMADIN) 5 MG tablet Comments:   Reason for Stopping:             Allergies   No Known Allergies

## 2019-03-10 NOTE — PLAN OF CARE
Pt alert and oriented, up with standby assist in the room. Pt with hemoglobin 10.1 yesterday, if stable today pt hopes to discharge to home. Pt slept well overnight between cares.

## 2019-03-10 NOTE — PROGRESS NOTES
"GASTROENTEROLOGY PROGRESS NOTE    CC:    SUBJECTIVE:  Feels a little \"woozy\" but no more bloody stools    OBJECTIVE:  General Appearance:  Pale awake alert NAD  BP (!) 186/96   Pulse 75   Temp 98.2  F (36.8  C) (Oral)   Resp 16   Ht 1.829 m (6')   Wt 84.2 kg (185 lb 9.6 oz)   SpO2 91%   BMI 25.17 kg/m    Temp (24hrs), Av.3  F (36.8  C), Min:98.1  F (36.7  C), Max:98.5  F (36.9  C)    Patient Vitals for the past 72 hrs:   Weight   03/10/19 0500 84.2 kg (185 lb 9.6 oz)   19 0555 85.4 kg (188 lb 3.2 oz)   19 1518 84.5 kg (186 lb 4.6 oz)   19 1137 84.8 kg (187 lb)       Intake/Output Summary (Last 24 hours) at 3/10/2019 1008  Last data filed at 3/9/2019 1741  Gross per 24 hour   Intake 1170 ml   Output 550 ml   Net 620 ml       PHYSICAL EXAM    Abd: S NT ND        Additional Comments:  ROS, FH, SH: See initial GI consult for details.    I have reviewed the patient's new clinical lab results:    Recent Labs   Lab Test 03/10/19  0622 19  1417 19  0631 19  2219 19  1407  19  1136 10/07/18  0720   WBC 8.2  --   --   --   --   --  5.5 7.6   HGB 10.0* 10.1* 8.8* 9.3*  --    < > 6.9* 10.5*   MCV 85  --   --   --   --   --  82 91     --   --   --   --   --  224 225   INR  --   --  1.49* 1.80* 3.27*  --  3.69*  --     < > = values in this interval not displayed.     Recent Labs   Lab Test 19  0631 19  1144 19  1136 10/06/18  0706   POTASSIUM 4.0 4.2 4.2 3.7   CHLORIDE 106 99 104 105   CO2 25  --  26 26   BUN 17 21 21 8   ANIONGAP 4 12 6 7     Recent Labs   Lab Test 10/06/18  0706 10/02/18  1132 12  1329   ALBUMIN 2.8* 3.4 4.1   BILITOTAL 0.6 0.6 1.2   ALT 12 15 18   AST 17 17 29         Active Problems:    GI bleed    Assessment: Presumed diverticular, resolved    Plan:   1. Regular diet  2. Agree with plan to step down anticoagulation  3. OK from GI standpoint to discharge home today    Will sign off. Call with questions.         Camryn " MD Trip  [facility]  Pager: [phone]  Office: [phone]

## 2019-03-10 NOTE — PLAN OF CARE
Pt alert and oriented X 4. Apical pulse irregular, baseline. Lung sounds clear throughout all lobes. Last BM 3/9/2019 which was soft, brown. Pt hemoglobin 10.0 this AM. Plan: Discontinue Coumadin, continue aspirin. Discharge.     Pt son, Gallito, and pt verbalizes understanding of discharge instructions and medication regimen.

## 2019-03-10 NOTE — PLAN OF CARE
VSS, denies pain, up with SBA, Hgb 10.1, no BM this shift, recheck Hgb tomorrow and if stable will plan to discharge home, will continue with POC.

## 2019-05-06 ENCOUNTER — ANCILLARY PROCEDURE (OUTPATIENT)
Dept: CARDIOLOGY | Facility: CLINIC | Age: 84
End: 2019-05-06
Attending: INTERNAL MEDICINE
Payer: MEDICARE

## 2019-05-06 DIAGNOSIS — Z95.0 CARDIAC PACEMAKER IN SITU: ICD-10-CM

## 2019-05-06 PROCEDURE — 93296 REM INTERROG EVL PM/IDS: CPT | Performed by: INTERNAL MEDICINE

## 2019-05-06 PROCEDURE — 93294 REM INTERROG EVL PM/LDLS PM: CPT | Performed by: INTERNAL MEDICINE

## 2019-05-15 ENCOUNTER — TRANSFERRED RECORDS (OUTPATIENT)
Dept: HEALTH INFORMATION MANAGEMENT | Facility: CLINIC | Age: 84
End: 2019-05-15

## 2019-05-15 ENCOUNTER — APPOINTMENT (OUTPATIENT)
Dept: GENERAL RADIOLOGY | Facility: CLINIC | Age: 84
DRG: 488 | End: 2019-05-15
Attending: EMERGENCY MEDICINE
Payer: MEDICARE

## 2019-05-15 ENCOUNTER — APPOINTMENT (OUTPATIENT)
Dept: PHYSICAL THERAPY | Facility: CLINIC | Age: 84
DRG: 488 | End: 2019-05-15
Attending: PHYSICIAN ASSISTANT
Payer: MEDICARE

## 2019-05-15 ENCOUNTER — APPOINTMENT (OUTPATIENT)
Dept: CT IMAGING | Facility: CLINIC | Age: 84
DRG: 488 | End: 2019-05-15
Attending: EMERGENCY MEDICINE
Payer: MEDICARE

## 2019-05-15 ENCOUNTER — HOSPITAL ENCOUNTER (INPATIENT)
Facility: CLINIC | Age: 84
LOS: 3 days | Discharge: NURSING, REHAB OR LONG-TERM CARE | DRG: 488 | End: 2019-05-18
Attending: EMERGENCY MEDICINE | Admitting: INTERNAL MEDICINE
Payer: MEDICARE

## 2019-05-15 DIAGNOSIS — D64.9 ANEMIA, UNSPECIFIED TYPE: ICD-10-CM

## 2019-05-15 DIAGNOSIS — S42.124A CLOSED NONDISPLACED FRACTURE OF RIGHT ACROMIAL PROCESS, INITIAL ENCOUNTER: ICD-10-CM
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
## 2019-05-15 PROBLEM — S42.309A ARM FRACTURE: Status: ACTIVE | Noted: 2019-05-15

## 2019-05-15 LAB
ALBUMIN SERPL-MCNC: 3.3 G/DL (ref 3.4–5)
ALBUMIN UR-MCNC: NEGATIVE MG/DL
ALP SERPL-CCNC: 76 U/L (ref 40–150)
ALT SERPL W P-5'-P-CCNC: 14 U/L (ref 0–70)
ANION GAP SERPL CALCULATED.3IONS-SCNC: 6 MMOL/L (ref 3–14)
APPEARANCE UR: CLEAR
AST SERPL W P-5'-P-CCNC: 17 U/L (ref 0–45)
BASOPHILS # BLD AUTO: 0 10E9/L (ref 0–0.2)
BASOPHILS NFR BLD AUTO: 0.4 %
BILIRUB SERPL-MCNC: 0.3 MG/DL (ref 0.2–1.3)
BILIRUB UR QL STRIP: NEGATIVE
BUN SERPL-MCNC: 24 MG/DL (ref 7–30)
CALCIUM SERPL-MCNC: 8.6 MG/DL (ref 8.5–10.1)
CHLORIDE SERPL-SCNC: 103 MMOL/L (ref 94–109)
CO2 SERPL-SCNC: 28 MMOL/L (ref 20–32)
COLOR UR AUTO: ABNORMAL
CREAT SERPL-MCNC: 0.95 MG/DL (ref 0.66–1.25)
DIFFERENTIAL METHOD BLD: ABNORMAL
EOSINOPHIL # BLD AUTO: 0.1 10E9/L (ref 0–0.7)
EOSINOPHIL NFR BLD AUTO: 1.3 %
ERYTHROCYTE [DISTWIDTH] IN BLOOD BY AUTOMATED COUNT: 20.5 % (ref 10–15)
GFR SERPL CREATININE-BSD FRML MDRD: 71 ML/MIN/{1.73_M2}
GLUCOSE SERPL-MCNC: 94 MG/DL (ref 70–99)
GLUCOSE UR STRIP-MCNC: NEGATIVE MG/DL
HCT VFR BLD AUTO: 26.5 % (ref 40–53)
HGB BLD-MCNC: 8 G/DL (ref 13.3–17.7)
HGB UR QL STRIP: NEGATIVE
IMM GRANULOCYTES # BLD: 0 10E9/L (ref 0–0.4)
IMM GRANULOCYTES NFR BLD: 0.4 %
INTERPRETATION ECG - MUSE: NORMAL
KETONES UR STRIP-MCNC: NEGATIVE MG/DL
LEUKOCYTE ESTERASE UR QL STRIP: NEGATIVE
LYMPHOCYTES # BLD AUTO: 0.8 10E9/L (ref 0.8–5.3)
LYMPHOCYTES NFR BLD AUTO: 11.6 %
MCH RBC QN AUTO: 25.4 PG (ref 26.5–33)
MCHC RBC AUTO-ENTMCNC: 30.2 G/DL (ref 31.5–36.5)
MCV RBC AUTO: 84 FL (ref 78–100)
MONOCYTES # BLD AUTO: 0.8 10E9/L (ref 0–1.3)
MONOCYTES NFR BLD AUTO: 11.1 %
MUCOUS THREADS #/AREA URNS LPF: PRESENT /LPF
NEUTROPHILS # BLD AUTO: 5.2 10E9/L (ref 1.6–8.3)
NEUTROPHILS NFR BLD AUTO: 75.2 %
NITRATE UR QL: NEGATIVE
NRBC # BLD AUTO: 0 10*3/UL
NRBC BLD AUTO-RTO: 0 /100
PH UR STRIP: 7 PH (ref 5–7)
PLATELET # BLD AUTO: 265 10E9/L (ref 150–450)
POTASSIUM SERPL-SCNC: 4.5 MMOL/L (ref 3.4–5.3)
PROT SERPL-MCNC: 6.4 G/DL (ref 6.8–8.8)
RBC # BLD AUTO: 3.15 10E12/L (ref 4.4–5.9)
RBC #/AREA URNS AUTO: 1 /HPF (ref 0–2)
SODIUM SERPL-SCNC: 137 MMOL/L (ref 133–144)
SOURCE: ABNORMAL
SP GR UR STRIP: 1.01 (ref 1–1.03)
TROPONIN I SERPL-MCNC: <0.015 UG/L (ref 0–0.04)
UROBILINOGEN UR STRIP-MCNC: NORMAL MG/DL (ref 0–2)
WBC # BLD AUTO: 7 10E9/L (ref 4–11)
WBC #/AREA URNS AUTO: 3 /HPF (ref 0–5)

## 2019-05-15 PROCEDURE — 25000128 H RX IP 250 OP 636: Performed by: EMERGENCY MEDICINE

## 2019-05-15 PROCEDURE — 99223 1ST HOSP IP/OBS HIGH 75: CPT | Mod: AI | Performed by: INTERNAL MEDICINE

## 2019-05-15 PROCEDURE — 99285 EMERGENCY DEPT VISIT HI MDM: CPT | Mod: 25

## 2019-05-15 PROCEDURE — 12000000 ZZH R&B MED SURG/OB

## 2019-05-15 PROCEDURE — 97162 PT EVAL MOD COMPLEX 30 MIN: CPT | Mod: GP | Performed by: PHYSICAL THERAPIST

## 2019-05-15 PROCEDURE — 73562 X-RAY EXAM OF KNEE 3: CPT | Mod: 50

## 2019-05-15 PROCEDURE — 96361 HYDRATE IV INFUSION ADD-ON: CPT

## 2019-05-15 PROCEDURE — 70450 CT HEAD/BRAIN W/O DYE: CPT

## 2019-05-15 PROCEDURE — 80053 COMPREHEN METABOLIC PANEL: CPT | Performed by: EMERGENCY MEDICINE

## 2019-05-15 PROCEDURE — 25000132 ZZH RX MED GY IP 250 OP 250 PS 637: Performed by: INTERNAL MEDICINE

## 2019-05-15 PROCEDURE — 73030 X-RAY EXAM OF SHOULDER: CPT | Mod: RT

## 2019-05-15 PROCEDURE — 81001 URINALYSIS AUTO W/SCOPE: CPT | Performed by: EMERGENCY MEDICINE

## 2019-05-15 PROCEDURE — 93005 ELECTROCARDIOGRAM TRACING: CPT

## 2019-05-15 PROCEDURE — 73200 CT UPPER EXTREMITY W/O DYE: CPT | Mod: RT

## 2019-05-15 PROCEDURE — A9270 NON-COVERED ITEM OR SERVICE: HCPCS | Performed by: INTERNAL MEDICINE

## 2019-05-15 PROCEDURE — 84484 ASSAY OF TROPONIN QUANT: CPT | Performed by: EMERGENCY MEDICINE

## 2019-05-15 PROCEDURE — G0378 HOSPITAL OBSERVATION PER HR: HCPCS

## 2019-05-15 PROCEDURE — 25800030 ZZH RX IP 258 OP 636: Performed by: INTERNAL MEDICINE

## 2019-05-15 PROCEDURE — 85025 COMPLETE CBC W/AUTO DIFF WBC: CPT | Performed by: EMERGENCY MEDICINE

## 2019-05-15 PROCEDURE — 72125 CT NECK SPINE W/O DYE: CPT

## 2019-05-15 PROCEDURE — 72131 CT LUMBAR SPINE W/O DYE: CPT

## 2019-05-15 PROCEDURE — 96360 HYDRATION IV INFUSION INIT: CPT

## 2019-05-15 RX ORDER — KETOROLAC TROMETHAMINE 15 MG/ML
15 INJECTION, SOLUTION INTRAMUSCULAR; INTRAVENOUS EVERY 6 HOURS PRN
Status: DISCONTINUED | OUTPATIENT
Start: 2019-05-15 | End: 2019-05-16

## 2019-05-15 RX ORDER — BISACODYL 10 MG
10 SUPPOSITORY, RECTAL RECTAL DAILY PRN
Status: DISCONTINUED | OUTPATIENT
Start: 2019-05-15 | End: 2019-05-18 | Stop reason: HOSPADM

## 2019-05-15 RX ORDER — ONDANSETRON 4 MG/1
4 TABLET, ORALLY DISINTEGRATING ORAL EVERY 6 HOURS PRN
Status: DISCONTINUED | OUTPATIENT
Start: 2019-05-15 | End: 2019-05-16

## 2019-05-15 RX ORDER — HYDROCODONE BITARTRATE AND ACETAMINOPHEN 5; 325 MG/1; MG/1
1 TABLET ORAL EVERY 6 HOURS PRN
Status: DISCONTINUED | OUTPATIENT
Start: 2019-05-15 | End: 2019-05-18 | Stop reason: HOSPADM

## 2019-05-15 RX ORDER — ATENOLOL 25 MG/1
25 TABLET ORAL DAILY
Status: DISCONTINUED | OUTPATIENT
Start: 2019-05-15 | End: 2019-05-18 | Stop reason: HOSPADM

## 2019-05-15 RX ORDER — POLYETHYLENE GLYCOL 3350 17 G/17G
17 POWDER, FOR SOLUTION ORAL DAILY PRN
Status: DISCONTINUED | OUTPATIENT
Start: 2019-05-15 | End: 2019-05-18 | Stop reason: HOSPADM

## 2019-05-15 RX ORDER — SODIUM CHLORIDE 9 MG/ML
INJECTION, SOLUTION INTRAVENOUS CONTINUOUS
Status: ACTIVE | OUTPATIENT
Start: 2019-05-15 | End: 2019-05-16

## 2019-05-15 RX ORDER — CELECOXIB 100 MG/1
100 CAPSULE ORAL 2 TIMES DAILY
Status: DISCONTINUED | OUTPATIENT
Start: 2019-05-15 | End: 2019-05-15

## 2019-05-15 RX ORDER — ONDANSETRON 2 MG/ML
4 INJECTION INTRAMUSCULAR; INTRAVENOUS EVERY 6 HOURS PRN
Status: DISCONTINUED | OUTPATIENT
Start: 2019-05-15 | End: 2019-05-16

## 2019-05-15 RX ORDER — LEVOTHYROXINE SODIUM 100 UG/1
100 TABLET ORAL DAILY
Status: DISCONTINUED | OUTPATIENT
Start: 2019-05-15 | End: 2019-05-18 | Stop reason: HOSPADM

## 2019-05-15 RX ORDER — PAROXETINE 20 MG/1
20 TABLET, FILM COATED ORAL EVERY MORNING
Status: DISCONTINUED | OUTPATIENT
Start: 2019-05-15 | End: 2019-05-18 | Stop reason: HOSPADM

## 2019-05-15 RX ORDER — AMOXICILLIN 250 MG
2 CAPSULE ORAL 2 TIMES DAILY
Status: DISCONTINUED | OUTPATIENT
Start: 2019-05-15 | End: 2019-05-16

## 2019-05-15 RX ORDER — NALOXONE HYDROCHLORIDE 0.4 MG/ML
.1-.4 INJECTION, SOLUTION INTRAMUSCULAR; INTRAVENOUS; SUBCUTANEOUS
Status: DISCONTINUED | OUTPATIENT
Start: 2019-05-15 | End: 2019-05-17

## 2019-05-15 RX ORDER — SODIUM CHLORIDE 9 MG/ML
1000 INJECTION, SOLUTION INTRAVENOUS CONTINUOUS
Status: DISCONTINUED | OUTPATIENT
Start: 2019-05-15 | End: 2019-05-15

## 2019-05-15 RX ORDER — AMOXICILLIN 250 MG
1 CAPSULE ORAL 2 TIMES DAILY
Status: DISCONTINUED | OUTPATIENT
Start: 2019-05-15 | End: 2019-05-16

## 2019-05-15 RX ORDER — AMOXICILLIN 250 MG
1 CAPSULE ORAL 2 TIMES DAILY PRN
Status: DISCONTINUED | OUTPATIENT
Start: 2019-05-15 | End: 2019-05-18

## 2019-05-15 RX ORDER — AMOXICILLIN 250 MG
2 CAPSULE ORAL 2 TIMES DAILY PRN
Status: DISCONTINUED | OUTPATIENT
Start: 2019-05-15 | End: 2019-05-18

## 2019-05-15 RX ORDER — ACETAMINOPHEN 325 MG/1
650 TABLET ORAL EVERY 4 HOURS PRN
Status: DISCONTINUED | OUTPATIENT
Start: 2019-05-15 | End: 2019-05-17 | Stop reason: ALTCHOICE

## 2019-05-15 RX ORDER — LISINOPRIL 10 MG/1
10 TABLET ORAL DAILY
Status: DISCONTINUED | OUTPATIENT
Start: 2019-05-15 | End: 2019-05-18 | Stop reason: HOSPADM

## 2019-05-15 RX ADMIN — ACETAMINOPHEN 650 MG: 325 TABLET, FILM COATED ORAL at 16:23

## 2019-05-15 RX ADMIN — ACETAMINOPHEN 650 MG: 325 TABLET, FILM COATED ORAL at 11:15

## 2019-05-15 RX ADMIN — LEVOTHYROXINE SODIUM 100 MCG: 100 TABLET ORAL at 09:29

## 2019-05-15 RX ADMIN — LISINOPRIL 10 MG: 10 TABLET ORAL at 09:29

## 2019-05-15 RX ADMIN — SODIUM CHLORIDE: 9 INJECTION, SOLUTION INTRAVENOUS at 09:30

## 2019-05-15 RX ADMIN — SENNOSIDES AND DOCUSATE SODIUM 2 TABLET: 8.6; 5 TABLET ORAL at 09:29

## 2019-05-15 RX ADMIN — ACETAMINOPHEN 650 MG: 325 TABLET, FILM COATED ORAL at 20:54

## 2019-05-15 RX ADMIN — PAROXETINE HYDROCHLORIDE 20 MG: 20 TABLET, FILM COATED ORAL at 09:29

## 2019-05-15 RX ADMIN — SODIUM CHLORIDE 1000 ML: 9 INJECTION, SOLUTION INTRAVENOUS at 01:32

## 2019-05-15 RX ADMIN — ATENOLOL 25 MG: 25 TABLET ORAL at 09:29

## 2019-05-15 ASSESSMENT — ACTIVITIES OF DAILY LIVING (ADL)
RETIRED_COMMUNICATION: 0-->UNDERSTANDS/COMMUNICATES WITHOUT DIFFICULTY
TRANSFERRING: 1-->ASSISTIVE EQUIPMENT
AMBULATION: 1-->ASSISTIVE EQUIPMENT
ADLS_ACUITY_SCORE: 21
RETIRED_EATING: 0-->INDEPENDENT
FALL_HISTORY_WITHIN_LAST_SIX_MONTHS: YES
WHICH_OF_THE_ABOVE_FUNCTIONAL_RISKS_HAD_A_RECENT_ONSET_OR_CHANGE?: AMBULATION;TRANSFERRING;TOILETING;BATHING;DRESSING
COGNITION: 0 - NO COGNITION ISSUES REPORTED
BATHING: 0-->INDEPENDENT
DRESS: 0-->INDEPENDENT
SWALLOWING: 0-->SWALLOWS FOODS/LIQUIDS WITHOUT DIFFICULTY
NUMBER_OF_TIMES_PATIENT_HAS_FALLEN_WITHIN_LAST_SIX_MONTHS: 2
TOILETING: 0-->INDEPENDENT

## 2019-05-15 ASSESSMENT — ENCOUNTER SYMPTOMS
ABDOMINAL PAIN: 0
NECK PAIN: 0
BACK PAIN: 0
ARTHRALGIAS: 1
HEADACHES: 1

## 2019-05-15 NOTE — CONSULTS
Care Transition Initial Assessment - RN        Met with: Patient.  DATA        Cognitive Status: awake, alert and oriented.  Primary Care Clinic Name: [facility] [place]  Primary Care MD Name: Dr. Sweet  Contact information and PCP information verified: Yes  Lives With: alone      Quality of Family Relationships: involved, supportive  Description of Support System: Supportive, Involved   Who is your support system?: Children       Insurance concerns: No Insurance issues identified  ASSESSMENT  Patient currently receives the following services:  Lives at [facility] with 1 meal/day        Identified issues/concerns regarding health management: Patient admitted for fall with injury to his right shoulder and knee.  CTS assessing for increased care needs and discharge planning. Patient is living independently at [facility] in [place]. He receives one meal a day. He manages his medications with the support of his daughter -in-law. His sons  provide transportation to appointments. Patient anticipates returning home.  Noted patient schedule for Right knee pre-patellar osteophyte removal 5/16. Discharge plans undetermined at this time.  Reached out to [facility], [phone]) spoke with RNRuddy. Facility offers limited therapy, PT 3x/week and OT 2x/week thru [facility] [place][note 2].     Care  (CTS) will continue to follow as needed.    Mercedes Hamilton RN BSN CTS  Care Management Team  [facility]

## 2019-05-15 NOTE — ED NOTES
Sling positioned on Patient while sitting. While laying, pillows were placed under the right arm to support the arms weight.

## 2019-05-15 NOTE — ED PROVIDER NOTES
"  History     Chief Complaint:  Fall    HPI   Jake Duane Pfleiger is a 87 year old male with a history of atrial fibrillation who presents via EMS for evaluation of a fall. Tonight shortly prior to arrival, the patient was using his walker when he felt that his knees gave out and he fell to the ground landing on his bilateral knees and right shoulder. He does not believe that he struck his head in the fall and did not lose consciousness.  Patient states his left knee \"always gives me problems.\" Since the fall, the patient has developed pain in his right shoulder, bilateral knees, left greater than right, and a headache. EMS was called to bring him into the ED for evaluation, and he was placed in a C collar prior to arrival. Currently in the ED, the patient complains primarily of pain in his right shoulder, and he rates his pain at a severity of 8/10. He denies an chest pain, shortness of breath, abdominal pain, back pain, or neck pain since the fall. Notably, the patient was taken off Coumadin approximately three weeks ago due to concern for frequent falls and a recent GI bleed requiring admission between 3/8 and 3/10. He is currently living alone in an independent living building.   No numbness in the hands.  Mild left sided neck pain.  Most recent discharge summary as below:    [facility]  Hospitalist Discharge Summary       Date of Admission:  3/8/2019  Date of Discharge:  3/10/2019  2:59 PM  Discharging Provider: Ashok Wilcox MD     Discharge Diagnoses    #1.  Acute lower GI bleed due to diverticular bleed secondary to anticoagulation with Coumadin admitted to the hospital for blood transfusion, hemoglobin monitoring, reversal of anticoagulation and GI consultation.  Patient was given a unit of blood, hemoglobin remained stable and Coumadin was stopped.  Gastroenterology consulted and it was recommended that patient can continue aspirin alone.  Patient has history of recurrent GI bleed " secondary to anticoagulation in the past and understood the risk and benefit of stopping warfarin.  His primary care physician will be following him as an outpatient for further evaluation of patient's risk profile  #2.  Acute blood loss anemia secondary to GI bleed    Allergies:  NKDA      Medications:    acetaminophen (TYLENOL 8 HOUR ARTHRITIS PAIN) 650 MG CR tablet  aspirin (ASA) 81 MG tablet  atenolol (TENORMIN) 25 MG tablet  brimonidine (ALPHAGAN-P) 0.15 % ophthalmic solution  fish oil-omega-3 fatty acids (FISH OIL) 1000 MG capsule  furosemide (LASIX) 20 MG tablet  Levothyroxine Sodium (SYNTHROID PO)  Lisinopril (PRINIVIL PO)  omeprazole (PRILOSEC OTC) 20 MG EC tablet  paroxetine (PAXIL) 20 MG tablet  polyethylene glycol (MIRALAX/GLYCOLAX) packet  Wheat Dextrin (BENEFIBER PO)  Zolpidem Tartrate (AMBIEN PO)    Past Medical History:    Anxiety   Arthritis  Atrial fibrillation  Cardiomyopathy  Depression  Diastolic CHF   Diverticulosis   GERD   GI bleed   Hypertension  Insomnia  Nodule of lower lobe of left lung   Pacemaker  Tachy-jairo syndrome  Thyroid disease     Past Surgical History:    Left hip arthroplasty  Right knee arthroplasty  Cholecystectomy   Colonoscopy   Right cataract removal  Implant dual chamber pacemaker   Back surgery     Family History:    Cancer - Mother     Social History:  Tobacco use:    Former smoker - 0.50 packs / day for 25 years, quit 1982   Alcohol use:    Negative   Marital status:       Accompanied to ED by:  EMS      Review of Systems   Cardiovascular: Negative for chest pain.   Gastrointestinal: Negative for abdominal pain.   Musculoskeletal: Positive for arthralgias (right shoulder, bilateral knees). Negative for back pain and neck pain.   Neurological: Positive for headaches. Negative for syncope.   All other systems reviewed and are negative.    Pt to ER with c/o fall tonight, pt states that his knees gave out and he fell on bilat knees, pt c/o low back pain , right  "shoulder pain  As well pt denies any neck or head pain pt denies any LOC.    Tonight shortly prior to arrival, the patient was using his walker when he felt that his knees gave out and he fell to the ground. He does not believe that he struck his head in the fall and did not lose consciousness. Since the fall, the patient has developed pain in his right shoulder and bilateral knees, left greater than right. EMS was called to bring him into the ED for evaluation, and he was placed in a C collar prior to arrival. Currently in the ED, the patient complains primarily of pain in his right shoulder, and he rates his pain at a severity of 8/10. He denies any chest pain, shortness of breath, abdominal pain, back pain, neck pain, or headache since the fall. Notably, the patient was taken off Coumadin approximately three weeks ago due to concern for GI bleeding.     Physical Exam   First Vitals:  BP: 154/77  Pulse: 85  Temp: 98  F (36.7  C)  Resp: 18  Height: 180.3 cm (5' 11\")  SpO2: 97 %      Physical Exam   Musculoskeletal:        Back:         Arms:       Legs:    GEN: patient appears tired, wearing a c collar  HEAD: atraumatic, normocephalic  EYES: pupils reactive (3plus to 2plus), extraocular muscles intact, conjunctivae normal  ENT: TMs flat and white bilaterally, oropharynx normal with no erythema or exudate, mucus membranes dry  NECK: no posterior midline tenderness, no cervical LAD, ?mild left sided paraspinous tenderness, no step offs  RESPIRATORY: no tachypnea, breath sounds clear to auscultation (no rales, wheezes, rhonchi), chest wall nontender, normal phonation  CVS: normal S1/S2, II/VI systolic ej murmurs, rubs/gallops  ABDOMEN: soft, nontender, no masses or organomegaly, no rebound, decreased bowel sounds  BACK:  Lumbar left lower spinal tenderness, no thoracic tenderness  EXTREMITIES: intact pulses x 4, right shoulder adducted, no edema  MUSCULOSKELETAL: no deformities, stable to varus and valgus bilateral " knees.  Slight edema left knee.  Thigh and popliteal fossa normal.  Tib/fib and feet normal.  Radial head and elbow nontender.  Forearm nontender.  Wrist nontender.  Clavicle nontender.  Right lateral humeral head and glenoid tender with contusion.  SKIN: warm and dry, no acute rashes or ulceration  NEURO: GCS 15, cranial nerves intact.  Motor- moves all 4 extremities,  5/5.  Sensation- intact  Reflexes- DTRs 2plus.  Coordination-not able to ambulate secondary to pain.  Overall symmetrical exam  HEME: no bruising or petechiae/contusions  LYMPH: no lymphadenopathy    Emergency Department Course   ECG (4:49:21):  Indication: Screening for cardiovascular disease.   Rate 82 bpm. WA interval * ms. QRS duration 86 ms. QT/QTc 378/441 ms. P-R-T axes * 7 27.   Interpretation: Atrial fibrillation with occasional ventricular-paced complexes, Cannot rule out inferior infarct age undetermined, Abnormal ECG   Agree with computer interpretation. Yes   Interpreted at 0449 by Dr. Bush.      Imaging:  Radiographic findings were communicated with the patient who voiced understanding of the findings.    CT Shoulder Right w/o Contrast:  Advanced arthritis with chronic thinning and deformity of the acromion. Acute mildly displaced fracture of the acromion. Join effusion and multiple joint bodies.   Preliminary report per radiology.     XR Shoulder, Right:  IMPRESSION: Deformity of the acromion may be due to advanced arthritis in the shoulder rather than an acute fracture. The humeral head is subluxed superiorly consistent with rotator cuff tear.  Preliminary report per radiology.     XR Knee, Left:  IMPRESSION: No acute fracture or dislocation. Moderate osteoarthritis. Joint effusion.  Preliminary report per radiology.    XR Knee, Right:  IMPRESSION: No acute fracture or dislocation. There is again a large ossific joint body in the suprapatellar pouch. Unremarkable appearance of the knee arthroplasty.  Preliminary report per  "radiology.     Lumbar Spine CT w/o Contrast:  IMPRESSION: No acute traumatic abnormality.  Preliminary report per radiology.     Cervical Spine CT w/o Contrast:  IMPRESSION: No acute traumatic abnormality.  Preliminary report per radiology.     CT Head w/o Contrast:  IMPRESSION: No acute abnormality.  Preliminary report per radiology.     Laboratory:  CBC: HGB 8.0 low, o/w WNL (WBC 7.0, )   CMP: Albumin 3.3 low, Protein total 6.4 low, o/w WNL (Creatinine 0.95)  Troponin I 0131: <0.015     UA with Microscopic: Mucous present, o/w Negative     Interventions:  0132 NS 1,000 mL IV   Heplock  Cardiac/Sp02 monitoring  Oxygen by nasal cannula at 2L/min  Sling and swathe , right arm ice    Emergency Department Course:  Nursing notes and vitals reviewed.  0106: I performed an exam of the patient as documented above.     0313: I updated and reassessed the patient. I removed the patient's C collar.     0334: I updated and reassessed the patient.     0429: I spoke with Dr. Rosas of the hospitalist service regarding patient's presentation, findings, and plan of care.     /82   Pulse 80   Temp 98  F (36.7  C) (Oral)   Resp 18   Ht 1.803 m (5' 11\")   SpO2 95%   BMI 25.89 kg/m    0530: recheck, patient updated    Findings and plan explained to the Patient who consents to admission. Discussed the patient with Dr. Rosas, who will admit the patient to a med bed for further monitoring, evaluation, and treatment.     7:18 AM Awaiting on admit    Impression & Plan      Medical Decision Making:  Jake Duane Pfleiger is a 87 year old male who was just taken off of Coumadin secondary to frequent falls. He took the Coumadin for atrial fibrillation. He presents after falling tonight. The patient was also taken off of Coumadin secondary to GI bleeds. The patient presented with an acute fall on his shoulder and also complains of mild headache pain in addition to bilateral knee pain. He lives at assisted living and was using " his walker when he states that his knees gave out, but he did not feel chest pain or shortness of breath. He did have some mild left paraspinous cervical spine tenderness and was placed in a C collar and was brought in by EMS. His right shoulder appeared swollen and his CMS was intact and the collarbone was intact but it appeared to be over the acromion and/or the humeral head. EKG showed atrial fibrillation that was rate controlled. The patient had an IV placed and was given pain medicine. He was expedited for a CT of the head and CT of his neck, both of which were negative. We were able to take off his C collar. His hemoglobin upon discharge was 10 and today it is a little bit low at 8.0. He denies any GI bleeding. Chemistry panel is normal and troponin does not show any sign of ischemia. X-rays of the knees bilaterally showed osteoarthritis but no other acute fracture. With regard to the right shoulder, there is deformity of the acromion and humeral head subluxation and several displaced foreign bodies in the joint, questionable acute versus old. CT L spin and C spine were normal. The patient was placed in a right shoulder sling and a CT scan was obtained, which shows an acute acromion fracture. We did try to stand him and he was not able to stand secondary to the pain and dizziness and I think he will need to be admitted. Troponin did not show any signs of ischemia. He was given ice in addition to pain medicine and hydration. The case was discussed with Dr. Rosas who graciously accepts admission.     Plan- ortho consult.  Trend anemia.  No signs of active bleeding.    Diagnosis:    ICD-10-CM   1. Anemia, unspecified type D64.9   2. Fall, initial encounter W19.XXXA   3. Closed nondisplaced fracture of right acromial process, initial encounter S42.124A       Disposition:  Admitted to Dr. Rosas.       ORLANDO, Elias Patiño, am serving as a scribe at 1:06 AM on 5/15/2019 to document services personally performed by   Rachelle, based on my observations and the provider's statements to me.    [facility] EMERGENCY DEPARTMENT       Stefanie Bush MD  05/15/19 0718       Stefanie Bush MD  05/15/19 0720

## 2019-05-15 NOTE — PLAN OF CARE
PT:  Eval complete.  Pt is currently admitted under OBS status due to fall with R knee and R shoulder pain.  Pt states his R knee gave out, started having pain in the knee yesterday.  Found to have no fx R knee, rather large peripatellar osteophyte, R acromial fx being treated conservatively with sling prn. Lives independently at [facility], uses 4WW at baseline.    Discharge Planner PT   Patient plan for discharge: return home  Current status: Pt appears to be functioning close to his baseline status, limited by acute R knee and R shoulder pain after recent fall.  He is able to demo all necessary functional mobility and ADL skills with SBA/independence.  Amb hallway x 175ft with 4WW SBA.  He is able to functionally use his R hand/arm for support on 4WW without much problem.  Barriers to return to prior living situation: none anticipated  Recommendations for discharge: home with home PT services  Rationale for recommendations: Appears safe to dc home, but would benefit from home PT as he is feeling deconditioned/weak and would benefit from monitored strengthening, balance, and mobility program to optimize function and lower risk for future falls. No further IP PT services needed, signing off.         Entered by: Román Herrmann 05/15/2019 4:24 PM

## 2019-05-15 NOTE — PHARMACY-ADMISSION MEDICATION HISTORY
Admission medication history interview status for this patient is complete. See [facility] admission navigator for allergy information, prior to admission medications and immunization status.     Medication history interview source(s):Patient  Medication history resources (including written lists, pill bottles, clinic record):None  Primary pharmacy:[facility] - [place], [place] - [address]    Changes made to PTA medication list: none    Actions taken by pharmacist (provider contacted, etc):None   Additional medication history information:None  Medication reconciliation/reorder completed by provider prior to medication history? No      Prior to Admission medications    Medication Sig Last Dose Taking? Auth Provider   acetaminophen (TYLENOL 8 HOUR ARTHRITIS PAIN) 650 MG CR tablet Take 1,300 mg by mouth 2 times daily 5/14/2019 at x 1 Yes Unknown, Entered By History   aspirin (ASA) 81 MG tablet Take 1 tablet (81 mg) by mouth daily 5/14/2019 at am Yes Ashok Wilcox MD   atenolol (TENORMIN) 25 MG tablet Take 25 mg by mouth daily 5/14/2019 at am Yes Unknown, Entered By History   brimonidine (ALPHAGAN-P) 0.15 % ophthalmic solution Place 1 drop into the right eye 2 times daily 5/14/2019 at x 2 Yes Unknown, Entered By History   fish oil-omega-3 fatty acids (FISH OIL) 1000 MG capsule Take 2 g by mouth daily  5/14/2019 at am Yes Unknown, Entered By History   furosemide (LASIX) 20 MG tablet Take 1 tablet (20 mg) by mouth daily 5/14/2019 at am Yes Juwan Maher MD   Levothyroxine Sodium (SYNTHROID PO) Take 100 mcg by mouth daily. 5/14/2019 at am Yes Unknown, Entered By History   Lisinopril (PRINIVIL PO) Take 10 mg by mouth daily  5/14/2019 at am Yes Reported, Patient   omeprazole (PRILOSEC OTC) 20 MG EC tablet Take 20 mg by mouth daily as needed Past Week at Unknown time Yes Unknown, Entered By History   paroxetine (PAXIL) 20 MG tablet Take 20 mg by mouth every morning. 5/14/2019 at  am Yes Unknown, Entered By History   polyethylene glycol (MIRALAX/GLYCOLAX) packet Take 1 packet by mouth daily as needed  Past Month at Unknown time Yes Reported, Patient   Zolpidem Tartrate (AMBIEN PO) Take 5 mg by mouth At Bedtime  5/14/2019 at hs Yes Unknown, Entered By History   Wheat Dextrin (BENEFIBER) TABS Take 1 tablet by mouth daily as needed  More than a month at Unknown time  Unknown, Entered By History

## 2019-05-15 NOTE — ED NOTES
Bed: ED13  Expected date: 5/15/19  Expected time: 12:56 AM  Means of arrival: Ambulance  Comments:  Alistair Guo6

## 2019-05-15 NOTE — ED NOTES
[facility]  ED Nurse Handoff Report    Jake Duane Pfleiger is a 87 year old male   ED Chief complaint: Fall  . ED Diagnosis:   Final diagnoses:   Anemia, unspecified type   Fall, initial encounter     Allergies: No Known Allergies    Code Status: Full Code  Activity level - Baseline/Home:  Independent. Activity Level - Current:   Stand with Assist of 2. Lift room needed: No. Bariatric: No   Needed: No   Isolation: No. Infection: Not Applicable.     Vital Signs:   Vitals:    05/15/19 0255 05/15/19 0300 05/15/19 0315 05/15/19 0330   BP:  (!) 183/100 (!) 188/94 (!) 169/94   Pulse:  80 89 77   Resp:       Temp:       TempSrc:       SpO2: 97% 95% 96% 97%   Height:           Cardiac Rhythm:  ,      Pain level: 0-10 Pain Scale: 8  Patient confused: No. Patient Falls Risk: Yes.   Elimination Status: Has voided   Patient Report - Initial Complaint: fall. Focused Assessment: Jake Duane Pfleiger is a 87 year old male who presents via EMS for evaluation of a fall. Tonight shortly prior to arrival, the patient was using his walker when he felt that his knees gave out and he fell to the ground. He does not believe that he struck his head in the fall and did not lose consciousness. Since the fall, the patient has developed pain in his right shoulder and bilateral knees, left greater than right. EMS was called to bring him into the ED for evaluation, and he was placed in a C collar prior to arrival. Currently in the ED, the patient complains primarily of pain in his right shoulder, and he rates his pain at a severity of 8/10. He denies an chest pain, shortness of breath, abdominal pain, back pain, neck pain, or headache since the fall. Notably, the patient was taken off Coumadin approximately three weeks ago due to concern for GI bleeding.     Tests Performed:   Labs Ordered and Resulted from Time of ED Arrival Up to the Time of Departure from the ED   CBC WITH PLATELETS DIFFERENTIAL - Abnormal; Notable for  the following components:       Result Value    RBC Count 3.15 (*)     Hemoglobin 8.0 (*)     Hematocrit 26.5 (*)     MCH 25.4 (*)     MCHC 30.2 (*)     RDW 20.5 (*)     All other components within normal limits   COMPREHENSIVE METABOLIC PANEL - Abnormal; Notable for the following components:    Albumin 3.3 (*)     Protein Total 6.4 (*)     All other components within normal limits   ROUTINE UA WITH MICROSCOPIC   TROPONIN I   PERIPHERAL IV CATHETER     XR Knee Left 3 Views   Preliminary Result   IMPRESSION: No acute fracture or dislocation. Moderate osteoarthritis.   Joint effusion.      XR Knee Right 3 Views   Preliminary Result   IMPRESSION: No acute fracture or dislocation. There is again a large   ossific joint body in the suprapatellar pouch. Unremarkable appearance   of the knee arthroplasty.      XR Shoulder Right 2 Views   Preliminary Result   IMPRESSION: Deformity of the acromion may be due to advanced arthritis   in the shoulder rather than an acute fracture. The humeral head is   subluxed superiorly consistent with rotator cuff tear.      Lumbar spine CT w/o contrast   Preliminary Result   IMPRESSION: No acute traumatic abnormality.      Cervical spine CT w/o contrast   Preliminary Result   IMPRESSION: No acute traumatic abnormality.      CT Head w/o Contrast   Preliminary Result   IMPRESSION: No acute abnormality.      CT Shoulder Right w/o Contrast    (Results Pending)      Abnormal Results: see above.   Treatments provided: see MAR  Family Comments: none present  OBS brochure/video discussed/provided to patient:  Yes  ED Medications:   Medications   0.9% sodium chloride BOLUS (0 mLs Intravenous Stopped 5/15/19 0343)     Followed by   sodium chloride 0.9% infusion (has no administration in time range)     Drips infusing:  No  For the majority of the shift, the patient's behavior Green. Interventions performed were none.     Severe Sepsis OR Septic Shock Diagnosis Present: No      ED Nurse Name/Phone  Number: Tavo Simon,   3:43 AM    RECEIVING UNIT ED HANDOFF REVIEW    Above ED Nurse Handoff Report was reviewed: Yes  Reviewed by: Padmini Delatorre on May 15, 2019 at 7:48 AM

## 2019-05-15 NOTE — ED TRIAGE NOTES
Pt to ER with c/o fall tonight, pt states that his knees gave out and he fell on bilat knees, pt c/o low back pain , right shoulder pain  As well pt denies any neck or head pain pt denies any LOC

## 2019-05-15 NOTE — PROGRESS NOTES
SPIRITUAL HEALTH SERVICES Progress Note  [facility] 6th floor    SH consult for communion facilitation.   Met with pt, Jorge Luis, who shares that he took a fall at home and injured his shoulder. He was wondering about communion as he identifies a [employer] and attends [place]. Oriented Jorge Luis to the volunteer [employer] supported by the local [facility] and that he would be offered communion through their service. Jorge Luis expresses no other needs/concerns at this time.   SH remains available.     DARIO Valdez.  Staff    Pager #[phone]

## 2019-05-15 NOTE — H&P
[facility]    History and Physical  Hospitalist       Date of Admission:  5/15/2019    Assessment & Plan   Jake Duane Pfleiger is a 87 year old male who presents with fall at home.    Patient has a history of A. fib (on aspirin, not on warfarin anymore due to GI bleed), hypertension, dyslipidemia, mild cognitive dysfunction.  He lives by himself at an assisted living facility.    He had a fall at around 10 PM when he was getting back from bathroom to his bedroom.  He was walking with the help of his walker when he lost his balance and fell.  He says that his left knee gave out on him and he lost balance.  Apparently, his left knee has been troubling him since this morning.  He has a history of severe osteoarthritis of his left knee.  He denies any chest pain, palpitations, shortness of breath, dizziness.  Denies any head injury.  Denies any loss of consciousness.    After the fall, he was not able to get up by himself due to the left knee pain and right shoulder pain.  He used his medical alert device to call for help.    In the ER, trauma work-up done by Dr. Bush.  Other than the right shoulder injury no other bony or joint injury has been noted.  Patient has not been able to get up and ambulate by himself.  He also noted to have worsening of his anemia.  His right shoulder CT shows a acromion fracture.    His last admission in March was for a lower GI bleed while he was on warfarin.  This was thought to be a diverticular bleed.  No colonoscopy was done as last colonoscopy in 2018 which showed diverticulosis.  Patient has been struggling with constipation issue.  After the discharge in March he was doing well without any bleeding.  About 7 to 10 days ago he had an episode of dark stools with dark blood.  It lasted for about 2 days and then resolved.  Denies any hematemesis.  Denies any abdominal pain.  His warfarin was stopped in March and he currently takes low-dose aspirin.    Due to all these  complex medical issues with patient is being admitted to internal medicine hospitalist service with an orthopedic consult.    Problem List:    Fall.  Right shoulder injury in form of acromion fracture.  Left knee pain but no bony fracture.  - The shoulder issue is likely nonsurgical treatment.  Will obtain an orthopedic consult as well for further recommendations.  - Meanwhile right shoulder is being placed in a sling.  After orthopedic clearance patient can be started on physical therapy.  - His balance appears to be off and he will benefit from ongoing physical therapy.    Acute on chronic anemia.  Likely related to subacute diverticular bleed.  - We will monitor the hemoglobin.  No active bleeding at this point.  He had dark bloody stool about 7 to 10 days ago and now resolved.  -Drop in hemoglobin from 10 to 8.  -Recent admission for diverticular bleed.  - Hold the aspirin for now.  Trend the hemoglobin.  If no active bleeding is seen in the hospital then aspirin can be resumed.  - Transfuse if hemoglobin less than 7.    Hypertension  -Resume home atenolol and lisinopril.    Hypothyroidism  - Continue levothyroxine.  Check TSH.    Constipation  - This is likely precipitating the diverticulosis issue along with related bleeding problem.  - I advised the patient to take stool softeners on a regular basis.  She takes MiraLAX on as-needed basis.  - Monitor and treat with regular stool softeners.    A. fib  -Warfarin has been stopped due to significant and recurrent bleeding.  May resume aspirin if no more bleeding in the hospital.  -Rate is controlled.  Continue atenolol.    Plan discussed with the patient.    DVT Prophylaxis: Pneumatic Compression Devices  Code Status: Full Code, per patient.    Perry Rosas MD    Primary Care Physician   [facility]    Chief Complaint   Fall.      History of Present Illness   Jake Duane Pfleiger is a 87 year old male presented to the emergency room after a  fall.      Past Medical History    I have reviewed this patient's medical history and updated it with pertinent information if needed.   Past Medical History:   Diagnosis Date     Anxiety      Arthritis      Atrial fibrillation (H)      Cardiomyopathy (H)      Depression      Diastolic CHF (H) 04/10/2016     Diverticulosis      Gastro-oesophageal reflux disease      GI bleed 10/02/2018    Lower     Hypertension      Insomnia      Nodule of lower lobe of left lung     CT Chest 1/2019-stable     Pacemaker     [employer] Dual Pacemaker     Permanent atrial fibrillation (H)      Right patella fracture      Tachy-jairo syndrome (H)      Thyroid disease     Hypothyrodism        Past Surgical History   I have reviewed this patient's surgical history and updated it with pertinent information if needed.  Past Surgical History:   Procedure Laterality Date     ARTHROPLASTY HIP  1/21/2014    left hip replacement     ARTHROPLASTY KNEE      right      CHOLECYSTECTOMY       COLONOSCOPY N/A 10/3/2018    Procedure: COLONOSCOPY;  COLONOSCOPY Rm.#[id number];  Surgeon: Reese Burroughs MD;  Location:  GI     COLONOSCOPY N/A 10/5/2018    Procedure: COLONOSCOPY;  COLONOSCOPY  Rm.#[id number];  Surgeon: Reese Burroughs MD;  Location:  GI     EYE SURGERY      right eye cataract removal      IMPLANT PACEMAKER  03/2012    dual chamber      ORTHOPEDIC SURGERY      back surgery        Prior to Admission Medications   Prior to Admission Medications   Prescriptions Last Dose Informant Patient Reported? Taking?   Levothyroxine Sodium (SYNTHROID PO)   Yes No   Sig: Take 100 mcg by mouth daily.   Lisinopril (PRINIVIL PO)   Yes No   Sig: Take 10 mg by mouth daily    Wheat Dextrin (BENEFIBER PO)   Yes No   Sig: Take by mouth daily as needed   Zolpidem Tartrate (AMBIEN PO)   Yes No   Sig: Take 5 mg by mouth At Bedtime    acetaminophen (TYLENOL 8 HOUR ARTHRITIS PAIN) 650 MG CR tablet   Yes No   Sig: Take 1,300 mg by mouth 2 times daily   aspirin (ASA) 81  MG tablet   No No   Sig: Take 1 tablet (81 mg) by mouth daily   atenolol (TENORMIN) 25 MG tablet   Yes No   Sig: Take 25 mg by mouth daily   brimonidine (ALPHAGAN-P) 0.15 % ophthalmic solution   Yes No   Sig: Place 1 drop into the right eye 2 times daily   fish oil-omega-3 fatty acids (FISH OIL) 1000 MG capsule   Yes No   Sig: Take 2 g by mouth daily    furosemide (LASIX) 20 MG tablet   No No   Sig: Take 1 tablet (20 mg) by mouth daily   omeprazole (PRILOSEC OTC) 20 MG EC tablet   Yes No   Sig: Take 20 mg by mouth daily as needed   paroxetine (PAXIL) 20 MG tablet   Yes No   Sig: Take 20 mg by mouth every morning.   polyethylene glycol (MIRALAX/GLYCOLAX) packet   Yes No   Sig: Take 1 packet by mouth as needed       Facility-Administered Medications: None     Allergies   No Known Allergies    Social History   I have reviewed this patient's social history and updated it with pertinent information if needed. Jake Duane Pfleiger  reports that he quit smoking about 37 years ago. His smoking use included cigarettes, pipe, and cigars. He has a 12.50 pack-year smoking history. He has never used smokeless tobacco. He reports that he does not drink alcohol or use drugs.    Family History   I have reviewed this patient's family history and updated it with pertinent information if needed.   Family History   Problem Relation Age of Onset     Other Cancer Mother      Unknown/Adopted Father        Review of Systems   The 10 point Review of Systems is negative other than noted in the HPI or here.     Physical Exam   Temp: 98  F (36.7  C) Temp src: Oral BP: 183/82 Pulse: 80   Resp: 18 SpO2: 95 % O2 Device: None (Room air)    Vital Signs with Ranges  Temp:  [98  F (36.7  C)] 98  F (36.7  C)  Pulse:  [77-89] 80  Resp:  [18] 18  BP: (154-188)/() 183/82  SpO2:  [95 %-97 %] 95 %  0 lbs 0 oz    Constitutional: Awake, alert, cooperative, no apparent distress.  Eyes: Conjunctiva and pupils examined and normal.  HEENT: Moist mucous  membranes, normal dentition.  Respiratory: Clear to auscultation bilaterally, no crackles or wheezing.  Cardiovascular: Irregular rate and rhythm, normal S1 and S2, and no murmur noted.  GI: Soft, non-distended, non-tender, normal bowel sounds.  Lymph/Hematologic: No anterior cervical or supraclavicular adenopathy.  Skin: No rashes, no cyanosis, no edema.  Musculoskeletal:   Left knee: Small effusion.  Minimally tender.  Good movement without any significant pain.  Right shoulder: Effusion noted.  Unable to do active movement of the shoulder joint.  Effusion with tenderness.    Neurologic: Cranial nerves 2-12 intact, normal strength and sensation.  No focal deficit.  Unable to move his right arm due to his shoulder pain.  Psychiatric: Alert, oriented to person, place and time, no obvious anxiety or depression.    Data     Recent Labs   Lab 05/15/19  0131   WBC 7.0   HGB 8.0*   MCV 84         POTASSIUM 4.5   CHLORIDE 103   CO2 28   BUN 24   CR 0.95   ANIONGAP 6   MANAS 8.6   GLC 94   ALBUMIN 3.3*   PROTTOTAL 6.4*   BILITOTAL 0.3   ALKPHOS 76   ALT 14   AST 17   TROPI <0.015       Recent Results (from the past 24 hour(s))   CT Head w/o Contrast    Narrative    CT SCAN OF THE HEAD WITHOUT CONTRAST  5/15/2019 1:52 AM     HISTORY: Fall.    TECHNIQUE: Axial images of the head and coronal reformations without  IV contrast material. Radiation dose for this scan was reduced using  automated exposure control, adjustment of the mA and/or kV according  to patient size, or iterative reconstruction technique.    COMPARISON: 9/23/2015.    FINDINGS: There is generalized atrophy of the brain. There is low  attenuation in the white matter of the cerebral hemispheres consistent  with sequelae of small vessel ischemic disease. There is no evidence  of intracranial hemorrhage, mass, acute infarct or anomaly.     Right maxillary sinus disease. There is no evidence of trauma.      Impression    IMPRESSION: No acute abnormality.    Cervical spine CT w/o contrast    Narrative    CT CERVICAL SPINE W/O CONTRAST  5/15/2019 1:57 AM      HISTORY: Fall, pain.    TECHNIQUE: Multiplanar imaging of the cervical spine without  intravenous contrast. Radiation dose for this scan was reduced using  automated exposure control, adjustment of the mA and/or kV according  to patient size, or iterative reconstruction technique.     COMPARISON: 5/25/2015.    FINDINGS: There is no acute fracture or traumatic malalignment. There  is extensive multilevel degenerative disc and facet disease. There is  mild spinal stenosis at C4-C5 and C5-C6. The paraspinal soft tissues  show no acute abnormalities. There is atherosclerotic calcification of  the carotid arterial system. The lung apices are unremarkable.      Impression    IMPRESSION: No acute traumatic abnormality.   Lumbar spine CT w/o contrast    Narrative    CT LUMBAR SPINE W/O CONTRAST  5/15/2019 2:11 AM      HISTORY: Fall, back pain.    TECHNIQUE: Multiplanar imaging in the lumbar spine without intravenous  contrast. Radiation dose for this scan was reduced using automated  exposure control, adjustment of the mA and/or kV according to patient  size, or iterative reconstruction technique.     COMPARISON: None.    FINDINGS: There is no acute fracture or traumatic malalignment. There  is extensive degenerative disc and facet disease throughout the lumbar  spine. There is moderate spinal stenosis at L3-L4. There is a convex  left lumbar scoliosis. The paraspinal soft tissues show no acute  abnormalities. There is atherosclerotic calcification of the aorta and  its branches. No aneurysm.      Impression    IMPRESSION: No acute traumatic abnormality.   XR Shoulder Right 2 Views    Narrative    XR SHOULDER 2 VIEW RIGHT  5/15/2019 2:45 AM     HISTORY: Fall. Right arm swelling.    COMPARISON: None.       Impression    IMPRESSION: Deformity of the acromion may be due to advanced arthritis  in the shoulder rather than an  acute fracture. The humeral head is  subluxed superiorly consistent with rotator cuff tear.   XR Knee Right 3 Views    Narrative    XR KNEE RT 3 VW  5/15/2019 2:46 AM     HISTORY: Fall. Right knee pain.    COMPARISON: None.       Impression    IMPRESSION: No acute fracture or dislocation. There is again a large  ossific joint body in the suprapatellar pouch. Unremarkable appearance  of the knee arthroplasty.   XR Knee Left 3 Views    Narrative    XR KNEE LT 3 VW  5/15/2019 2:46 AM     HISTORY: Fall. Left knee pain.    COMPARISON: None.       Impression    IMPRESSION: No acute fracture or dislocation. Moderate osteoarthritis.  Joint effusion.

## 2019-05-15 NOTE — PROGRESS NOTES
I saw Mr Ordoñez and reviewed the plan outlined by Dr. Rosas from this morning.  I agree with his plan.  Appreciate orthopedic surgery consultation.  They recommended conservative therapy for his right acromion fracture.  There are tentatively scheduling arthroscopic surgery on his left knee for tomorrow morning.  Physical therapy also consulting and feel that he safe to return to his assisted living with some increased services.  We will reassess his mobility tomorrow after surgery to determine a safe discharge plan.  We also recheck hemoglobin tomorrow as he has some chronic gastrointestinal bleeding as described Dr. Rosas.  I also discussed with the patient's son and daughter-in-law and they are in agreement with the plan.

## 2019-05-15 NOTE — PROGRESS NOTES
05/15/19 1606   Quick Adds   Type of Visit Initial PT Evaluation   Living Environment   Lives With alone   Living Arrangements independent living facility   Home Accessibility no concerns   Transportation Anticipated family or friend will provide   Living Environment Comment [facility]   Self-Care   Usual Activity Tolerance moderate   Current Activity Tolerance moderate   Regular Exercise No   Equipment Currently Used at Home walker, rolling;grab bar, toilet;grab bar, tub/shower;shower chair;other (see comments)  (4ww, sock aide, reacher/grabber)   Activity/Exercise/Self-Care Comment Functions independently at baseline with use of reacher/grabber and sock aide for lower body dressing, grab bars at toilet and shower, shower chair, 4WW.  Walks down for meals 1-2x/day, daughter sets up meds.   Functional Level Prior   Ambulation 1-->assistive equipment   Transferring 1-->assistive equipment   Toileting 1-->assistive equipment   Bathing 1-->assistive equipment   Communication 0-->understands/communicates without difficulty   Swallowing 0-->swallows foods/liquids without difficulty   Cognition 0 - no cognition issues reported   Fall history within last six months yes   Number of times patient has fallen within last six months 2   Which of the above functional risks had a recent onset or change? ambulation;transferring;fall history;toileting;bathing;dressing   General Information   Onset of Illness/Injury or Date of Surgery - Date 05/15/19   Referring Physician Dalia Manzano PA-C   Patient/Family Goals Statement dc home, considering having osteophyte removal while here vs OP    Pertinent History of Current Problem (include personal factors and/or comorbidities that impact the POC) Pt is currently admitted under OBS status due to fall with R knee and R shoulder pain.  Pt states his R knee gave out, started having pain in the knee yesterday.  Found to have no fx R knee, rather large peripatellar  osteophyte, R acromial fx being treated conservatively with sling prn.  Hx of R TKA.   Precautions/Limitations   (sling for comfort, ROM as tolerated)   General Observations pt awake, alert, sitting up in chair   Cognitive Status Examination   Orientation orientation to person, place and time   Pain Assessment   Patient Currently in Pain Yes, see Vital Sign flowsheet  (2/10 at rest, 4/10 R knee WB, minimal pain R shoulder w/ 4WW)   Integumentary/Edema   Integumentary/Edema Comments no swelling/redness noted R knee, R shoulder mild TTP, no overt swelling/redness   Posture    Posture Forward head position;Protracted shoulders;Kyphosis   Posture Comments leaning to L to offload R leg in standing   Range of Motion (ROM)   ROM Comment WFL LLE and LUE, R shoulder limited severely at baseline d/t rotator cuff problems, AROM currently < 45 deg.  R knee flx AROM to ~100 deg.  Able to demo abiltiy to reach to socks on feet, but difficutly getting them off, has reacher/grabber he uses at baseline.   Strength   Strength Comments WFL LLE and LUE, weakness R shoulder at baseline.  R knee flx/ext at least 4/5   Bed Mobility   Bed Mobility Comments SBA/indep, limited by R shoulder and knee pain, very close to his baseline   Transfer Skills   Transfer Comments SBA sit <> stand transfers with use of 4WW from EOB, chair, and toilet with grab bar, using L hand for majority of pushing support, tolerating well.   Gait   Gait Comments Amb with 4WW SBA in hallway x 175ft total, limited by fatigue and R knee pain/fatigue. Appears close to his baseline. Advised taking rest breaks using 4WW with walking at home to minimize risk for fatigue related knee buckling.   Balance   Balance Comments Impaired standing balance/gait stability at baseline, comensating well with 4WW, appears at/close to baseline functional status.   Sensory Examination   Sensory Perception no deficits were identified   Clinical Impression   Criteria for Skilled Therapeutic  "Intervention evaluation only;no problems identified which require skilled intervention;current level of function same as previous level of function   PT Diagnosis R shoulder and knee pain, impaired functional mobility   Influenced by the following impairments R shoulder/knee pain, decreased ROM, weakness   Functional limitations due to impairments Impaired tolerance with functional transfers, gait, ADLs   Clinical Presentation Evolving/Changing   Clinical Presentation Rationale acute fx, PMH, PLOF, pain levels   Clinical Decision Making (Complexity) Moderate complexity   Anticipated Discharge Disposition Home with Home Therapy   Risk & Benefits of therapy have been explained Yes   Patient, Family & other staff in agreement with plan of care Yes   Clinical Impression Comments Pt appears to be functioning close to his baseline status, limited by acute R knee and R shoulder pain after recent fall.  He is able to demo all necessary functional mobility and ADL skills with SBA/independence, appears safe to dc home. But, would benefit from home PT as he is feeling deconditioned/weak and would benefit from monitored strengthening, balance, and mobility program to optimize function and lower risk for future falls.   [employer] [facility] [employer] TM \"6 Clicks\"   2016, Trustees of [facility], under license to [employer].  All rights reserved.   6 Clicks Short Forms Basic Mobility Inpatient Short Form   [facility] AM-PAC  \"6 Clicks\" V.2 Basic Mobility Inpatient Short Form   1. Turning from your back to your side while in a flat bed without using bedrails? 3 - A Little   2. Moving from lying on your back to sitting on the side of a flat bed without using bedrails? 3 - A Little   3. Moving to and from a bed to a chair (including a wheelchair)? 3 - A Little   4. Standing up from a chair using your arms (e.g., wheelchair, or bedside chair)? 3 - A Little   5. To walk in hospital room? 3 - A Little   6. Climbing 3-5 steps " with a railing? 3 - A Little   Basic Mobility Raw Score (Score out of 24.Lower scores equate to lower levels of function) 18   Total Evaluation Time   Total Evaluation Time (Minutes) 25

## 2019-05-15 NOTE — ED NOTES
Attempted to stand Patient with RN. Patient able to stand at the side of the bed with assistance on both sides. Patient was not steady on feet. Patient was unable to stand unassisted. MD notified. Patient able to urinate into a urinal at bedside with assist of the RN.

## 2019-05-15 NOTE — LETTER
Key Recommendations:   Patient admitted for fall with injury to his right shoulder and knee.  CTS assessing for increased care needs and discharge planning. Patient is living independently at [facility] in [place]. He receives one meal a day. He manages his medications with the support of his daughter -in-law. His sons  provide transportation to appointments. Patient anticipates returning home.  Noted patient schedule for Right knee pre-patellar osteophyte removal 5/16. Discharge plans undetermined at this time.  Reached out to [facility], [phone]) spoke with RNRuddy. Facility offers limited therapy, PT 3x/week and OT 2x/week thru [facility].     Post Right knee pre-patellar osteophyte removal recommendations: *****        Mercedes Hamilton    AVS/Discharge Summary is the source of truth; this is a helpful guide for improved communication of patient story

## 2019-05-15 NOTE — UTILIZATION REVIEW
"  Admission Status; Secondary Review Determination         Under the authority of the Utilization Management Committee, the utilization review process indicated a secondary review on the above patient.  The review outcome is based on review of the medical records, discussions with staff, and applying clinical experience noted on the date of the review.        ()      Inpatient Status Appropriate - This patient's medical care is consistent with medical management for inpatient care and reasonable inpatient medical practice.      (X) Observation Status Appropriate - This patient does not meet hospital inpatient criteria and is placed in observation status. If this patient's primary payer is Medicare and was admitted as an inpatient, Condition Code 44 should be used and patient status changed to \"observation\".   () Admission Status NOT Appropriate - This patient's medical care is not consistent with medical management for Inpatient or Observation Status.          RATIONALE FOR DETERMINATION     88 yo male with a history of A. fib (on aspirin, not on warfarin anymore due to GI bleed), hypertension, dyslipidemia, mild cognitive dysfunction who fell at home injuring his right shoulder.  CT shows an acromial fracture.  He has increased pain and dizziness with ambulation.  His hemoglobin is 8 which is about 2 grams below his baseline.  Other labs nd vital signs are normal.  He is not requiring parenteral pain medications. He will have therapies, serial hemoglobins, and an orthopedic consultation.  I would recommend placement in Observation status.  I have spoken to Dr Maher.      The severity of illness, intensity of service provided, expected LOS and risk for adverse outcome make the care complex, high risk and appropriate for hospital admission.        The information on this document is developed by the utilization review team in order for the business office to ensure compliance.  This only denotes the " appropriateness of proper admission status and does not reflect the quality of care rendered.         The definitions of Inpatient Status and Observation Status used in making the determination above are those provided in the CMS Coverage Manual, Chapter 1 and Chapter 6, section 70.4.      Sincerely,     Yair Shen MD  Physician Advisor  Utilization Review/ Case Management  [employer].

## 2019-05-15 NOTE — PLAN OF CARE
Arrived to room 648 from ER at 0830 via cart, oriented to room and call system, reviewed welcome folder and pain/medication information packet with patient.    Pt A&O x4. VS stable; afebrile. PO tylenol managing pain. CMS intact. Up w/ A2, using gait belt, and walker. Voiding in good amts. Tolerating regular diet. Plan is for possible surgery tomorrow; NPO @ midnight. Will continue to monitor.

## 2019-05-15 NOTE — CONSULTS
[facility]    Orthopedic Consultation    Jake Duane Pfleiger MRN# [id number]   Age: 87 year old YOB: 1931     Date of Admission:  5/15/2019    Reason for consult: Right shoulder injury       Requesting physician: Dr. Perry Rosas       Level of consult: Consult, follow and place orders           Assessment and Plan:   Assessment:   Right chronic rotator cuff arthropathy  Right acromion thinning and break through  Right knee large osteophyte - total knee arthroplasty  End stage left knee DJD      Plan:   Conservative treatment of right shoulder at this time  Sling for comfort  ROM as tolerated, Activity as tolerated  Discussion of removal of osteophyte - patient will consider  PT   Social Work - patient lives in Independent living with other levels of care offered  Pain medication as needed - minimize narcotics           Chief Complaint:   Right shoulder injury         History of Present Illness:   This patient is a 87 year old male who presents with the following condition requiring a hospital admission:    Mr. Ordoñez states that his main concern at this time is that his right knee has been giving out on him on a somewhat regular basis which caused him to fall last night. He states he was walking from his bedroom to his bathroom when his right leg gave out causing him to fall onto his right shoulder even though he was ambulating with his walker. He states that his shoulder at this time is not painful. He denies any chronic pain in his right shoulder. He states his left knee is also bone on bone as he had it scheduled for replacement but ultimately delayed his surgery for a variety of reasons. He was chronically anticoagulated on Warfarin secondary to A. Fib but was recently stopped due to a significant GI bleed.           Past Medical History:     Past Medical History:   Diagnosis Date     Anxiety      Arthritis      Atrial fibrillation (H)      Cardiomyopathy (H)      Depression       Diastolic CHF (H) 04/10/2016     Diverticulosis      Gastro-oesophageal reflux disease      GI bleed 10/02/2018    Lower     Hypertension      Insomnia      Nodule of lower lobe of left lung     CT Chest 2019-stable     Pacemaker     [employer] Dual Pacemaker     Permanent atrial fibrillation (H)      Right patella fracture      Tachy-jairo syndrome (H)      Thyroid disease     Hypothyrodism              Past Surgical History:     Past Surgical History:   Procedure Laterality Date     ARTHROPLASTY HIP  2014    left hip replacement     ARTHROPLASTY KNEE      right      CHOLECYSTECTOMY       COLONOSCOPY N/A 10/3/2018    Procedure: COLONOSCOPY;  COLONOSCOPY Rm.#[id number];  Surgeon: Reese Burroughs MD;  Location:  GI     COLONOSCOPY N/A 10/5/2018    Procedure: COLONOSCOPY;  COLONOSCOPY  Rm.#[id number];  Surgeon: Reese Burroughs MD;  Location:  GI     EYE SURGERY      right eye cataract removal      IMPLANT PACEMAKER  2012    dual chamber      ORTHOPEDIC SURGERY      back surgery              Social History:     Social History     Tobacco Use     Smoking status: Former Smoker     Packs/day: 0.50     Years: 25.00     Pack years: 12.50     Types: Cigarettes, Pipe, Cigars     Last attempt to quit: 1982     Years since quittin.3     Smokeless tobacco: Never Used   Substance Use Topics     Alcohol use: No     Alcohol/week: 0.0 oz             Family History:     Family History   Problem Relation Age of Onset     Other Cancer Mother      Unknown/Adopted Father              Immunizations:     VACCINE/DOSE   Diptheria   DPT   DTAP   HBIG   Hepatitis A   Hepatitis B   HIB   Influenza   Measles   Meningococcal   MMR   Mumps   Pneumococcal   Polio   Rubella   Small Pox   TDAP   Varicella   Zoster             Allergies:   No Known Allergies          Medications:     Current Facility-Administered Medications   Medication     acetaminophen (TYLENOL) tablet 650 mg     atenolol (TENORMIN) tablet 25 mg     bisacodyl  "(DULCOLAX) Suppository 10 mg     HYDROcodone-acetaminophen (NORCO) 5-325 MG per tablet 1 tablet     levothyroxine (SYNTHROID/LEVOTHROID) tablet 100 mcg     lisinopril (PRINIVIL/ZESTRIL) tablet 10 mg     melatonin tablet 1 mg     naloxone (NARCAN) injection 0.1-0.4 mg     ondansetron (ZOFRAN-ODT) ODT tab 4 mg    Or     ondansetron (ZOFRAN) injection 4 mg     PARoxetine (PAXIL) tablet 20 mg     polyethylene glycol (MIRALAX/GLYCOLAX) Packet 17 g     senna-docusate (SENOKOT-S/PERICOLACE) 8.6-50 MG per tablet 1 tablet    Or     senna-docusate (SENOKOT-S/PERICOLACE) 8.6-50 MG per tablet 2 tablet     senna-docusate (SENOKOT-S/PERICOLACE) 8.6-50 MG per tablet 1 tablet    Or     senna-docusate (SENOKOT-S/PERICOLACE) 8.6-50 MG per tablet 2 tablet     sodium chloride 0.9% infusion             Review of Systems:   CV: NEGATIVE for chest pain, palpitations or peripheral edema  C: NEGATIVE for fever, chills, change in weight  E/M: NEGATIVE for ear, mouth and throat problems  R: NEGATIVE for significant cough or SOB    10 point review of systems negative aside from HPI and physical exam.           Physical Exam:   All vitals have been reviewed  Patient Vitals for the past 24 hrs:   BP Temp Temp src Pulse Heart Rate Resp SpO2 Height   05/15/19 0814 175/89 97.2  F (36.2  C) Oral 81 -- 16 99 % --   05/15/19 0800 141/72 -- -- 76 -- -- 98 % --   05/15/19 0751 168/84 -- -- -- 82 18 99 % --   05/15/19 0613 (!) 160/93 -- -- 85 -- 20 98 % --   05/15/19 0400 183/82 -- -- 80 -- -- 95 % --   05/15/19 0330 (!) 169/94 -- -- 77 -- -- 97 % --   05/15/19 0315 (!) 188/94 -- -- 89 -- -- 96 % --   05/15/19 0300 (!) 183/100 -- -- 80 -- -- 95 % --   05/15/19 0255 -- -- -- -- -- -- 97 % --   05/15/19 0115 (!) 154/92 -- -- 87 -- -- 97 % --   05/15/19 0105 154/77 98  F (36.7  C) Oral 85 -- 18 97 % 1.803 m (5' 11\")       Intake/Output Summary (Last 24 hours) at 5/15/2019 0946  Last data filed at 5/15/2019 0844  Gross per 24 hour   Intake --   Output 300 ml "   Net -300 ml     Constitutional: Pleasant, alert, appropriate, following commands.  HEENT: Head atraumatic normocephalic. PERRLA.  Respiratory: Unlabored breathing no audible wheeze  Cardiovascular: Regular rate and rhythm  GI: Abdomen soft, non tender, and non distended.  Lymph/Hematologic: No edema or palor  Skin: No rashes, no cyanosis, no edema.  Neuro: Sensation intact to light touch in bilateral upper and lower extremities.  Musculoskeletal:   Right UE without erythema or ecchymosis  Full passive ROM with pain on motion of flexion to extension  Crepitus felt over right shoulder joint  No step offs or significant deformity noted in comparison to left shoulder  Sling in place  Sensation intact in upper arm over biceps as well as in hand r/m/u nerve distribution  Able to abduct and oppose right thumb/finger  +Radial pulse  +cap refill     No ecchymosis or erythema over entirety of the right leg  No notable swelling or edema  Full ROM of left knee - 0 to 120 degrees with significant crepitus under right patella  Stable to varus and valgus stress  No TTP over medial or lateral joint line  Negative patellar apprehension   No pain with log roll right LE  Able to SLR  No TTP over great trochanter  Bilateral calves are soft, non-tender.  Bilateral lower extremity is NVI.  Sensation intact bilateral lower extremities  5/5 motor with resisted dorsi and plantar flexion bilaterally  5/5 hip flexors  5/5 quad  5/5 EHL  +Dp pulse              Data:   All laboratory data reviewed  Results for orders placed or performed during the hospital encounter of 05/15/19   XR Knee Right 3 Views    Narrative    XR KNEE RT 3 VW  5/15/2019 2:46 AM     HISTORY: Fall. Right knee pain.    COMPARISON: None.       Impression    IMPRESSION: No acute fracture or dislocation. There is again a large  ossific joint body in the suprapatellar pouch. Unremarkable appearance  of the knee arthroplasty.    ANALISA ZHU MD   XR Knee Left 3 Views     Narrative    XR KNEE LT 3 VW  5/15/2019 2:46 AM     HISTORY: Fall. Left knee pain.    COMPARISON: None.       Impression    IMPRESSION: No acute fracture or dislocation. Moderate osteoarthritis.  Joint effusion.    ANALISA ZHU MD   CT Head w/o Contrast    Narrative    CT SCAN OF THE HEAD WITHOUT CONTRAST  5/15/2019 1:52 AM     HISTORY: Fall.    TECHNIQUE: Axial images of the head and coronal reformations without  IV contrast material. Radiation dose for this scan was reduced using  automated exposure control, adjustment of the mA and/or kV according  to patient size, or iterative reconstruction technique.    COMPARISON: 9/23/2015.    FINDINGS: There is generalized atrophy of the brain. There is low  attenuation in the white matter of the cerebral hemispheres consistent  with sequelae of small vessel ischemic disease. There is no evidence  of intracranial hemorrhage, mass, acute infarct or anomaly.     Right maxillary sinus disease. There is no evidence of trauma.      Impression    IMPRESSION: No acute abnormality.    ANALISA ZHU MD   Cervical spine CT w/o contrast    Narrative    CT CERVICAL SPINE W/O CONTRAST  5/15/2019 1:57 AM      HISTORY: Fall, pain.    TECHNIQUE: Multiplanar imaging of the cervical spine without  intravenous contrast. Radiation dose for this scan was reduced using  automated exposure control, adjustment of the mA and/or kV according  to patient size, or iterative reconstruction technique.     COMPARISON: 5/25/2015.    FINDINGS: There is no acute fracture or traumatic malalignment. There  is extensive multilevel degenerative disc and facet disease. There is  mild spinal stenosis at C4-C5 and C5-C6. The paraspinal soft tissues  show no acute abnormalities. There is atherosclerotic calcification of  the carotid arterial system. The lung apices are unremarkable.      Impression    IMPRESSION: No acute traumatic abnormality.    ANALISA ZHU MD   Lumbar spine CT w/o contrast    Narrative     CT LUMBAR SPINE W/O CONTRAST  5/15/2019 2:11 AM      HISTORY: Fall, back pain.    TECHNIQUE: Multiplanar imaging in the lumbar spine without intravenous  contrast. Radiation dose for this scan was reduced using automated  exposure control, adjustment of the mA and/or kV according to patient  size, or iterative reconstruction technique.     COMPARISON: None.    FINDINGS: There is no acute fracture or traumatic malalignment. There  is extensive degenerative disc and facet disease throughout the lumbar  spine. There is moderate spinal stenosis at L3-L4. There is a convex  left lumbar scoliosis. The paraspinal soft tissues show no acute  abnormalities. There is atherosclerotic calcification of the aorta and  its branches. No aneurysm.      Impression    IMPRESSION: No acute traumatic abnormality.    ANALISA ZHU MD   XR Shoulder Right 2 Views    Narrative    XR SHOULDER 2 VIEW RIGHT  5/15/2019 2:45 AM     HISTORY: Fall. Right arm swelling.    COMPARISON: None.       Impression    IMPRESSION: Deformity of the acromion may be due to advanced arthritis  in the shoulder rather than an acute fracture. The humeral head is  subluxed superiorly consistent with rotator cuff tear.    ANALISA ZHU MD   CT Shoulder Right w/o Contrast    Narrative    CT RIGHT SHOULDER WITHOUT CONTRAST  5/15/2019 4:43 AM     HISTORY:  Fall, swelling.    COMPARISON: 5/15/2019 and 9/28/2004 radiographs.    FINDINGS: Advanced shoulder arthropathy is noted. There is superior  migration of the humeral head presumably related to chronic rotator  cuff tear. This has eroded the undersurface of the acromion. The  acromion appears to be fragmented. While acute fracture of the  acromion is not entirely excluded, this appears to be chronic.  Prominent humeral head inferomedial osteophytic spurring is noted with  narrowing of the glenohumeral joint and mechanical erosion of the  glenoid fossa. Multiple calcifications are noted about the shoulder  likely  representing numerous articular bodies and soft tissue  calcifications. Prominent fluid pocket is noted anteriorly, presumably  within the subscapularis recess or bursa.      Impression    IMPRESSION: Advanced cuff arthropathy with fragmentation of the  acromion and marked mechanical erosion of the glenoid fossa. Large  anterior fluid pocket is noted. There is no definitive evidence of  acute fracture. No dislocation.    DANIEL DESIR MD   CBC with platelets differential   Result Value Ref Range    WBC 7.0 4.0 - 11.0 10e9/L    RBC Count 3.15 (L) 4.4 - 5.9 10e12/L    Hemoglobin 8.0 (L) 13.3 - 17.7 g/dL    Hematocrit 26.5 (L) 40.0 - 53.0 %    MCV 84 78 - 100 fl    MCH 25.4 (L) 26.5 - 33.0 pg    MCHC 30.2 (L) 31.5 - 36.5 g/dL    RDW 20.5 (H) 10.0 - 15.0 %    Platelet Count 265 150 - 450 10e9/L    Diff Method Automated Method     % Neutrophils 75.2 %    % Lymphocytes 11.6 %    % Monocytes 11.1 %    % Eosinophils 1.3 %    % Basophils 0.4 %    % Immature Granulocytes 0.4 %    Nucleated RBCs 0 0 /100    Absolute Neutrophil 5.2 1.6 - 8.3 10e9/L    Absolute Lymphocytes 0.8 0.8 - 5.3 10e9/L    Absolute Monocytes 0.8 0.0 - 1.3 10e9/L    Absolute Eosinophils 0.1 0.0 - 0.7 10e9/L    Absolute Basophils 0.0 0.0 - 0.2 10e9/L    Abs Immature Granulocytes 0.0 0 - 0.4 10e9/L    Absolute Nucleated RBC 0.0    Comprehensive metabolic panel   Result Value Ref Range    Sodium 137 133 - 144 mmol/L    Potassium 4.5 3.4 - 5.3 mmol/L    Chloride 103 94 - 109 mmol/L    Carbon Dioxide 28 20 - 32 mmol/L    Anion Gap 6 3 - 14 mmol/L    Glucose 94 70 - 99 mg/dL    Urea Nitrogen 24 7 - 30 mg/dL    Creatinine 0.95 0.66 - 1.25 mg/dL    GFR Estimate 71 >60 mL/min/[1.73_m2]    GFR Estimate If Black 82 >60 mL/min/[1.73_m2]    Calcium 8.6 8.5 - 10.1 mg/dL    Bilirubin Total 0.3 0.2 - 1.3 mg/dL    Albumin 3.3 (L) 3.4 - 5.0 g/dL    Protein Total 6.4 (L) 6.8 - 8.8 g/dL    Alkaline Phosphatase 76 40 - 150 U/L    ALT 14 0 - 70 U/L    AST 17 0 - 45 U/L   UA  with Microscopic   Result Value Ref Range    Color Urine Light Yellow     Appearance Urine Clear     Glucose Urine Negative NEG^Negative mg/dL    Bilirubin Urine Negative NEG^Negative    Ketones Urine Negative NEG^Negative mg/dL    Specific Gravity Urine 1.012 1.003 - 1.035    Blood Urine Negative NEG^Negative    pH Urine 7.0 5.0 - 7.0 pH    Protein Albumin Urine Negative NEG^Negative mg/dL    Urobilinogen mg/dL Normal 0.0 - 2.0 mg/dL    Nitrite Urine Negative NEG^Negative    Leukocyte Esterase Urine Negative NEG^Negative    Source Midstream Urine     WBC Urine 3 0 - 5 /HPF    RBC Urine 1 0 - 2 /HPF    Mucous Urine Present (A) NEG^Negative /LPF   Troponin I   Result Value Ref Range    Troponin I ES <0.015 0.000 - 0.045 ug/L   EKG 12 lead   Result Value Ref Range    Interpretation ECG Click View Image link to view waveform and result           Attestation:  I have reviewed today's vital signs, notes, medications, labs and imaging with Dr. Armstrong.  Amount of time performed on this consult: 25 minutes.    Dalia Manzano PA-C

## 2019-05-16 ENCOUNTER — ANESTHESIA (OUTPATIENT)
Dept: SURGERY | Facility: CLINIC | Age: 84
DRG: 488 | End: 2019-05-16
Payer: MEDICARE

## 2019-05-16 ENCOUNTER — ANESTHESIA EVENT (OUTPATIENT)
Dept: SURGERY | Facility: CLINIC | Age: 84
DRG: 488 | End: 2019-05-16
Payer: MEDICARE

## 2019-05-16 LAB
ABO + RH BLD: NORMAL
ABO + RH BLD: NORMAL
BASOPHILS # BLD AUTO: 0 10E9/L (ref 0–0.2)
BASOPHILS NFR BLD AUTO: 0.3 %
BLD GP AB SCN SERPL QL: NORMAL
BLOOD BANK CMNT PATIENT-IMP: NORMAL
DIFFERENTIAL METHOD BLD: ABNORMAL
EOSINOPHIL # BLD AUTO: 0.1 10E9/L (ref 0–0.7)
EOSINOPHIL NFR BLD AUTO: 2.2 %
ERYTHROCYTE [DISTWIDTH] IN BLOOD BY AUTOMATED COUNT: 20.9 % (ref 10–15)
FERRITIN SERPL-MCNC: 24 NG/ML (ref 26–388)
HCT VFR BLD AUTO: 26.2 % (ref 40–53)
HGB BLD-MCNC: 7.9 G/DL (ref 13.3–17.7)
HGB BLD-MCNC: 8.1 G/DL (ref 13.3–17.7)
IMM GRANULOCYTES # BLD: 0 10E9/L (ref 0–0.4)
IMM GRANULOCYTES NFR BLD: 0.5 %
IRON SATN MFR SERPL: 6 % (ref 15–46)
IRON SERPL-MCNC: 19 UG/DL (ref 35–180)
LYMPHOCYTES # BLD AUTO: 0.9 10E9/L (ref 0.8–5.3)
LYMPHOCYTES NFR BLD AUTO: 13.8 %
MCH RBC QN AUTO: 25.6 PG (ref 26.5–33)
MCHC RBC AUTO-ENTMCNC: 30.2 G/DL (ref 31.5–36.5)
MCV RBC AUTO: 85 FL (ref 78–100)
MONOCYTES # BLD AUTO: 0.7 10E9/L (ref 0–1.3)
MONOCYTES NFR BLD AUTO: 11.8 %
NEUTROPHILS # BLD AUTO: 4.5 10E9/L (ref 1.6–8.3)
NEUTROPHILS NFR BLD AUTO: 71.4 %
NRBC # BLD AUTO: 0 10*3/UL
NRBC BLD AUTO-RTO: 0 /100
PLATELET # BLD AUTO: 244 10E9/L (ref 150–450)
RBC # BLD AUTO: 3.08 10E12/L (ref 4.4–5.9)
SPECIMEN EXP DATE BLD: NORMAL
TIBC SERPL-MCNC: 323 UG/DL (ref 240–430)
TSH SERPL DL<=0.005 MIU/L-ACNC: 1.98 MU/L (ref 0.4–4)
VIT B12 SERPL-MCNC: 369 PG/ML (ref 193–986)
WBC # BLD AUTO: 6.3 10E9/L (ref 4–11)

## 2019-05-16 PROCEDURE — 27110028 ZZH OR GENERAL SUPPLY NON-STERILE: Performed by: ORTHOPAEDIC SURGERY

## 2019-05-16 PROCEDURE — 36000056 ZZH SURGERY LEVEL 3 1ST 30 MIN: Performed by: ORTHOPAEDIC SURGERY

## 2019-05-16 PROCEDURE — 71000012 ZZH RECOVERY PHASE 1 LEVEL 1 FIRST HR: Performed by: ORTHOPAEDIC SURGERY

## 2019-05-16 PROCEDURE — A9270 NON-COVERED ITEM OR SERVICE: HCPCS | Performed by: INTERNAL MEDICINE

## 2019-05-16 PROCEDURE — 25000132 ZZH RX MED GY IP 250 OP 250 PS 637: Performed by: INTERNAL MEDICINE

## 2019-05-16 PROCEDURE — 27210794 ZZH OR GENERAL SUPPLY STERILE: Performed by: ORTHOPAEDIC SURGERY

## 2019-05-16 PROCEDURE — 86850 RBC ANTIBODY SCREEN: CPT | Performed by: ORTHOPAEDIC SURGERY

## 2019-05-16 PROCEDURE — 36000058 ZZH SURGERY LEVEL 3 EA 15 ADDTL MIN: Performed by: ORTHOPAEDIC SURGERY

## 2019-05-16 PROCEDURE — 88305 TISSUE EXAM BY PATHOLOGIST: CPT | Performed by: ORTHOPAEDIC SURGERY

## 2019-05-16 PROCEDURE — A9270 NON-COVERED ITEM OR SERVICE: HCPCS | Performed by: PHYSICIAN ASSISTANT

## 2019-05-16 PROCEDURE — 82728 ASSAY OF FERRITIN: CPT | Performed by: INTERNAL MEDICINE

## 2019-05-16 PROCEDURE — 36415 COLL VENOUS BLD VENIPUNCTURE: CPT | Performed by: ORTHOPAEDIC SURGERY

## 2019-05-16 PROCEDURE — 25000132 ZZH RX MED GY IP 250 OP 250 PS 637: Performed by: PHYSICIAN ASSISTANT

## 2019-05-16 PROCEDURE — 85025 COMPLETE CBC W/AUTO DIFF WBC: CPT | Performed by: INTERNAL MEDICINE

## 2019-05-16 PROCEDURE — 25000125 ZZHC RX 250: Performed by: ORTHOPAEDIC SURGERY

## 2019-05-16 PROCEDURE — 25800030 ZZH RX IP 258 OP 636: Performed by: PHYSICIAN ASSISTANT

## 2019-05-16 PROCEDURE — G0378 HOSPITAL OBSERVATION PER HR: HCPCS

## 2019-05-16 PROCEDURE — 25000128 H RX IP 250 OP 636: Performed by: NURSE ANESTHETIST, CERTIFIED REGISTERED

## 2019-05-16 PROCEDURE — 86900 BLOOD TYPING SEROLOGIC ABO: CPT | Performed by: ORTHOPAEDIC SURGERY

## 2019-05-16 PROCEDURE — 0QBD0ZZ EXCISION OF RIGHT PATELLA, OPEN APPROACH: ICD-10-PCS | Performed by: ORTHOPAEDIC SURGERY

## 2019-05-16 PROCEDURE — 25800030 ZZH RX IP 258 OP 636: Performed by: NURSE ANESTHETIST, CERTIFIED REGISTERED

## 2019-05-16 PROCEDURE — 40000306 ZZH STATISTIC PRE PROC ASSESS II: Performed by: ORTHOPAEDIC SURGERY

## 2019-05-16 PROCEDURE — 25800030 ZZH RX IP 258 OP 636: Performed by: INTERNAL MEDICINE

## 2019-05-16 PROCEDURE — 0LMQ0ZZ REATTACHMENT OF RIGHT KNEE TENDON, OPEN APPROACH: ICD-10-PCS | Performed by: ORTHOPAEDIC SURGERY

## 2019-05-16 PROCEDURE — 83550 IRON BINDING TEST: CPT | Performed by: INTERNAL MEDICINE

## 2019-05-16 PROCEDURE — 37000008 ZZH ANESTHESIA TECHNICAL FEE, 1ST 30 MIN: Performed by: ORTHOPAEDIC SURGERY

## 2019-05-16 PROCEDURE — 83540 ASSAY OF IRON: CPT | Performed by: INTERNAL MEDICINE

## 2019-05-16 PROCEDURE — 86901 BLOOD TYPING SEROLOGIC RH(D): CPT | Performed by: ORTHOPAEDIC SURGERY

## 2019-05-16 PROCEDURE — 12000000 ZZH R&B MED SURG/OB

## 2019-05-16 PROCEDURE — 84443 ASSAY THYROID STIM HORMONE: CPT | Performed by: INTERNAL MEDICINE

## 2019-05-16 PROCEDURE — 25000125 ZZHC RX 250: Performed by: NURSE ANESTHETIST, CERTIFIED REGISTERED

## 2019-05-16 PROCEDURE — 99232 SBSQ HOSP IP/OBS MODERATE 35: CPT | Performed by: INTERNAL MEDICINE

## 2019-05-16 PROCEDURE — 88311 DECALCIFY TISSUE: CPT | Performed by: ORTHOPAEDIC SURGERY

## 2019-05-16 PROCEDURE — 82607 VITAMIN B-12: CPT | Performed by: INTERNAL MEDICINE

## 2019-05-16 PROCEDURE — 25000128 H RX IP 250 OP 636: Performed by: PHYSICIAN ASSISTANT

## 2019-05-16 PROCEDURE — 85018 HEMOGLOBIN: CPT | Performed by: ORTHOPAEDIC SURGERY

## 2019-05-16 PROCEDURE — 25800030 ZZH RX IP 258 OP 636: Performed by: ANESTHESIOLOGY

## 2019-05-16 PROCEDURE — 88305 TISSUE EXAM BY PATHOLOGIST: CPT | Mod: 26 | Performed by: ORTHOPAEDIC SURGERY

## 2019-05-16 PROCEDURE — 37000009 ZZH ANESTHESIA TECHNICAL FEE, EACH ADDTL 15 MIN: Performed by: ORTHOPAEDIC SURGERY

## 2019-05-16 PROCEDURE — 36415 COLL VENOUS BLD VENIPUNCTURE: CPT | Performed by: INTERNAL MEDICINE

## 2019-05-16 PROCEDURE — 88311 DECALCIFY TISSUE: CPT | Mod: 26 | Performed by: ORTHOPAEDIC SURGERY

## 2019-05-16 RX ORDER — FERROUS SULFATE 325(65) MG
325 TABLET ORAL 2 TIMES DAILY WITH MEALS
Status: DISCONTINUED | OUTPATIENT
Start: 2019-05-16 | End: 2019-05-18 | Stop reason: HOSPADM

## 2019-05-16 RX ORDER — MEPERIDINE HYDROCHLORIDE 25 MG/ML
12.5 INJECTION INTRAMUSCULAR; INTRAVENOUS; SUBCUTANEOUS EVERY 5 MIN PRN
Status: DISCONTINUED | OUTPATIENT
Start: 2019-05-16 | End: 2019-05-16 | Stop reason: HOSPADM

## 2019-05-16 RX ORDER — ONDANSETRON 2 MG/ML
INJECTION INTRAMUSCULAR; INTRAVENOUS PRN
Status: DISCONTINUED | OUTPATIENT
Start: 2019-05-16 | End: 2019-05-16

## 2019-05-16 RX ORDER — AMOXICILLIN 250 MG
1 CAPSULE ORAL 2 TIMES DAILY
Status: DISCONTINUED | OUTPATIENT
Start: 2019-05-16 | End: 2019-05-18 | Stop reason: HOSPADM

## 2019-05-16 RX ORDER — LABETALOL 20 MG/4 ML (5 MG/ML) INTRAVENOUS SYRINGE
10
Status: DISCONTINUED | OUTPATIENT
Start: 2019-05-16 | End: 2019-05-16 | Stop reason: HOSPADM

## 2019-05-16 RX ORDER — METOCLOPRAMIDE HYDROCHLORIDE 5 MG/ML
5 INJECTION INTRAMUSCULAR; INTRAVENOUS EVERY 6 HOURS PRN
Status: DISCONTINUED | OUTPATIENT
Start: 2019-05-16 | End: 2019-05-18 | Stop reason: HOSPADM

## 2019-05-16 RX ORDER — HYDRALAZINE HYDROCHLORIDE 20 MG/ML
2.5-5 INJECTION INTRAMUSCULAR; INTRAVENOUS EVERY 10 MIN PRN
Status: DISCONTINUED | OUTPATIENT
Start: 2019-05-16 | End: 2019-05-16 | Stop reason: HOSPADM

## 2019-05-16 RX ORDER — GLYCOPYRROLATE 0.2 MG/ML
INJECTION, SOLUTION INTRAMUSCULAR; INTRAVENOUS PRN
Status: DISCONTINUED | OUTPATIENT
Start: 2019-05-16 | End: 2019-05-16

## 2019-05-16 RX ORDER — KETOROLAC TROMETHAMINE 15 MG/ML
15 INJECTION, SOLUTION INTRAMUSCULAR; INTRAVENOUS EVERY 6 HOURS PRN
Status: DISCONTINUED | OUTPATIENT
Start: 2019-05-16 | End: 2019-05-18 | Stop reason: HOSPADM

## 2019-05-16 RX ORDER — DEXAMETHASONE SODIUM PHOSPHATE 4 MG/ML
4 INJECTION, SOLUTION INTRA-ARTICULAR; INTRALESIONAL; INTRAMUSCULAR; INTRAVENOUS; SOFT TISSUE
Status: DISCONTINUED | OUTPATIENT
Start: 2019-05-16 | End: 2019-05-16 | Stop reason: HOSPADM

## 2019-05-16 RX ORDER — NALOXONE HYDROCHLORIDE 0.4 MG/ML
.1-.4 INJECTION, SOLUTION INTRAMUSCULAR; INTRAVENOUS; SUBCUTANEOUS
Status: ACTIVE | OUTPATIENT
Start: 2019-05-16 | End: 2019-05-17

## 2019-05-16 RX ORDER — ONDANSETRON 2 MG/ML
4 INJECTION INTRAMUSCULAR; INTRAVENOUS EVERY 6 HOURS PRN
Status: DISCONTINUED | OUTPATIENT
Start: 2019-05-16 | End: 2019-05-18 | Stop reason: HOSPADM

## 2019-05-16 RX ORDER — LIDOCAINE 40 MG/G
CREAM TOPICAL
Status: DISCONTINUED | OUTPATIENT
Start: 2019-05-16 | End: 2019-05-16 | Stop reason: HOSPADM

## 2019-05-16 RX ORDER — DEXAMETHASONE SODIUM PHOSPHATE 4 MG/ML
INJECTION, SOLUTION INTRA-ARTICULAR; INTRALESIONAL; INTRAMUSCULAR; INTRAVENOUS; SOFT TISSUE PRN
Status: DISCONTINUED | OUTPATIENT
Start: 2019-05-16 | End: 2019-05-16

## 2019-05-16 RX ORDER — CEFAZOLIN SODIUM 2 G/100ML
2 INJECTION, SOLUTION INTRAVENOUS EVERY 8 HOURS
Status: COMPLETED | OUTPATIENT
Start: 2019-05-16 | End: 2019-05-16

## 2019-05-16 RX ORDER — ACETAMINOPHEN 325 MG/1
650 TABLET ORAL EVERY 4 HOURS PRN
Status: DISCONTINUED | OUTPATIENT
Start: 2019-05-19 | End: 2019-05-18 | Stop reason: HOSPADM

## 2019-05-16 RX ORDER — ONDANSETRON 4 MG/1
4 TABLET, ORALLY DISINTEGRATING ORAL EVERY 30 MIN PRN
Status: DISCONTINUED | OUTPATIENT
Start: 2019-05-16 | End: 2019-05-16 | Stop reason: HOSPADM

## 2019-05-16 RX ORDER — CEFAZOLIN SODIUM 1 G/50ML
1 INJECTION, SOLUTION INTRAVENOUS SEE ADMIN INSTRUCTIONS
Status: DISCONTINUED | OUTPATIENT
Start: 2019-05-16 | End: 2019-05-16 | Stop reason: HOSPADM

## 2019-05-16 RX ORDER — SODIUM CHLORIDE 9 MG/ML
INJECTION, SOLUTION INTRAVENOUS CONTINUOUS
Status: DISCONTINUED | OUTPATIENT
Start: 2019-05-16 | End: 2019-05-17

## 2019-05-16 RX ORDER — BUPIVACAINE HYDROCHLORIDE AND EPINEPHRINE 5; 5 MG/ML; UG/ML
INJECTION, SOLUTION PERINEURAL PRN
Status: DISCONTINUED | OUTPATIENT
Start: 2019-05-16 | End: 2019-05-16 | Stop reason: HOSPADM

## 2019-05-16 RX ORDER — EPHEDRINE SULFATE 50 MG/ML
INJECTION, SOLUTION INTRAMUSCULAR; INTRAVENOUS; SUBCUTANEOUS PRN
Status: DISCONTINUED | OUTPATIENT
Start: 2019-05-16 | End: 2019-05-16

## 2019-05-16 RX ORDER — METOCLOPRAMIDE 5 MG/1
5 TABLET ORAL EVERY 6 HOURS PRN
Status: DISCONTINUED | OUTPATIENT
Start: 2019-05-16 | End: 2019-05-18 | Stop reason: HOSPADM

## 2019-05-16 RX ORDER — CEFAZOLIN SODIUM 2 G/100ML
2 INJECTION, SOLUTION INTRAVENOUS
Status: COMPLETED | OUTPATIENT
Start: 2019-05-16 | End: 2019-05-16

## 2019-05-16 RX ORDER — LIDOCAINE 40 MG/G
CREAM TOPICAL
Status: DISCONTINUED | OUTPATIENT
Start: 2019-05-16 | End: 2019-05-18 | Stop reason: HOSPADM

## 2019-05-16 RX ORDER — HYDROMORPHONE HYDROCHLORIDE 1 MG/ML
0.2 INJECTION, SOLUTION INTRAMUSCULAR; INTRAVENOUS; SUBCUTANEOUS
Status: DISCONTINUED | OUTPATIENT
Start: 2019-05-16 | End: 2019-05-18 | Stop reason: HOSPADM

## 2019-05-16 RX ORDER — ONDANSETRON 4 MG/1
4 TABLET, ORALLY DISINTEGRATING ORAL EVERY 6 HOURS PRN
Status: DISCONTINUED | OUTPATIENT
Start: 2019-05-16 | End: 2019-05-18 | Stop reason: HOSPADM

## 2019-05-16 RX ORDER — KETOROLAC TROMETHAMINE 30 MG/ML
15 INJECTION, SOLUTION INTRAMUSCULAR; INTRAVENOUS
Status: DISCONTINUED | OUTPATIENT
Start: 2019-05-16 | End: 2019-05-16 | Stop reason: HOSPADM

## 2019-05-16 RX ORDER — SODIUM CHLORIDE, SODIUM LACTATE, POTASSIUM CHLORIDE, CALCIUM CHLORIDE 600; 310; 30; 20 MG/100ML; MG/100ML; MG/100ML; MG/100ML
INJECTION, SOLUTION INTRAVENOUS CONTINUOUS
Status: DISCONTINUED | OUTPATIENT
Start: 2019-05-16 | End: 2019-05-16 | Stop reason: HOSPADM

## 2019-05-16 RX ORDER — PROCHLORPERAZINE MALEATE 5 MG
5 TABLET ORAL EVERY 6 HOURS PRN
Status: DISCONTINUED | OUTPATIENT
Start: 2019-05-16 | End: 2019-05-18 | Stop reason: HOSPADM

## 2019-05-16 RX ORDER — NALOXONE HYDROCHLORIDE 0.4 MG/ML
.1-.4 INJECTION, SOLUTION INTRAMUSCULAR; INTRAVENOUS; SUBCUTANEOUS
Status: DISCONTINUED | OUTPATIENT
Start: 2019-05-16 | End: 2019-05-18 | Stop reason: HOSPADM

## 2019-05-16 RX ORDER — TRAVOPROST OPHTHALMIC SOLUTION 0.04 MG/ML
SOLUTION OPHTHALMIC AT BEDTIME
Status: DISCONTINUED | OUTPATIENT
Start: 2019-05-16 | End: 2019-05-17

## 2019-05-16 RX ORDER — TRAMADOL HYDROCHLORIDE 50 MG/1
50 TABLET ORAL EVERY 6 HOURS PRN
Status: DISCONTINUED | OUTPATIENT
Start: 2019-05-16 | End: 2019-05-18 | Stop reason: HOSPADM

## 2019-05-16 RX ORDER — ACETAMINOPHEN 325 MG/1
975 TABLET ORAL EVERY 8 HOURS
Status: DISCONTINUED | OUTPATIENT
Start: 2019-05-16 | End: 2019-05-18 | Stop reason: HOSPADM

## 2019-05-16 RX ORDER — ASCORBIC ACID 250 MG
250 TABLET,CHEWABLE ORAL 2 TIMES DAILY
Status: DISCONTINUED | OUTPATIENT
Start: 2019-05-16 | End: 2019-05-18 | Stop reason: HOSPADM

## 2019-05-16 RX ORDER — ONDANSETRON 2 MG/ML
4 INJECTION INTRAMUSCULAR; INTRAVENOUS EVERY 30 MIN PRN
Status: DISCONTINUED | OUTPATIENT
Start: 2019-05-16 | End: 2019-05-16 | Stop reason: HOSPADM

## 2019-05-16 RX ORDER — FENTANYL CITRATE 50 UG/ML
INJECTION, SOLUTION INTRAMUSCULAR; INTRAVENOUS PRN
Status: DISCONTINUED | OUTPATIENT
Start: 2019-05-16 | End: 2019-05-16

## 2019-05-16 RX ORDER — PROPOFOL 10 MG/ML
INJECTION, EMULSION INTRAVENOUS PRN
Status: DISCONTINUED | OUTPATIENT
Start: 2019-05-16 | End: 2019-05-16

## 2019-05-16 RX ORDER — FENTANYL CITRATE 50 UG/ML
25-50 INJECTION, SOLUTION INTRAMUSCULAR; INTRAVENOUS
Status: DISCONTINUED | OUTPATIENT
Start: 2019-05-16 | End: 2019-05-16 | Stop reason: HOSPADM

## 2019-05-16 RX ORDER — LIDOCAINE HYDROCHLORIDE 10 MG/ML
INJECTION, SOLUTION INFILTRATION; PERINEURAL PRN
Status: DISCONTINUED | OUTPATIENT
Start: 2019-05-16 | End: 2019-05-16

## 2019-05-16 RX ORDER — AMOXICILLIN 250 MG
2 CAPSULE ORAL 2 TIMES DAILY
Status: DISCONTINUED | OUTPATIENT
Start: 2019-05-16 | End: 2019-05-18 | Stop reason: HOSPADM

## 2019-05-16 RX ORDER — DIMENHYDRINATE 50 MG/ML
25 INJECTION, SOLUTION INTRAMUSCULAR; INTRAVENOUS
Status: DISCONTINUED | OUTPATIENT
Start: 2019-05-16 | End: 2019-05-16 | Stop reason: HOSPADM

## 2019-05-16 RX ADMIN — SODIUM CHLORIDE: 9 INJECTION, SOLUTION INTRAVENOUS at 13:55

## 2019-05-16 RX ADMIN — CEFAZOLIN SODIUM 2 G: 2 INJECTION, SOLUTION INTRAVENOUS at 18:04

## 2019-05-16 RX ADMIN — ACETAMINOPHEN 975 MG: 325 TABLET, FILM COATED ORAL at 13:57

## 2019-05-16 RX ADMIN — LEVOTHYROXINE SODIUM 100 MCG: 100 TABLET ORAL at 13:58

## 2019-05-16 RX ADMIN — Medication 1 MG: at 00:42

## 2019-05-16 RX ADMIN — HYDROMORPHONE HYDROCHLORIDE 0.5 MG: 1 INJECTION, SOLUTION INTRAMUSCULAR; INTRAVENOUS; SUBCUTANEOUS at 09:24

## 2019-05-16 RX ADMIN — Medication 10 MG: at 08:46

## 2019-05-16 RX ADMIN — LISINOPRIL 10 MG: 10 TABLET ORAL at 13:59

## 2019-05-16 RX ADMIN — PHENYLEPHRINE HYDROCHLORIDE 100 MCG: 10 INJECTION, SOLUTION INTRAMUSCULAR; INTRAVENOUS; SUBCUTANEOUS at 09:25

## 2019-05-16 RX ADMIN — LIDOCAINE HYDROCHLORIDE 50 MG: 10 INJECTION, SOLUTION INFILTRATION; PERINEURAL at 08:42

## 2019-05-16 RX ADMIN — PHENYLEPHRINE HYDROCHLORIDE 100 MCG: 10 INJECTION, SOLUTION INTRAMUSCULAR; INTRAVENOUS; SUBCUTANEOUS at 09:15

## 2019-05-16 RX ADMIN — SODIUM CHLORIDE, POTASSIUM CHLORIDE, SODIUM LACTATE AND CALCIUM CHLORIDE: 600; 310; 30; 20 INJECTION, SOLUTION INTRAVENOUS at 08:35

## 2019-05-16 RX ADMIN — Medication 5 MG: at 09:24

## 2019-05-16 RX ADMIN — SODIUM CHLORIDE: 9 INJECTION, SOLUTION INTRAVENOUS at 00:53

## 2019-05-16 RX ADMIN — ONDANSETRON 4 MG: 2 INJECTION INTRAMUSCULAR; INTRAVENOUS at 09:09

## 2019-05-16 RX ADMIN — TRAVOPROST OPHTHALMIC SOLUTION: 0.04 SOLUTION OPHTHALMIC at 19:37

## 2019-05-16 RX ADMIN — FERROUS SULFATE TAB 325 MG (65 MG ELEMENTAL FE) 325 MG: 325 (65 FE) TAB at 18:04

## 2019-05-16 RX ADMIN — HYDROMORPHONE HYDROCHLORIDE 0.5 MG: 1 INJECTION, SOLUTION INTRAMUSCULAR; INTRAVENOUS; SUBCUTANEOUS at 09:12

## 2019-05-16 RX ADMIN — DEXAMETHASONE SODIUM PHOSPHATE 4 MG: 4 INJECTION, SOLUTION INTRA-ARTICULAR; INTRALESIONAL; INTRAMUSCULAR; INTRAVENOUS; SOFT TISSUE at 08:42

## 2019-05-16 RX ADMIN — ACETAMINOPHEN 975 MG: 325 TABLET, FILM COATED ORAL at 18:03

## 2019-05-16 RX ADMIN — Medication 5 MG: at 09:01

## 2019-05-16 RX ADMIN — CEFAZOLIN SODIUM 2 G: 2 INJECTION, SOLUTION INTRAVENOUS at 11:26

## 2019-05-16 RX ADMIN — PHENYLEPHRINE HYDROCHLORIDE 100 MCG: 10 INJECTION, SOLUTION INTRAMUSCULAR; INTRAVENOUS; SUBCUTANEOUS at 09:09

## 2019-05-16 RX ADMIN — SENNOSIDES AND DOCUSATE SODIUM 2 TABLET: 8.6; 5 TABLET ORAL at 19:33

## 2019-05-16 RX ADMIN — ROCURONIUM BROMIDE 10 MG: 10 INJECTION INTRAVENOUS at 08:56

## 2019-05-16 RX ADMIN — Medication 250 MG: at 19:20

## 2019-05-16 RX ADMIN — Medication 1 MG: at 23:48

## 2019-05-16 RX ADMIN — FENTANYL CITRATE 100 MCG: 50 INJECTION, SOLUTION INTRAMUSCULAR; INTRAVENOUS at 08:42

## 2019-05-16 RX ADMIN — PAROXETINE HYDROCHLORIDE 20 MG: 20 TABLET, FILM COATED ORAL at 13:58

## 2019-05-16 RX ADMIN — ASPIRIN 325 MG: 325 TABLET, DELAYED RELEASE ORAL at 13:58

## 2019-05-16 RX ADMIN — CEFAZOLIN SODIUM 2 G: 2 INJECTION, SOLUTION INTRAVENOUS at 08:35

## 2019-05-16 RX ADMIN — PHENYLEPHRINE HYDROCHLORIDE 100 MCG: 10 INJECTION, SOLUTION INTRAMUSCULAR; INTRAVENOUS; SUBCUTANEOUS at 09:03

## 2019-05-16 RX ADMIN — PHENYLEPHRINE HYDROCHLORIDE 100 MCG: 10 INJECTION, SOLUTION INTRAMUSCULAR; INTRAVENOUS; SUBCUTANEOUS at 08:57

## 2019-05-16 RX ADMIN — GLYCOPYRROLATE 0.2 MG: 0.2 INJECTION, SOLUTION INTRAMUSCULAR; INTRAVENOUS at 08:42

## 2019-05-16 RX ADMIN — ATENOLOL 25 MG: 25 TABLET ORAL at 13:58

## 2019-05-16 RX ADMIN — PHENYLEPHRINE HYDROCHLORIDE 100 MCG: 10 INJECTION, SOLUTION INTRAMUSCULAR; INTRAVENOUS; SUBCUTANEOUS at 08:48

## 2019-05-16 RX ADMIN — MICONAZOLE NITRATE: 2 POWDER TOPICAL at 22:13

## 2019-05-16 RX ADMIN — PROPOFOL 170 MG: 10 INJECTION, EMULSION INTRAVENOUS at 08:42

## 2019-05-16 RX ADMIN — PROPOFOL 30 MG: 10 INJECTION, EMULSION INTRAVENOUS at 08:56

## 2019-05-16 RX ADMIN — Medication 5 MG: at 09:15

## 2019-05-16 ASSESSMENT — ACTIVITIES OF DAILY LIVING (ADL)
ADLS_ACUITY_SCORE: 19
ADLS_ACUITY_SCORE: 19

## 2019-05-16 ASSESSMENT — ENCOUNTER SYMPTOMS: DYSRHYTHMIAS: 1

## 2019-05-16 NOTE — ANESTHESIA POSTPROCEDURE EVALUATION
Patient: Jake Duane Pfleiger    Procedure(s):  right knee pre-patellar osteophyte removal    Diagnosis:Osteophyte  Diagnosis Additional Information: Diagnosis: Osteophyte of patella    Anesthesia Type:  General, ETT    Note:  Anesthesia Post Evaluation    Patient location during evaluation: PACU  Patient participation: Able to fully participate in evaluation  Level of consciousness: awake and alert  Pain management: adequate  Airway patency: patent  Cardiovascular status: acceptable  Respiratory status: acceptable  Hydration status: acceptable  PONV: controlled     Anesthetic complications: None          Last vitals:  Vitals:    05/16/19 1200 05/16/19 1230 05/16/19 1329   BP: 134/76 128/70 148/78   Pulse:      Resp: 14 16 16   Temp:      SpO2: 96% 96% 98%         Electronically Signed By: Fede Tompkins MD  May 16, 2019  1:42 PM

## 2019-05-16 NOTE — PLAN OF CARE
Pt returned from pacu in his bed, IV infusing into left AC, PCDs on O2 on at 2L NC. Denied pain. Afeb, vss, cms intact to right hand and right foot. Ace wrap clean and dry to his knee, immoblizer on with ice stuffed inside to ice his knee. Frequent VS started and they have been wnl. Sleepy on arrival, wanted to sleep for awhile then at 1400 woke and ordered food. Pt scanned in pacu for 181, said he would try to void after he ate. Daughter here and wanted to address discharge plans with [facility]. May need TCU for a short time.

## 2019-05-16 NOTE — ANESTHESIA PREPROCEDURE EVALUATION
Anesthesia Pre-Procedure Evaluation    Patient: Jake Duane Pfleiger   MRN: [id number] : 1931          Preoperative Diagnosis: Osteophyte    Procedure(s):  ARTHROTOMY, KNEE    Past Medical History:   Diagnosis Date     Anxiety      Arthritis      Atrial fibrillation (H)      Cardiomyopathy (H)      Depression      Diastolic CHF (H) 04/10/2016     Diverticulosis      Gastro-oesophageal reflux disease      GI bleed 10/02/2018    Lower     Hypertension      Insomnia      Nodule of lower lobe of left lung     CT Chest 2019-stable     Pacemaker     [employer] Dual Pacemaker     Permanent atrial fibrillation (H)      Right patella fracture      Tachy-jairo syndrome (H)      Thyroid disease     Hypothyrodism      Past Surgical History:   Procedure Laterality Date     ARTHROPLASTY HIP  2014    left hip replacement     ARTHROPLASTY KNEE      right      CHOLECYSTECTOMY       COLONOSCOPY N/A 10/3/2018    Procedure: COLONOSCOPY;  COLONOSCOPY Rm.#227;  Surgeon: Reese Burroughs MD;  Location:  GI     COLONOSCOPY N/A 10/5/2018    Procedure: COLONOSCOPY;  COLONOSCOPY  Rm.#524;  Surgeon: Reese Burroughs MD;  Location:  GI     EYE SURGERY      right eye cataract removal      IMPLANT PACEMAKER  2012    dual chamber      ORTHOPEDIC SURGERY      back surgery      Anesthesia Evaluation     . Pt has had prior anesthetic. Type: General           ROS/MED HX    ENT/Pulmonary:  - neg pulmonary ROS     Neurologic:  - neg neurologic ROS     Cardiovascular:     (+) hypertension----. : . CHF . . pacemaker Reason placed: chronic A-[id number] syndrome :. dysrhythmias a-fib, .       METS/Exercise Tolerance:     Hematologic:  - neg hematologic  ROS       Musculoskeletal:   (+) arthritis,  -       GI/Hepatic:     (+) GERD Asymptomatic on medication,       Renal/Genitourinary:  - ROS Renal section negative       Endo:     (+) thyroid problem hypothyroidism, .      Psychiatric:  - neg psychiatric ROS   (+) psychiatric  "history anxiety      Infectious Disease:  - neg infectious disease ROS       Malignancy:      - no malignancy   Other:    (+) No chance of pregnancy C-spine cleared: N/A, no H/O Chronic Pain,no other significant disability                         Physical Exam  Normal systems: cardiovascular, pulmonary and dental    Airway   Mallampati: II  TM distance: >3 FB  Neck ROM: full    Dental     Cardiovascular       Pulmonary             Lab Results   Component Value Date    WBC 6.3 05/16/2019    HGB 7.9 (L) 05/16/2019    HCT 26.2 (L) 05/16/2019     05/16/2019     05/15/2019    POTASSIUM 4.5 05/15/2019    CHLORIDE 103 05/15/2019    CO2 28 05/15/2019    BUN 24 05/15/2019    CR 0.95 05/15/2019    GLC 94 05/15/2019    MANAS 8.6 05/15/2019    MAG 2.1 03/08/2019    ALBUMIN 3.3 (L) 05/15/2019    PROTTOTAL 6.4 (L) 05/15/2019    ALT 14 05/15/2019    AST 17 05/15/2019    ALKPHOS 76 05/15/2019    BILITOTAL 0.3 05/15/2019    PTT 46 (H) 03/12/2012    INR 1.49 (H) 03/09/2019    TSH 1.98 05/16/2019    T4 0.90 10/03/2006       Preop Vitals  BP Readings from Last 3 Encounters:   05/16/19 169/90   03/10/19 143/79   01/30/19 119/64    Pulse Readings from Last 3 Encounters:   05/15/19 81   03/08/19 75   01/30/19 64      Resp Readings from Last 3 Encounters:   05/16/19 16   03/10/19 16   10/07/18 16    SpO2 Readings from Last 3 Encounters:   05/16/19 95%   03/10/19 91%   10/07/18 97%      Temp Readings from Last 1 Encounters:   05/16/19 100  F (37.8  C) (Temporal)    Ht Readings from Last 1 Encounters:   05/15/19 1.803 m (5' 11\")      Wt Readings from Last 1 Encounters:   03/10/19 84.2 kg (185 lb 9.6 oz)    Estimated body mass index is 25.89 kg/m  as calculated from the following:    Height as of this encounter: 1.803 m (5' 11\").    Weight as of 3/10/19: 84.2 kg (185 lb 9.6 oz).       Anesthesia Plan      History & Physical Review  History and physical reviewed and following examination; no interval change.    ASA Status:  3 .  "   NPO Status:  > 8 hours    Plan for General and ETT with Intravenous induction. Maintenance will be Balanced.    PONV prophylaxis:  Ondansetron (or other 5HT-3) and Dexamethasone or Solumedrol       Postoperative Care  Postoperative pain management:  IV analgesics.      Consents  Anesthetic plan, risks, benefits and alternatives discussed with:  Patient.  Use of blood products discussed: Yes.   Use of blood products discussed with Patient.  Consented to blood products.  .                 Fede Tompkins MD                    .

## 2019-05-16 NOTE — PROGRESS NOTES
[facility]  Hospitalist Progress Note    Juwan Maher MD 05/16/2019  Text Page      Reason for Stay (Diagnosis): Fall         Assessment and Plan:      Summary of Stay: Jake Duane Pfleiger is a 87 year old male who presents with fall at home.     Patient has a history of A. fib (on aspirin, not on warfarin anymore due to GI bleed), hypertension, dyslipidemia, mild cognitive dysfunction.  He lives by himself at an assisted living facility.     He had a fall at around 10 PM when he was getting back from bathroom to his bedroom.  He was walking with the help of his walker when he lost his balance and fell.  He says that his left knee gave out on him and he lost balance.  Apparently, his left knee has been troubling him since this morning.  He has a history of severe osteoarthritis of his left knee.  He denies any chest pain, palpitations, shortness of breath, dizziness.  Denies any head injury.  Denies any loss of consciousness.     After the fall, he was not able to get up by himself due to the left knee pain and right shoulder pain.  He used his medical alert device to call for help.       His right shoulder CT showed an acromion fracture.     His last admission in March was for a lower GI bleed while he was on warfarin.  This was thought to be a diverticular bleed.  No colonoscopy was done as last colonoscopy in 2018 which showed diverticulosis.  Patient has been struggling with constipation issue.  After the discharge in March he was doing well without any bleeding.  About 7 to 10 days ago he had an episode of dark stools with dark blood.  It lasted for about 2 days and then resolved.  Denies any hematemesis.  Denies any abdominal pain.  His warfarin was stopped in March and he currently takes low-dose aspirin.        Problem List:     1.  Fall:   --Right shoulder injury in form of acromion fracture.  Nonsurgical per orthopedics.  Continue with conservative management, use sling as  "needed.  - It seems as follows precipitated by \"locking up\" of his knee.  For that reason he underwent removal of a large bone spur of his right knee today.  --He usually walks with a walker but his walking will now be inhibited as he requires an knee immobilizer.  In addition he has a right shoulder injury described above.  --He usually lives in assisted living but now will likely need transitional care while he heals.  --PT, OT and social work to assess and determine a safe discharge plan.  He is still groggy after surgery and likely will not be able to undergo therapies today.     2.  Acute on chronic anemia.  Likely related to subacute diverticular bleed.  - We will monitor the hemoglobin.  No active bleeding at this point.  He had dark bloody stool about 7 to 10 days ago and now resolved.  -Drop in hemoglobin from 10 to 8.  Has subsequently remained stable at 8  -Recent admission for diverticular bleed.  - Hold the aspirin for now.  Resume at discharge   --Iron saturation is low.  Will start iron and vitamin C supplements.     3. Hypertension  -Resumed home atenolol and lisinopril.     4. Hypothyroidism  - Continue levothyroxine.       5. Constipation  - This is likely precipitating the diverticulosis issue along with related bleeding problem.  - Advised the patient to take stool softeners on a regular basis.    - Monitor and treat with regular stool softeners.     6.  A. fib  -Warfarin has been stopped due to significant and recurrent bleeding.  May resume aspirin if no more bleeding in the hospital.  -Rate is controlled.  Continue atenolol.      DVT Prophylaxis: Pneumatic Compression Devices  Code Status: Full Code  Disposition Plan   Expected discharge in 1 day to transitional care unit once discharge needs are determined.  Changed to inpatient status today, admitted on observation..     Entered: Juwan Maher 05/16/2019, 3:37 PM     Incidental Findings/ Discharge Issues:    I discussed with social " "work and the patient's daughter-in-law.        Interval History (Subjective):      Seen postoperatively.  He feels okay but is quite sleepy.  His right leg is also in a knee immobilizer.                  Physical Exam:      Last Vital Signs:  /62   Pulse 108   Temp 96.8  F (36  C) (Oral)   Resp 16   Ht 1.803 m (5' 11\")   SpO2 92%   BMI 25.89 kg/m        Vital signs reviewed  General:  Alert, calm, NAD  CV: Irregular, no murmurs or rubs  Lungs:  Clear to ascultation bilaterally, normal respiratory effort  Abdomen:  Soft, nontender, nondistended, no masses, normal bowel sounds  Extremities: Right leg in knee immobilizer  Neuro: normal strength and sensation in all 4 extremities, cranial nerves grossly intact  Psychiatric:  Mood and affect within normal limits             Medications:      All current medications were reviewed with changes reflected in problem list.         Data:      All new lab and imaging data was reviewed.   Labs:  Hemoglobin 8.1  Iron saturation 6%  "

## 2019-05-16 NOTE — PLAN OF CARE
VSS: stable, NPO from midnight. Up with Ax1, sudarshan and walker. Sling on R arm. Surgical dariel wipes done. Voiding good amounts. Surgery scheduled this am. Nursing continue to monitor.

## 2019-05-16 NOTE — PLAN OF CARE
Vital signs stable.  Lungs clear.  Bowel sounds hypoactive, voiding.  CMS intact, has shoulder sling on, ice pack applied.Ambulated with walker, gait belt and sba of 1.  Surgical scrub bath and linen change done this evening.  Pain to right shoulder and rt knee treated with tylenol, ice pack application.  Plan of care reviewed with pt and family.Pt will be NPO after midnight, will be seen again by Ortho in am.

## 2019-05-16 NOTE — ANESTHESIA CARE TRANSFER NOTE
Patient: Jake Duane Pfleiger    Procedure(s):  right knee pre-patellar osteophyte removal    Diagnosis: Osteophyte  Diagnosis Additional Information: No value filed.    Anesthesia Type:   General, ETT     Note:    Patient transferred to:PACU  Comments: Pt spont resps LMA removed to PACU VSS Report to RNHandoff Report: Identifed the Patient, Identified the Reponsible Provider, Reviewed the pertinent medical history, Discussed the surgical course, Reviewed Intra-OP anesthesia mangement and issues during anesthesia, Set expectations for post-procedure period and Allowed opportunity for questions and acknowledgement of understanding      Vitals: (Last set prior to Anesthesia Care Transfer)    CRNA VITALS  5/16/2019 0909 - 5/16/2019 0944      5/16/2019             Pulse:  47  (Abnormal)     SpO2:  90 %    Resp Rate (observed):  11                Electronically Signed By: LAILA Loaiza CRNA  May 16, 2019  9:44 AM

## 2019-05-17 LAB
ANION GAP SERPL CALCULATED.3IONS-SCNC: 8 MMOL/L (ref 3–14)
BUN SERPL-MCNC: 15 MG/DL (ref 7–30)
CALCIUM SERPL-MCNC: 8.2 MG/DL (ref 8.5–10.1)
CHLORIDE SERPL-SCNC: 103 MMOL/L (ref 94–109)
CO2 SERPL-SCNC: 25 MMOL/L (ref 20–32)
CREAT SERPL-MCNC: 0.74 MG/DL (ref 0.66–1.25)
ERYTHROCYTE [DISTWIDTH] IN BLOOD BY AUTOMATED COUNT: 19.9 % (ref 10–15)
GFR SERPL CREATININE-BSD FRML MDRD: 82 ML/MIN/{1.73_M2}
GLUCOSE SERPL-MCNC: 96 MG/DL (ref 70–99)
GLUCOSE SERPL-MCNC: NORMAL MG/DL (ref 70–99)
HCT VFR BLD AUTO: 25.2 % (ref 40–53)
HGB BLD-MCNC: 7.6 G/DL (ref 13.3–17.7)
HGB BLD-MCNC: 8.5 G/DL (ref 13.3–17.7)
HGB BLD-MCNC: NORMAL G/DL (ref 13.3–17.7)
MCH RBC QN AUTO: 25.7 PG (ref 26.5–33)
MCHC RBC AUTO-ENTMCNC: 30.2 G/DL (ref 31.5–36.5)
MCV RBC AUTO: 85 FL (ref 78–100)
MDC_IDC_LEAD_IMPLANT_DT: NORMAL
MDC_IDC_LEAD_LOCATION: NORMAL
MDC_IDC_LEAD_MFG: NORMAL
MDC_IDC_LEAD_MODEL: NORMAL
MDC_IDC_LEAD_POLARITY_TYPE: NORMAL
MDC_IDC_LEAD_SERIAL: NORMAL
MDC_IDC_MSMT_BATTERY_DTM: NORMAL
MDC_IDC_MSMT_BATTERY_REMAINING_LONGEVITY: 114 MO
MDC_IDC_MSMT_BATTERY_REMAINING_PERCENTAGE: 91 %
MDC_IDC_MSMT_BATTERY_RRT_TRIGGER: NORMAL
MDC_IDC_MSMT_BATTERY_STATUS: NORMAL
MDC_IDC_MSMT_BATTERY_VOLTAGE: 2.95 V
MDC_IDC_MSMT_LEADCHNL_RV_IMPEDANCE_VALUE: 400 OHM
MDC_IDC_MSMT_LEADCHNL_RV_LEAD_CHANNEL_STATUS: NORMAL
MDC_IDC_MSMT_LEADCHNL_RV_PACING_THRESHOLD_AMPLITUDE: 0.75 V
MDC_IDC_MSMT_LEADCHNL_RV_PACING_THRESHOLD_PULSEWIDTH: 1 MS
MDC_IDC_MSMT_LEADCHNL_RV_SENSING_INTR_AMPL: 9 MV
MDC_IDC_PG_IMPLANT_DTM: NORMAL
MDC_IDC_PG_MFG: NORMAL
MDC_IDC_PG_MODEL: NORMAL
MDC_IDC_PG_SERIAL: NORMAL
MDC_IDC_PG_TYPE: NORMAL
MDC_IDC_SESS_CLINIC_NAME: NORMAL
MDC_IDC_SESS_DTM: NORMAL
MDC_IDC_SESS_REPROGRAMMED: NO
MDC_IDC_SESS_TYPE: NORMAL
MDC_IDC_SET_BRADY_LOWRATE: 60 {BEATS}/MIN
MDC_IDC_SET_BRADY_MAX_SENSOR_RATE: 120 {BEATS}/MIN
MDC_IDC_SET_BRADY_MODE: NORMAL
MDC_IDC_SET_LEADCHNL_RV_PACING_AMPLITUDE: 2 V
MDC_IDC_SET_LEADCHNL_RV_PACING_ANODE_ELECTRODE_1: NORMAL
MDC_IDC_SET_LEADCHNL_RV_PACING_ANODE_LOCATION_1: NORMAL
MDC_IDC_SET_LEADCHNL_RV_PACING_CAPTURE_MODE: NORMAL
MDC_IDC_SET_LEADCHNL_RV_PACING_CATHODE_ELECTRODE_1: NORMAL
MDC_IDC_SET_LEADCHNL_RV_PACING_CATHODE_LOCATION_1: NORMAL
MDC_IDC_SET_LEADCHNL_RV_PACING_POLARITY: NORMAL
MDC_IDC_SET_LEADCHNL_RV_PACING_PULSEWIDTH: 1 MS
MDC_IDC_SET_LEADCHNL_RV_SENSING_ADAPTATION_MODE: NORMAL
MDC_IDC_SET_LEADCHNL_RV_SENSING_ANODE_ELECTRODE_1: NORMAL
MDC_IDC_SET_LEADCHNL_RV_SENSING_ANODE_LOCATION_1: NORMAL
MDC_IDC_SET_LEADCHNL_RV_SENSING_CATHODE_ELECTRODE_1: NORMAL
MDC_IDC_SET_LEADCHNL_RV_SENSING_CATHODE_LOCATION_1: NORMAL
MDC_IDC_SET_LEADCHNL_RV_SENSING_POLARITY: NORMAL
MDC_IDC_SET_LEADCHNL_RV_SENSING_SENSITIVITY: 2 MV
MDC_IDC_STAT_AT_DTM_END: NORMAL
MDC_IDC_STAT_AT_DTM_START: NORMAL
MDC_IDC_STAT_BRADY_DTM_END: NORMAL
MDC_IDC_STAT_BRADY_DTM_START: NORMAL
MDC_IDC_STAT_BRADY_RV_PERCENT_PACED: 22 %
MDC_IDC_STAT_CRT_DTM_END: NORMAL
MDC_IDC_STAT_CRT_DTM_START: NORMAL
MDC_IDC_STAT_HEART_RATE_DTM_END: NORMAL
MDC_IDC_STAT_HEART_RATE_DTM_START: NORMAL
MDC_IDC_STAT_HEART_RATE_VENTRICULAR_MAX: 220 {BEATS}/MIN
MDC_IDC_STAT_HEART_RATE_VENTRICULAR_MEAN: 78 {BEATS}/MIN
MDC_IDC_STAT_HEART_RATE_VENTRICULAR_MIN: 60 {BEATS}/MIN
PLATELET # BLD AUTO: 237 10E9/L (ref 150–450)
POTASSIUM SERPL-SCNC: 4.3 MMOL/L (ref 3.4–5.3)
RBC # BLD AUTO: 2.96 10E12/L (ref 4.4–5.9)
SODIUM SERPL-SCNC: 136 MMOL/L (ref 133–144)
WBC # BLD AUTO: 8.3 10E9/L (ref 4–11)

## 2019-05-17 PROCEDURE — 85018 HEMOGLOBIN: CPT | Performed by: PHYSICIAN ASSISTANT

## 2019-05-17 PROCEDURE — 80048 BASIC METABOLIC PNL TOTAL CA: CPT | Performed by: INTERNAL MEDICINE

## 2019-05-17 PROCEDURE — 12000000 ZZH R&B MED SURG/OB

## 2019-05-17 PROCEDURE — 36415 COLL VENOUS BLD VENIPUNCTURE: CPT | Performed by: PHYSICIAN ASSISTANT

## 2019-05-17 PROCEDURE — 85027 COMPLETE CBC AUTOMATED: CPT | Performed by: INTERNAL MEDICINE

## 2019-05-17 PROCEDURE — 25000132 ZZH RX MED GY IP 250 OP 250 PS 637: Performed by: PHYSICIAN ASSISTANT

## 2019-05-17 PROCEDURE — A9270 NON-COVERED ITEM OR SERVICE: HCPCS | Performed by: INTERNAL MEDICINE

## 2019-05-17 PROCEDURE — 25000132 ZZH RX MED GY IP 250 OP 250 PS 637: Performed by: INTERNAL MEDICINE

## 2019-05-17 PROCEDURE — 99232 SBSQ HOSP IP/OBS MODERATE 35: CPT | Performed by: INTERNAL MEDICINE

## 2019-05-17 PROCEDURE — 99207 ZZC CDG-MDM COMPONENT: MEETS LOW - DOWN CODED: CPT | Performed by: INTERNAL MEDICINE

## 2019-05-17 PROCEDURE — A9270 NON-COVERED ITEM OR SERVICE: HCPCS | Performed by: PHYSICIAN ASSISTANT

## 2019-05-17 RX ORDER — ASPIRIN 325 MG
325 TABLET, DELAYED RELEASE (ENTERIC COATED) ORAL DAILY
Qty: 30 TABLET | Refills: 0 | Status: ON HOLD | OUTPATIENT
Start: 2019-05-18 | End: 2019-09-28

## 2019-05-17 RX ORDER — TRAVOPROST OPHTHALMIC SOLUTION 0.04 MG/ML
1 SOLUTION OPHTHALMIC AT BEDTIME
Status: DISCONTINUED | OUTPATIENT
Start: 2019-05-17 | End: 2019-05-18 | Stop reason: HOSPADM

## 2019-05-17 RX ORDER — AMOXICILLIN 250 MG
2 CAPSULE ORAL 2 TIMES DAILY
Status: ON HOLD | DISCHARGE
Start: 2019-05-17 | End: 2019-09-28

## 2019-05-17 RX ORDER — HYDROCODONE BITARTRATE AND ACETAMINOPHEN 5; 325 MG/1; MG/1
1 TABLET ORAL EVERY 6 HOURS PRN
Qty: 20 TABLET | Refills: 0 | Status: ON HOLD | OUTPATIENT
Start: 2019-05-17 | End: 2019-09-28

## 2019-05-17 RX ADMIN — PAROXETINE HYDROCHLORIDE 20 MG: 20 TABLET, FILM COATED ORAL at 08:50

## 2019-05-17 RX ADMIN — ATENOLOL 25 MG: 25 TABLET ORAL at 08:49

## 2019-05-17 RX ADMIN — Medication 250 MG: at 08:49

## 2019-05-17 RX ADMIN — LISINOPRIL 10 MG: 10 TABLET ORAL at 08:50

## 2019-05-17 RX ADMIN — ACETAMINOPHEN 975 MG: 325 TABLET, FILM COATED ORAL at 03:03

## 2019-05-17 RX ADMIN — FERROUS SULFATE TAB 325 MG (65 MG ELEMENTAL FE) 325 MG: 325 (65 FE) TAB at 08:49

## 2019-05-17 RX ADMIN — SENNOSIDES AND DOCUSATE SODIUM 1 TABLET: 8.6; 5 TABLET ORAL at 08:49

## 2019-05-17 RX ADMIN — LEVOTHYROXINE SODIUM 100 MCG: 100 TABLET ORAL at 08:49

## 2019-05-17 RX ADMIN — FERROUS SULFATE TAB 325 MG (65 MG ELEMENTAL FE) 325 MG: 325 (65 FE) TAB at 18:33

## 2019-05-17 RX ADMIN — Medication 250 MG: at 18:33

## 2019-05-17 RX ADMIN — Medication 1 MG: at 23:51

## 2019-05-17 RX ADMIN — SENNOSIDES AND DOCUSATE SODIUM 2 TABLET: 8.6; 5 TABLET ORAL at 21:12

## 2019-05-17 RX ADMIN — ACETAMINOPHEN 975 MG: 325 TABLET, FILM COATED ORAL at 10:30

## 2019-05-17 RX ADMIN — TRAVOPROST OPHTHALMIC SOLUTION 1 DROP: 0.04 SOLUTION OPHTHALMIC at 21:53

## 2019-05-17 RX ADMIN — ACETAMINOPHEN 975 MG: 325 TABLET, FILM COATED ORAL at 18:32

## 2019-05-17 RX ADMIN — SENNOSIDES AND DOCUSATE SODIUM 1 TABLET: 8.6; 5 TABLET ORAL at 08:50

## 2019-05-17 RX ADMIN — ASPIRIN 325 MG: 325 TABLET, DELAYED RELEASE ORAL at 08:50

## 2019-05-17 ASSESSMENT — ACTIVITIES OF DAILY LIVING (ADL)
ADLS_ACUITY_SCORE: 19
ADLS_ACUITY_SCORE: 20
ADLS_ACUITY_SCORE: 21
ADLS_ACUITY_SCORE: 19

## 2019-05-17 NOTE — PROGRESS NOTES
Orthopedic Surgery  Jake Duane Pfleiger  2019  Admit Date:  5/15/2019  POD 1 Day Post-Op  S/P Procedure(s):  1.  Excision of bony mass, 3 x 3 x 2 cm, underneath right quadriceps tendon in total knee arthroplasty.  2.  Repair of right quadriceps tendon with suture anchor.     Repair tendon quadriceps    Patient resting comfortably in chair.    Pain controlled.  Tolerating oral intake.    Denies nausea or vomiting  Denies chest pain or shortness of breath  No events overnight.     Alert and orient to person, place, and time.  Vital Sign Ranges  Temperature Temp  Av.7  F (36.5  C)  Min: 97  F (36.1  C)  Max: 98.2  F (36.8  C)   Blood pressure Systolic (24hrs), Av , Min:123 , Max:154        Diastolic (24hrs), Av, Min:56, Max:85      Pulse Pulse  Av  Min: 73  Max: 80   Respirations Resp  Av  Min: 14  Max: 20   Pulse oximetry SpO2  Av.6 %  Min: 92 %  Max: 100 %       Dressing is clean, dry, and intact. Knee immobilizer in place  Minimal erythema of the surrounding skin.   Bilateral calves are soft, non-tender.  Bilateral lower extremity is NVI.  Sensation intact bilateral lower extremities  5/5 motor with resisted dorsi and plantar flexion bilaterally  +Dp pulse      Labs:  Recent Labs   Lab Test 19  0558 05/15/19  0131 19  0631   POTASSIUM 4.3 4.5 4.0     Recent Labs   Lab Test 19  0558 19  0557 19  0824   HGB 7.6* Duplicate request 8.1*     Recent Labs   Lab Test 19  0631 19  2219 19  1407   INR 1.49* 1.80* 3.27*     Recent Labs   Lab Test 19  0558 19  0637 05/15/19  0131    244 265       A/P  1. S/p right total knee removal of whitley fragment with repair of quad   Continue ASA for DVT prophylaxis.     Mobilize with PT/OT    WBAT with knee immobilizer.     Continue current pain regiment.   Dressing change tomorrow - ok for nursing staff to complete (do not flex knee)   Recheck hgb - if under 7 transfuse   Watch for GI  bleed (previously had) and may discharge ASA if necessary    2. Disposition   Anticipate d/c to TCU vs return to [facility] with elevated cares based on progress it PT and medical clearance.    Dalia Manzano PA-C

## 2019-05-17 NOTE — PROGRESS NOTES
[facility]  Hospitalist Progress Note  Delon Fabian MD 05/17/2019  Admit 5/15/2019  1:05 AM        Reason for Stay (Diagnosis): Fall         Assessment and Plan:      Summary of Stay: Jake Duane Pfleiger is a 87 year old male who presents with fall at home.   Patient has a history of A. fib (on aspirin, not on warfarin anymore due to GI bleed), hypertension, dyslipidemia, mild cognitive dysfunction.  He lives by himself at an assisted living facility.     He had a fall at around 10 PM when he was getting back from bathroom to his bedroom.  He was walking with the help of his walker when he lost his balance and fell.  He says that his left knee gave out on him and he lost balance.  Apparently, his left knee has been troubling him since this morning.  He has a history of severe osteoarthritis of his left knee.  He denies any chest pain, palpitations, shortness of breath, dizziness.  Denies any head injury.  Denies any loss of consciousness.   After the fall, he was not able to get up by himself due to the left knee pain and right shoulder pain.  He used his medical alert device to call for help.  His right shoulder CT showed an acromion fracture.  Further evaluation revealed revealed a large mass under his left patella/quad tendon which was resected yesterday by orthopedics, this is the same he had arthroplasty in.     His last admission in March was for a lower GI bleed while he was on warfarin.  This was thought to be a diverticular bleed.  No colonoscopy was done as last colonoscopy in 2018 which showed diverticulosis.  Patient has been struggling with constipation issue.  After the discharge in March he was doing well without any bleeding.  About 7 to 10 days ago he had an episode of dark stools with dark blood.  It lasted for about 2 days and then resolved.  Denies any hematemesis.  Denies any abdominal pain.  His warfarin was stopped in March and he currently takes low-dose  "aspirin.        Problem List:     1.  Fall:  Right shoulder acromion fracture and a large bone mass removed from underneath the right quad tendon  --Right shoulder injury in form of acromion fracture.  Nonsurgical per orthopedics.  Continue with conservative management, use sling as needed.  - It seems as follows precipitated by \"locking up\" of his knee.  For that reason he underwent removal of a large bone spur of his right knee on May 16.  --He usually walks with a walker but his walking will now be inhibited as he requires an knee immobilizer.  In addition he has a right shoulder injury described above.  --He usually lives in assisted living but now will likely need transitional care while he heals.  -He will need to go to TCU     2.  Acute on chronic anemia.  Likely related to subacute diverticular bleed.  Hemoglobin seems stable- We will monitor the hemoglobin.  No active bleeding at this point.  He had dark bloody stool about 7 to 10 days ago and now resolved.  -Drop in hemoglobin from 10 to 8.  Has subsequently remained stable at 8.5 this afternoon  -Recent admission for diverticular bleed.  - Continue aspirin with close monitoring likely low-dose on discharge  --Iron saturation is low.  Will start iron and vitamin C supplements.     3. Hypertension  -Resumed home atenolol and lisinopril.     4. Hypothyroidism  - Continue levothyroxine.       5. Constipation  - This is likely precipitating the diverticulosis issue along with related bleeding problem.  - Advised the patient to take stool softeners on a regular basis.    - Monitor and treat with regular stool softeners.     6.  A. fib  -Warfarin has been stopped due to significant and recurrent bleeding.  May resume aspirin if no more bleeding in the hospital.  -Rate is controlled.  Continue atenolol.      DVT Prophylaxis: Pneumatic Compression Devices  Code Status: Full Code  Disposition Plan   Expected discharge in 1 day to transitional care unit once a bed " "is available per CTS likely tomorrow     entered: Delon Fabian 05/17/2019, 2:57 PM            Interval History (Subjective):        Feels okay denies severe pain shortness of breath or fever, understanding needs to go to transitional care unit and willing to look into [facility] or [facility].  Discussed with the patient's son who was in the room  Discussed with the  care transition staff                  Physical Exam:      Last Vital Signs:  /50 (BP Location: Left arm)   Pulse 73   Temp 96.5  F (35.8  C) (Oral)   Resp 16   Ht 1.803 m (5' 11\")   SpO2 97%   BMI 25.89 kg/m        Vital signs reviewed  General:  Alert, calm, NAD  CV: Irregular, no murmurs or rubs  Lungs:  Clear to ascultation bilaterally, normal respiratory effort  Abdomen:  Soft, nontender, nondistended, no masses, normal bowel sounds  Extremities: Right leg in knee immobilizer, R shoulder in sling   Neuro: normal strength and sensation in all 4 extremities, cranial nerves grossly intact  Psychiatric:  Mood and affect within normal limits             Medications:      All current medications were reviewed with changes reflected in problem list.         Data:      All new lab and imaging data was reviewed.   Labs:  Hemoglobin 8.5  Iron saturation 6%  "

## 2019-05-17 NOTE — OP NOTE
Procedure Date: 05/16/2019      PREOPERATIVE DIAGNOSIS:  Symptomatic bony mass, right quadriceps tendon, in patient with total knee arthroplasty.      POSTOPERATIVE DIAGNOSIS:  Symptomatic bony mass, right quadriceps tendon, in patient with total knee arthroplasty.      PROCEDURE PERFORMED:   1.  Excision of bony mass, 3 x 3 x 2 cm, underneath right quadriceps tendon in total knee arthroplasty.   2.  Repair of right quadriceps tendon with suture anchor.      SURGEON:  Ag Armstrong MD.        ANESTHESIA:  General.        ESTIMATED BLOOD LOSS:  20 mL.      FINDINGS:  Large bony mass 2 x 3 x 3 cm, solid quadriceps tendon repair.      INDICATIONS:  An 83-year-old male who had a right total knee arthroplasty some years ago who has had symptomatic with pain in the anterior aspect of the knee and a bony mass found on lateral x-rays.  Discussed no surgery versus surgical excision.  Risks, benefits and alternatives.  Risks of his condition and surgery discussed include but not limited to bleeding, infection, damage to surrounding neurovascular structures, rupture of quad tendon and anesthetic complications.  The patient understands and wishes to proceed.  Consent signed.      DESCRIPTION OF PROCEDURE:  The patient identified in the preoperative holding area.  Correct OP site marked, brought into the OR and positioned on the OR table.  Patient scrubbed with ChloraPrep, draped, and a timeout was performed.  All in the room agreed.      We made a proximal portion of the incision extended slightly proximal after tourniquet inflated, dissecting through the skin, subcutaneous tissue onto the quad tendon.  Bony mass felt underneath it.  I performed an arthrotomy of the right knee, sharply excising a 3 x 3 x 2 cm bony mass.  Copiously irrigated wound.  This caused detachment of approximately 70% of the quad tendon surface proximally.  We used an Arthrex 4.75 mm SwiveLock anchor, drilling and placing the anchor.  It had been  tapped.  There was a malfunction on the peak portion of the anchor, however, with 15-20 pounds pull could not dislodge the sutures.  I elected to retain the device and use the Krackow double stranded repair of the quadriceps tendon onto the proximal portion of the patella.  Closed the remaining with 0 Vicryl, 2-0 Vicryl, 4-0 Monocryl, and Dermabond.  Sterile dressing was applied, awakened, transferred stable to the PACU.      POSTOPERATIVE PLAN:   1.  Weightbearing as tolerated in the knee immobilizer with the knee straight.   2.  Aspirin for DVT prophylaxis.     3.  Twenty-four hours of Ancef.   4.  Follow up in clinic in 2 weeks with an appointment on the same day for hinged knee brace.         MELISSA WADE MD             D: 2019   T: 2019   MT: CORRINE      Name:     GLENN CERRATO   MRN:      [id number]        Account:        [id number]   :      1931           Procedure Date: 2019      Document: [id number]

## 2019-05-17 NOTE — PLAN OF CARE
VS-stable, afebrile, on capnography. Irreg heart rate. Has pacemaker. Hx of at fib. On asa--baseline. .  Lung Sounds-clear, no cough, using IS upto 1000  O2-on 2L NC, on capnography  GI-+bs, +flatus , lbm was yesterday. Tolerating reg  diet, denies nausea. Had pizza, juice, water.   -voiding well. Via urinal. Standing at side of bed.   IVF-at 50cc. On ancef.   Dressings-KI on at all times on R leg, ace wrap on R lower leg. C/d/I. R arm in sling.   CMS-ice inside KI. Wbat. Denies numbness and tingling. +pp, dorsi/planter flexion strong. Scds on. R arm in sling. +radial pulse, denies numbness and tingling. Strong hand grasp.   Drain-none.   Activity-up with 2 assist, pivot to chair to eat supper, up 2 hours.   Pain-denies pain at this time. On scheduled tylenol.   D/C Plan-tcu at time of discharge.

## 2019-05-17 NOTE — CONSULTS
"Care Transitions Team: Following for CC, discharge planning, and disposition.        Per chart review pt is identified as  \"Anticipate d/c to TCU vs return to [facility] with elevated cares based on progress it PT and medical clearance.\"  Meet with pt and pt son to discuss anticipation of discharge needs.   Noted PT eval and rec's pending following Right knee procedure 5/16    TCU is anticipated 2/2 to need to increase services at additional cost vs medicare benefits.     Referral Sent  1. [facility]   2. [facility]   PRIVATE room     CM to follow in collaboration with care team anticipate 5/18 discharge readiness           "

## 2019-05-17 NOTE — PLAN OF CARE
Afeb, vss, cms at [facility] with some numbness in his toes which his says he has all the time. Ace wrap clean and dry, immobilizer on at all times also sling to right arm  when up. Ice to the knee.Minimal to no pain, using scheduled tylenol for pain  only. Good appetite, eating up in the chair for both meals. Voiding in urinal with assist Up with assist of 2 and gait belt, pt having a hard time not using his right hand/arm to help himself. He is very unsteady when up, walking on his heels.  Son here to visit  and spoke to care coordinator  to devise a plan, probably needs TCU.

## 2019-05-17 NOTE — PLAN OF CARE
A&OX4, [place] , hearing aid in place. Sleeping comfortably throughout the night. VSS: on Capno with 2L oxygen. Up with Ax2, sudarshan, KASSIE and walker. CMS: Intact. Sling on R arm. Drsg: CDI. Denies pain. On scheduled Tylenol. Voiding small amounts. 100 ml and 200 ml.PVR 18 ml. IS and Fluids encouraged. Nursing continue to monitor.

## 2019-05-18 ENCOUNTER — APPOINTMENT (OUTPATIENT)
Dept: PHYSICAL THERAPY | Facility: CLINIC | Age: 84
DRG: 488 | End: 2019-05-18
Attending: ORTHOPAEDIC SURGERY
Payer: MEDICARE

## 2019-05-18 VITALS
RESPIRATION RATE: 16 BRPM | HEART RATE: 73 BPM | DIASTOLIC BLOOD PRESSURE: 77 MMHG | HEIGHT: 71 IN | TEMPERATURE: 97.9 F | OXYGEN SATURATION: 92 % | SYSTOLIC BLOOD PRESSURE: 152 MMHG | BODY MASS INDEX: 25.89 KG/M2

## 2019-05-18 LAB
GLUCOSE BLDC GLUCOMTR-MCNC: 73 MG/DL (ref 70–99)
GLUCOSE SERPL-MCNC: 82 MG/DL (ref 70–99)
HGB BLD-MCNC: 8.3 G/DL (ref 13.3–17.7)

## 2019-05-18 PROCEDURE — 97116 GAIT TRAINING THERAPY: CPT | Mod: GP | Performed by: PHYSICAL THERAPIST

## 2019-05-18 PROCEDURE — 97530 THERAPEUTIC ACTIVITIES: CPT | Mod: GP | Performed by: PHYSICAL THERAPIST

## 2019-05-18 PROCEDURE — 25000132 ZZH RX MED GY IP 250 OP 250 PS 637: Performed by: INTERNAL MEDICINE

## 2019-05-18 PROCEDURE — 85018 HEMOGLOBIN: CPT | Performed by: PHYSICIAN ASSISTANT

## 2019-05-18 PROCEDURE — 00000146 ZZHCL STATISTIC GLUCOSE BY METER IP

## 2019-05-18 PROCEDURE — 99239 HOSP IP/OBS DSCHRG MGMT >30: CPT | Performed by: INTERNAL MEDICINE

## 2019-05-18 PROCEDURE — 82947 ASSAY GLUCOSE BLOOD QUANT: CPT | Performed by: PHYSICIAN ASSISTANT

## 2019-05-18 PROCEDURE — 25000132 ZZH RX MED GY IP 250 OP 250 PS 637: Performed by: PHYSICIAN ASSISTANT

## 2019-05-18 PROCEDURE — A9270 NON-COVERED ITEM OR SERVICE: HCPCS | Performed by: PHYSICIAN ASSISTANT

## 2019-05-18 PROCEDURE — A9270 NON-COVERED ITEM OR SERVICE: HCPCS | Performed by: INTERNAL MEDICINE

## 2019-05-18 PROCEDURE — 36415 COLL VENOUS BLD VENIPUNCTURE: CPT | Performed by: PHYSICIAN ASSISTANT

## 2019-05-18 PROCEDURE — 97164 PT RE-EVAL EST PLAN CARE: CPT | Mod: GP | Performed by: PHYSICAL THERAPIST

## 2019-05-18 RX ORDER — FERROUS SULFATE 325(65) MG
325 TABLET ORAL 2 TIMES DAILY WITH MEALS
Qty: 60 TABLET | Refills: 0 | Status: ON HOLD | OUTPATIENT
Start: 2019-05-18 | End: 2019-09-28

## 2019-05-18 RX ADMIN — PAROXETINE HYDROCHLORIDE 20 MG: 20 TABLET, FILM COATED ORAL at 08:03

## 2019-05-18 RX ADMIN — Medication 250 MG: at 08:03

## 2019-05-18 RX ADMIN — LEVOTHYROXINE SODIUM 100 MCG: 100 TABLET ORAL at 08:03

## 2019-05-18 RX ADMIN — SENNOSIDES AND DOCUSATE SODIUM 2 TABLET: 8.6; 5 TABLET ORAL at 08:02

## 2019-05-18 RX ADMIN — ACETAMINOPHEN 975 MG: 325 TABLET, FILM COATED ORAL at 11:07

## 2019-05-18 RX ADMIN — FERROUS SULFATE TAB 325 MG (65 MG ELEMENTAL FE) 325 MG: 325 (65 FE) TAB at 08:02

## 2019-05-18 RX ADMIN — ASPIRIN 325 MG: 325 TABLET, DELAYED RELEASE ORAL at 08:02

## 2019-05-18 RX ADMIN — POLYETHYLENE GLYCOL 3350 17 G: 17 POWDER, FOR SOLUTION ORAL at 09:43

## 2019-05-18 RX ADMIN — ATENOLOL 25 MG: 25 TABLET ORAL at 08:03

## 2019-05-18 RX ADMIN — LISINOPRIL 10 MG: 10 TABLET ORAL at 08:03

## 2019-05-18 RX ADMIN — ACETAMINOPHEN 975 MG: 325 TABLET, FILM COATED ORAL at 06:22

## 2019-05-18 ASSESSMENT — ACTIVITIES OF DAILY LIVING (ADL)
ADLS_ACUITY_SCORE: 20

## 2019-05-18 NOTE — PROGRESS NOTES
SERG met with patient and his son Duane. Explained that the TCU choices that they had chosen are full. They have chosen [facility] and [facility]. They would also like a private room. SERG made referrals.       [facility] called and said they could take the patient today. SERG called patients daughter Monica to let her know. She would like a ride set up. SERG called [facility] and arranged a wheelchair ride for 5:45pm. SERG updated Jason and bedside nurse. SW also update patient.       HEAVENLY Rojas  [phone]

## 2019-05-18 NOTE — PLAN OF CARE
Discharge Planner PT PT: PT re-evaluation completion , treatment initiated for pt admitted POD#2 s/p removal of symptomatic bony mass R quad tendon.  Pt initially admitted under OBS status due to fall with R knee and R shoulder pain, now IP status and post op. Initially, Pt states his R knee gave out, started having pain in the knee .  Found to have no fx R knee, rather large peripatellar osteophyte with surgery to remove 5/16, R possible acromial fx being treated conservatively with sling prn. Lives independently at [facility], uses 4WW at baseline.      Patient plan for discharge: TCU  Current status:Increased difficulty with mobility post op. KI on RLE at all times, pain 2-3/10 at rest, up to 3-4/10 w/ gait. Mod A bed mobility, mod+ A transfers, sling off for gait with fww 240' with CGA, 2-3 brief rest periods, slight wheeze noted when supine, IS done in sitting.   Barriers to return to prior living situation: alone in apt, can not care for himself at this time, level of A needed  Recommendations for discharge: TCU  Rationale for recommendations: pt needs to learn to arben/marisela KI. PT at TCU unitl pt able to participate more in functional mobility, upon discharge  from TCU will likely need increased  services at [facility].       Entered by: Mercedes Duran 05/18/2019 9:51 AM

## 2019-05-18 NOTE — DISCHARGE SUMMARY
[facility]  Discharge Summary  Hospitalist      Date of Admission:  5/15/2019  Date of Discharge:  5/18/2019  Provider:  Delon Fabian MD. Formerly [facility]  Date of Service (when I last saw the patient): 05/18/19      Primary Provider: Herve Sweet          Discharge Diagnosis:     Discharge Diagnoses   Fall resulting in right shoulder acromion fracture  Acute on chronic anemia subacute diverticular bleed  Hypertension  Hypothyroidism  Atrial fibrillation      Other medical issues:  Past Medical History:   Diagnosis Date     Anxiety      Arthritis      Atrial fibrillation (H)      Cardiomyopathy (H)      Depression      Diastolic CHF (H) 04/10/2016     Diverticulosis      Gastro-oesophageal reflux disease      GI bleed 10/02/2018    Lower     Hypertension      Insomnia      Nodule of lower lobe of left lung     CT Chest 1/2019-stable     Pacemaker     [employer] Dual Pacemaker     Permanent atrial fibrillation (H)      Right patella fracture      Tachy-jairo syndrome (H)      Thyroid disease     Hypothyrodism          Please see the admission history and physical for full details.     Hospital Course     Jake Duane Pfleiger was admitted on 5/15/2019.  The following problems were addressed during his hospitalization:  87 year old male who presents with fall at home.   Patient has a history of A. fib (on aspirin, not on warfarin anymore due to GI bleed), hypertension, dyslipidemia, mild cognitive dysfunction.  He lives by himself at an assisted living facility.     He had a fall at around 10 PM when he was getting back from bathroom to his bedroom.  He was walking with the help of his walker when he lost his balance and fell.  He says that his left knee gave out on him and he lost balance.  Apparently, his left knee has been troubling him since this morning.  He has a history of severe osteoarthritis of his left knee.  He denies any chest pain, palpitations, shortness of breath, dizziness.  Denies any  "head injury.  Denies any loss of consciousness.   After the fall, he was not able to get up by himself due to the left knee pain and right shoulder pain.  He used his medical alert device to call for help.  His right shoulder CT showed an acromion fracture.  Further evaluation revealed revealed a large mass under his left patella/quad tendon which was resected by orthopedics, this is the same he had arthroplasty in.     His last admission in March was for a lower GI bleed while he was on warfarin.  This was thought to be a diverticular bleed.  No colonoscopy was done as last colonoscopy in 2018 which showed diverticulosis.  Patient has been struggling with constipation issue.  After the discharge in March he was doing well without any bleeding.  About 7 to 10 days ago he had an episode of dark stools with dark blood.  It lasted for about 2 days and then resolved.  Denies any hematemesis.  Denies any abdominal pain.  His warfarin was stopped in March and he currently takes aspirin.        Problem List:     1.  Fall:  Right shoulder acromion fracture and a large bone mass removed from underneath the right quad tendon  --Right shoulder injury in form of acromion fracture.  Nonsurgical per orthopedics.  Continue with conservative management, use sling as needed.  - It seems as follows precipitated by \"locking up\" of his knee.  For that reason he underwent removal of a large bone spur of his right knee on May 16.  --He usually walks with a walker but his walking will now be inhibited as he requires an knee immobilizer.  In addition he has a right shoulder injury described above.  --He usually lives in assisted living but now will likely need transitional care while he heals.  -He will need to go to TCU     2.  Acute on chronic anemia.  Likely related to subacute diverticular bleed.  Hemoglobin seems stable- We will monitor the hemoglobin.  No active bleeding at this point.  He had dark bloody stool about 7 to 10 days ago " and now resolved.  -Drop in hemoglobin from 10 to 8.  Has subsequently remained stable at 8.5 this afternoon  -Recent admission for diverticular bleed.  - Continue aspirin with close monitoring after discharge  --Iron saturation is low.  Will start iron and vitamin C supplements.     3. Hypertension-Resumed home atenolol and lisinopril.     4. Hypothyroidism- Continue levothyroxine.       5. Constipation  - This is likely precipitating the diverticulosis issue along with related bleeding problem.  - Advised the patient to take stool softeners on a regular basis.    - Monitor and treat with regular stool softeners.     6.  A. fib  -Warfarin has been stopped due to significant and recurrent bleeding.  May resume aspirin   -Rate is controlled.  Continue atenolol.          Pending Results   Unresulted Labs Ordered in the Past 30 Days of this Admission     Date and Time Order Name Status Description    5/16/2019 0901 Surgical pathology exam In process           Discharge Orders      General info for SNF    Length of Stay Estimate: Short Term Care: Estimated # of Days <30  Condition at Discharge: Improving  Level of care:skilled   Rehabilitation Potential: Good  Admission H&P remains valid and up-to-date: Yes  Recent Chemotherapy: N/A  Use Nursing Home Standing Orders: Yes     Mantoux instructions    Give two-step Mantoux (PPD) Per Facility Policy Yes     Reason for your hospital stay    Right shoulder injury - chronic rotator cuff arthropathy   Right total knee removal of whitley fragment and quad repair     Activity - Up with assistive device     Weight bearing status    WBAT with knee immobilizer on at all times     Wound care    Site:   Right knee  Instructions:  Leave dressing intact  ACE wrap from distal thigh to mid shin     Follow Up and recommended labs and tests    Follow up with Dr. Ag Armstrong , at [facility]location with clinic [facility] or city[facility], within 14 days  to evaluate after  surgery. No follow up labs or test are needed prior to visit. At this visit x-rays will be taken, sutures/staples removed, and questions to be answered.  Bring any needed forms with to appointment.  Call for appointment: [phone]  Follow-up with nursing home physician Monday or Tuesday with a CBC  After hours: [phone]  Fax: [phone] or [phone]     Physical Therapy Adult Consult    Evaluate and treat as clinically indicated.    Reason:  Right shoulder injury - chronic rotator cuff arthropathy   Right total knee removal of whitley fragment and quad repair     Occupational Therapy Adult Consult    Evaluate and treat as clinically indicated.    Reason:  Right shoulder injury - chronic rotator cuff arthropathy   Right total knee removal of whitley fragment and quad repair     Fall precautions     Advance Diet as Tolerated    Follow this diet upon discharge: Orders Placed This Encounter      Advance Diet as Tolerated: Regular Diet Adult       Code Status   Full Code       Primary Care Physician   Herve Sweet    Physical Exam   Temp: 97.8  F (36.6  C) Temp src: Oral BP: 160/61   Heart Rate: 76 Resp: 16 SpO2: 96 % O2 Device: None (Room air) Oxygen Delivery: 2 LPM  There were no vitals filed for this visit.  Vital Signs with Ranges  Temp:  [96.6  F (35.9  C)-97.9  F (36.6  C)] 97.8  F (36.6  C)  Heart Rate:  [58-76] 76  Resp:  [16-18] 16  BP: (135-163)/(53-90) 160/61  SpO2:  [92 %-98 %] 96 %  I/O last 3 completed shifts:  In: 1240 [P.O.:1240]  Out: 825 [Urine:825]    Constitutional:  alert, cooperative, no apparent distress  Respiratory: No increased work of breathing, good air exchange, no crackles or wheezing.  Cardiovascular: apical impulse,normal S1 and S2  GI: bowel sounds present, soft, non-distended, non-tender      Discharge Disposition   Discharged to assisted living    Consultations This Hospital Stay   ORTHOPEDIC SURGERY IP CONSULT  SPIRITUAL HEALTH SERVICES IP CONSULT  CARE COORDINATOR IP  CONSULT  PHYSICAL THERAPY ADULT IP CONSULT  SOCIAL WORK IP CONSULT  SOCIAL WORK IP CONSULT  PHYSICAL THERAPY ADULT IP CONSULT  OCCUPATIONAL THERAPY ADULT IP CONSULT  PHYSICAL THERAPY ADULT IP CONSULT    Time Spent on this Encounter   I, Delon Fabian, personally saw the patient today and spent greater than 30 minutes discharging this patient.      Discharge Medications   Current Discharge Medication List      START taking these medications    Details   aspirin (ASA) 325 MG EC tablet Take 1 tablet (325 mg) by mouth daily  Qty: 30 tablet, Refills: 0    Associated Diagnoses: Closed nondisplaced fracture of right acromial process, initial encounter      HYDROcodone-acetaminophen (NORCO) 5-325 MG tablet Take 1 tablet by mouth every 6 hours as needed for severe pain  Qty: 20 tablet, Refills: 0    Associated Diagnoses: Closed nondisplaced fracture of right acromial process, initial encounter      senna-docusate (SENOKOT-S/PERICOLACE) 8.6-50 MG tablet Take 2 tablets by mouth 2 times daily    Associated Diagnoses: Closed nondisplaced fracture of right acromial process, initial encounter         CONTINUE these medications which have NOT CHANGED    Details   acetaminophen (TYLENOL 8 HOUR ARTHRITIS PAIN) 650 MG CR tablet Take 1,300 mg by mouth 2 times daily      atenolol (TENORMIN) 25 MG tablet Take 25 mg by mouth daily      brimonidine (ALPHAGAN-P) 0.15 % ophthalmic solution Place 1 drop into the right eye 2 times daily      fish oil-omega-3 fatty acids (FISH OIL) 1000 MG capsule Take 2 g by mouth daily       furosemide (LASIX) 20 MG tablet Take 1 tablet (20 mg) by mouth daily  Qty: 30 tablet, Refills: 1    Associated Diagnoses: Congestive heart failure, unspecified congestive heart failure chronicity, unspecified congestive heart failure type      Levothyroxine Sodium (SYNTHROID PO) Take 100 mcg by mouth daily.      Lisinopril (PRINIVIL PO) Take 10 mg by mouth daily       omeprazole (PRILOSEC OTC) 20 MG EC tablet Take  20 mg by mouth daily as needed      paroxetine (PAXIL) 20 MG tablet Take 20 mg by mouth every morning.      polyethylene glycol (MIRALAX/GLYCOLAX) packet Take 1 packet by mouth daily as needed       Zolpidem Tartrate (AMBIEN PO) Take 5 mg by mouth At Bedtime          STOP taking these medications       aspirin (ASA) 81 MG tablet Comments:   Reason for Stopping:         Wheat Dextrin (BENEFIBER) TABS Comments:   Reason for Stopping:             Allergies   No Known Allergies  Data   Most Recent 3 CBC's:  Recent Labs   Lab Test 05/18/19  0607 05/17/19  1405 05/17/19  0558  05/16/19  0637 05/15/19  0131   WBC  --   --  8.3  --  6.3 7.0   HGB 8.3* 8.5* 7.6*   < > 7.9* 8.0*   MCV  --   --  85  --  85 84   PLT  --   --  237  --  244 265    < > = values in this interval not displayed.      Most Recent 3 BMP's:  Recent Labs   Lab Test 05/18/19  0607 05/17/19  0558 05/17/19  0557 05/15/19  0131 03/09/19  0631   NA  --  136  --  137 135   POTASSIUM  --  4.3  --  4.5 4.0   CHLORIDE  --  103  --  103 106   CO2  --  25  --  28 25   BUN  --  15  --  24 17   CR  --  0.74  --  0.95 0.84   ANIONGAP  --  8  --  6 4   MANAS  --  8.2*  --  8.6 8.1*   GLC 82 96 Duplicate request 94 80     Most Recent 2 LFT's:  Recent Labs   Lab Test 05/15/19  0131 10/06/18  0706   AST 17 17   ALT 14 12   ALKPHOS 76 49   BILITOTAL 0.3 0.6     Most Recent INR's and Anticoagulation Dosing History:  Anticoagulation Dose History     Recent Dosing and Labs Latest Ref Rng & Units 10/3/2018 10/4/2018 10/6/2018 3/8/2019 3/8/2019 3/8/2019 3/9/2019    INR 0.86 - 1.14 1.76(H) 1.29(H) 1.21(H) 3.69(H) 3.27(H) 1.80(H) 1.49(H)        Most Recent 3 Troponin's:  Recent Labs   Lab Test 05/15/19  0131 04/10/16  1136 09/23/15  1030   TROPI <0.015 <0.015  The 99th percentile for upper reference range is 0.045 ug/L.  Troponin values in   the range of 0.045 - 0.120 ug/L may be associated with risks of adverse   clinical events.   <0.015  The 99th percentile for upper reference  range is 0.045 ug/L.  Troponin values in   the range of 0.045 - 0.120 ug/L may be associated with risks of adverse   clinical events.       Most Recent Cholesterol Panel:No lab results found.  Most Recent 6 Bacteria Isolates From Any Culture (See EPIC Reports for Culture Details):  Recent Labs   Lab Test 02/24/12  1813 02/23/12  1329 02/23/12  1325   CULT No Salmonella, Shigella, Campylobacter or E coli 0157 isolated. No growth after 6 days No growth after 6 days     Most Recent TSH, T4 and A1c Labs:  Recent Labs   Lab Test 05/16/19  0637   TSH 1.98     Results for orders placed or performed during the hospital encounter of 05/15/19   XR Knee Right 3 Views    Narrative    XR KNEE RT 3 VW  5/15/2019 2:46 AM     HISTORY: Fall. Right knee pain.    COMPARISON: None.       Impression    IMPRESSION: No acute fracture or dislocation. There is again a large  ossific joint body in the suprapatellar pouch. Unremarkable appearance  of the knee arthroplasty.    ANALISA ZHU MD   XR Knee Left 3 Views    Narrative    XR KNEE LT 3 VW  5/15/2019 2:46 AM     HISTORY: Fall. Left knee pain.    COMPARISON: None.       Impression    IMPRESSION: No acute fracture or dislocation. Moderate osteoarthritis.  Joint effusion.    ANALISA ZHU MD   CT Head w/o Contrast    Narrative    CT SCAN OF THE HEAD WITHOUT CONTRAST  5/15/2019 1:52 AM     HISTORY: Fall.    TECHNIQUE: Axial images of the head and coronal reformations without  IV contrast material. Radiation dose for this scan was reduced using  automated exposure control, adjustment of the mA and/or kV according  to patient size, or iterative reconstruction technique.    COMPARISON: 9/23/2015.    FINDINGS: There is generalized atrophy of the brain. There is low  attenuation in the white matter of the cerebral hemispheres consistent  with sequelae of small vessel ischemic disease. There is no evidence  of intracranial hemorrhage, mass, acute infarct or anomaly.     Right maxillary sinus  disease. There is no evidence of trauma.      Impression    IMPRESSION: No acute abnormality.    ANALISA ZHU MD   Cervical spine CT w/o contrast    Narrative    CT CERVICAL SPINE W/O CONTRAST  5/15/2019 1:57 AM      HISTORY: Fall, pain.    TECHNIQUE: Multiplanar imaging of the cervical spine without  intravenous contrast. Radiation dose for this scan was reduced using  automated exposure control, adjustment of the mA and/or kV according  to patient size, or iterative reconstruction technique.     COMPARISON: 5/25/2015.    FINDINGS: There is no acute fracture or traumatic malalignment. There  is extensive multilevel degenerative disc and facet disease. There is  mild spinal stenosis at C4-C5 and C5-C6. The paraspinal soft tissues  show no acute abnormalities. There is atherosclerotic calcification of  the carotid arterial system. The lung apices are unremarkable.      Impression    IMPRESSION: No acute traumatic abnormality.    ANALISA ZHU MD   Lumbar spine CT w/o contrast    Narrative    CT LUMBAR SPINE W/O CONTRAST  5/15/2019 2:11 AM      HISTORY: Fall, back pain.    TECHNIQUE: Multiplanar imaging in the lumbar spine without intravenous  contrast. Radiation dose for this scan was reduced using automated  exposure control, adjustment of the mA and/or kV according to patient  size, or iterative reconstruction technique.     COMPARISON: None.    FINDINGS: There is no acute fracture or traumatic malalignment. There  is extensive degenerative disc and facet disease throughout the lumbar  spine. There is moderate spinal stenosis at L3-L4. There is a convex  left lumbar scoliosis. The paraspinal soft tissues show no acute  abnormalities. There is atherosclerotic calcification of the aorta and  its branches. No aneurysm.      Impression    IMPRESSION: No acute traumatic abnormality.    ANALISA ZHU MD   XR Shoulder Right 2 Views    Narrative    XR SHOULDER 2 VIEW RIGHT  5/15/2019 2:45 AM     HISTORY: Fall. Right  arm swelling.    COMPARISON: None.       Impression    IMPRESSION: Deformity of the acromion may be due to advanced arthritis  in the shoulder rather than an acute fracture. The humeral head is  subluxed superiorly consistent with rotator cuff tear.    ANALISA ZHU MD   CT Shoulder Right w/o Contrast    Narrative    CT RIGHT SHOULDER WITHOUT CONTRAST  5/15/2019 4:43 AM     HISTORY:  Fall, swelling.    COMPARISON: 5/15/2019 and 9/28/2004 radiographs.    FINDINGS: Advanced shoulder arthropathy is noted. There is superior  migration of the humeral head presumably related to chronic rotator  cuff tear. This has eroded the undersurface of the acromion. The  acromion appears to be fragmented. While acute fracture of the  acromion is not entirely excluded, this appears to be chronic.  Prominent humeral head inferomedial osteophytic spurring is noted with  narrowing of the glenohumeral joint and mechanical erosion of the  glenoid fossa. Multiple calcifications are noted about the shoulder  likely representing numerous articular bodies and soft tissue  calcifications. Prominent fluid pocket is noted anteriorly, presumably  within the subscapularis recess or bursa.      Impression    IMPRESSION: Advanced cuff arthropathy with fragmentation of the  acromion and marked mechanical erosion of the glenoid fossa. Large  anterior fluid pocket is noted. There is no definitive evidence of  acute fracture. No dislocation.    DANIEL DESIR MD           Disclaimer: This note consists of symbols derived from keyboarding, dictation and/or voice recognition software. As a result, there may be errors in the script that have gone undetected. Please consider this when interpreting information found in this chart.

## 2019-05-18 NOTE — PROGRESS NOTES
Your information has been submitted on May 18th, 2019 at 04:13:50 PM CDT. The confirmation number is [id number]      HEAVENLY Rojas  [phone]

## 2019-05-18 NOTE — PROGRESS NOTES
Orthopedic Surgery  Jake Duane Pfleiger  2019  Admit Date:  5/15/2019  POD: 2 Days Post-Op  Procedure(s):  1.  Excision of bony mass, 3 x 3 x 2 cm, underneath right quadriceps tendon in total knee arthroplasty.  2.  Repair of right quadriceps tendon with suture anchor.     Repair tendon quadriceps    Patient sitting comfortably in chair.    Pain controlled.  Tolerating oral intake.    Denies nausea or vomiting.  Does note some shortness of breath that is unchanged from baseline. Denies chest pain.  No events overnight.      Alert and orient to person, place, and time.  Vital Sign Ranges  Temperature Temp  Av.4  F (36.3  C)  Min: 96.6  F (35.9  C)  Max: 97.9  F (36.6  C)   Blood pressure Systolic (24hrs), Av , Min:135 , Max:163        Diastolic (24hrs), Av, Min:53, Max:90      Pulse No data recorded   Respirations Resp  Av.3  Min: 16  Max: 18   Pulse oximetry SpO2  Av.5 %  Min: 92 %  Max: 98 %       Knee immobilizer in place.   Dressing is clean, dry, and intact.   Minimal erythema of the surrounding skin.   Bilateral calves are soft, non-tender.   Bilateral lower extremity is NVI.  Sensation intact bilateral lower extremities.  5/5 motor with resisted dorsi and plantar flexion bilaterally.  +DP pulse.     Labs:  Recent Labs   Lab Test 19  0558 05/15/19  0131 19  0631   POTASSIUM 4.3 4.5 4.0     Recent Labs   Lab Test 19  0607 19  1405 19  0558   HGB 8.3* 8.5* 7.6*     Recent Labs   Lab Test 19  0631 19  2219 19  1407   INR 1.49* 1.80* 3.27*     Recent Labs   Lab Test 19  0558 19  0637 05/15/19  0131    244 265       A/P  1. S/p right total knee removal of whitley fragment with repair of quad              Continue ASA for DVT prophylaxis.                Mobilize with PT/OT - no ROM of the knee, both passive or active               WBAT with knee immobilizer.                Continue current pain regiment.              Recheck  hgb - if under 7 transfuse              Watch for GI bleed (previously had) and may discharge ASA if necessary     2. Disposition              Anticipate d/c to TCU vs return to skilled nursing with elevated cares based on progress it PT and medical clearance.        CHERELLE HahnC

## 2019-05-18 NOTE — PROGRESS NOTES
Pt stable during ambulating to the bathroom with A1-2, cane, sling and immobilizer. PT tomorrow at 0800. Possible discharge tomorrow. Pt denying pain only taking scheduled tylenol for pain, good appetite, IV removed as infiltrating. Voiding small amounts. Needs lots of encouragement and reminders not to use right arm. Walker removed. Family would like to be notified about placement. Will keep monitoring.

## 2019-05-18 NOTE — PROGRESS NOTES
A&Ox4, On 2L of O2, Up with assist of 2 with a cane to the bathroom. Sling to RUE and uses a KI continues.Tolerating regular diet, passing flatus,+ve bowel sounds.Dressing is clean dry and intact. Denies pain taking scheduled Tylenol for pain.Baseline numbness to bilateral lower and upper extremities.Lost IV. Voiding adequate amounts in the bathroom.Plans for discharge on going.

## 2019-05-18 NOTE — DISCHARGE INSTRUCTIONS
Patient to discharge to [facility]  Address is [address] [place] [address]  Phone number is [phone]  Fax number is [phone]      Your information has been submitted on May 18th, 2019 at 04:13:50 PM CDT. The confirmation number is [id number]

## 2019-05-18 NOTE — PROGRESS NOTES
05/18/19 0800   Quick Adds   Type of Visit PT Re-evaluation   Living Environment   Lives With alone   Living Arrangements independent living facility   Home Accessibility no concerns   Transportation Anticipated family or friend will provide   Living Environment Comment [facility]   Self-Care   Usual Activity Tolerance moderate   Current Activity Tolerance fair   Regular Exercise No   Equipment Currently Used at Home walker, rolling;grab bar, toilet;grab bar, tub/shower;shower chair;other (see comments)  (4ww, sock aide, reacher/grabber)   Activity/Exercise/Self-Care Comment Functions independently at baseline with use of reacher/grabber and sock aide for lower body dressing, grab bars at toilet and shower, shower chair, 4WW.  Walks down for meals 1-2x/day, daughter sets up meds.   Functional Level Prior   Ambulation 1-->assistive equipment   Transferring 1-->assistive equipment   Toileting 1-->assistive equipment   Bathing 1-->assistive equipment   Communication 0-->understands/communicates without difficulty   Swallowing 0-->swallows foods/liquids without difficulty   Cognition 0 - no cognition issues reported   Fall history within last six months yes   Number of times patient has fallen within last six months 2   Which of the above functional risks had a recent onset or change? ambulation;transferring;fall history;bathing;dressing   General Information   Onset of Illness/Injury or Date of Surgery - Date 05/15/19  (5/16 Scope L knee)   Referring Physician Nathaniel   Patient/Family Goals Statement TCU   Pertinent History of Current Problem (include personal factors and/or comorbidities that impact the POC) Pt initially here under OBS status aftert a fall, then switched to IP status with arthroscope R knee 6/16 for removal of symptomatic mass R quad tendon, R shld sling for comfort, no weight bearing restrictions   Precautions/Limitations other (see comments)  (KI on at all times, sling for comfort  ROm as verna)   Weight-Bearing Status - RUE weight-bearing as tolerated   Weight-Bearing Status - RLE weight-bearing as tolerated   General Observations KI on in bed   General Info Comments Sliing for comfort only RUE, per imaging, unclear if acromion fx vs wear and tear from chronic RCT   Cognitive Status Examination   Orientation orientation to person, place and time   Level of Consciousness alert   Follows Commands and Answers Questions 100% of the time;able to follow single-step instructions   Personal Safety and Judgment intact   Memory intact   Pain Assessment   Patient Currently in Pain Yes, see Vital Sign flowsheet  (2-3/10 R knee, 0/10 R shld)   Posture    Posture Forward head position;Kyphosis;Protracted shoulders   Range of Motion (ROM)   ROM Comment WFL LLE and LUE, R shoulder limited severely at baseline d/t rotator cuff problems,/. R knee NT due to immobilizer on at all times   Strength   Strength Comments pt can SLR on L, unable to SLR on R,  UE weak   Bed Mobility   Bed Mobility Comments Mod A x1 RLE and torso   Transfer Skills   Transfer Comments Mod/max A sit<><stand w/ fww,    Gait   Gait Comments fww ambulation 25' for eval, KI on, CGA, mild antalgic gait sling on initially , then removed for comfort.    Balance   Balance Comments mild instability initial standing, then near baseline w/ fww once navigating   Clinical Impression   Criteria for Skilled Therapeutic Intervention yes, treatment indicated   PT Diagnosis impaired functional mobility    Influenced by the following impairments R knee pain, KI, mild R shld pain, chronic   Functional limitations due to impairments Mod A with bed mobility and tranfsfers, limited gait and act verna   Clinical Presentation Stable/Uncomplicated   Clinical Presentation Rationale surgical removal of painful osteophyte R knee completed   Clinical Decision Making (Complexity) Low complexity   Therapy Frequency` daily   Predicted Duration of Therapy Intervention  "(days/wks) 1-2 days   Anticipated Discharge Disposition Transitional Care Facility   Risk & Benefits of therapy have been explained Yes   Patient, Family & other staff in agreement with plan of care Yes   Clinical Impression Comments Pt having increased mobility challenges with R knee in KI, TCU now recommended.    [facility] AM-PAC  \"6 Clicks\" V.2 Basic Mobility Inpatient Short Form   1. Turning from your back to your side while in a flat bed without using bedrails? 3 - A Little   2. Moving from lying on your back to sitting on the side of a flat bed without using bedrails? 2 - A Lot   3. Moving to and from a bed to a chair (including a wheelchair)? 2 - A Lot   4. Standing up from a chair using your arms (e.g., wheelchair, or bedside chair)? 2 - A Lot   5. To walk in hospital room? 3 - A Little   6. Climbing 3-5 steps with a railing? 2 - A Lot   Basic Mobility Raw Score (Score out of 24.Lower scores equate to lower levels of function) 14   Total Evaluation Time   Total Evaluation Time (Minutes) 20     "

## 2019-05-19 NOTE — PLAN OF CARE
Physical Therapy Discharge Summary     Reason for therapy discharge:    Discharged to transitional care facility.     Progress towards therapy goal(s). See goals on Care Plan in [facility] electronic health record for goal details.  Goals partially met.  Barriers to achieving goals:   needing A with KI, sling, able to ambulate 240 feet needing CGA.      Therapy recommendation(s):    Continued therapy is recommended.  Rationale/Recommendations:  Pt is below baseline with functional mobility and strength and would benefit from continued PT to progress skills.

## 2019-05-20 LAB — COPATH REPORT: NORMAL

## 2019-08-13 ENCOUNTER — ANCILLARY PROCEDURE (OUTPATIENT)
Dept: CARDIOLOGY | Facility: CLINIC | Age: 84
End: 2019-08-13
Attending: INTERNAL MEDICINE
Payer: MEDICARE

## 2019-08-13 DIAGNOSIS — Z95.0 CARDIAC PACEMAKER IN SITU: ICD-10-CM

## 2019-08-14 LAB
MDC_IDC_EPISODE_DTM: NORMAL
MDC_IDC_EPISODE_ID: NORMAL
MDC_IDC_EPISODE_TYPE: NORMAL
MDC_IDC_LEAD_IMPLANT_DT: NORMAL
MDC_IDC_LEAD_LOCATION: NORMAL
MDC_IDC_LEAD_MFG: NORMAL
MDC_IDC_LEAD_MODEL: NORMAL
MDC_IDC_LEAD_POLARITY_TYPE: NORMAL
MDC_IDC_LEAD_SERIAL: NORMAL
MDC_IDC_MSMT_BATTERY_DTM: NORMAL
MDC_IDC_MSMT_BATTERY_REMAINING_LONGEVITY: 126 MO
MDC_IDC_MSMT_BATTERY_REMAINING_PERCENTAGE: 95.5 %
MDC_IDC_MSMT_BATTERY_RRT_TRIGGER: NORMAL
MDC_IDC_MSMT_BATTERY_STATUS: NORMAL
MDC_IDC_MSMT_BATTERY_VOLTAGE: 2.96 V
MDC_IDC_MSMT_LEADCHNL_RV_IMPEDANCE_VALUE: 430 OHM
MDC_IDC_MSMT_LEADCHNL_RV_LEAD_CHANNEL_STATUS: NORMAL
MDC_IDC_MSMT_LEADCHNL_RV_PACING_THRESHOLD_AMPLITUDE: 0.75 V
MDC_IDC_MSMT_LEADCHNL_RV_PACING_THRESHOLD_PULSEWIDTH: 1 MS
MDC_IDC_MSMT_LEADCHNL_RV_SENSING_INTR_AMPL: 12 MV
MDC_IDC_PG_IMPLANT_DTM: NORMAL
MDC_IDC_PG_MFG: NORMAL
MDC_IDC_PG_MODEL: NORMAL
MDC_IDC_PG_SERIAL: NORMAL
MDC_IDC_PG_TYPE: NORMAL
MDC_IDC_SESS_CLINIC_NAME: NORMAL
MDC_IDC_SESS_DTM: NORMAL
MDC_IDC_SESS_REPROGRAMMED: NO
MDC_IDC_SESS_TYPE: NORMAL
MDC_IDC_SET_BRADY_LOWRATE: 60 {BEATS}/MIN
MDC_IDC_SET_BRADY_MAX_SENSOR_RATE: 120 {BEATS}/MIN
MDC_IDC_SET_BRADY_MODE: NORMAL
MDC_IDC_SET_LEADCHNL_RV_PACING_AMPLITUDE: 2 V
MDC_IDC_SET_LEADCHNL_RV_PACING_ANODE_ELECTRODE_1: NORMAL
MDC_IDC_SET_LEADCHNL_RV_PACING_ANODE_LOCATION_1: NORMAL
MDC_IDC_SET_LEADCHNL_RV_PACING_CAPTURE_MODE: NORMAL
MDC_IDC_SET_LEADCHNL_RV_PACING_CATHODE_ELECTRODE_1: NORMAL
MDC_IDC_SET_LEADCHNL_RV_PACING_CATHODE_LOCATION_1: NORMAL
MDC_IDC_SET_LEADCHNL_RV_PACING_POLARITY: NORMAL
MDC_IDC_SET_LEADCHNL_RV_PACING_PULSEWIDTH: 1 MS
MDC_IDC_SET_LEADCHNL_RV_SENSING_ADAPTATION_MODE: NORMAL
MDC_IDC_SET_LEADCHNL_RV_SENSING_ANODE_ELECTRODE_1: NORMAL
MDC_IDC_SET_LEADCHNL_RV_SENSING_ANODE_LOCATION_1: NORMAL
MDC_IDC_SET_LEADCHNL_RV_SENSING_CATHODE_ELECTRODE_1: NORMAL
MDC_IDC_SET_LEADCHNL_RV_SENSING_CATHODE_LOCATION_1: NORMAL
MDC_IDC_SET_LEADCHNL_RV_SENSING_POLARITY: NORMAL
MDC_IDC_SET_LEADCHNL_RV_SENSING_SENSITIVITY: 2 MV
MDC_IDC_STAT_AT_DTM_END: NORMAL
MDC_IDC_STAT_AT_DTM_START: NORMAL
MDC_IDC_STAT_BRADY_DTM_END: NORMAL
MDC_IDC_STAT_BRADY_DTM_START: NORMAL
MDC_IDC_STAT_BRADY_RV_PERCENT_PACED: 11 %
MDC_IDC_STAT_CRT_DTM_END: NORMAL
MDC_IDC_STAT_CRT_DTM_START: NORMAL
MDC_IDC_STAT_HEART_RATE_DTM_END: NORMAL
MDC_IDC_STAT_HEART_RATE_DTM_START: NORMAL
MDC_IDC_STAT_HEART_RATE_VENTRICULAR_MAX: 210 {BEATS}/MIN
MDC_IDC_STAT_HEART_RATE_VENTRICULAR_MEAN: 85 {BEATS}/MIN
MDC_IDC_STAT_HEART_RATE_VENTRICULAR_MIN: 60 {BEATS}/MIN

## 2019-08-14 PROCEDURE — 93296 REM INTERROG EVL PM/IDS: CPT | Performed by: INTERNAL MEDICINE

## 2019-08-14 PROCEDURE — 93294 REM INTERROG EVL PM/LDLS PM: CPT | Performed by: INTERNAL MEDICINE

## 2019-09-28 ENCOUNTER — HOSPITAL ENCOUNTER (OUTPATIENT)
Facility: CLINIC | Age: 84
Setting detail: OBSERVATION
Discharge: HOME | End: 2019-09-30
Attending: EMERGENCY MEDICINE | Admitting: HOSPITALIST
Payer: MEDICARE

## 2019-09-28 DIAGNOSIS — K92.2 GASTROINTESTINAL HEMORRHAGE, UNSPECIFIED GASTROINTESTINAL HEMORRHAGE TYPE: ICD-10-CM

## 2019-09-28 PROBLEM — K62.5 RECTAL BLEEDING: Status: ACTIVE | Noted: 2019-09-28

## 2019-09-28 LAB
ABO + RH BLD: NORMAL
ABO + RH BLD: NORMAL
ANION GAP SERPL CALCULATED.3IONS-SCNC: 5 MMOL/L (ref 3–14)
BASOPHILS # BLD AUTO: 0 10E9/L (ref 0–0.2)
BASOPHILS NFR BLD AUTO: 0.3 %
BLD GP AB SCN SERPL QL: NORMAL
BLOOD BANK CMNT PATIENT-IMP: NORMAL
BUN SERPL-MCNC: 23 MG/DL (ref 7–30)
CALCIUM SERPL-MCNC: 8.9 MG/DL (ref 8.5–10.1)
CHLORIDE SERPL-SCNC: 101 MMOL/L (ref 94–109)
CO2 SERPL-SCNC: 30 MMOL/L (ref 20–32)
CREAT SERPL-MCNC: 1.11 MG/DL (ref 0.66–1.25)
DIFFERENTIAL METHOD BLD: ABNORMAL
EOSINOPHIL # BLD AUTO: 0 10E9/L (ref 0–0.7)
EOSINOPHIL NFR BLD AUTO: 0.3 %
ERYTHROCYTE [DISTWIDTH] IN BLOOD BY AUTOMATED COUNT: 16.3 % (ref 10–15)
GFR SERPL CREATININE-BSD FRML MDRD: 59 ML/MIN/{1.73_M2}
GLUCOSE SERPL-MCNC: 99 MG/DL (ref 70–99)
HCT VFR BLD AUTO: 28.4 % (ref 40–53)
HGB BLD-MCNC: 9.1 G/DL (ref 13.3–17.7)
IMM GRANULOCYTES # BLD: 0 10E9/L (ref 0–0.4)
IMM GRANULOCYTES NFR BLD: 0.3 %
INR PPP: 1.1 (ref 0.86–1.14)
LYMPHOCYTES # BLD AUTO: 1 10E9/L (ref 0.8–5.3)
LYMPHOCYTES NFR BLD AUTO: 11.8 %
MCH RBC QN AUTO: 30.3 PG (ref 26.5–33)
MCHC RBC AUTO-ENTMCNC: 32 G/DL (ref 31.5–36.5)
MCV RBC AUTO: 95 FL (ref 78–100)
MONOCYTES # BLD AUTO: 0.7 10E9/L (ref 0–1.3)
MONOCYTES NFR BLD AUTO: 7.5 %
NEUTROPHILS # BLD AUTO: 6.9 10E9/L (ref 1.6–8.3)
NEUTROPHILS NFR BLD AUTO: 79.8 %
NRBC # BLD AUTO: 0 10*3/UL
NRBC BLD AUTO-RTO: 0 /100
PLATELET # BLD AUTO: 237 10E9/L (ref 150–450)
POTASSIUM SERPL-SCNC: 4.3 MMOL/L (ref 3.4–5.3)
RBC # BLD AUTO: 3 10E12/L (ref 4.4–5.9)
SODIUM SERPL-SCNC: 136 MMOL/L (ref 133–144)
SPECIMEN EXP DATE BLD: NORMAL
WBC # BLD AUTO: 8.7 10E9/L (ref 4–11)

## 2019-09-28 PROCEDURE — G0378 HOSPITAL OBSERVATION PER HR: HCPCS

## 2019-09-28 PROCEDURE — 86900 BLOOD TYPING SEROLOGIC ABO: CPT | Performed by: EMERGENCY MEDICINE

## 2019-09-28 PROCEDURE — 36415 COLL VENOUS BLD VENIPUNCTURE: CPT | Performed by: PHYSICIAN ASSISTANT

## 2019-09-28 PROCEDURE — 99285 EMERGENCY DEPT VISIT HI MDM: CPT | Mod: 25

## 2019-09-28 PROCEDURE — 86850 RBC ANTIBODY SCREEN: CPT | Performed by: EMERGENCY MEDICINE

## 2019-09-28 PROCEDURE — 93005 ELECTROCARDIOGRAM TRACING: CPT

## 2019-09-28 PROCEDURE — 80048 BASIC METABOLIC PNL TOTAL CA: CPT | Performed by: EMERGENCY MEDICINE

## 2019-09-28 PROCEDURE — 86901 BLOOD TYPING SEROLOGIC RH(D): CPT | Performed by: EMERGENCY MEDICINE

## 2019-09-28 PROCEDURE — 99220 ZZC INITIAL OBSERVATION CARE,LEVL III: CPT | Performed by: PHYSICIAN ASSISTANT

## 2019-09-28 PROCEDURE — 85025 COMPLETE CBC W/AUTO DIFF WBC: CPT | Performed by: EMERGENCY MEDICINE

## 2019-09-28 PROCEDURE — 25000132 ZZH RX MED GY IP 250 OP 250 PS 637: Mod: GY | Performed by: PHYSICIAN ASSISTANT

## 2019-09-28 PROCEDURE — 85610 PROTHROMBIN TIME: CPT | Performed by: PHYSICIAN ASSISTANT

## 2019-09-28 RX ORDER — FERROUS SULFATE 325(65) MG
325 TABLET ORAL
Status: ON HOLD | COMMUNITY
End: 2021-10-09

## 2019-09-28 RX ORDER — AMOXICILLIN 250 MG
1 CAPSULE ORAL 2 TIMES DAILY PRN
Status: DISCONTINUED | OUTPATIENT
Start: 2019-09-28 | End: 2019-09-30 | Stop reason: HOSPADM

## 2019-09-28 RX ORDER — AMOXICILLIN 250 MG
2 CAPSULE ORAL 2 TIMES DAILY PRN
Status: DISCONTINUED | OUTPATIENT
Start: 2019-09-28 | End: 2019-09-30 | Stop reason: HOSPADM

## 2019-09-28 RX ORDER — ONDANSETRON 4 MG/1
4 TABLET, ORALLY DISINTEGRATING ORAL EVERY 6 HOURS PRN
Status: DISCONTINUED | OUTPATIENT
Start: 2019-09-28 | End: 2019-09-30 | Stop reason: HOSPADM

## 2019-09-28 RX ORDER — ACETAMINOPHEN 325 MG/1
650 TABLET ORAL EVERY 4 HOURS PRN
Status: DISCONTINUED | OUTPATIENT
Start: 2019-09-28 | End: 2019-09-30 | Stop reason: HOSPADM

## 2019-09-28 RX ORDER — ZOLPIDEM TARTRATE 10 MG/1
10 TABLET ORAL AT BEDTIME
Status: ON HOLD | COMMUNITY
End: 2019-11-27

## 2019-09-28 RX ORDER — PROCHLORPERAZINE MALEATE 5 MG
5 TABLET ORAL EVERY 6 HOURS PRN
Status: DISCONTINUED | OUTPATIENT
Start: 2019-09-28 | End: 2019-09-30 | Stop reason: HOSPADM

## 2019-09-28 RX ORDER — PROCHLORPERAZINE 25 MG
12.5 SUPPOSITORY, RECTAL RECTAL EVERY 12 HOURS PRN
Status: DISCONTINUED | OUTPATIENT
Start: 2019-09-28 | End: 2019-09-30 | Stop reason: HOSPADM

## 2019-09-28 RX ORDER — ASPIRIN 81 MG/1
81 TABLET ORAL DAILY
Status: ON HOLD | COMMUNITY
End: 2019-09-30

## 2019-09-28 RX ORDER — ONDANSETRON 2 MG/ML
4 INJECTION INTRAMUSCULAR; INTRAVENOUS EVERY 6 HOURS PRN
Status: DISCONTINUED | OUTPATIENT
Start: 2019-09-28 | End: 2019-09-30 | Stop reason: HOSPADM

## 2019-09-28 RX ORDER — NALOXONE HYDROCHLORIDE 0.4 MG/ML
.1-.4 INJECTION, SOLUTION INTRAMUSCULAR; INTRAVENOUS; SUBCUTANEOUS
Status: DISCONTINUED | OUTPATIENT
Start: 2019-09-28 | End: 2019-09-30 | Stop reason: HOSPADM

## 2019-09-28 RX ORDER — ZOLPIDEM TARTRATE 5 MG/1
5 TABLET ORAL AT BEDTIME
Status: DISCONTINUED | OUTPATIENT
Start: 2019-09-28 | End: 2019-09-30 | Stop reason: HOSPADM

## 2019-09-28 RX ORDER — ACETAMINOPHEN 325 MG/1
1300 TABLET ORAL 2 TIMES DAILY
Status: DISCONTINUED | OUTPATIENT
Start: 2019-09-28 | End: 2019-09-30 | Stop reason: HOSPADM

## 2019-09-28 RX ORDER — ACETAMINOPHEN 650 MG/1
650 SUPPOSITORY RECTAL EVERY 4 HOURS PRN
Status: DISCONTINUED | OUTPATIENT
Start: 2019-09-28 | End: 2019-09-30 | Stop reason: HOSPADM

## 2019-09-28 RX ADMIN — ZOLPIDEM TARTRATE 5 MG: 5 TABLET, COATED ORAL at 22:38

## 2019-09-28 RX ADMIN — ACETAMINOPHEN 1300 MG: 325 TABLET, FILM COATED ORAL at 20:14

## 2019-09-28 ASSESSMENT — ENCOUNTER SYMPTOMS
DIZZINESS: 1
BLOOD IN STOOL: 1
COLOR CHANGE: 1
ABDOMINAL PAIN: 1
WEAKNESS: 1
DIARRHEA: 1
ANAL BLEEDING: 1

## 2019-09-28 NOTE — ED NOTES
Bed: ED11  Expected date: 9/28/19  Expected time: 3:30 PM  Means of arrival: Ambulance  Comments:  Alistair 514- gi bleed

## 2019-09-28 NOTE — ED PROVIDER NOTES
History     Chief Complaint:  Rectal Bleeding      HPI   Jake Duane Pfleiger is a 88 year old male with a history of GI bleeds who is no longer on blood thinners and presents to the emergency department today for evaluation of rectal bleeding. Patient complains of black blood that has now had some red spots as well. He also complains of nausea, dizziness, and weakness. His last colonoscopy was in May, and they saw some dried blood. Patient has a history of GI bleeds, and his coumadin was stopped. This episode of bleeding started on Thursday, and his pain and weakness worsened significantly today. He has some slight abdominal pain that started a few hours ago. Additionally, he states he is having diarrhea and had increasing frequency of stools that have maroon colored blood. Of note, he does take iron supplements, and believes his darker stools may be from that.      Allergies:  No known drug allergies     Medications:    Acetaminophen    Ascorbic acid  Aspirin   Atenolol  Alphagan  Ferosul  Fish oil  Lasix  Norco  Synthroid  Prinivil  Prilosec  Paxil  Miralax  Senokot    Past Medical History:    Anxiety   Arthritis  Atrial fibrillation  Cardiomyopathy  Depression  Diastolic CHF   Diverticulosis   GERD   GI bleed   Hypertension  Insomnia  Nodule of lower lobe of left lung   Pacemaker  Tachy-jairo syndrome  Thyroid disease     Past Surgical History:    Arthroplasty hip and knee  Cholecystectomy   Eye surgery  Pacemaker  Repair quadriceps    Family History:    Cancer    Social History:  Smoking Status: former  Smokeless Tobacco: no  Alcohol Use: no   Marital Status:   [5]     Review of Systems   Gastrointestinal: Positive for abdominal pain, anal bleeding, blood in stool and diarrhea.   Skin: Positive for color change (pale).   Neurological: Positive for dizziness and weakness.   All other systems reviewed and are negative.    Physical Exam     Patient Vitals for the past 24 hrs:   BP Temp Temp src Heart Rate  SpO2 Weight   09/28/19 1602 -- -- -- -- -- 84 kg (185 lb 3 oz)   09/28/19 1601 (!) 147/69 98.8  F (37.1  C) Oral 77 96 % --        Physical Exam  General: Patient is alert and interactive when I enter the room, pale appearing   Head:  The scalp, face, and head appear normal  Eyes:  Conjunctivae are normal  ENT:    The nose is normal    Pinnae are normal    External acoustic canals are normal  Neck:  Trachea midline  CV:  Pulses are normal, RRR  Resp:  No respiratory distress, CTAB   Abdomen:      Soft, non-tender, non-distended  Musc:  Normal muscular tone    No major joint effusions    No asymmetric leg swelling  Rectal:  Dried blood around rectum, no active bleeding  Skin:  No rash or lesions noted  Neuro:  Speech is normal and fluent. Face is symmetric.     Moving all extremities well.   Psych: Awake. Alert.  Normal affect.  Appropriate interactions.    Emergency Department Course   ECG (16:03:02):  Rate 61 bpm. ND interval *. QRS duration 78. QT/QTc 376/378. P-R-T axes * 13 89. Ventricular paced rhythm. Abnormal ECG. Interpreted at 1604 by Davida Elise MD.     Laboratory:  Laboratory findings were communicated with the patient who voiced understanding of the findings.   CBC: WBC 8.7, HGB 9.1 (L) ,    ABORH: O positive, negative antibody screen  BMP: GFR 59 (L)  (Creatinine 1.11)     Emergency Department Course:  Past medical records, nursing notes, and vitals reviewed.    1607: I performed an exam of the patient and obtained history, as documented above.     IV inserted and blood drawn.     Findings and plan explained to the Patient who consents to admission.     : Discussed the patient with RPACHI Glynn , for Dr. Berrios who will admit the patient to a obs bed for further monitoring, evaluation, and treatment.           Impression & Plan      Medical Decision Making:   Wolf Ordoñez is a 89 yo male with a history of diverticular bleed who GI bleed.  Patient has a long history of diverticular  bleeds.  Patient endorses mild abdominal pain but his abdomen is quite benign.  On rectal exam he has no obvious active bleeding.  Hemoglobin here was actually improved from his last discharge where he had a bleed.  However he is quite pale and certainly symptomatic from the blood loss.  Adequate IV access was obtained and type and screen also obtained.  Given his history of diverticular bleed and the fact that he is symptomatic I do think he needs to be admitted.  He may end up needing a transfusion but certainly does not need a transfusion currently.  He remains hemodynamically stable with no hypotension.  He is no longer on blood thinners.  No indication for imaging at this point as his abdomen is benign.  Patient be admitted to obs for continued hemoglobin checks and possibly GI consult of his bleeding continues.  Patient [facility] to [facility] in [facility] condition.    Diagnosis:    ICD-10-CM    1. Gastrointestinal hemorrhage, unspecified gastrointestinal hemorrhage type K92.2        Disposition:  Admitted to Dr. Yash Arnold  9/28/2019   [facility] EMERGENCY DEPARTMENT  Scribe Disclosure:  I, Verenice Arnold, am serving as a scribe at 4:07 PM on 9/28/2019 to document services personally performed by Davida Elise MD based on my observations and the provider's statements to me.       Davida Elise MD  09/28/19 2007

## 2019-09-28 NOTE — ED TRIAGE NOTES
Patient presents with stated feeling of weakness for past 3 days to include bright red bleeding in stools, hx of GI bleed, A&OX4

## 2019-09-28 NOTE — H&P
[facility]    History and Physical - Hospitalist Service       Date of Admission:  9/28/2019    Assessment & Plan   Jake Duane Pfleiger is a 88 year old male admitted on 9/28/2019 for rectal bleeding.    Acute blood loss anemia  Rectal bleeding  --last hemoglobin 12.6 9/13/2019 at primary care.  Two days of rectal bleeding.  Last colonoscopy 10/2018 with evidence of hematin in rectum, felt likely source of bleed is left colon, diverticular.  Also GIB earlier this year not requiring intervention.  Hemoglobin 12.6 earlier this month, 9.1 on admission  --hold iron supplement for now   --q6H hemoglobin, transfuse for hemoglobin <7.0 (patient consented)  --GI consult though suspect source of bleeding is diverticular and may not require further work-up if no ongoing bleeding.  Hospitalization in 10/2018 for GIB with colonoscopy at that time    Acute renal failure, mild, likely pre-renal  --repeat creatinine in AM    Tachy-jairo syndrome s/p PPM  Cardiomyopathy, chronic compensated diastolic heart failure  Atrial fibrillation  --hold lisinopril, atenolol, furosemide for now given GIB  --hold aspirin d/t GIB.  No longer on warfarin due to previous GIB    Hypothyroidism: Continue levothyroxine  Depressive disorder: resume paroxetine on discharge       Diet: clears for now, NPO p MN in case procedure needed  DVT Prophylaxis: VTE Prophylaxis contraindicated due to GIB  Larson Catheter: not present  Code Status: FULL    Disposition Plan   Expected discharge: 1-2 days, recommended to prior living arrangement once no ongoing bleeding.  Entered: Edith Lui PA-C 09/28/2019, 6:15 PM         Edith Lui PA-C  [facility]    ______________________________________________________________________    Chief Complaint   Rectal bleeding    History is obtained from the patient    History of Present Illness   Jake Duane Pfleiger is a 88 year old male with history of diverticular bleed admitted for  rectal bleeding.  Reports rectal bleeding started Thursday 9/26/2019.  He cannot tell me how many stools he has had but reports he had three loose stools today with red blood.  He typically has dark stools related to iron supplementation started in June 2019.  He felt dizzy and light-headed, prompting evaluation in ED today.  No falls or passing out.  Feels less light-headed currently.  He initially had crampy abdominal pain today but none currently.  No fevers.  No dyspnea.  No chest pain.  Weight has been stable.      Review of Systems    The 10 point Review of Systems is negative other than noted in the HPI or here.     Past Medical History    I have reviewed this patient's medical history and updated it with pertinent information if needed.   Past Medical History:   Diagnosis Date     Anxiety      Arthritis      Atrial fibrillation (H)      Cardiomyopathy (H)      Depression      Diastolic CHF (H) 04/10/2016     Diverticulosis      Gastro-oesophageal reflux disease      GI bleed 10/02/2018    Lower     Hypertension      Insomnia      Nodule of lower lobe of left lung     CT Chest 1/2019-stable     Pacemaker     [employer] Dual Pacemaker     Permanent atrial fibrillation (H)      Right patella fracture      Tachy-jairo syndrome (H)      Thyroid disease     Hypothyrodism        Past Surgical History   I have reviewed this patient's surgical history and updated it with pertinent information if needed.  Past Surgical History:   Procedure Laterality Date     ARTHROPLASTY HIP  1/21/2014    left hip replacement     ARTHROPLASTY KNEE      right      ARTHROTOMY KNEE Right 5/16/2019    Procedure: 1.  Excision of bony mass, 3 x 3 x 2 cm, underneath right quadriceps tendon in total knee arthroplasty.  2.  Repair of right quadriceps tendon with suture anchor.   ;  Surgeon: Ag Armstrong MD;  Location: [facility] OR     CHOLECYSTECTOMY       COLONOSCOPY N/A 10/3/2018    Procedure: COLONOSCOPY;  COLONOSCOPY Rm.#[id number];  Surgeon:  Reese Burroughs MD;  Location:  GI     COLONOSCOPY N/A 10/5/2018    Procedure: COLONOSCOPY;  COLONOSCOPY  Rm.#[id number];  Surgeon: Reese Burroughs MD;  Location:  GI     EYE SURGERY      right eye cataract removal      IMPLANT PACEMAKER  2012    dual chamber      ORTHOPEDIC SURGERY      back surgery      REPAIR TENDON QUADRICEPS Right 2019    Procedure: Repair tendon quadriceps;  Surgeon: Ag Armstrong MD;  Location:  OR       Social History   I have reviewed this patient's social history and updated it with pertinent information if needed.  Social History     Tobacco Use     Smoking status: Former Smoker     Packs/day: 0.50     Years: 25.00     Pack years: 12.50     Types: Cigarettes, Pipe, Cigars     Last attempt to quit: 1982     Years since quittin.7     Smokeless tobacco: Never Used   Substance Use Topics     Alcohol use: No     Alcohol/week: 0.0 standard drinks     Drug use: No   Lives in assisted living in [place], [place].      Family History   I have reviewed this patient's family history and updated it with pertinent information if needed.   Family History   Problem Relation Age of Onset     Other Cancer Mother      Unknown/Adopted Father        Prior to Admission Medications   Prior to Admission Medications   Prescriptions Last Dose Informant Patient Reported? Taking?   HYDROcodone-acetaminophen (NORCO) 5-325 MG tablet   No No   Sig: Take 1 tablet by mouth every 6 hours as needed for severe pain   Levothyroxine Sodium (SYNTHROID PO)   Yes No   Sig: Take 100 mcg by mouth daily.   Lisinopril (PRINIVIL PO)   Yes No   Sig: Take 10 mg by mouth daily    acetaminophen (TYLENOL 8 HOUR ARTHRITIS PAIN) 650 MG CR tablet   Yes No   Sig: Take 1,300 mg by mouth 2 times daily   ascorbic acid 500 MG CHEW   No No   Sig: Take 500 mg by mouth 2 times daily   aspirin (ASA) 325 MG EC tablet   No No   Sig: Take 1 tablet (325 mg) by mouth daily   atenolol (TENORMIN) 25 MG tablet   Yes No   Sig:  Take 25 mg by mouth daily   brimonidine (ALPHAGAN-P) 0.15 % ophthalmic solution   Yes No   Sig: Place 1 drop into the right eye 2 times daily   ferrous sulfate (FEROSUL) 325 (65 Fe) MG tablet   No No   Sig: Take 1 tablet (325 mg) by mouth 2 times daily (with meals)   fish oil-omega-3 fatty acids (FISH OIL) 1000 MG capsule   Yes No   Sig: Take 2 g by mouth daily    furosemide (LASIX) 20 MG tablet   No No   Sig: Take 1 tablet (20 mg) by mouth daily   omeprazole (PRILOSEC OTC) 20 MG EC tablet   Yes No   Sig: Take 20 mg by mouth daily as needed   paroxetine (PAXIL) 20 MG tablet   Yes No   Sig: Take 20 mg by mouth every morning.   polyethylene glycol (MIRALAX/GLYCOLAX) packet   Yes No   Sig: Take 1 packet by mouth daily as needed    senna-docusate (SENOKOT-S/PERICOLACE) 8.6-50 MG tablet   No No   Sig: Take 2 tablets by mouth 2 times daily      Facility-Administered Medications: None     Allergies   No Known Allergies    Physical Exam   Vital Signs: Temp: 98.8  F (37.1  C) Temp src: Oral BP: 121/78 Pulse: 81 Heart Rate: 79 Resp: 24 SpO2: 99 %      Weight: 185 lbs 2.98 oz    Constitutional: nontoxic appearing man sitting up in bed  Eyes: no icterus.    HEENT: mucous membranes moist.  Pale conjunctivae  Respiratory: clear bilaterally  Cardiovascular: irregularly irregular, 1/6 murmur  GI: normoactive bowel sounds, soft, nontender  Lymph/Hematologic: no bruising  Genitourinary: no catheter  Skin: warm and dry  Musculoskeletal: normal muscle bulk and tone  Neurologic: nonfocal  Psychiatric: alert, oriented, appropriate      Data   Data reviewed today: I reviewed all medications, new labs and imaging results over the last 24 hours. I personally reviewed no images or EKG's today.    Recent Labs   Lab 09/28/19  1603   WBC 8.7   HGB 9.1*   MCV 95         POTASSIUM 4.3   CHLORIDE 101   CO2 30   BUN 23   CR 1.11   ANIONGAP 5   MANAS 8.9   GLC 99     No results found for this or any previous visit (from the past 24  hour(s)).

## 2019-09-28 NOTE — ED NOTES
[facility]  ED Nurse Handoff Report    Jake Duane Pfleiger is a 88 year old male   ED Chief complaint: Rectal Bleeding  . ED Diagnosis:   Final diagnoses:   Gastrointestinal hemorrhage, unspecified gastrointestinal hemorrhage type     Allergies: No Known Allergies    Code Status: Full Code  Activity level - Baseline/Home:  Independent. Activity Level - Current:   Assist X 1. Lift room needed: No. Bariatric: No   Needed: No   Isolation: No . Infection: Not Applicable.     Vital Signs:   Vitals:    09/28/19 1601 09/28/19 1602 09/28/19 1700 09/28/19 1715   BP: (!) 147/69  (!) 116/105 121/78   Pulse:   82 81   Resp:   21 18   Temp: 98.8  F (37.1  C)      TempSrc: Oral      SpO2: 96%  90% 98%   Weight:  84 kg (185 lb 3 oz)         Cardiac Rhythm:  ,      Pain level:    Patient confused: No. Patient Falls Risk: Yes.   Elimination Status: Has not voided yet   Patient Report - Initial Complaint:Patient presents with stated feeling of weakness for past 3 days to include bright red bleeding in stools, hx of GI bleed, A&OX4 .   Focused Assessment:  Gastrointestinal - GI WDL: -WDL except  Gastrointestinal Comment: Patient presents with stated feeling of weakness, patient states he has had bright red blood in his stools over the past two days, A&OX4, CMS intact in BL UE & LE, skin is cool to touch in BL UE, abdomen non tender to palpation, 3 bowel movements today with bright red blood present he stated   Tests Performed: Labs. Abnormal Results:   Labs Ordered and Resulted from Time of ED Arrival Up to the Time of Departure from the ED   CBC WITH PLATELETS DIFFERENTIAL - Abnormal; Notable for the following components:       Result Value    RBC Count 3.00 (*)     Hemoglobin 9.1 (*)     Hematocrit 28.4 (*)     RDW 16.3 (*)     All other components within normal limits   BASIC METABOLIC PANEL - Abnormal; Notable for the following components:    GFR Estimate 59 (*)     All other components within normal  limits   ABO/RH TYPE AND SCREEN   .   Treatments provided: See MAR  Family Comments: Jason Lemos at bedside  OBS brochure/video discussed/provided to patient:  Yes  ED Medications: Medications - No data to display  Drips infusing:  No  For the majority of the shift, the patient's behavior Green. Interventions performed were N/A.     Severe Sepsis OR Septic Shock Diagnosis Present: No      ED Nurse Name/Phone Number: Lázaro Domínguez RN,   5:16 PM    RECEIVING UNIT ED HANDOFF REVIEW    Above ED Nurse Handoff Report was reviewed: Yes  Reviewed by: Karen M. Lesch, RN on September 28, 2019 at 6:17 PM

## 2019-09-29 LAB
ANION GAP SERPL CALCULATED.3IONS-SCNC: 4 MMOL/L (ref 3–14)
BUN SERPL-MCNC: 20 MG/DL (ref 7–30)
CALCIUM SERPL-MCNC: 8.2 MG/DL (ref 8.5–10.1)
CHLORIDE SERPL-SCNC: 104 MMOL/L (ref 94–109)
CO2 SERPL-SCNC: 28 MMOL/L (ref 20–32)
CREAT SERPL-MCNC: 0.8 MG/DL (ref 0.66–1.25)
GFR SERPL CREATININE-BSD FRML MDRD: 79 ML/MIN/{1.73_M2}
GLUCOSE SERPL-MCNC: 82 MG/DL (ref 70–99)
HGB BLD-MCNC: 7.4 G/DL (ref 13.3–17.7)
HGB BLD-MCNC: 7.8 G/DL (ref 13.3–17.7)
HGB BLD-MCNC: 8.1 G/DL (ref 13.3–17.7)
POTASSIUM SERPL-SCNC: 3.9 MMOL/L (ref 3.4–5.3)
SODIUM SERPL-SCNC: 136 MMOL/L (ref 133–144)

## 2019-09-29 PROCEDURE — 25000132 ZZH RX MED GY IP 250 OP 250 PS 637: Mod: GY | Performed by: PHYSICIAN ASSISTANT

## 2019-09-29 PROCEDURE — 85018 HEMOGLOBIN: CPT | Performed by: PHYSICIAN ASSISTANT

## 2019-09-29 PROCEDURE — G0378 HOSPITAL OBSERVATION PER HR: HCPCS

## 2019-09-29 PROCEDURE — 80048 BASIC METABOLIC PNL TOTAL CA: CPT | Performed by: PHYSICIAN ASSISTANT

## 2019-09-29 PROCEDURE — 36415 COLL VENOUS BLD VENIPUNCTURE: CPT | Performed by: PHYSICIAN ASSISTANT

## 2019-09-29 PROCEDURE — 99225 ZZC SUBSEQUENT OBSERVATION CARE,LEVEL II: CPT | Performed by: PHYSICIAN ASSISTANT

## 2019-09-29 RX ORDER — BRIMONIDINE TARTRATE 1.5 MG/ML
1 SOLUTION/ DROPS OPHTHALMIC 2 TIMES DAILY
Status: DISCONTINUED | OUTPATIENT
Start: 2019-09-29 | End: 2019-09-30 | Stop reason: HOSPADM

## 2019-09-29 RX ORDER — PAROXETINE 20 MG/1
20 TABLET, FILM COATED ORAL EVERY MORNING
Status: DISCONTINUED | OUTPATIENT
Start: 2019-09-30 | End: 2019-09-30 | Stop reason: HOSPADM

## 2019-09-29 RX ORDER — LISINOPRIL 10 MG/1
10 TABLET ORAL DAILY
Status: DISCONTINUED | OUTPATIENT
Start: 2019-09-29 | End: 2019-09-30 | Stop reason: HOSPADM

## 2019-09-29 RX ORDER — FERROUS SULFATE 325(65) MG
325 TABLET ORAL
Status: DISCONTINUED | OUTPATIENT
Start: 2019-09-30 | End: 2019-09-30 | Stop reason: HOSPADM

## 2019-09-29 RX ORDER — ATENOLOL 25 MG/1
25 TABLET ORAL DAILY
Status: DISCONTINUED | OUTPATIENT
Start: 2019-09-29 | End: 2019-09-30 | Stop reason: HOSPADM

## 2019-09-29 RX ORDER — LEVOTHYROXINE SODIUM 100 UG/1
100 TABLET ORAL DAILY
Status: DISCONTINUED | OUTPATIENT
Start: 2019-09-29 | End: 2019-09-30 | Stop reason: HOSPADM

## 2019-09-29 RX ADMIN — ACETAMINOPHEN 1300 MG: 325 TABLET, FILM COATED ORAL at 20:57

## 2019-09-29 RX ADMIN — ACETAMINOPHEN 1300 MG: 325 TABLET, FILM COATED ORAL at 07:57

## 2019-09-29 RX ADMIN — ZOLPIDEM TARTRATE 5 MG: 5 TABLET, COATED ORAL at 22:46

## 2019-09-29 NOTE — PROGRESS NOTES
[facility]    Medicine Progress Note - Hospitalist Service       Date of Admission:  9/28/2019    Assessment & Plan   89 yo with AFib, ICM, CHF, HTN, SSS s/p PPM, and history of diverticular bleed who presented to [facility] with bloody stools and found to have acute blood loss anemia.    Lower GI bleed  Possibly recurrent diverticular bleed.  Pt hemodynamically stable.  GI following - will see later today.  Recommend CLD and trend hemoglobin.  So far Hgb 9.1 >> 8.1 >> 7.4.  Repeat Hgb at 1300.  ASA on hold.     SSS s/p PPM  Permanent AFib  PTA atenolol and ASA on hold.  Is not anticoagulated 2/2 prior GI bleed.     ICM  Chronic diastolic CHF, compensated  PTA lisinopril, atenolol, ASA, and lasix on hold for now.     Diet: Clear Liquid Diet    DVT Prophylaxis: Pneumatic Compression Devices  Larson Catheter: not present  Code Status: Full Code      DISPO:  Home likely tomorrow pending stable hgb and ability to advance diet.        The patient's care was discussed with the Attending Physician, Dr. Berrios.    PRACHI Colin, PA  Hospitalist Service  [facility]    ______________________________________________________________________    Interval History   Tired. No bloody BM since admission    Data reviewed today: I reviewed all medications, new labs and imaging results over the last 24 hours. I personally reviewed the EKG tracing showing V-paced rhythm.    Physical Exam   Vital Signs: Temp: 97.8  F (36.6  C) Temp src: Oral BP: 119/57 Pulse: 81 Heart Rate: 80 Resp: 16 SpO2: 90 % O2 Device: None (Room air)    Weight: 185 lbs 2.98 oz  GENERAL:  Pleasant, cooperative, alert. Well developed, well nourished.   HEENT: Normocephalic, atraumatic.  Extra occular mm intact.  Sclera clear   PULMONOLOGY: Clear to auscultation bilaterally    CARDIAC: Regular rate and rhythm.     ABDOMEN: Soft, nontender   MUSCULOSKELETAL:  Moving x 4 spontaneously with CMS intact x4.     NEURO: Alert and oriented x3.  CN  II-XII grossly intact and symmetric.    Nonfocal exam.    Data   Recent Labs   Lab 09/29/19  0547 09/29/19  0020 09/28/19  1910 09/28/19  1603   WBC  --   --   --  8.7   HGB 7.4* 8.1*  --  9.1*   MCV  --   --   --  95   PLT  --   --   --  237   INR  --   --  1.10  --      --   --  136   POTASSIUM 3.9  --   --  4.3   CHLORIDE 104  --   --  101   CO2 28  --   --  30   BUN 20  --   --  23   CR 0.80  --   --  1.11   ANIONGAP 4  --   --  5   MANAS 8.2*  --   --  8.9   GLC 82  --   --  99     No results found for this or any previous visit (from the past 24 hour(s)).

## 2019-09-29 NOTE — CONSULTS
GASTROENTEROLOGY CONSULTATION     Jake Duane Pfleiger  [address]   [place] [facility] [address]  88 year old male    Admission Date/Time: 9/28/2019  Primary Care Provider: Herve Sweet    We were asked to see the patient in consultation by Dr. Delatorre for evaluation of blood in stool.        HPI:  Jake Duane Pfleiger is a 88 year old male who was admitted to [facility] overnight with 2-3 days of noting bright red blood with his stools.  His hemoglobin over the past year has been in the 8-10 range.    Patient states he initially had a very mild lower abdominal discomfort but this has subsided. No bowel movements since noon yesterday.    History of bloody stools in the past. October 2018 colonoscopy showed blood on the left side of the colon and multiple diverticula on the left side, diverticular bleed had been suspected. Patient was on anticoagulation at that time which was stopped. Had another episode in March 2019 that resolved without intervention.    ROS: A comprehensive ten point review of systems was negative aside from those in mentioned in the HPI.      MEDICATIONS: No current facility-administered medications on file prior to encounter.   acetaminophen (TYLENOL 8 HOUR ARTHRITIS PAIN) 650 MG CR tablet, Take 1,300 mg by mouth 2 times daily  Ascorbic Acid 500 MG CHEW, Take 1,000 mg by mouth every evening  aspirin 81 MG EC tablet, Take 81 mg by mouth daily  atenolol (TENORMIN) 25 MG tablet, Take 25 mg by mouth daily  brimonidine (ALPHAGAN-P) 0.15 % ophthalmic solution, Place 1 drop into the right eye 2 times daily  ferrous sulfate (FEROSUL) 325 (65 Fe) MG tablet, Take 325 mg by mouth daily (with breakfast)  fish oil-omega-3 fatty acids (FISH OIL) 1000 MG capsule, Take 2 g by mouth daily   furosemide (LASIX) 20 MG tablet, Take 1 tablet (20 mg) by mouth daily  levothyroxine (SYNTHROID) 100 MCG tablet, Take 100 mcg by mouth daily   lisinopril (PRINIVIL) 10 MG tablet, Take 10 mg by mouth  daily   paroxetine (PAXIL) 20 MG tablet, Take 20 mg by mouth every morning.  polyethylene glycol (MIRALAX/GLYCOLAX) packet, Take 1 packet by mouth daily as needed   Wheat Dextrin (BENEFIBER PO), Take by mouth daily  zolpidem (AMBIEN) 10 MG tablet, Take 10 mg by mouth At Bedtime        ALLERGIES: No Known Allergies    Past Medical History:   Diagnosis Date     Anxiety      Arthritis      Atrial fibrillation (H)      Cardiomyopathy (H)      Depression      Diastolic CHF (H) 04/10/2016     Diverticulosis      Gastro-oesophageal reflux disease      GI bleed 10/02/2018    Lower     Hypertension      Insomnia      Nodule of lower lobe of left lung     CT Chest 1/2019-stable     Pacemaker     [employer] Dual Pacemaker     Permanent atrial fibrillation (H)      Right patella fracture      Tachy-jairo syndrome (H)      Thyroid disease     Hypothyrodism        Past Surgical History:   Procedure Laterality Date     ARTHROPLASTY HIP  1/21/2014    left hip replacement     ARTHROPLASTY KNEE      right      ARTHROTOMY KNEE Right 5/16/2019    Procedure: 1.  Excision of bony mass, 3 x 3 x 2 cm, underneath right quadriceps tendon in total knee arthroplasty.  2.  Repair of right quadriceps tendon with suture anchor.   ;  Surgeon: Ag Armstrong MD;  Location:  OR     CHOLECYSTECTOMY       COLONOSCOPY N/A 10/3/2018    Procedure: COLONOSCOPY;  COLONOSCOPY Rm.#[id number];  Surgeon: Reese Burroughs MD;  Location:  GI     COLONOSCOPY N/A 10/5/2018    Procedure: COLONOSCOPY;  COLONOSCOPY  Rm.#[id number];  Surgeon: Reese Burroughs MD;  Location:  GI     EYE SURGERY      right eye cataract removal      IMPLANT PACEMAKER  03/2012    dual chamber      ORTHOPEDIC SURGERY      back surgery      REPAIR TENDON QUADRICEPS Right 5/16/2019    Procedure: Repair tendon quadriceps;  Surgeon: Ag Armstrong MD;  Location:  OR         SOCIAL HISTORY:  Social History     Tobacco Use     Smoking status: Former Smoker     Packs/day: 0.50      "Years: 25.00     Pack years: 12.50     Types: Cigarettes, Pipe, Cigars     Last attempt to quit: 1982     Years since quittin.7     Smokeless tobacco: Never Used   Substance Use Topics     Alcohol use: No     Alcohol/week: 0.0 standard drinks     Drug use: No       FAMILY HISTORY:  No known family history of GI disease    PHYSICAL EXAM:   BP (!) 156/83 (BP Location: Left arm)   Pulse 79   Temp 97.3  F (36.3  C) (Oral)   Resp 16   Ht 1.803 m (5' 11\")   Wt 84 kg (185 lb 3 oz)   SpO2 97%   BMI 25.83 kg/m      Constitutional: NAD, comfortable  Cardiovascular: RRR, normal S1 and S2  Respiratory: CTAB  Psychiatric: mentation appears normal and affect normal/bright  Head: Normocephalic. Atraumatic.    Neck: normal size, trachea midline  Eyes:  no icterus  ENT: hearing adequate, pharynx normal without erythema or exudate  Abdomen:   Auscultation: normal  Appearance: normal  Palpation: normal  NEURO: grossly negative  SKIN: no suspicious lesions or rashes            ADDITIONAL COMMENTS:   I reviewed the patient's new clinical lab test results.   Recent Labs   Lab Test 19  1251 19  0547 19  0020 19  1910 19  1603  19  0558  19  0637  19  0631 19  2219   WBC  --   --   --   --  8.7  --  8.3  --  6.3   < >  --   --    HGB 7.8* 7.4* 8.1*  --  9.1*   < > 7.6*   < > 7.9*   < > 8.8* 9.3*   MCV  --   --   --   --  95  --  85  --  85   < >  --   --    PLT  --   --   --   --  237  --  237  --  244   < >  --   --    INR  --   --   --  1.10  --   --   --   --   --   --  1.49* 1.80*    < > = values in this interval not displayed.     Recent Labs   Lab Test 19  0547 19  1603 19  0607 19  0558    136  --  136   POTASSIUM 3.9 4.3  --  4.3   CHLORIDE 104 101  --  103   CO2 28 30  --  25   BUN 20 23  --  15   CR 0.80 1.11  --  0.74   ANIONGAP 4 5  --  8   MANAS 8.2* 8.9  --  8.2*   GLC 82 99 82 96     Recent Labs   Lab Test 05/15/19  0339 " 05/15/19  0131 10/06/18  0706 10/02/18  1132   ALBUMIN  --  3.3* 2.8* 3.4   BILITOTAL  --  0.3 0.6 0.6   ALT  --  14 12 15   AST  --  17 17 17   ALKPHOS  --  76 49 65   PROTEIN Negative  --   --   --              .    CONSULTATION ASSESSMENT AND PLAN:    Active Problems:    Rectal bleeding    Assessment:  88 year old man admitted with several episodes of brbpr at home. No bowel movements now in over 24 hours.  Hgb low but stable from prior months.  Oct. 2018 colonoscopy with left sided blood suspicious for diverticular bleed.    Patient may have had another, mild diverticular bleed.  Bleeding seems to have subsided now.  No need for endoscopic work up at this time given stability.      Plan:   - advance diet  - if remains stable, likely discharge tomorrow          Lilli Echols MD  [facility]  Office:  [phone]  Cell/Pager:  [phone]

## 2019-09-29 NOTE — PROGRESS NOTES
Pt had first BM since admission, loose, dark in color, small amount of rectal bleeding noted. PA notified.

## 2019-09-29 NOTE — PHARMACY-ADMISSION MEDICATION HISTORY
Admission medication history interview status for this patient is complete. See [facility] admission navigator for allergy information, prior to admission medications and immunization status.     Medication history interview source(s):Patient and Family  Medication history resources (including written lists, pill bottles, clinic record):None  Primary pharmacy:[facility]    Changes made to PTA medication list:  Added: Ambien, benefiber  Deleted: senokot-s, prilosec, Norco  Changed: aspirin from 325 mg to 81 mg, iron from BID to daily    Actions taken by pharmacist (provider contacted, etc):None     Additional medication history information:None    Medication reconciliation/reorder completed by provider prior to medication history? Yes    Do you take OTC medications (eg tylenol, ibuprofen, fish oil, eye/ear drops, etc)? Y(Y/N)    For patients on insulin therapy: N (Y/N)    Time spent in this activity: 10 min    Prior to Admission medications    Medication Sig Last Dose Taking? Auth Provider   acetaminophen (TYLENOL 8 HOUR ARTHRITIS PAIN) 650 MG CR tablet Take 1,300 mg by mouth 2 times daily 9/28/2019 at am Yes Unknown, Entered By History   Ascorbic Acid 500 MG CHEW Take 1,000 mg by mouth every evening 9/27/2019 at PM Yes Unknown, Entered By History   atenolol (TENORMIN) 25 MG tablet Take 25 mg by mouth daily 9/28/2019 at am Yes Unknown, Entered By History   brimonidine (ALPHAGAN-P) 0.15 % ophthalmic solution Place 1 drop into the right eye 2 times daily 9/28/2019 at am Yes Unknown, Entered By History   ferrous sulfate (FEROSUL) 325 (65 Fe) MG tablet Take 325 mg by mouth daily (with breakfast) 9/28/2019 at am Yes Unknown, Entered By History   fish oil-omega-3 fatty acids (FISH OIL) 1000 MG capsule Take 2 g by mouth daily  9/28/2019 at noon Yes Unknown, Entered By History   furosemide (LASIX) 20 MG tablet Take 1 tablet (20 mg) by mouth daily 9/28/2019 at am Yes Juwan Maher MD   levothyroxine  (SYNTHROID) 100 MCG tablet Take 100 mcg by mouth daily  9/28/2019 at am Yes Unknown, Entered By History   lisinopril (PRINIVIL) 10 MG tablet Take 10 mg by mouth daily  9/28/2019 at am Yes Reported, Patient   paroxetine (PAXIL) 20 MG tablet Take 20 mg by mouth every morning. 9/28/2019 at am Yes Unknown, Entered By History   polyethylene glycol (MIRALAX/GLYCOLAX) packet Take 1 packet by mouth daily as needed   at prn Yes Reported, Patient   Wheat Dextrin (BENEFIBER PO) Take by mouth daily 9/28/2019 at am Yes Unknown, Entered By History   zolpidem (AMBIEN) 10 MG tablet Take 10 mg by mouth At Bedtime 9/27/2019 at pm Yes Unknown, Entered By History   aspirin 81 MG EC tablet Take 81 mg by mouth daily AM  Unknown, Entered By History

## 2019-09-29 NOTE — PROGRESS NOTES
INTERIM NOTE    Repeat Hgb 7.9 (better).  Diet being advanced by GI to low fiber diet.  Continue to hold ASA.  Will defer repeat Hgb unless recurrent bloody stools.  Hopefully can DC home in IGGY.     Isabella KAUR

## 2019-09-29 NOTE — PLAN OF CARE
PRIMARY DIAGNOSIS: GI BLEED    OUTPATIENT/OBSERVATION GOALS TO BE MET BEFORE DISCHARGE  Orthostatic performed: No             Stable Hgb Hgb to be drawn q6.  Decreased to 8.1   Recent Labs   Lab Test 09/29/19  0020 09/28/19  1603 05/18/19  0607   HGB 8.1* 9.1* 8.3*         Resolved or declined bleeding episodes: Yes Last episode: 9/28    Appropriate testing complete: NPO for possible intervention    Cleared for discharge by consultants (if involved): No    Safe discharge environment identified: Yes    Discharge Planner Nurse   Safe discharge environment identified: Yes  Barriers to discharge: Yes       Entered by: Juan R Conway 09/29/2019 12:41 AM     Pt resting comfortably in bed.  Denies any pain or dizziness.  Hgb to be drawn again at 0600.  Continue to monitor.    Please review provider order for any additional goals.   Nurse to notify provider when observation goals have been met and patient is ready for discharge.

## 2019-09-29 NOTE — PLAN OF CARE
"PRIMARY DIAGNOSIS: GI BLEED    OUTPATIENT/OBSERVATION GOALS TO BE MET BEFORE DISCHARGE  1. Orthostatic performed: No    2. Stable Hgb Yes.   Recent Labs   Lab Test 09/29/19  1251 09/29/19  0547 09/29/19  0020   HGB 7.8* 7.4* 8.1*       3. Resolved or declined bleeding episodes: Yes Last episode: yesterday prior to admission    4. Appropriate testing complete: Yes    5. Cleared for discharge by consultants (if involved): No    6. Safe discharge environment identified: Yes    Discharge Planner Nurse   Safe discharge environment identified: Yes  Barriers to discharge: No       Entered by: Sarai Contreras 09/29/2019 3:59 PM     Please review provider order for any additional goals.   Nurse to notify provider when observation goals have been met and patient is ready for discharge.    /68 (BP Location: Left arm)   Pulse 79   Temp 97.9  F (36.6  C) (Oral)   Resp 20   Ht 1.803 m (5' 11\")   Wt 84 kg (185 lb 3 oz)   SpO2 98%   BMI 25.83 kg/m        Pt resting in bed, denying pain, no rectal bleeding episodes noted. Plan to advance diet and monitor overnight with anticipated discharge in AM.  "

## 2019-09-29 NOTE — PROGRESS NOTES
SPIRITUAL HEALTH SERVICES Progress Note  [facility]  Observation Unit    Saw pt Jorge Luis per his request for  support.  He asked for communion.  Reviewed the [employer]' schedule.  Jorge Luis reported that he came in with rectal bleeding and is waiting to hear more about his care plan.  He named his two sons and four granddaughters as being central to his support network.  Two of his granddaughters will see him this afternoon.  Jorge Luis is affiliated with [facility] in [place].  He welcomed prayer.      Pt will request further  support as needs arise.    Nitish Donohue M.Div., [facility]  Staff   Pager [phone]

## 2019-09-29 NOTE — PLAN OF CARE
"PRIMARY DIAGNOSIS: GI BLEED    OUTPATIENT/OBSERVATION GOALS TO BE MET BEFORE DISCHARGE  1. Orthostatic performed: No    2. Stable Hgb Yes.   Recent Labs   Lab Test 09/28/19  1603 05/18/19  0607 05/17/19  1405   HGB 9.1* 8.3* 8.5*       3. Resolved or declined bleeding episodes:  Last episode: 3 loose bloody stools today prior to being seen in the the ED    4. Appropriate testing complete: No    5. Cleared for discharge by consultants (if involved): No    6. Safe discharge environment identified: Yes    Blood pressure (!) 147/61, pulse 81, temperature 98  F (36.7  C), temperature source Oral, resp. rate 20, height 1.803 m (5' 11\"), weight 84 kg (185 lb 3 oz), SpO2 99 %.    VSS.  LS clear, adequate sats on room air.  Patient denies any pain or discomfort.  Patient up to bedside commode with assist of 1.  No further stooling at this time.    Plan:  Continue to provide support cares.   Follow serial HGB.  NPO after midnight for possible further tests tomorrow.      Discharge Planner Nurse   Safe discharge environment identified: Yes  Barriers to discharge: Yes, unless medically cleared         Entered by: Karen M. Lesch 09/28/2019 10:44 PM     Please review provider order for any additional goals.   Nurse to notify provider when observation goals have been met and patient is ready for discharge.  "

## 2019-09-29 NOTE — PLAN OF CARE
PRIMARY DIAGNOSIS: GI BLEED    OUTPATIENT/OBSERVATION GOALS TO BE MET BEFORE DISCHARGE  Orthostatic performed: No    Stable Hgb drawn q 6 hr.   Recent Labs   Lab Test 09/29/19  0020 09/28/19  1603 05/18/19  0607   HGB 8.1* 9.1* 8.3*         Resolved or declined bleeding episodes: Yes Last episode: 9/28/19    Appropriate testing complete: Yes    Cleared for discharge by consultants (if involved): No    Safe discharge environment identified: Yes    Discharge Planner Nurse   Safe discharge environment identified: Yes  Barriers to discharge: Yes       Entered by: Juan R Conway 09/29/2019 4:16 AM     No BM yet this shift.  Denies dizziness or pain.  Next hgb due at 0600.   Continue to monitor for signs and symptoms of low hgb.    Please review provider order for any additional goals.   Nurse to notify provider when observation goals have been met and patient is ready for discharge.

## 2019-09-29 NOTE — PROGRESS NOTES
GI Brief Note    Consult received.  I will be to the hospital later today to meet the patient, in the meantime please feel free to call my  Cell phone with questions.    Chart reviewed.    Colonoscopy 2018 with left sided bleeding, likely diverticular.  Baseline hemoglobin seems to be in 8-9 range recently.  No Bowel movements since admission per nursing notes.    Clear liquid diet, watch for now. A majority of diverticular bleeds resolve on their own and he has not had further episodes, hopefully the bleeding has stopped.    I will be there early afternoon if not before.    Lilli Echols MD  [facility]  Cell/Pager: [phone]  After 5pm please call [phone]

## 2019-09-29 NOTE — PLAN OF CARE
ROOM # 213 2    Living Situation  Lives in independent living  Facility name: [facility]  : Gallito Ordoñez, [phone] (c)  [phone] (h)    Activity level at baseline: patient uses walker for ambulation  Activity level on admit: assist of 1-2, gait belt and walker      Patient registered to observation; given Patient Bill of Rights; given the opportunity to ask questions about observation status and their plan of care.  Patient has been oriented to the observation room, bathroom and call light is in place.    Discussed discharge goals and expectations with patient/family.

## 2019-09-29 NOTE — PLAN OF CARE
"PRIMARY DIAGNOSIS: GI BLEED    OUTPATIENT/OBSERVATION GOALS TO BE MET BEFORE DISCHARGE  Orthostatic performed: No    Stable Hgb Yes.   Recent Labs   Lab Test 09/29/19  1251 09/29/19  0547 09/29/19  0020   HGB 7.8* 7.4* 8.1*       Resolved or declined bleeding episodes: Yes Last episode: yesterday prior to admission    Appropriate testing complete: Yes    Cleared for discharge by consultants (if involved): No    Safe discharge environment identified: Yes    Discharge Planner Nurse   Safe discharge environment identified: Yes  Barriers to discharge: No       Entered by: Sarai Contreras 09/29/2019 1:37 PM     Please review provider order for any additional goals.   Nurse to notify provider when observation goals have been met and patient is ready for discharge.    BP (!) 156/83 (BP Location: Left arm)   Pulse 79   Temp 97.3  F (36.3  C) (Oral)   Resp 16   Ht 1.803 m (5' 11\")   Wt 84 kg (185 lb 3 oz)   SpO2 97%   BMI 25.83 kg/m      "

## 2019-09-29 NOTE — PLAN OF CARE
"PRIMARY DIAGNOSIS: rectal bleeding    OUTPATIENT/OBSERVATION GOALS TO BE MET BEFORE DISCHARGE  Orthostatic performed: No    Stable Hgb No, hgb trending down, next check at 1300.  Recent Labs   Lab Test 09/29/19  0547 09/29/19  0020 09/28/19  1603   HGB 7.4* 8.1* 9.1*       Resolved or declined bleeding episodes: Yes Last episode: yesterday prior to admission    Appropriate testing complete: Yes    Cleared for discharge by consultants (if involved): No, GI consulting      Discharge Planner Nurse   Safe discharge environment identified: Yes  Barriers to discharge: No       Entered by: Sarai Contreras 09/29/2019 8:11 AM     Please review provider order for any additional goals.   Nurse to notify provider when observation goals have been met and patient is ready for discharge.    /57 (BP Location: Left arm)   Pulse 81   Temp 97.8  F (36.6  C) (Oral)   Resp 16   Ht 1.803 m (5' 11\")   Wt 84 kg (185 lb 3 oz)   SpO2 90%   BMI 25.83 kg/m      A&Ox4.  Voids well with urinal.  Denies acute pain, reports chronic knee pain, scheduled tylenol given.  Appears pale.  Denies feeling fatigued, light headed, or dizzy at rest or with exertion.  Plan to monitor hgb Q6h, pain control, encourage ambulation.  Clear liquid diet.  GI to see later today.  "

## 2019-09-30 VITALS
HEART RATE: 90 BPM | WEIGHT: 185.19 LBS | HEIGHT: 71 IN | OXYGEN SATURATION: 99 % | BODY MASS INDEX: 25.93 KG/M2 | RESPIRATION RATE: 18 BRPM | SYSTOLIC BLOOD PRESSURE: 120 MMHG | DIASTOLIC BLOOD PRESSURE: 64 MMHG | TEMPERATURE: 96.6 F

## 2019-09-30 LAB — INTERPRETATION ECG - MUSE: NORMAL

## 2019-09-30 PROCEDURE — G0378 HOSPITAL OBSERVATION PER HR: HCPCS

## 2019-09-30 PROCEDURE — 25000132 ZZH RX MED GY IP 250 OP 250 PS 637: Mod: GY | Performed by: PHYSICIAN ASSISTANT

## 2019-09-30 PROCEDURE — 99217 ZZC OBSERVATION CARE DISCHARGE: CPT | Performed by: PHYSICIAN ASSISTANT

## 2019-09-30 RX ADMIN — ACETAMINOPHEN 1300 MG: 325 TABLET, FILM COATED ORAL at 08:50

## 2019-09-30 RX ADMIN — PAROXETINE HYDROCHLORIDE 20 MG: 20 TABLET, FILM COATED ORAL at 08:50

## 2019-09-30 RX ADMIN — LISINOPRIL 10 MG: 10 TABLET ORAL at 08:50

## 2019-09-30 RX ADMIN — FERROUS SULFATE TAB 325 MG (65 MG ELEMENTAL FE) 325 MG: 325 (65 FE) TAB at 08:51

## 2019-09-30 RX ADMIN — ATENOLOL 25 MG: 25 TABLET ORAL at 08:51

## 2019-09-30 RX ADMIN — LEVOTHYROXINE SODIUM 100 MCG: 100 TABLET ORAL at 08:50

## 2019-09-30 NOTE — PLAN OF CARE
Walked patient around the unit with walker and gait belt. Patient walked very well, no SOB, no dizziness.

## 2019-09-30 NOTE — PROGRESS NOTES
"GASTROENTEROLOGY PROGRESS NOTE        SUBJECTIVE:  No abdominal pain.  Tolerating regular breakfast.  Last bowel movement last evening with small amount of blood.  No bowel movement this morning.     OBJECTIVE:    BP (!) 160/85 (BP Location: Left arm)   Pulse 89   Temp 96  F (35.6  C) (Axillary)   Resp 16   Ht 1.803 m (5' 11\")   Wt 84 kg (185 lb 3 oz)   SpO2 96%   BMI 25.83 kg/m    Temp (24hrs), Av.4  F (36.3  C), Min:96  F (35.6  C), Max:98.4  F (36.9  C)    Patient Vitals for the past 72 hrs:   Weight   19 1602 84 kg (185 lb 3 oz)       Intake/Output Summary (Last 24 hours) at 2019 0954  Last data filed at 2019 0845  Gross per 24 hour   Intake --   Output 1100 ml   Net -1100 ml        PHYSICAL EXAM     Constitutional: NAD  Abdomen: Soft, nontender           Additional Comments:  ROS, FH, SH: See initial GI consult for details.     I have reviewed the patient's new clinical lab results:     Recent Labs   Lab Test 19  1251 19  0547 19  0020 19  1910 19  1603  19  0558  19  0637  19  0631 19  2219   WBC  --   --   --   --  8.7  --  8.3  --  6.3   < >  --   --    HGB 7.8* 7.4* 8.1*  --  9.1*   < > 7.6*   < > 7.9*   < > 8.8* 9.3*   MCV  --   --   --   --  95  --  85  --  85   < >  --   --    PLT  --   --   --   --  237  --  237  --  244   < >  --   --    INR  --   --   --  1.10  --   --   --   --   --   --  1.49* 1.80*    < > = values in this interval not displayed.     Recent Labs   Lab Test 19  0547 19  1603 19  0558   POTASSIUM 3.9 4.3 4.3   CHLORIDE 104 101 103   CO2 28 30 25   BUN 20 23 15   ANIONGAP 4 5 8     Recent Labs   Lab Test 05/15/19  0339 05/15/19  0131 10/06/18  0706 10/02/18  1132   ALBUMIN  --  3.3* 2.8* 3.4   BILITOTAL  --  0.3 0.6 0.6   ALT  --  14 12 15   AST  --  17 17 17   PROTEIN Negative  --   --   --         ASSESSMENT/ PLAN  Jake Duane Pfleiger is an 88-year-old male admitted with bright red " blood per rectum.    1.  GI bleeding: Improving.  No bowel movement for the past 12 hours.  Hemoglobin low but stable.  Suspect diverticular bleed.  A colonoscopy in October 2018 showed blood in the left side of the colon suspicious for diverticular bleed.  Stable for discharge.      Will no longer follow. Discussed with Dr. Moss.  Jeannette Hightower PA-C  [facility] ( [facility])

## 2019-09-30 NOTE — PLAN OF CARE
PRIMARY DIAGNOSIS: GI BLEED    OUTPATIENT/OBSERVATION GOALS TO BE MET BEFORE DISCHARGE  1. Orthostatic performed: No    2. Stable Hgb Yes.   Recent Labs   Lab Test 09/29/19  1251 09/29/19  0547 09/29/19  0020   HGB 7.8* 7.4* 8.1*       3. Resolved or declined bleeding episodes: Yes Last episode: yesterday prior to admission    4. Appropriate testing complete: Yes    5. Cleared for discharge by consultants (if involved): No    6. Safe discharge environment identified: Yes    Discharge Planner Nurse   Safe discharge environment identified: Yes  Barriers to discharge: No       Entered by: Lynn Steiner 09/30/2019 12:27 AM     VSS on RA. No rectal bleeding episodes. Denies pain. Assist x1 with walker. Low fiber diet. Will continue to monitor.       Please review provider order for any additional goals.   Nurse to notify provider when observation goals have been met and patient is ready for discharge.

## 2019-09-30 NOTE — DISCHARGE SUMMARY
[facility]    Observation Unit   Discharge Summary  Hospitalist    Date of Admission:  9/28/2019  Date of Discharge:  9/30/2019  Provider:  Niki Good PA-C  Date of Service (when I last saw the patient): 09/30/19    Discharge Diagnoses   Lower GI bleed     Other medical issues:  Past Medical History:   Diagnosis Date     Anxiety      Arthritis      Atrial fibrillation (H)      Cardiomyopathy (H)      Depression      Diastolic CHF (H) 04/10/2016     Diverticulosis      Gastro-oesophageal reflux disease      GI bleed 10/02/2018    Lower     Hypertension      Insomnia      Nodule of lower lobe of left lung     CT Chest 1/2019-stable     Pacemaker     [employer] Dual Pacemaker     Permanent atrial fibrillation (H)      Right patella fracture      Tachy-jairo syndrome (H)      Thyroid disease     Hypothyrodism      History of Present Illness   87 yo with AFib, ICM, CHF, HTN, SSS s/p PPM, and history of diverticular bleed who presented to [facility] with bloody stools and found to have acute blood loss anemia. Reports rectal bleeding started Thursday 9/26/2019.  He cannot tell me how many stools he has had but reports he had three loose stools today with red blood.  He typically has dark stools related to iron supplementation started in June 2019.  He felt dizzy and light-headed, prompting evaluation in ED today.  No falls or passing out.  Feels less light-headed currently.  He initially had crampy abdominal pain today but none currently.  No fevers.  No dyspnea.  No chest pain.  Weight has been stable.  Please see the admission history and physical for full details.    Hospital Course   Jake Duane Pfleiger was admitted on 9/28/2019.  The following problems were addressed during his hospitalization:    #Lower GI bleed: presented with 2 days worth of rectal bleeding. Possibly recurrent diverticular bleed. Pt hemodynamically stable. GI consulted and recommended conservative management with monitoring.  Patient was  "able to tolerate advancing his diet without increased bleeding. So far Hgb stable and improving, 9.1 >> 8.1 >> 7.4 >> 7.8.  ASA on hold. Last colonoscopy in 10/2018 showed blood in the left side of the colon suspicious for diverticular bleed. No need for colonoscopy at this time per GI.      #SSS s/p PPM  #Permanent AFib  PTA atenolol and ASA on hold.  Is not anticoagulated 2/2 prior GI bleed.      #ICM  #Chronic diastolic CHF, compensated  BP tolerated resumption of Lisinopril and Atenolol. Instructed patient to resume Lasix the day after discharge. He should continue to hold his ASA.     Pending Results   None    Code Status   Full Code       Primary Care Physician   Herve Sweet    Exam:    /64 (BP Location: Right arm)   Pulse 90   Temp 96.6  F (35.9  C) (Oral)   Resp 18   Ht 1.803 m (5' 11\")   Wt 84 kg (185 lb 3 oz)   SpO2 99%   BMI 25.83 kg/m    GENERAL:  Pleasant, cooperative, alert. Well developed, well nourished.   HEENT: Normocephalic, atraumatic.  Extra occular mm intact.  Sclera clear   PULMONOLOGY: Clear to auscultation bilaterally    CARDIAC: Regular rate and rhythm.     ABDOMEN: Soft, nontender, non-distended   MUSCULOSKELETAL:  Moving x 4 spontaneously with CMS intact x4.     NEURO: Alert and oriented x3.  CN II-XII grossly intact and symmetric.    Nonfocal exam.    Discharge Disposition   Discharged to home    Consultations This Hospital Stay   GASTROENTEROLOGY IP CONSULT    Time Spent on this Encounter   I, Lizette Good PA-C, personally saw the patient today and spent greater than 30 minutes discharging this patient.    Discharge Orders      Reason for your hospital stay    You were admitted due to concerns for rectal bleeding. Your workup including following your hemoglobin which slightly dropped during your stay but remained stable. You were seen by GI who recommended conservative management with monitoring and no need for colonoscopy at this time. Based on your " previous colonoscopy and known diverticulosis your bleeding is likely due a diverticular bleed. On subsequent stools your bleeding improved with a regular diet. Your aspirin has been held during your stay and you should continue to hold this until seen in follow up by your primary care provider.     Follow-up and recommended labs and tests     Follow up with primary care provider, Herve Sweet, within 7 days for hospital follow- up.  No follow up labs or test are needed.     Activity    Your activity upon discharge: activity as tolerated     Discharge Instructions    - Continue to hold aspirin  - Resume your lasix tomorrow     Full Code     Diet    Follow this diet upon discharge: Orders Placed This Encounter      Low Fiber Diet     Discharge Medications   Current Discharge Medication List      CONTINUE these medications which have NOT CHANGED    Details   acetaminophen (TYLENOL 8 HOUR ARTHRITIS PAIN) 650 MG CR tablet Take 1,300 mg by mouth 2 times daily      Ascorbic Acid 500 MG CHEW Take 1,000 mg by mouth every evening      atenolol (TENORMIN) 25 MG tablet Take 25 mg by mouth daily      brimonidine (ALPHAGAN-P) 0.15 % ophthalmic solution Place 1 drop into the right eye 2 times daily      ferrous sulfate (FEROSUL) 325 (65 Fe) MG tablet Take 325 mg by mouth daily (with breakfast)      fish oil-omega-3 fatty acids (FISH OIL) 1000 MG capsule Take 2 g by mouth daily       furosemide (LASIX) 20 MG tablet Take 1 tablet (20 mg) by mouth daily  Qty: 30 tablet, Refills: 1    Associated Diagnoses: Congestive heart failure, unspecified congestive heart failure chronicity, unspecified congestive heart failure type      levothyroxine (SYNTHROID) 100 MCG tablet Take 100 mcg by mouth daily       lisinopril (PRINIVIL) 10 MG tablet Take 10 mg by mouth daily       paroxetine (PAXIL) 20 MG tablet Take 20 mg by mouth every morning.      polyethylene glycol (MIRALAX/GLYCOLAX) packet Take 1 packet by mouth daily as needed        Wheat Dextrin (BENEFIBER PO) Take by mouth daily      zolpidem (AMBIEN) 10 MG tablet Take 10 mg by mouth At Bedtime         STOP taking these medications       aspirin 81 MG EC tablet Comments:   Reason for Stopping:             Allergies   No Known Allergies     Data   Results for orders placed or performed during the hospital encounter of 09/28/19   CBC with platelets differential   Result Value Ref Range    WBC 8.7 4.0 - 11.0 10e9/L    RBC Count 3.00 (L) 4.4 - 5.9 10e12/L    Hemoglobin 9.1 (L) 13.3 - 17.7 g/dL    Hematocrit 28.4 (L) 40.0 - 53.0 %    MCV 95 78 - 100 fl    MCH 30.3 26.5 - 33.0 pg    MCHC 32.0 31.5 - 36.5 g/dL    RDW 16.3 (H) 10.0 - 15.0 %    Platelet Count 237 150 - 450 10e9/L    Diff Method Automated Method     % Neutrophils 79.8 %    % Lymphocytes 11.8 %    % Monocytes 7.5 %    % Eosinophils 0.3 %    % Basophils 0.3 %    % Immature Granulocytes 0.3 %    Nucleated RBCs 0 0 /100    Absolute Neutrophil 6.9 1.6 - 8.3 10e9/L    Absolute Lymphocytes 1.0 0.8 - 5.3 10e9/L    Absolute Monocytes 0.7 0.0 - 1.3 10e9/L    Absolute Eosinophils 0.0 0.0 - 0.7 10e9/L    Absolute Basophils 0.0 0.0 - 0.2 10e9/L    Abs Immature Granulocytes 0.0 0 - 0.4 10e9/L    Absolute Nucleated RBC 0.0    Basic metabolic panel   Result Value Ref Range    Sodium 136 133 - 144 mmol/L    Potassium 4.3 3.4 - 5.3 mmol/L    Chloride 101 94 - 109 mmol/L    Carbon Dioxide 30 20 - 32 mmol/L    Anion Gap 5 3 - 14 mmol/L    Glucose 99 70 - 99 mg/dL    Urea Nitrogen 23 7 - 30 mg/dL    Creatinine 1.11 0.66 - 1.25 mg/dL    GFR Estimate 59 (L) >60 mL/min/[1.73_m2]    GFR Estimate If Black 68 >60 mL/min/[1.73_m2]    Calcium 8.9 8.5 - 10.1 mg/dL   INR   Result Value Ref Range    INR 1.10 0.86 - 1.14   Hemoglobin   Result Value Ref Range    Hemoglobin 8.1 (L) 13.3 - 17.7 g/dL   Basic metabolic panel   Result Value Ref Range    Sodium 136 133 - 144 mmol/L    Potassium 3.9 3.4 - 5.3 mmol/L    Chloride 104 94 - 109 mmol/L    Carbon Dioxide 28 20 -  32 mmol/L    Anion Gap 4 3 - 14 mmol/L    Glucose 82 70 - 99 mg/dL    Urea Nitrogen 20 7 - 30 mg/dL    Creatinine 0.80 0.66 - 1.25 mg/dL    GFR Estimate 79 >60 mL/min/[1.73_m2]    GFR Estimate If Black >90 >60 mL/min/[1.73_m2]    Calcium 8.2 (L) 8.5 - 10.1 mg/dL   Hemoglobin   Result Value Ref Range    Hemoglobin 7.4 (L) 13.3 - 17.7 g/dL   Hemoglobin   Result Value Ref Range    Hemoglobin 7.8 (L) 13.3 - 17.7 g/dL   EKG 12-lead, tracing only   Result Value Ref Range    Interpretation ECG Click View Image link to view waveform and result    Gastroenterology IP Consult: rectal bleeding; Consultant may enter orders: Yes; Patient to be seen: Routine - within 24 hours; Requesting provider? Hospitalist (if different from attending physician)    Lilli Ryan MD     9/29/2019  2:24 PM  GASTROENTEROLOGY CONSULTATION     Jake Duane Pfleiger  [address]   [place] [facility] [id number]  88 year old male    Admission Date/Time: 9/28/2019  Primary Care Provider: Herve Sweet    We were asked to see the patient in consultation by Dr. Delatorre   for evaluation of blood in stool.        HPI:  Jake Duane Pfleiger is a 88 year old male who was admitted   to [facility] overnight with 2-3 days of noting bright red blood with   his stools.  His hemoglobin over the past year has been in the   8-10 range.    Patient states he initially had a very mild lower abdominal   discomfort but this has subsided. No bowel movements since noon   yesterday.    History of bloody stools in the past. October 2018 colonoscopy   showed blood on the left side of the colon and multiple   diverticula on the left side, diverticular bleed had been   suspected. Patient was on anticoagulation at that time which was   stopped. Had another episode in March 2019 that resolved without   intervention.    ROS: A comprehensive ten point review of systems was negative   aside from those in mentioned in the HPI.       MEDICATIONS: No current facility-administered medications on file   prior to encounter.   acetaminophen (TYLENOL 8 HOUR ARTHRITIS PAIN) 650 MG CR tablet,   Take 1,300 mg by mouth 2 times daily  Ascorbic Acid 500 MG CHEW, Take 1,000 mg by mouth every evening  aspirin 81 MG EC tablet, Take 81 mg by mouth daily  atenolol (TENORMIN) 25 MG tablet, Take 25 mg by mouth daily  brimonidine (ALPHAGAN-P) 0.15 % ophthalmic solution, Place 1 drop   into the right eye 2 times daily  ferrous sulfate (FEROSUL) 325 (65 Fe) MG tablet, Take 325 mg by   mouth daily (with breakfast)  fish oil-omega-3 fatty acids (FISH OIL) 1000 MG capsule, Take 2 g   by mouth daily   furosemide (LASIX) 20 MG tablet, Take 1 tablet (20 mg) by mouth   daily  levothyroxine (SYNTHROID) 100 MCG tablet, Take 100 mcg by mouth   daily   lisinopril (PRINIVIL) 10 MG tablet, Take 10 mg by mouth daily   paroxetine (PAXIL) 20 MG tablet, Take 20 mg by mouth every   morning.  polyethylene glycol (MIRALAX/GLYCOLAX) packet, Take 1 packet by   mouth daily as needed   Wheat Dextrin (BENEFIBER PO), Take by mouth daily  zolpidem (AMBIEN) 10 MG tablet, Take 10 mg by mouth At Bedtime        ALLERGIES: No Known Allergies    Past Medical History:   Diagnosis Date     Anxiety      Arthritis      Atrial fibrillation (H)      Cardiomyopathy (H)      Depression      Diastolic CHF (H) 04/10/2016     Diverticulosis      Gastro-oesophageal reflux disease      GI bleed 10/02/2018    Lower     Hypertension      Insomnia      Nodule of lower lobe of left lung     CT Chest 1/2019-stable     Pacemaker     [employer] Dual Pacemaker     Permanent atrial fibrillation (H)      Right patella fracture      Tachy-jairo syndrome (H)      Thyroid disease     Hypothyrodism        Past Surgical History:   Procedure Laterality Date     ARTHROPLASTY HIP  1/21/2014    left hip replacement     ARTHROPLASTY KNEE      right      ARTHROTOMY KNEE Right 5/16/2019    Procedure: 1.  Excision of bony mass, 3 x  "3 x 2 cm, underneath   right quadriceps tendon in total knee arthroplasty.  2.  Repair   of right quadriceps tendon with suture anchor.   ;  Surgeon:   Ag Armstrong MD;  Location: [facility] OR     CHOLECYSTECTOMY       COLONOSCOPY N/A 10/3/2018    Procedure: COLONOSCOPY;  COLONOSCOPY Rm.#[id number];  Surgeon: Reese Burroughs MD;  Location: [facility] GI     COLONOSCOPY N/A 10/5/2018    Procedure: COLONOSCOPY;  COLONOSCOPY  Rm.#[id number];  Surgeon: Reese Burroughs MD;  Location:  GI     EYE SURGERY      right eye cataract removal      IMPLANT PACEMAKER  2012    dual chamber      ORTHOPEDIC SURGERY      back surgery      REPAIR TENDON QUADRICEPS Right 2019    Procedure: Repair tendon quadriceps;  Surgeon: Ag Armstrong MD;  Location: [facility] OR         SOCIAL HISTORY:  Social History     Tobacco Use     Smoking status: Former Smoker     Packs/day: 0.50     Years: 25.00     Pack years: 12.50     Types: Cigarettes, Pipe, Cigars     Last attempt to quit: 1982     Years since quittin.7     Smokeless tobacco: Never Used   Substance Use Topics     Alcohol use: No     Alcohol/week: 0.0 standard drinks     Drug use: No       FAMILY HISTORY:  No known family history of GI disease    PHYSICAL EXAM:   BP (!) 156/83 (BP Location: Left arm)   Pulse 79   Temp 97.3  F   (36.3  C) (Oral)   Resp 16   Ht 1.803 m (5' 11\")   Wt 84 kg   (185 lb 3 oz)   SpO2 97%   BMI 25.83 kg/m      Constitutional: NAD, comfortable  Cardiovascular: RRR, normal S1 and S2  Respiratory: CTAB  Psychiatric: mentation appears normal and affect normal/bright  Head: Normocephalic. Atraumatic.    Neck: normal size, trachea midline  Eyes:  no icterus  ENT: hearing adequate, pharynx normal without erythema or exudate  Abdomen:   Auscultation: normal  Appearance: normal  Palpation: normal  NEURO: grossly negative  SKIN: no suspicious lesions or rashes            ADDITIONAL COMMENTS:   I reviewed the patient's new clinical lab test results. "   Recent Labs   Lab Test 09/29/19  1251 09/29/19  0547 09/29/19  0020 09/28/19  1910 09/28/19  1603  05/17/19  0558  05/16/19  0637  03/09/19  0631 03/08/19  2219   WBC  --   --   --   --  8.7  --  8.3  --  6.3   < >  --   --    HGB 7.8* 7.4* 8.1*  --  9.1*   < > 7.6*   < > 7.9*   < > 8.8*   9.3*   MCV  --   --   --   --  95  --  85  --  85   < >  --   --    PLT  --   --   --   --  237  --  237  --  244   < >  --   --    INR  --   --   --  1.10  --   --   --   --   --   --  1.49* 1.80*      < > = values in this interval not displayed.     Recent Labs   Lab Test 09/29/19  0547 09/28/19  1603 05/18/19  0607 05/17/19  0558    136  --  136   POTASSIUM 3.9 4.3  --  4.3   CHLORIDE 104 101  --  103   CO2 28 30  --  25   BUN 20 23  --  15   CR 0.80 1.11  --  0.74   ANIONGAP 4 5  --  8   MANAS 8.2* 8.9  --  8.2*   GLC 82 99 82 96     Recent Labs   Lab Test 05/15/19  0339 05/15/19  0131 10/06/18  0706 10/02/18  1132   ALBUMIN  --  3.3* 2.8* 3.4   BILITOTAL  --  0.3 0.6 0.6   ALT  --  14 12 15   AST  --  17 17 17   ALKPHOS  --  76 49 65   PROTEIN Negative  --   --   --              .    CONSULTATION ASSESSMENT AND PLAN:    Active Problems:    Rectal bleeding    Assessment:  88 year old man admitted with several episodes of   brbpr at home. No bowel movements now in over 24 hours.  Hgb low   but stable from prior months.  Oct. 2018 colonoscopy with left   sided blood suspicious for diverticular bleed.    Patient may have had another, mild diverticular bleed.  Bleeding   seems to have subsided now.  No need for endoscopic work up at   this time given stability.      Plan:   - advance diet  - if remains stable, likely discharge tomorrow          Lilli Echols MD  [facility]  Office:  [phone]  Cell/Pager:  [phone]         ABO/Rh type and screen   Result Value Ref Range    ABO O     RH(D) Pos     Antibody Screen Neg     Test Valid Only At [facility]        Specimen Expires 10/01/2019         Lizette Good PA-C

## 2019-09-30 NOTE — DISCHARGE INSTRUCTIONS
Eating a Low-fiber Diet  What is fiber?  Fiber is the part of food that the body cannot digest. It helps form stools (bowel movements).   If you eat less fiber, you may:    Reduce belly pain, diarrhea (loose, watery stools) and other digestive problems    Have fewer and smaller stools    Decrease inflammation (pain, redness and swelling) in the GI (gastro-intestinal) tract    Promote healing in the GI tract.  For a list of foods allowed in a low-fiber diet, see the back of this page.  Why might I need a low-fiber diet?  You may need a low-fiber diet if you have:     Inflamed bowels    Crohn's disease    Diverticular disease    Ulcerative colitis    Radiation therapy to the belly area    Chemotherapy    An upcoming colonoscopy    Surgery on your intestines or in the belly area.  Sample Menu  Breakfast:  1 scrambled egg  1 slice white toast with 1 teaspoon margarine    cup Cream of Wheat with sugar    cup milk     cup pulp-free orange juice  Snack:    cup canned fruit cocktail (in juice)  6 saltine crackers  Lunch:  Tuna sandwich on white bread   1 cup cream of chicken soup    cup canned peaches (in light syrup)  1 cup lemonade  Snack:    cup cottage cheese  1 medium apple, sliced and peeled  Dinner:  3 ounces well-cooked chicken breast  1 cup white rice    cup cooked canned carrots  1 white dinner roll with 1 teaspoon margarine  1 slice angelica food cake  1 cup herbal tea  Food group Allowed Avoid   Grains Foods that contain refined white flour   (1 gram fiber or less per serving), such as bread, pasta, muffins, cereals, crackers, etc.; white rice; Cream of Wheat; Cream of Rice Whole grains (whole wheat bread, oatmeal, barley, brown or wild rice); foods containing nuts, seeds or bran   Vegetables Canned or well-cooked vegetables; mashed potatoes; non-gas-forming vegetables; vegetables without skin, seeds or pulp; vegetable juice ( 1/2 cup per day or less) Raw vegetables; cooked greens or spinach;   gas-forming  "vegetables (broccoli, cauliflower, brussels sprouts)   Fruits Peeled fresh fruit (bananas, apples, melons, nectarines); canned fruit (in juice or light syrup); fruit juice without pulp Dried fruit; fruit with pulp (oranges, grapefruit, pineapple); unpeeled fruit; prune juice   Meats and   other proteins Tender, well-cooked or ground meats; fish; eggs; tofu; smooth nut butters (peanut, soy, almond, sunflower) Crunchy nut butters; tough meats; meats with gristle (morrell, sausage); dried beans or peas (legumes)   Milk products Milk, soy milk, rice milk, almond milk, coconut milk; yogurt, soy yogurt; cottage cheese, mild cheese; ice cream, sherbet If you are lactose intolerant: avoid milk, dairy products and foods made with milk  Note: Some people become lactose intolerant after surgery. This may or may not improve over time.   Other Salad dressings; oil, butter, margarine; jelly, honey, syrup Any food containing nuts or seeds; coconut; marmalade; carbonated (\"fizzy\") drinks   For informational purposes only. Not to replace the advice of your health care provider.   Copyright   2007 [employer]. All rights reserved. Managed by Q [id number] - REV 09/15.  For informational purposes only. Not to replace the advice of your health care provider.  Copyright   2018 [employer]. All rights reserved.        "

## 2019-09-30 NOTE — PLAN OF CARE
PRIMARY DIAGNOSIS: GI BLEED    OUTPATIENT/OBSERVATION GOALS TO BE MET BEFORE DISCHARGE  1. Orthostatic performed: No    2. Stable Hgb Yes.   Recent Labs   Lab Test 09/29/19  1251 09/29/19  0547 09/29/19  0020   HGB 7.8* 7.4* 8.1*       3. Resolved or declined bleeding episodes: Yes Last episode: yesterday prior to admission    4. Appropriate testing complete: Yes    5. Cleared for discharge by consultants (if involved): No    6. Safe discharge environment identified: Yes    Discharge Planner Nurse   Safe discharge environment identified: Yes  Barriers to discharge: No       Entered by: Lynn Steiner 09/30/2019 12:25 AM     A&Ox4, hypertension otherwise VSS. No rectal bleeding episodes. Denies pain. Assist x1 with walker. Low fiber diet. Will continue to monitor.       Please review provider order for any additional goals.   Nurse to notify provider when observation goals have been met and patient is ready for discharge.         Universal Safety Interventions

## 2019-11-20 ENCOUNTER — ANCILLARY PROCEDURE (OUTPATIENT)
Dept: CARDIOLOGY | Facility: CLINIC | Age: 84
End: 2019-11-20
Attending: INTERNAL MEDICINE
Payer: MEDICARE

## 2019-11-20 DIAGNOSIS — Z95.0 CARDIAC PACEMAKER IN SITU: ICD-10-CM

## 2019-11-20 PROCEDURE — 93296 REM INTERROG EVL PM/IDS: CPT | Performed by: INTERNAL MEDICINE

## 2019-11-20 PROCEDURE — 93294 REM INTERROG EVL PM/LDLS PM: CPT | Performed by: INTERNAL MEDICINE

## 2019-11-25 ASSESSMENT — MIFFLIN-ST. JEOR: SCORE: 1508.6

## 2019-11-27 ENCOUNTER — APPOINTMENT (OUTPATIENT)
Dept: GENERAL RADIOLOGY | Facility: CLINIC | Age: 84
DRG: 470 | End: 2019-11-27
Attending: PHYSICIAN ASSISTANT
Payer: MEDICARE

## 2019-11-27 ENCOUNTER — HOSPITAL ENCOUNTER (INPATIENT)
Facility: CLINIC | Age: 84
LOS: 3 days | Discharge: NURSING, REHAB OR LONG-TERM CARE | DRG: 470 | End: 2019-11-30
Attending: ORTHOPAEDIC SURGERY | Admitting: ORTHOPAEDIC SURGERY
Payer: MEDICARE

## 2019-11-27 ENCOUNTER — ANESTHESIA EVENT (OUTPATIENT)
Dept: SURGERY | Facility: CLINIC | Age: 84
DRG: 470 | End: 2019-11-27
Payer: MEDICARE

## 2019-11-27 ENCOUNTER — APPOINTMENT (OUTPATIENT)
Dept: PHYSICAL THERAPY | Facility: CLINIC | Age: 84
DRG: 470 | End: 2019-11-27
Attending: ORTHOPAEDIC SURGERY
Payer: MEDICARE

## 2019-11-27 ENCOUNTER — ANESTHESIA (OUTPATIENT)
Dept: SURGERY | Facility: CLINIC | Age: 84
DRG: 470 | End: 2019-11-27
Payer: MEDICARE

## 2019-11-27 DIAGNOSIS — F51.01 PRIMARY INSOMNIA: ICD-10-CM

## 2019-11-27 DIAGNOSIS — I50.30 DIASTOLIC HEART FAILURE, UNSPECIFIED HF CHRONICITY (H): ICD-10-CM

## 2019-11-27 DIAGNOSIS — Z96.652 STATUS POST TOTAL LEFT KNEE REPLACEMENT: Primary | ICD-10-CM

## 2019-11-27 DIAGNOSIS — I50.9 CONGESTIVE HEART FAILURE (H): ICD-10-CM

## 2019-11-27 DIAGNOSIS — I10 ESSENTIAL HYPERTENSION: ICD-10-CM

## 2019-11-27 PROBLEM — Z96.659 S/P TOTAL KNEE ARTHROPLASTY: Status: ACTIVE | Noted: 2019-11-27

## 2019-11-27 LAB
MDC_IDC_LEAD_IMPLANT_DT: NORMAL
MDC_IDC_LEAD_LOCATION: NORMAL
MDC_IDC_LEAD_MFG: NORMAL
MDC_IDC_LEAD_MODEL: NORMAL
MDC_IDC_LEAD_POLARITY_TYPE: NORMAL
MDC_IDC_LEAD_SERIAL: NORMAL
MDC_IDC_MSMT_BATTERY_DTM: NORMAL
MDC_IDC_MSMT_BATTERY_REMAINING_LONGEVITY: 127 MO
MDC_IDC_MSMT_BATTERY_REMAINING_PERCENTAGE: 95.5 %
MDC_IDC_MSMT_BATTERY_RRT_TRIGGER: NORMAL
MDC_IDC_MSMT_BATTERY_STATUS: NORMAL
MDC_IDC_MSMT_BATTERY_VOLTAGE: 2.96 V
MDC_IDC_MSMT_LEADCHNL_RV_IMPEDANCE_VALUE: 450 OHM
MDC_IDC_MSMT_LEADCHNL_RV_LEAD_CHANNEL_STATUS: NORMAL
MDC_IDC_MSMT_LEADCHNL_RV_PACING_THRESHOLD_AMPLITUDE: 0.75 V
MDC_IDC_MSMT_LEADCHNL_RV_PACING_THRESHOLD_PULSEWIDTH: 1 MS
MDC_IDC_MSMT_LEADCHNL_RV_SENSING_INTR_AMPL: 12 MV
MDC_IDC_PG_IMPLANT_DTM: NORMAL
MDC_IDC_PG_MFG: NORMAL
MDC_IDC_PG_MODEL: NORMAL
MDC_IDC_PG_SERIAL: NORMAL
MDC_IDC_PG_TYPE: NORMAL
MDC_IDC_SESS_CLINIC_NAME: NORMAL
MDC_IDC_SESS_DTM: NORMAL
MDC_IDC_SESS_REPROGRAMMED: NO
MDC_IDC_SESS_TYPE: NORMAL
MDC_IDC_SET_BRADY_LOWRATE: 60 {BEATS}/MIN
MDC_IDC_SET_BRADY_MAX_SENSOR_RATE: 120 {BEATS}/MIN
MDC_IDC_SET_BRADY_MODE: NORMAL
MDC_IDC_SET_LEADCHNL_RV_PACING_AMPLITUDE: 2 V
MDC_IDC_SET_LEADCHNL_RV_PACING_ANODE_ELECTRODE_1: NORMAL
MDC_IDC_SET_LEADCHNL_RV_PACING_ANODE_LOCATION_1: NORMAL
MDC_IDC_SET_LEADCHNL_RV_PACING_CAPTURE_MODE: NORMAL
MDC_IDC_SET_LEADCHNL_RV_PACING_CATHODE_ELECTRODE_1: NORMAL
MDC_IDC_SET_LEADCHNL_RV_PACING_CATHODE_LOCATION_1: NORMAL
MDC_IDC_SET_LEADCHNL_RV_PACING_POLARITY: NORMAL
MDC_IDC_SET_LEADCHNL_RV_PACING_PULSEWIDTH: 1 MS
MDC_IDC_SET_LEADCHNL_RV_SENSING_ADAPTATION_MODE: NORMAL
MDC_IDC_SET_LEADCHNL_RV_SENSING_ANODE_ELECTRODE_1: NORMAL
MDC_IDC_SET_LEADCHNL_RV_SENSING_ANODE_LOCATION_1: NORMAL
MDC_IDC_SET_LEADCHNL_RV_SENSING_CATHODE_ELECTRODE_1: NORMAL
MDC_IDC_SET_LEADCHNL_RV_SENSING_CATHODE_LOCATION_1: NORMAL
MDC_IDC_SET_LEADCHNL_RV_SENSING_POLARITY: NORMAL
MDC_IDC_SET_LEADCHNL_RV_SENSING_SENSITIVITY: 2 MV
MDC_IDC_STAT_AT_DTM_END: NORMAL
MDC_IDC_STAT_AT_DTM_START: NORMAL
MDC_IDC_STAT_BRADY_DTM_END: NORMAL
MDC_IDC_STAT_BRADY_DTM_START: NORMAL
MDC_IDC_STAT_BRADY_RV_PERCENT_PACED: 15 %
MDC_IDC_STAT_CRT_DTM_END: NORMAL
MDC_IDC_STAT_CRT_DTM_START: NORMAL
MDC_IDC_STAT_HEART_RATE_DTM_END: NORMAL
MDC_IDC_STAT_HEART_RATE_DTM_START: NORMAL
MDC_IDC_STAT_HEART_RATE_VENTRICULAR_MAX: 240 {BEATS}/MIN
MDC_IDC_STAT_HEART_RATE_VENTRICULAR_MEAN: 83 {BEATS}/MIN
MDC_IDC_STAT_HEART_RATE_VENTRICULAR_MIN: 60 {BEATS}/MIN

## 2019-11-27 PROCEDURE — 25000128 H RX IP 250 OP 636: Performed by: NURSE ANESTHETIST, CERTIFIED REGISTERED

## 2019-11-27 PROCEDURE — 71000013 ZZH RECOVERY PHASE 1 LEVEL 1 EA ADDTL HR: Performed by: ORTHOPAEDIC SURGERY

## 2019-11-27 PROCEDURE — 37000009 ZZH ANESTHESIA TECHNICAL FEE, EACH ADDTL 15 MIN: Performed by: ORTHOPAEDIC SURGERY

## 2019-11-27 PROCEDURE — 40000986 XR KNEE PORT LT 1/2 VW: Mod: LT

## 2019-11-27 PROCEDURE — 25000128 H RX IP 250 OP 636: Performed by: PHYSICIAN ASSISTANT

## 2019-11-27 PROCEDURE — 36000093 ZZH SURGERY LEVEL 4 1ST 30 MIN: Performed by: ORTHOPAEDIC SURGERY

## 2019-11-27 PROCEDURE — 0SRD0J9 REPLACEMENT OF LEFT KNEE JOINT WITH SYNTHETIC SUBSTITUTE, CEMENTED, OPEN APPROACH: ICD-10-PCS | Performed by: ORTHOPAEDIC SURGERY

## 2019-11-27 PROCEDURE — C1776 JOINT DEVICE (IMPLANTABLE): HCPCS | Performed by: ORTHOPAEDIC SURGERY

## 2019-11-27 PROCEDURE — 25000128 H RX IP 250 OP 636: Performed by: ORTHOPAEDIC SURGERY

## 2019-11-27 PROCEDURE — 27210794 ZZH OR GENERAL SUPPLY STERILE: Performed by: ORTHOPAEDIC SURGERY

## 2019-11-27 PROCEDURE — 25000125 ZZHC RX 250: Performed by: ORTHOPAEDIC SURGERY

## 2019-11-27 PROCEDURE — 25800030 ZZH RX IP 258 OP 636: Performed by: ANESTHESIOLOGY

## 2019-11-27 PROCEDURE — 25800025 ZZH RX 258: Performed by: ORTHOPAEDIC SURGERY

## 2019-11-27 PROCEDURE — 40000306 ZZH STATISTIC PRE PROC ASSESS II: Performed by: ORTHOPAEDIC SURGERY

## 2019-11-27 PROCEDURE — 25000125 ZZHC RX 250: Performed by: NURSE ANESTHETIST, CERTIFIED REGISTERED

## 2019-11-27 PROCEDURE — 36000063 ZZH SURGERY LEVEL 4 EA 15 ADDTL MIN: Performed by: ORTHOPAEDIC SURGERY

## 2019-11-27 PROCEDURE — 12000000 ZZH R&B MED SURG/OB

## 2019-11-27 PROCEDURE — 37000008 ZZH ANESTHESIA TECHNICAL FEE, 1ST 30 MIN: Performed by: ORTHOPAEDIC SURGERY

## 2019-11-27 PROCEDURE — 71000012 ZZH RECOVERY PHASE 1 LEVEL 1 FIRST HR: Performed by: ORTHOPAEDIC SURGERY

## 2019-11-27 PROCEDURE — 25000128 H RX IP 250 OP 636: Performed by: ANESTHESIOLOGY

## 2019-11-27 PROCEDURE — 99221 1ST HOSP IP/OBS SF/LOW 40: CPT | Performed by: INTERNAL MEDICINE

## 2019-11-27 PROCEDURE — 99207 ZZC CONSULT E&M CHANGED TO INITIAL LEVEL: CPT | Performed by: INTERNAL MEDICINE

## 2019-11-27 PROCEDURE — 97530 THERAPEUTIC ACTIVITIES: CPT | Mod: GP

## 2019-11-27 PROCEDURE — 27810169 ZZH OR IMPLANT GENERAL: Performed by: ORTHOPAEDIC SURGERY

## 2019-11-27 PROCEDURE — 25000132 ZZH RX MED GY IP 250 OP 250 PS 637: Mod: GY | Performed by: PHYSICIAN ASSISTANT

## 2019-11-27 PROCEDURE — 25000125 ZZHC RX 250: Performed by: INTERNAL MEDICINE

## 2019-11-27 PROCEDURE — 25800030 ZZH RX IP 258 OP 636: Performed by: PHYSICIAN ASSISTANT

## 2019-11-27 PROCEDURE — 97161 PT EVAL LOW COMPLEX 20 MIN: CPT | Mod: GP

## 2019-11-27 PROCEDURE — 97110 THERAPEUTIC EXERCISES: CPT | Mod: GP

## 2019-11-27 PROCEDURE — 25000125 ZZHC RX 250: Performed by: PHYSICIAN ASSISTANT

## 2019-11-27 PROCEDURE — 97116 GAIT TRAINING THERAPY: CPT | Mod: GP

## 2019-11-27 DEVICE — IMP COMP PATELLA TRI 33X9MM 5550-L-339: Type: IMPLANTABLE DEVICE | Site: KNEE | Status: FUNCTIONAL

## 2019-11-27 DEVICE — BONE CEMENT SIMPLEX W/TOBRAMYCIN 6197-9-001: Type: IMPLANTABLE DEVICE | Site: KNEE | Status: FUNCTIONAL

## 2019-11-27 DEVICE — IMP BASEPLATE TIBIAL HOWM TRI 6 5520-B-600: Type: IMPLANTABLE DEVICE | Site: KNEE | Status: FUNCTIONAL

## 2019-11-27 DEVICE — IMP COMP FEM STRK TRIATHLN CR LT 6 5510-F-601: Type: IMPLANTABLE DEVICE | Site: KNEE | Status: FUNCTIONAL

## 2019-11-27 DEVICE — IMPLANTABLE DEVICE: Type: IMPLANTABLE DEVICE | Site: KNEE | Status: FUNCTIONAL

## 2019-11-27 RX ORDER — LIDOCAINE 40 MG/G
CREAM TOPICAL
Status: DISCONTINUED | OUTPATIENT
Start: 2019-11-27 | End: 2019-11-30 | Stop reason: HOSPADM

## 2019-11-27 RX ORDER — AMOXICILLIN 250 MG
2 CAPSULE ORAL 2 TIMES DAILY
Status: DISCONTINUED | OUTPATIENT
Start: 2019-11-27 | End: 2019-11-30 | Stop reason: HOSPADM

## 2019-11-27 RX ORDER — CEFAZOLIN SODIUM 2 G/100ML
2 INJECTION, SOLUTION INTRAVENOUS EVERY 8 HOURS
Status: COMPLETED | OUTPATIENT
Start: 2019-11-27 | End: 2019-11-28

## 2019-11-27 RX ORDER — ONDANSETRON 2 MG/ML
4 INJECTION INTRAMUSCULAR; INTRAVENOUS EVERY 30 MIN PRN
Status: DISCONTINUED | OUTPATIENT
Start: 2019-11-27 | End: 2019-11-27 | Stop reason: HOSPADM

## 2019-11-27 RX ORDER — OXYCODONE HYDROCHLORIDE 5 MG/1
5 TABLET ORAL EVERY 6 HOURS PRN
Status: DISCONTINUED | OUTPATIENT
Start: 2019-11-27 | End: 2019-11-29

## 2019-11-27 RX ORDER — CEFAZOLIN SODIUM 2 G/100ML
2 INJECTION, SOLUTION INTRAVENOUS
Status: COMPLETED | OUTPATIENT
Start: 2019-11-27 | End: 2019-11-27

## 2019-11-27 RX ORDER — CELECOXIB 100 MG/1
100 CAPSULE ORAL 2 TIMES DAILY
Status: DISCONTINUED | OUTPATIENT
Start: 2019-11-27 | End: 2019-11-30 | Stop reason: HOSPADM

## 2019-11-27 RX ORDER — CELECOXIB 200 MG/1
200 CAPSULE ORAL ONCE
Status: COMPLETED | OUTPATIENT
Start: 2019-11-27 | End: 2019-11-27

## 2019-11-27 RX ORDER — HYDROXYZINE HYDROCHLORIDE 10 MG/1
10 TABLET, FILM COATED ORAL EVERY 6 HOURS PRN
Qty: 15 TABLET | Refills: 0 | Status: ON HOLD | OUTPATIENT
Start: 2019-11-27 | End: 2021-10-09

## 2019-11-27 RX ORDER — ONDANSETRON 4 MG/1
4 TABLET, ORALLY DISINTEGRATING ORAL EVERY 6 HOURS PRN
Qty: 8 TABLET | Refills: 0 | Status: ON HOLD | OUTPATIENT
Start: 2019-11-27 | End: 2021-10-09

## 2019-11-27 RX ORDER — NALOXONE HYDROCHLORIDE 0.4 MG/ML
.1-.4 INJECTION, SOLUTION INTRAMUSCULAR; INTRAVENOUS; SUBCUTANEOUS
Status: DISCONTINUED | OUTPATIENT
Start: 2019-11-27 | End: 2019-11-27 | Stop reason: HOSPADM

## 2019-11-27 RX ORDER — PHENYLEPHRINE HYDROCHLORIDE 10 MG/ML
INJECTION INTRAVENOUS PRN
Status: DISCONTINUED | OUTPATIENT
Start: 2019-11-27 | End: 2019-11-27

## 2019-11-27 RX ORDER — CEFAZOLIN SODIUM 1 G/3ML
1 INJECTION, POWDER, FOR SOLUTION INTRAMUSCULAR; INTRAVENOUS SEE ADMIN INSTRUCTIONS
Status: DISCONTINUED | OUTPATIENT
Start: 2019-11-27 | End: 2019-11-27 | Stop reason: HOSPADM

## 2019-11-27 RX ORDER — GLYCOPYRROLATE 0.2 MG/ML
INJECTION, SOLUTION INTRAMUSCULAR; INTRAVENOUS PRN
Status: DISCONTINUED | OUTPATIENT
Start: 2019-11-27 | End: 2019-11-27

## 2019-11-27 RX ORDER — CELECOXIB 100 MG/1
100 CAPSULE ORAL 2 TIMES DAILY
Qty: 10 CAPSULE | Refills: 0 | Status: ON HOLD | OUTPATIENT
Start: 2019-11-27 | End: 2021-10-09

## 2019-11-27 RX ORDER — ONDANSETRON 4 MG/1
4 TABLET, ORALLY DISINTEGRATING ORAL EVERY 6 HOURS PRN
Status: DISCONTINUED | OUTPATIENT
Start: 2019-11-27 | End: 2019-11-30 | Stop reason: HOSPADM

## 2019-11-27 RX ORDER — LIDOCAINE HYDROCHLORIDE ANHYDROUS AND EPINEPHRINE 10; 10 MG/ML; UG/ML
15 INJECTION, SOLUTION INFILTRATION ONCE
Status: COMPLETED | OUTPATIENT
Start: 2019-11-27 | End: 2019-11-27

## 2019-11-27 RX ORDER — GABAPENTIN 300 MG/1
300 CAPSULE ORAL 3 TIMES DAILY
Status: DISCONTINUED | OUTPATIENT
Start: 2019-11-27 | End: 2019-11-30 | Stop reason: HOSPADM

## 2019-11-27 RX ORDER — OXYCODONE HYDROCHLORIDE 5 MG/1
5 TABLET ORAL EVERY 6 HOURS PRN
Qty: 30 TABLET | Refills: 0 | Status: ON HOLD | OUTPATIENT
Start: 2019-11-27 | End: 2021-10-09

## 2019-11-27 RX ORDER — ONDANSETRON 4 MG/1
4 TABLET, ORALLY DISINTEGRATING ORAL EVERY 30 MIN PRN
Status: DISCONTINUED | OUTPATIENT
Start: 2019-11-27 | End: 2019-11-27 | Stop reason: HOSPADM

## 2019-11-27 RX ORDER — ACETAMINOPHEN 500 MG
1000 TABLET ORAL ONCE
Status: COMPLETED | OUTPATIENT
Start: 2019-11-27 | End: 2019-11-27

## 2019-11-27 RX ORDER — ASPIRIN 325 MG
325 TABLET, DELAYED RELEASE (ENTERIC COATED) ORAL DAILY
Qty: 42 TABLET | Refills: 0 | Status: ON HOLD | OUTPATIENT
Start: 2019-11-28 | End: 2021-10-09

## 2019-11-27 RX ORDER — PROPOFOL 10 MG/ML
INJECTION, EMULSION INTRAVENOUS CONTINUOUS PRN
Status: DISCONTINUED | OUTPATIENT
Start: 2019-11-27 | End: 2019-11-27

## 2019-11-27 RX ORDER — ACETAMINOPHEN 325 MG/1
650 TABLET ORAL EVERY 6 HOURS PRN
Status: DISCONTINUED | OUTPATIENT
Start: 2019-11-27 | End: 2019-11-30 | Stop reason: HOSPADM

## 2019-11-27 RX ORDER — AMOXICILLIN 250 MG
2 CAPSULE ORAL 2 TIMES DAILY
Qty: 15 TABLET | Refills: 0 | Status: ON HOLD | OUTPATIENT
Start: 2019-11-27 | End: 2021-10-09

## 2019-11-27 RX ORDER — BISACODYL 10 MG
10 SUPPOSITORY, RECTAL RECTAL DAILY PRN
Status: DISCONTINUED | OUTPATIENT
Start: 2019-11-27 | End: 2019-11-30 | Stop reason: HOSPADM

## 2019-11-27 RX ORDER — SODIUM CHLORIDE, SODIUM LACTATE, POTASSIUM CHLORIDE, CALCIUM CHLORIDE 600; 310; 30; 20 MG/100ML; MG/100ML; MG/100ML; MG/100ML
INJECTION, SOLUTION INTRAVENOUS CONTINUOUS
Status: DISCONTINUED | OUTPATIENT
Start: 2019-11-27 | End: 2019-11-27 | Stop reason: HOSPADM

## 2019-11-27 RX ORDER — BRIMONIDINE TARTRATE 1.5 MG/ML
1 SOLUTION/ DROPS OPHTHALMIC 2 TIMES DAILY
Status: DISCONTINUED | OUTPATIENT
Start: 2019-11-27 | End: 2019-11-27 | Stop reason: CLARIF

## 2019-11-27 RX ORDER — HYDROMORPHONE HYDROCHLORIDE 1 MG/ML
0.2 INJECTION, SOLUTION INTRAMUSCULAR; INTRAVENOUS; SUBCUTANEOUS
Status: DISCONTINUED | OUTPATIENT
Start: 2019-11-27 | End: 2019-11-30 | Stop reason: HOSPADM

## 2019-11-27 RX ORDER — FERROUS SULFATE 325(65) MG
325 TABLET ORAL
Status: DISCONTINUED | OUTPATIENT
Start: 2019-11-28 | End: 2019-11-30 | Stop reason: HOSPADM

## 2019-11-27 RX ORDER — LIDOCAINE 40 MG/G
CREAM TOPICAL
Status: DISCONTINUED | OUTPATIENT
Start: 2019-11-27 | End: 2019-11-27 | Stop reason: HOSPADM

## 2019-11-27 RX ORDER — LISINOPRIL 10 MG/1
10 TABLET ORAL DAILY
Status: DISCONTINUED | OUTPATIENT
Start: 2019-11-27 | End: 2019-11-28

## 2019-11-27 RX ORDER — ONDANSETRON 2 MG/ML
INJECTION INTRAMUSCULAR; INTRAVENOUS PRN
Status: DISCONTINUED | OUTPATIENT
Start: 2019-11-27 | End: 2019-11-27

## 2019-11-27 RX ORDER — FUROSEMIDE 20 MG
20 TABLET ORAL DAILY
Status: DISCONTINUED | OUTPATIENT
Start: 2019-11-27 | End: 2019-11-28

## 2019-11-27 RX ORDER — BRIMONIDINE TARTRATE 2 MG/ML
1 SOLUTION/ DROPS OPHTHALMIC 2 TIMES DAILY
Status: DISCONTINUED | OUTPATIENT
Start: 2019-11-27 | End: 2019-11-30 | Stop reason: HOSPADM

## 2019-11-27 RX ORDER — ATENOLOL 25 MG/1
25 TABLET ORAL DAILY
Status: DISCONTINUED | OUTPATIENT
Start: 2019-11-27 | End: 2019-11-28

## 2019-11-27 RX ORDER — HYDROMORPHONE HYDROCHLORIDE 1 MG/ML
.3-.5 INJECTION, SOLUTION INTRAMUSCULAR; INTRAVENOUS; SUBCUTANEOUS EVERY 10 MIN PRN
Status: DISCONTINUED | OUTPATIENT
Start: 2019-11-27 | End: 2019-11-27 | Stop reason: HOSPADM

## 2019-11-27 RX ORDER — MEPERIDINE HYDROCHLORIDE 50 MG/ML
12.5 INJECTION INTRAMUSCULAR; INTRAVENOUS; SUBCUTANEOUS
Status: DISCONTINUED | OUTPATIENT
Start: 2019-11-27 | End: 2019-11-27 | Stop reason: HOSPADM

## 2019-11-27 RX ORDER — LEVOTHYROXINE SODIUM 100 UG/1
100 TABLET ORAL DAILY
Status: DISCONTINUED | OUTPATIENT
Start: 2019-11-28 | End: 2019-11-30 | Stop reason: HOSPADM

## 2019-11-27 RX ORDER — TRAMADOL HYDROCHLORIDE 50 MG/1
50 TABLET ORAL EVERY 6 HOURS PRN
Qty: 30 TABLET | Refills: 0 | Status: SHIPPED | OUTPATIENT
Start: 2019-11-27 | End: 2019-11-30

## 2019-11-27 RX ORDER — KETAMINE HYDROCHLORIDE 10 MG/ML
INJECTION INTRAMUSCULAR; INTRAVENOUS PRN
Status: DISCONTINUED | OUTPATIENT
Start: 2019-11-27 | End: 2019-11-27

## 2019-11-27 RX ORDER — POLYETHYLENE GLYCOL 3350 17 G/17G
17 POWDER, FOR SOLUTION ORAL DAILY
Status: DISCONTINUED | OUTPATIENT
Start: 2019-11-27 | End: 2019-11-30 | Stop reason: HOSPADM

## 2019-11-27 RX ORDER — HYDROXYZINE HYDROCHLORIDE 10 MG/1
10 TABLET, FILM COATED ORAL EVERY 6 HOURS PRN
Status: DISCONTINUED | OUTPATIENT
Start: 2019-11-27 | End: 2019-11-30 | Stop reason: HOSPADM

## 2019-11-27 RX ORDER — ZOLPIDEM TARTRATE 5 MG/1
5 TABLET ORAL AT BEDTIME
Status: ON HOLD | COMMUNITY
End: 2019-11-30

## 2019-11-27 RX ORDER — ONDANSETRON 2 MG/ML
4 INJECTION INTRAMUSCULAR; INTRAVENOUS EVERY 6 HOURS PRN
Status: DISCONTINUED | OUTPATIENT
Start: 2019-11-27 | End: 2019-11-30 | Stop reason: HOSPADM

## 2019-11-27 RX ORDER — FENTANYL CITRATE 50 UG/ML
25-50 INJECTION, SOLUTION INTRAMUSCULAR; INTRAVENOUS
Status: DISCONTINUED | OUTPATIENT
Start: 2019-11-27 | End: 2019-11-27 | Stop reason: HOSPADM

## 2019-11-27 RX ORDER — ALBUTEROL SULFATE 0.83 MG/ML
2.5 SOLUTION RESPIRATORY (INHALATION) EVERY 4 HOURS PRN
Status: DISCONTINUED | OUTPATIENT
Start: 2019-11-27 | End: 2019-11-27 | Stop reason: HOSPADM

## 2019-11-27 RX ORDER — PAROXETINE 20 MG/1
20 TABLET, FILM COATED ORAL EVERY MORNING
Status: DISCONTINUED | OUTPATIENT
Start: 2019-11-27 | End: 2019-11-30 | Stop reason: HOSPADM

## 2019-11-27 RX ORDER — VANCOMYCIN HYDROCHLORIDE 1 G/20ML
INJECTION, POWDER, LYOPHILIZED, FOR SOLUTION INTRAVENOUS PRN
Status: DISCONTINUED | OUTPATIENT
Start: 2019-11-27 | End: 2019-11-27 | Stop reason: HOSPADM

## 2019-11-27 RX ORDER — NALOXONE HYDROCHLORIDE 0.4 MG/ML
.1-.4 INJECTION, SOLUTION INTRAMUSCULAR; INTRAVENOUS; SUBCUTANEOUS
Status: DISCONTINUED | OUTPATIENT
Start: 2019-11-27 | End: 2019-11-30 | Stop reason: HOSPADM

## 2019-11-27 RX ORDER — SODIUM CHLORIDE, SODIUM LACTATE, POTASSIUM CHLORIDE, CALCIUM CHLORIDE 600; 310; 30; 20 MG/100ML; MG/100ML; MG/100ML; MG/100ML
INJECTION, SOLUTION INTRAVENOUS CONTINUOUS
Status: DISCONTINUED | OUTPATIENT
Start: 2019-11-27 | End: 2019-11-30 | Stop reason: HOSPADM

## 2019-11-27 RX ORDER — BUPIVACAINE HYDROCHLORIDE 7.5 MG/ML
INJECTION, SOLUTION INTRASPINAL PRN
Status: DISCONTINUED | OUTPATIENT
Start: 2019-11-27 | End: 2019-11-27

## 2019-11-27 RX ORDER — GABAPENTIN 300 MG/1
300 CAPSULE ORAL 3 TIMES DAILY
Qty: 9 CAPSULE | Refills: 0 | Status: ON HOLD | OUTPATIENT
Start: 2019-11-27 | End: 2021-10-09

## 2019-11-27 RX ORDER — DIMENHYDRINATE 50 MG/ML
25 INJECTION, SOLUTION INTRAMUSCULAR; INTRAVENOUS
Status: DISCONTINUED | OUTPATIENT
Start: 2019-11-27 | End: 2019-11-27 | Stop reason: HOSPADM

## 2019-11-27 RX ADMIN — Medication 5 MG: at 09:40

## 2019-11-27 RX ADMIN — SENNOSIDES AND DOCUSATE SODIUM 2 TABLET: 8.6; 5 TABLET ORAL at 20:51

## 2019-11-27 RX ADMIN — BRIMONIDINE TARTRATE 1 DROP: 2 SOLUTION OPHTHALMIC at 20:51

## 2019-11-27 RX ADMIN — SODIUM CHLORIDE, POTASSIUM CHLORIDE, SODIUM LACTATE AND CALCIUM CHLORIDE: 600; 310; 30; 20 INJECTION, SOLUTION INTRAVENOUS at 07:51

## 2019-11-27 RX ADMIN — Medication 5 MG: at 08:31

## 2019-11-27 RX ADMIN — PHENYLEPHRINE HYDROCHLORIDE 100 MCG: 10 INJECTION INTRAVENOUS at 08:42

## 2019-11-27 RX ADMIN — PAROXETINE HYDROCHLORIDE 20 MG: 20 TABLET, FILM COATED ORAL at 14:16

## 2019-11-27 RX ADMIN — Medication 5 MG: at 09:13

## 2019-11-27 RX ADMIN — ACETAMINOPHEN 1000 MG: 500 TABLET, FILM COATED ORAL at 07:24

## 2019-11-27 RX ADMIN — LISINOPRIL 10 MG: 10 TABLET ORAL at 14:16

## 2019-11-27 RX ADMIN — CELECOXIB 200 MG: 200 CAPSULE ORAL at 07:24

## 2019-11-27 RX ADMIN — GABAPENTIN 300 MG: 300 CAPSULE ORAL at 14:16

## 2019-11-27 RX ADMIN — FUROSEMIDE 20 MG: 20 TABLET ORAL at 14:16

## 2019-11-27 RX ADMIN — BUPIVACAINE HYDROCHLORIDE IN DEXTROSE 15 MG: 7.5 INJECTION, SOLUTION SUBARACHNOID at 08:15

## 2019-11-27 RX ADMIN — GLYCOPYRROLATE 0.2 MG: 0.2 INJECTION, SOLUTION INTRAMUSCULAR; INTRAVENOUS at 08:20

## 2019-11-27 RX ADMIN — Medication 1 G: at 08:26

## 2019-11-27 RX ADMIN — SODIUM CHLORIDE, POTASSIUM CHLORIDE, SODIUM LACTATE AND CALCIUM CHLORIDE: 600; 310; 30; 20 INJECTION, SOLUTION INTRAVENOUS at 11:56

## 2019-11-27 RX ADMIN — PROPOFOL 50 MCG/KG/MIN: 10 INJECTION, EMULSION INTRAVENOUS at 08:20

## 2019-11-27 RX ADMIN — Medication 10 MG: at 08:18

## 2019-11-27 RX ADMIN — MIDAZOLAM 1 MG: 1 INJECTION INTRAMUSCULAR; INTRAVENOUS at 07:10

## 2019-11-27 RX ADMIN — ONDANSETRON HYDROCHLORIDE 4 MG: 2 INJECTION, SOLUTION INTRAVENOUS at 08:42

## 2019-11-27 RX ADMIN — GABAPENTIN 300 MG: 300 CAPSULE ORAL at 20:50

## 2019-11-27 RX ADMIN — PHENYLEPHRINE HYDROCHLORIDE 100 MCG: 10 INJECTION INTRAVENOUS at 08:25

## 2019-11-27 RX ADMIN — SODIUM CHLORIDE, POTASSIUM CHLORIDE, SODIUM LACTATE AND CALCIUM CHLORIDE: 600; 310; 30; 20 INJECTION, SOLUTION INTRAVENOUS at 08:45

## 2019-11-27 RX ADMIN — CEFAZOLIN SODIUM 2 G: 2 INJECTION, SOLUTION INTRAVENOUS at 16:02

## 2019-11-27 RX ADMIN — CEFAZOLIN SODIUM 2 G: 2 INJECTION, SOLUTION INTRAVENOUS at 08:10

## 2019-11-27 RX ADMIN — CELECOXIB 100 MG: 100 CAPSULE ORAL at 20:51

## 2019-11-27 RX ADMIN — Medication 5 MG: at 09:52

## 2019-11-27 ASSESSMENT — ACTIVITIES OF DAILY LIVING (ADL)
ADLS_ACUITY_SCORE: 21
ADLS_ACUITY_SCORE: 22
ADLS_ACUITY_SCORE: 22

## 2019-11-27 ASSESSMENT — ENCOUNTER SYMPTOMS: DYSRHYTHMIAS: 1

## 2019-11-27 ASSESSMENT — MIFFLIN-ST. JEOR: SCORE: 1508.6

## 2019-11-27 ASSESSMENT — LIFESTYLE VARIABLES: TOBACCO_USE: 1

## 2019-11-27 NOTE — ANESTHESIA POSTPROCEDURE EVALUATION
Patient: Jake Duane Pfleiger    Procedure(s):  Left total knee arthroplasty,  spinal with adductor canal block    Diagnosis:Osteoarthritis of left knee, unspecified osteoarthritis type [M17.12]  Diagnosis Additional Information: No value filed.    Anesthesia Type:  Peripheral Nerve Block, For Post-op pain in coordination with surgeon, Spinal    Note:  Anesthesia Post Evaluation    Patient location during evaluation: PACU  Patient participation: Able to fully participate in evaluation  Level of consciousness: awake  Pain management: adequate  Airway patency: patent  Cardiovascular status: acceptable  Respiratory status: acceptable  Hydration status: acceptable  PONV: controlled     Anesthetic complications: None          Last vitals:  Vitals:    11/27/19 1115 11/27/19 1130 11/27/19 1145   BP: 121/76 115/75 102/65   Pulse: 75 74    Resp: 26 14 16   Temp:      SpO2: 99% 100% 99%         Electronically Signed By: Jhon Heller DO  November 27, 2019  11:52 AM

## 2019-11-27 NOTE — ANESTHESIA PROCEDURE NOTES
Peripheral nerve/Neuraxial procedure note : Femoral and Adductor canal  Pre-Procedure  Performed by Jhon Heller, DO  Referred by MELISSA WADE MD  Location: pre-op    Procedure Times:11/27/2019 7:47 AM and 11/27/2019 7:57 AM  Pre-Anesthestic Checklist: patient identified, IV checked, site marked, risks and benefits discussed, informed consent, monitors and equipment checked, pre-op evaluation, at physician/surgeon's request and post-op pain management    Timeout  Correct Patient: Yes   Correct Procedure: Yes   Correct Site: Yes   Correct Laterality: Yes   Correct Position: Yes   Site Marked: Yes   .   Procedure Documentation    .    Procedure: Femoral and Adductor canal, left.   Patient Position:supine   Ultrasound used to identify targeted nerve, plexus, or vascular marker and placed a needle adjacent to it., Ultrasound was used to visualize the spread of the anesthetic in close proximity to the above stated nerve.   Patient Prep/Sterile Barriers; mask, povidone-iodine 7.5% surgical scrub.  .  Needle: insulated    Needle Length (Inches) 3.13   Insertion Method: Single Shot.        Assessment/Narrative  Paresthesias: No.  .  The placement was negative for: blood aspirated, painful injection and site bleeding.  Bolus given via needle..   Secured via.   Complications: none. Comments:  .The surgeon has given a verbal order transferring care of this patient to me for the performance of a regional analgesia block for post-op pain control. It is requested of me because I am uniquely trained and qualified to perform this block and the surgeon is neither trained nor qualified to perform this procedure.  .Femoral Nerve Block, adductor canal approach  Medication- Bupivicaine 0.75% 16cc + Lido 2% w/ epi 1:200k 4cc  RAJAN Heller

## 2019-11-27 NOTE — OP NOTE
[facility]  Operative Note    Cemented Total Knee Arthroplasty     Jake Duane Pfleiger MRN# [id number]   YOB: 1931  Procedure Date:  11/27/2019  Age: 88 year old       PREOPERATIVE DIAGNOSIS:   Degenerative osteoarthritis, primary,left  knee.    POSTOPERATIVE DIAGNOSIS:   Degenerative osteoarthritis, primary, left} knee.    OPERATIVE PROCEDURE:  Left total knee arthroplasty.    SURGEON:  Ag Armstrong M.D.    FIRST ASSISTANT:  Rolly Palacios PA-C whose assistance was critical for positioning, exposure during bony resection and implantation as well as closure.     ANESTHESIA:  Block (adductor and I-pack) with spinal.      INDICATIONS:  Jake Duane Pfleiger is a 88 year old-year-old  with advanced primary degenerative tricompartmental osteoarthritis of the left knee.  They have failed nonoperative care with anti-inflammatory drugs, corticosteroid injections, and reduced activity.   Discussed continued nonoperative management versus arthroplasty.  The risks for arthroplasty discussed including but not limited to bleeding, infection and instrument neurovascular structures and tendons, continued knee pain and stiffness, blood clots to go to the heart, lung or brain, anesthetic complications and even death.  They understand and wish to proceed.  Consent signed.        ESTIMATED BLOOD LOSS:  30 mL.       IMPLANTS:  Michael Triatholon System CR Size 6 Femur, size 6 tibia, size 33 mm S patella.  The cruciate retaining CS poly size 11.        FINDINGS:  Tricompartmental osteoarthritis of the knee.        DESCRIPTION OF PROCEDURE:  The patient identified in the preoperative holding area per hospital policy and the correct site marked to the OR on the OR table in Ancef and TXA given.  Chlorohexidine prescrub and ChloraPrep, standard drape.  Time out performed, all in the room agreed.       A midline incision to the knee and medial parapatellar arthrotomy.  Resected the fat pad and limited medial  release.  Removed the meniscus and ACL.  Inserted intramedullary femoral guide amaya at 5 degrees valgus for 10 mm (due to would be minimal lateral resection) cut from the distal femur which was made while protecting collateral structures.  All cuts were made while protecting the collateral and posterior structures.  Used a sizing guide and sized the femur with the posterior reference to a size 6, insuring that it would not notch.   Assessed Morehouse's and epicondylar axis and set for 3 degrees of external rotation.  Used angle wing with cutting block in place to check for notching, moved 1.5 mm anterior.  The 4 chamfer cuts were made while protecting collateral structures.       Turned attention to the tibia using extramedullary tibia guide, lined with anterior tibialis, tibia spine and second ray with appropriate slope and taken just over 4 mm off the low point of the medial side.  Tibia was cut while protecting collateral and posterior structures and the remaining meniscus were removed.   Resected posterior osteophytes and injected the posterior capsule with a joint injection cocktail including ketorolac, morphine, epinephrine and Marcaine.  Spacer block placed and appropriately stable in 90 degrees of flexion and full extension with a drop amaya well aligned.       Sized the tibia with and selected a 6.  Prepared for the tibia in appropriate rotation, placed the femoral trial and first a 9 and ultimately a 11 mm trial.  The knee came out to full extension, was quite stable.  Also at 40 degrees flexion stable to varus/valgus, able flex to 125 degrees.  Stable anterior to posterior.  Used the patellar cutting guide to make resection for 33 mm patella.  The patella trial tracked appropriately.       All trials were removed.  Copiously irrigated with pulse lavage and dried bone surfaces.       Components were cemented into place and all visible excess cement was removed.   Cement allowed to harden with knee in full  extension.  Rush Betadine lavage protocol and then thoroughly irrigated.  1 gram vancomycin placed deep.  Used a 2-0 barbed Stratafix suture to close the arthrotomy followed by 0 Vicryl fatty layer, 2-0 Stratafix subcutaneous and subcuticular followed by Exofin glue, and sterile dressing applied.  Tourniquet let down.  Awakened, transferred stable in the PACU.       POSTOPERATIVE PLAN:   1.  Weightbearing as tolerated.   2.  Physical therapy.   3.  SCD's and Aspirin for DVT prophylaxis, 325 mg daily x6 weeks.   4.  24 hours antibiotics  5.  Followup plan 2-week wound check and dressing off, no x-rays.     6.  8-week follow up with repeat x-rays of the knee.     [facility]    Monday 1-5 PM  and Thursday 7:30-12:00 AM\  [address] [place] [address]  [phone]    [phone]    Or    Tuesday 7:30-5 PM  [address] [facility] [address]  [place], [place]        MELISSA WADE MD

## 2019-11-27 NOTE — ANESTHESIA PROCEDURE NOTES
Peripheral nerve/Neuraxial procedure note : intrathecal  Pre-Procedure  Performed by Jhon Heller DO  Location: OR    Procedure Times:11/27/2019 8:11 AM and 11/27/2019 8:16 AM  Pre-Anesthestic Checklist: patient identified, IV checked, site marked, risks and benefits discussed, informed consent, monitors and equipment checked, pre-op evaluation, at physician/surgeon's request and post-op pain management    Timeout  Correct Patient: Yes   Correct Procedure: Yes   Correct Site: Yes   Correct Laterality: Yes   Correct Position: Yes   Site Marked: Yes   .   Procedure Documentation    .    Procedure: intrathecal, .   Patient Position:sitting Insertion Site:L2-3  (midline approach)     Patient Prep/Sterile Barriers; mask, povidone-iodine 7.5% surgical scrub.  .  Needle:  Spinal Needle (gauge): 24  Spinal/LP Needle Length (inches): 3.5 # of attempts: 1 and # of redirects:  Introducer used .        Assessment/Narrative  Paresthesias: No.  .  .  clear CSF fluid removed . Comments:  Bupivicaine 15mg    R Heller

## 2019-11-27 NOTE — CONSULTS
Consult Date:  2019      REQUESTING PROVIDER:  Dr. Ag Armstrong from [facility].      REASON FOR CONSULTATION:  Medical evaluation and management in a postoperative patient.      HISTORY OF PRESENT ILLNESS:  Jorge Luis Ordoñez is an 88-year-old gentleman who is status post left total knee arthroplasty done for degenerative osteoarthritis.  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  The patient was previously on anticoagulation, but was taken off over a month ago, chronic congestive heart failure, hypertension, gastroesophageal reflux disease, and hypothyroidism.  The patient is in immediate postoperative period.  He is fully awake.  Denied any headache, no blurring of vision, no nausea or vomiting, no chest pain, no palpitations.  The patient had 3 operative medical evaluations done at [facility] on 2019 and was optimized and cleared for this surgery.  At the time his electrolytes were normal.  His hemoglobin was 12.7.      PAST MEDICAL HISTORY:   1.  Hypertension.   2.  Chronic congestive heart failure.   3.  Atrial fibrillation, status post pacemaker placement.   4.  Legal blindness.   5.  Gastroesophageal reflux disease.   6.  Hypothyroidism.   7.  Degenerative joint disease.   8.  Depression.      PAST SURGICAL HISTORY:  Cholecystectomy, laminectomy, hemorrhoidectomy, knee arthroplasty, cataract removal.      FAMILY HISTORY:  Noncontributory to his current condition, but his mother with cataracts, brother with prostate cancer.  His father  at age 83.  Not clear the cause of his death.  His mother  at age 90.      SOCIAL HISTORY:  The patient lives in an assisted living facility, gets his meal at lunch time from the facility.  Fixes his own breakfast.  He is a former smoker.  He quit in .  He does not drink alcohol.  He does not use illicit drugs.      ALLERGIES:  No  known drug allergies.      REVIEW OF SYSTEMS:  Ten points reviewed, all are negative except those mentioned in history of present illness.      HOME MEDICATIONS:  Reviewed and reconciled as in electronic medical record.      PHYSICAL EXAMINATION:   GENERAL:  The patient is awake, alert, oriented, comfortable, not in any form of distress.   VITAL SIGNS:  Blood pressure 149/70, pulse rate 74, temperature 96.8, oxygen saturation 96%.   HEENT:  Pink, nonicteric.  Extraocular muscle movement intact.   NECK:  Supple, no JVD, no thyromegaly.   CHEST:  Good air entry bilaterally.  No wheezing, crackles or rales.   CARDIOVASCULAR:  S1 and S2 heard, irregular.  Pacemaker in situ.   ABDOMEN:  Obese, soft, nontender, nondistended, positive bowel sounds, no organomegaly.   EXTREMITIES:  Right lower extremity:  No edema, cyanosis or clubbing.  Left total knee arthroplasty done.  The area is wrapped.   NEUROLOGIC:  No focal neurologic deficit.   PSYCHIATRIC:  Normal mood and affect, keeps eye contact, responds to questions appropriately.      DIAGNOSTIC TESTS OF INTEREST:  Labs are pending.  Lab results from 11/06/2019 showed hemoglobin of 12.7, platelets 401, creatinine was 1, potassium was 4.8.      ASSESSMENT:  Jorge Luis Ordoñez is an 88-year-old gentleman with past medical history as outlined above including degenerative joint disease, hypertension, hypothyroidism who is immediate postop status post left total knee arthroplasty.  Medical consultation is called to evaluate medical conditions.   1.  Immediate post left total knee arthroplasty.   2.  Hypertension.   3.  Degenerative joint disease.      PLAN:     1.  The patient is admitted under [facility].  Pain control, DVT prophylaxis, wound care, PT, OT per [facility] recommendation.     2.  His blood pressure is slightly elevated.  Need blood pressure medications started.  We will get CBC and BMP in the morning as the patient is on lisinopril.     3.  The patient  also has underlying AFib with permanent pacemaker placement.  At this point, his rate is controlled, the patient was taken off of anticoagulation over a month ago due to a fall and bleeding and also bleeding from the hemorrhoid and advised to stay off anticoagulation.  The patient will discuss regarding this with his primary care provider.  For now, DVT prophylaxis per [facility] and they will continue to follow up this patient.        All his questions and concerns addressed, showed understanding.      CODE STATUS:  In the event of cardiorespiratory arrest, he is full code.         DILAN ZELAYA MD             D: 2019   T: 2019   MT: WT      Name:     GLENN CERRATO   MRN:      [id number]        Account:       [id number]   :      1931           Consult Date:  2019      Document: [id number]       cc: gA Armstrong MD

## 2019-11-27 NOTE — PHARMACY-ADMISSION MEDICATION HISTORY
Admission medication history interview status for this patient is complete. See [facility] admission navigator for allergy information, prior to admission medications and immunization status.     Medication history interview source(s):Patient + family   Medication history resources (including written lists, pill bottles, clinic record):None    Changes made to PTA medication list:  Added: none  Deleted: aspirin (daughter states pt stopped a while ago d/t bleeding)  Changed: zolpidem 10mg --> 5mg, fiber --> prn    Actions taken by pharmacist (provider contacted, etc):None     Additional medication history information:None    Medication reconciliation/reorder completed by provider prior to medication history?  Yes     Do you take OTC medications (eg tylenol, ibuprofen, fish oil, eye/ear drops, etc)? Yes     For patients on insulin therapy: No      Prior to Admission medications    Medication Sig Last Dose Taking? Auth Provider   acetaminophen (TYLENOL 8 HOUR ARTHRITIS PAIN) 650 MG CR tablet Take 1,300 mg by mouth 2 times daily 11/26/2019 at Unknown time Yes Unknown, Entered By History   Ascorbic Acid 500 MG CHEW Take 1,000 mg by mouth every evening 11/26/2019 at Unknown time Yes Unknown, Entered By History   atenolol (TENORMIN) 50 MG tablet Take 25 mg by mouth daily 11/27/2019 at Unknown time Yes Unknown, Entered By History   brimonidine (ALPHAGAN-P) 0.15 % ophthalmic solution Place 1 drop into the right eye 2 times daily 11/13/2019 Yes Unknown, Entered By History   ferrous sulfate (FEROSUL) 325 (65 Fe) MG tablet Take 325 mg by mouth daily (with breakfast) 11/26/2019 at Unknown time Yes Unknown, Entered By History   fish oil-omega-3 fatty acids (FISH OIL) 1000 MG capsule Take 2 g by mouth daily  11/20/2019 Yes Unknown, Entered By History   furosemide (LASIX) 20 MG tablet Take 1 tablet (20 mg) by mouth daily 11/26/2019 at Unknown time Yes Juwan Maher MD   levothyroxine (SYNTHROID) 100 MCG tablet Take 100 mcg by  mouth daily  11/27/2019 at Unknown time Yes Unknown, Entered By History   lisinopril (PRINIVIL) 10 MG tablet Take 10 mg by mouth daily  11/26/2019 at Unknown time Yes Reported, Patient   paroxetine (PAXIL) 20 MG tablet Take 20 mg by mouth every morning. 11/26/2019 at Unknown time Yes Unknown, Entered By History   polyethylene glycol (MIRALAX/GLYCOLAX) packet Take 1 packet by mouth daily as needed  11/25/2019 Yes Reported, Patient   zolpidem (AMBIEN) 5 MG tablet Take 5 mg by mouth At Bedtime 11/26/2019 at Unknown time Yes Unknown, Entered By History   Wheat Dextrin (BENEFIBER PO) Take by mouth daily as needed  More than a month at Unknown time  Unknown, Entered By History

## 2019-11-27 NOTE — ANESTHESIA CARE TRANSFER NOTE
Patient: Jake Duane Pfleiger    Procedure(s):  Left total knee arthroplasty (Spinal with adductor canal block or IPACK block if available)    Diagnosis: Osteoarthritis of left knee, unspecified osteoarthritis type [M17.12]  Diagnosis Additional Information: No value filed.    Anesthesia Type:   Peripheral Nerve Block, For Post-op pain in coordination with surgeon, Spinal     Note:  Airway :Room Air  Patient transferred to:PACU  Comments: [email or web address] Report: Identifed the Patient, Identified the Reponsible Provider, Reviewed the pertinent medical history, Discussed the surgical course, Reviewed Intra-OP anesthesia mangement and issues during anesthesia, Set expectations for post-procedure period and Allowed opportunity for questions and acknowledgement of understanding      Vitals: (Last set prior to Anesthesia Care Transfer)    CRNA VITALS  11/27/2019 0927 - 11/27/2019 1006      11/27/2019             Pulse:  78    SpO2:  96 %                Electronically Signed By: LAILA Romo CRNA  November 27, 2019  10:06 AM

## 2019-11-27 NOTE — PROGRESS NOTES
11/27/19 1500   Quick Adds   Type of Visit Initial PT Evaluation   Living Environment   Lives With alone   Living Arrangements independent living facility   Home Accessibility no concerns   Transportation Anticipated family or friend will provide   Living Environment Comment receives assistance for showers 2x/week, grab bars in shower and toilet, shower chair, alarm button in bathrooms   Self-Care   Usual Activity Tolerance moderate   Current Activity Tolerance moderate   Equipment Currently Used at Home walker, rolling   Activity/Exercise/Self-Care Comment Pt reports walking up and down hallways multiple times/day. Pt functions independent with rolling walker and grab bars in toilet and shower, receives assistance for showers 2x/week   Functional Level Prior   Ambulation 1-->assistive equipment   Transferring 1-->assistive equipment   Toileting 1-->assistive equipment   Bathing 3-->assistive equipment and person   Fall history within last six months yes   Number of times patient has fallen within last six months 1   Which of the above functional risks had a recent onset or change? ambulation;transferring;toileting;bathing;dressing   General Information   Onset of Illness/Injury or Date of Surgery - Date 11/27/19   Referring Physician Rolly Palacios PA-C   Patient/Family Goals Statement home   Pertinent History of Current Problem (include personal factors and/or comorbidities that impact the POC) s/p L TKA day 0   Precautions/Limitations fall precautions   Weight-Bearing Status - LLE weight-bearing as tolerated   Cognitive Status Examination   Orientation orientation to person, place and time   Level of Consciousness alert   Follows Commands and Answers Questions 100% of the time   Personal Safety and Judgment intact   Memory intact   Pain Assessment   Patient Currently in Pain Yes, see Vital Sign flowsheet   Integumentary/Edema   Integumentary/Edema Comments L LE wrapped in ACE bandaging   Posture    Posture  Protracted shoulders;Forward head position;Kyphosis   Range of Motion (ROM)   ROM Comment heelslide of L LE to approximately 40 deg, PROM to approximately 80 deg   Strength   Strength Comments Completes SLR x 7 reps, no KI needed   Bed Mobility   Bed Mobility Comments Supine upon arrival, agreeable to PT. Educated about PT role and POC. Bp at 170/80 in supine following LE exercises. Transfers supine<>sit with min A for trunk, increased time and effort needed. Denies dizziness/lightheadedness. Transfers sit<>supine with SBA, VC for form and technique.   Transfer Skills   Transfer Comments Transfers sit<>stand with min/mod A x 2, FWW, and VC for form and technique. Transfers sit<>stand with mod A x 1 on second rep and FWW. Transfers stand<>sit with good eccentric control.   Gait   Gait Comments Ambulates 75' with FWW, CGA and WC follow. Ambulates at reduced gait speed with antalgic gait and significantly forward flexed posture. Pt had one standing rest break, denies dizziness throughout. No LOB or buckling observed.   Balance   Balance Comments Impaired, requires use of FWW for OOB   Sensory Examination   Sensory Perception Comments Pt reports tingling in bilateral toes at baseline   Coordination   Coordination no deficits were identified   Muscle Tone   Muscle Tone no deficits were identified   Modality Interventions   Planned Modality Interventions Cryotherapy   General Therapy Interventions   Planned Therapy Interventions bed mobility training;gait training;transfer training;home program guidelines;progressive activity/exercise   Clinical Impression   Criteria for Skilled Therapeutic Intervention yes, treatment indicated   PT Diagnosis impaired functional mobility   Influenced by the following impairments decreased strength, decreased endurance, impaired balance, pain   Functional limitations due to impairments bed mobility, transfers, ambulation   Clinical Presentation Stable/Uncomplicated   Clinical Presentation  "Rationale PT plan in place, symptoms unchanging   Clinical Decision Making (Complexity) Low complexity   Therapy Frequency 2x/day   Predicted Duration of Therapy Intervention (days/wks) 3 days   Anticipated Discharge Disposition Other (see comments)  (per ortho)   Risk & Benefits of therapy have been explained Yes   Patient, Family & other staff in agreement with plan of care Yes   [facility] TM \"6 Clicks\"   2016, Trustees of [facility], under license to [employer].  All rights reserved.   6 Clicks Short Forms Basic Mobility Inpatient Short Form   [facility] AM-PAC  \"6 Clicks\" V.2 Basic Mobility Inpatient Short Form   1. Turning from your back to your side while in a flat bed without using bedrails? 3 - A Little   2. Moving from lying on your back to sitting on the side of a flat bed without using bedrails? 3 - A Little   3. Moving to and from a bed to a chair (including a wheelchair)? 3 - A Little   4. Standing up from a chair using your arms (e.g., wheelchair, or bedside chair)? 3 - A Little   5. To walk in hospital room? 3 - A Little   6. Climbing 3-5 steps with a railing? 1 - Total   Basic Mobility Raw Score (Score out of 24.Lower scores equate to lower levels of function) 16   Total Evaluation Time   Total Evaluation Time (Minutes) 10     "

## 2019-11-27 NOTE — BRIEF OP NOTE
[facility]    Brief Operative Note    Pre-operative diagnosis: Osteoarthritis of left knee, unspecified osteoarthritis type [M17.12]  Post-operative diagnosis Same as pre-operative diagnosis    Procedure: Procedure(s):  Left total knee arthroplasty (Spinal with adductor canal block or IPACK block if available)  Surgeon: Surgeon(s) and Role:     * Ag Armstrong MD - Primary     * Rolly Palacios PA-C - Assisting  Anesthesia: Other   Estimated blood loss: Less than 50 ml  Drains: None  Specimens: * No specimens in log *  Findings:   None.  Complications: None.  Implants:   Implant Name Type Inv. Item Serial No.  Lot No. LRB No. Used   BONE CEMENT SIMPLEX W/TOBRAMYCIN 6197-9-001 Cement, Bone BONE CEMENT SIMPLEX W/TOBRAMYCIN 6197-9-001  [employer] [id number] Left 2   IMP COMP PATELLA TRI 33X9MM 5550-L-339 Total Joint Component/Insert IMP COMP PATELLA TRI 33X9MM 5550-L-339  [employer] [id number] Left 1   IMP COMP FEM STRK TRIATHLN CR LT 6 5510-F-601 Total Joint Component/Insert IMP COMP FEM STRK TRIATHLN CR LT 6 5510-F-601  [employer] [id number] Left 1   INSERT TIBIAL ARTICULAR SURFACE SZ 6 11MM THK POLYETHYLENE Total Joint Component/Insert INSERT TIBIAL ARTICULAR SURFACE SZ 6 11MM THK POLYETHYLENE  [employer] [id number] Left 1   IMP BASEPLATE TIBIAL HOWM TRI 6 5520-B-600 Total Joint Component/Insert IMP BASEPLATE TIBIAL HOWM TRI 6 5520-B-600  [employer] [id number] Left 1

## 2019-11-27 NOTE — PLAN OF CARE
Arrived to floor at 1200 from PACU, POD #0 from L knee arthroplasty; ACE bandage in place- CDI  HTN at times, on 2L NC  Denies pain, ice in place  WB as tolerated, PT consulted  IV ancef  Last BM 11/26, due to void  Tolerating diet, denies nausea

## 2019-11-27 NOTE — PROGRESS NOTES
SPIRITUAL HEALTH SERVICES Progress Note  [facility] Pre-surgical    SH consult per pt's request for  support prior to procedure.   Met with pt, Jorge Luis, and his son and dtr in law. (Duane and Courtney)  Jorge Luis shares that he identifies as Sabianist, affiliated with [employer] in [place].   He requests prayer and we shared in prayer together. Oriented Jorge Luis to the volunteer [employer] that is available once he is admitted to the floor. SH remains available.     DARIO Valdez.  Staff    Pager #[phone]   Pronouns: he/him/his

## 2019-11-27 NOTE — PLAN OF CARE
PT orders received, evaluation completed, treatment initiated. Pt s/p L TKA day 0. Pt lives alone in independent living facility, receives assistance 2x/week for showers, has shower chair and grab bars and grab bars by toilet. Pt also has alarm in bathroom to call if needed. Pt ambulates with 4WW at baseline. Pt reports daughter will be with him for one week to help out starting Friday.       Patient plan: home with home PT  Current status: Supine upon arrival, agreeable to PT. Educated about PT role and POC. Pt on 2 L O2, tolerates being off O2 throughout apt, O2 sats dipped to 88%. Pt completes LE exercises, demonstrates ability to complete SLR. Bp at 170/80 in supine following LE exercises. Transfers supine<>sit with min A for trunk, increased time and effort needed. Denies dizziness/lightheadedness. Transfers sit<>stand with min/mod A x 2, FWW, and VC for form and technique. Ambulates 75' with FWW, CGA and WC follow. Ambulates at reduced gait speed with antalgic gait and significantly forward flexed posture. Pt had one standing rest break, denies dizziness throughout. No LOB or buckling observed. Transfers sit<>stand with mod A x 1 on second rep and FWW. Transfers stand<>sit with good eccentric control. Bp at 172/84 following ambulation. Transfers sit<>supine with SBA, VC for form and technique.   Anticipated status at discharge: Anticipate pt to be SBA or less for all functional mobility.

## 2019-11-27 NOTE — ANESTHESIA PREPROCEDURE EVALUATION
Anesthesia Pre-Procedure Evaluation    Patient: Jake Duane Pfleiger   MRN: [id number] : 1931          Preoperative Diagnosis: Osteoarthritis of left knee, unspecified osteoarthritis type [M17.12]    Procedure(s):  Left total knee arthroplasty (Spinal with adductor canal block or IPACK block if available)    Past Medical History:   Diagnosis Date     Anxiety      Arthritis      Atrial fibrillation (H)      Cardiomyopathy (H)      Depression      Diastolic CHF (H) 04/10/2016     Diverticulosis      Gastro-oesophageal reflux disease      GI bleed 10/02/2018    Lower     History of blood transfusion      Hypertension      Insomnia      Nodule of lower lobe of left lung     CT Chest 2019-stable     Pacemaker     [employer] Dual Pacemaker     Permanent atrial fibrillation      Right patella fracture      Tachy-jairo syndrome (H)      Thyroid disease     Hypothyrodism      Past Surgical History:   Procedure Laterality Date     ARTHROPLASTY HIP  2014    left hip replacement     ARTHROPLASTY KNEE      right      ARTHROTOMY KNEE Right 2019    Procedure: 1.  Excision of bony mass, 3 x 3 x 2 cm, underneath right quadriceps tendon in total knee arthroplasty.  2.  Repair of right quadriceps tendon with suture anchor.   ;  Surgeon: Ag Armstrong MD;  Location:  OR     CHOLECYSTECTOMY       COLONOSCOPY N/A 10/3/2018    Procedure: COLONOSCOPY;  COLONOSCOPY Rm.#[id number];  Surgeon: Reese Burroughs MD;  Location:  GI     COLONOSCOPY N/A 10/5/2018    Procedure: COLONOSCOPY;  COLONOSCOPY  Rm.#[id number];  Surgeon: Reese Burroughs MD;  Location:  GI     EYE SURGERY      right eye cataract removal      IMPLANT PACEMAKER  2012    dual chamber      ORTHOPEDIC SURGERY      back surgery      REPAIR TENDON QUADRICEPS Right 2019    Procedure: Repair tendon quadriceps;  Surgeon: Ag Armstrong MD;  Location:  OR     Anesthesia Evaluation     .             ROS/MED HX    ENT/Pulmonary:     (+)tobacco use,  Past use , . .    Neurologic:  - neg neurologic ROS     Cardiovascular:     (+) hypertension----. : . CHF . . pacemaker :. dysrhythmias a-fib, .       METS/Exercise Tolerance:     Hematologic:  - neg hematologic  ROS       Musculoskeletal:   (+) arthritis,  -       GI/Hepatic:  - neg GI/hepatic ROS       Renal/Genitourinary:  - ROS Renal section negative       Endo:  - neg endo ROS   (+) thyroid problem .      Psychiatric:  - neg psychiatric ROS       Infectious Disease:  - neg infectious disease ROS       Malignancy:         Other: Comment: .Lab Test        09/29/19 09/29/19 09/29/19 09/28/19 09/28/19      --          05/17/19      --          05/16/19      --          03/09/19 03/08/19                       1251          0547          0020          1910          1603           --           0558           --           0637           --           0631          5769          WBC           --           --           --           --          8.7           --          8.3           --          6.3            < >         --           --           HGB          7.8*         7.4*         8.1*          --          9.1*           < >        7.6*           < >        7.9*           < >        8.8*         9.3*          MCV           --           --           --           --          95            --          85            --          85             < >         --           --           PLT           --           --           --           --          237           --          237           --          244            < >         --           --           INR           --           --           --          1.10          --           --           --           --           --           --          1.49*        1.80*          < > = values in this interval not displayed.                  Lab Test        09/29/19 09/28/19 05/18/19 05/17/19                       0547          1603          0607           0558          NA           136          136           --          136           POTASSIUM    3.9          4.3           --          4.3           CHLORIDE     104          101           --          103           CO2          28           30            --          25            BUN          20           23            --          15            CR           0.80         1.11          --          0.74          ANIONGAP     4            5             --          8             MANAS          8.2*         8.9           --          8.2*          GLC          82           99           82           96                                     Physical Exam  Normal systems: cardiovascular and pulmonary    Airway   Mallampati: II    Dental     Cardiovascular   Rhythm and rate: regular and normal      Pulmonary    breath sounds clear to auscultation            Lab Results   Component Value Date    WBC 8.7 09/28/2019    HGB 7.8 (L) 09/29/2019    HCT 28.4 (L) 09/28/2019     09/28/2019     09/29/2019    POTASSIUM 3.9 09/29/2019    CHLORIDE 104 09/29/2019    CO2 28 09/29/2019    BUN 20 09/29/2019    CR 0.80 09/29/2019    GLC 82 09/29/2019    MANAS 8.2 (L) 09/29/2019    MAG 2.1 03/08/2019    ALBUMIN 3.3 (L) 05/15/2019    PROTTOTAL 6.4 (L) 05/15/2019    ALT 14 05/15/2019    AST 17 05/15/2019    ALKPHOS 76 05/15/2019    BILITOTAL 0.3 05/15/2019    PTT 46 (H) 03/12/2012    INR 1.10 09/28/2019    TSH 1.98 05/16/2019    T4 0.90 10/03/2006       Preop Vitals  BP Readings from Last 3 Encounters:   11/27/19 (!) 151/83   09/30/19 120/64   05/18/19 152/77    Pulse Readings from Last 3 Encounters:   11/27/19 73   09/30/19 90   05/17/19 73      Resp Readings from Last 3 Encounters:   11/27/19 23   09/30/19 18   05/18/19 16    SpO2 Readings from Last 3 Encounters:   11/27/19 96%   09/30/19 99%   05/18/19 92%      Temp Readings from Last 1 Encounters:   11/27/19 97.7  F (36.5  C) (Temporal)    Ht Readings from Last 1 Encounters:   11/27/19  "1.803 m (5' 11\")      Wt Readings from Last 1 Encounters:   11/27/19 81.6 kg (180 lb)    Estimated body mass index is 25.1 kg/m  as calculated from the following:    Height as of this encounter: 1.803 m (5' 11\").    Weight as of this encounter: 81.6 kg (180 lb).       Anesthesia Plan      History & Physical Review  History and physical reviewed and following examination; no interval change.    ASA Status:  3 .        Plan for Peripheral Nerve Block, For Post-op pain in coordination with surgeon and Spinal          Postoperative Care  Postoperative pain management:  IV analgesics, Oral pain medications, Multi-modal analgesia and Peripheral nerve block (Single Shot).      Consents                 Jhon Heller DO                    .  "

## 2019-11-27 NOTE — ANESTHESIA PROCEDURE NOTES
Peripheral nerve/Neuraxial procedure note : Other  Pre-Procedure  Performed by Jhon Heller, DO  Referred by MELISSA WADE MD  Location: pre-op    Procedure Times:11/27/2019 7:26 AM and 11/27/2019 7:36 AM  Pre-Anesthestic Checklist: patient identified, IV checked, site marked, risks and benefits discussed, informed consent, monitors and equipment checked, pre-op evaluation, at physician/surgeon's request and post-op pain management    Timeout  Correct Patient: Yes   Correct Procedure: Yes   Correct Site: Yes   Correct Laterality: Yes   Correct Position: Yes   Site Marked: Yes   .   Procedure Documentation    Diagnosis:IPACK BLOCK,GENICULATE BRANCHES OF THE SCIATIC.    Procedure: Other, left.   Patient Position:RLD Local skin infiltrated with mL of 1% lidocaine.    Ultrasound used to identify targeted nerve, plexus, or vascular marker and placed a needle adjacent to it., Ultrasound was used to visualize the spread of the anesthetic in close proximity to the above stated nerve.   Patient Prep/Sterile Barriers; mask, sterile gloves, povidone-iodine 7.5% surgical scrub.  .  Needle: insulated    Needle Length (Inches) 4   Insertion Method: Single Shot.        Assessment/Narrative  Paresthesias: No.  .  The placement was negative for: blood aspirated, painful injection and site bleeding.  Bolus given via needle..   Secured via.   Complications: none. Comments:  .The surgeon has given a verbal order transferring care of this patient to me for the performance of a regional analgesia block for post-op pain control. It is requested of me because I am uniquely trained and qualified to perform this block and the surgeon is neither trained nor qualified to perform this procedure.  .IPACK block, field block, popliteal space, geniculate nerve branches of the sciatic    Medication- Bupivicaine 0.5% 12cc + Lido 2% w/ epi 1:200k 3cc    R Pina

## 2019-11-28 ENCOUNTER — APPOINTMENT (OUTPATIENT)
Dept: OCCUPATIONAL THERAPY | Facility: CLINIC | Age: 84
DRG: 470 | End: 2019-11-28
Attending: PHYSICIAN ASSISTANT
Payer: MEDICARE

## 2019-11-28 ENCOUNTER — APPOINTMENT (OUTPATIENT)
Dept: PHYSICAL THERAPY | Facility: CLINIC | Age: 84
DRG: 470 | End: 2019-11-28
Attending: ORTHOPAEDIC SURGERY
Payer: MEDICARE

## 2019-11-28 LAB
ANION GAP SERPL CALCULATED.3IONS-SCNC: 4 MMOL/L (ref 3–14)
BUN SERPL-MCNC: 20 MG/DL (ref 7–30)
CALCIUM SERPL-MCNC: 8.2 MG/DL (ref 8.5–10.1)
CHLORIDE SERPL-SCNC: 104 MMOL/L (ref 94–109)
CO2 SERPL-SCNC: 28 MMOL/L (ref 20–32)
CREAT SERPL-MCNC: 0.87 MG/DL (ref 0.66–1.25)
ERYTHROCYTE [DISTWIDTH] IN BLOOD BY AUTOMATED COUNT: 13.8 % (ref 10–15)
GFR SERPL CREATININE-BSD FRML MDRD: 77 ML/MIN/{1.73_M2}
GLUCOSE SERPL-MCNC: 94 MG/DL (ref 70–99)
HCT VFR BLD AUTO: 29 % (ref 40–53)
HGB BLD-MCNC: 9 G/DL (ref 13.3–17.7)
HGB BLD-MCNC: 9.2 G/DL (ref 13.3–17.7)
MAGNESIUM SERPL-MCNC: 2 MG/DL (ref 1.6–2.3)
MCH RBC QN AUTO: 29.6 PG (ref 26.5–33)
MCHC RBC AUTO-ENTMCNC: 31 G/DL (ref 31.5–36.5)
MCV RBC AUTO: 95 FL (ref 78–100)
PLATELET # BLD AUTO: 162 10E9/L (ref 150–450)
POTASSIUM SERPL-SCNC: 4.5 MMOL/L (ref 3.4–5.3)
RBC # BLD AUTO: 3.04 10E12/L (ref 4.4–5.9)
SODIUM SERPL-SCNC: 136 MMOL/L (ref 133–144)
WBC # BLD AUTO: 7 10E9/L (ref 4–11)

## 2019-11-28 PROCEDURE — 97530 THERAPEUTIC ACTIVITIES: CPT | Mod: GP

## 2019-11-28 PROCEDURE — 25000132 ZZH RX MED GY IP 250 OP 250 PS 637: Mod: GY | Performed by: PHYSICIAN ASSISTANT

## 2019-11-28 PROCEDURE — 97165 OT EVAL LOW COMPLEX 30 MIN: CPT | Mod: GO | Performed by: STUDENT IN AN ORGANIZED HEALTH CARE EDUCATION/TRAINING PROGRAM

## 2019-11-28 PROCEDURE — 97530 THERAPEUTIC ACTIVITIES: CPT | Mod: GO | Performed by: STUDENT IN AN ORGANIZED HEALTH CARE EDUCATION/TRAINING PROGRAM

## 2019-11-28 PROCEDURE — 25000132 ZZH RX MED GY IP 250 OP 250 PS 637: Mod: GY | Performed by: INTERNAL MEDICINE

## 2019-11-28 PROCEDURE — 25000128 H RX IP 250 OP 636: Performed by: PHYSICIAN ASSISTANT

## 2019-11-28 PROCEDURE — 83735 ASSAY OF MAGNESIUM: CPT | Performed by: INTERNAL MEDICINE

## 2019-11-28 PROCEDURE — 25800030 ZZH RX IP 258 OP 636: Performed by: INTERNAL MEDICINE

## 2019-11-28 PROCEDURE — 36415 COLL VENOUS BLD VENIPUNCTURE: CPT | Performed by: INTERNAL MEDICINE

## 2019-11-28 PROCEDURE — 80048 BASIC METABOLIC PNL TOTAL CA: CPT | Performed by: INTERNAL MEDICINE

## 2019-11-28 PROCEDURE — 85027 COMPLETE CBC AUTOMATED: CPT | Performed by: INTERNAL MEDICINE

## 2019-11-28 PROCEDURE — 99207 ZZC CDG-MDM COMPONENT: MEETS LOW - DOWN CODED: CPT | Performed by: INTERNAL MEDICINE

## 2019-11-28 PROCEDURE — 99232 SBSQ HOSP IP/OBS MODERATE 35: CPT | Performed by: INTERNAL MEDICINE

## 2019-11-28 PROCEDURE — 85018 HEMOGLOBIN: CPT | Performed by: INTERNAL MEDICINE

## 2019-11-28 PROCEDURE — 12000000 ZZH R&B MED SURG/OB

## 2019-11-28 RX ORDER — SODIUM CHLORIDE 9 MG/ML
INJECTION, SOLUTION INTRAVENOUS CONTINUOUS
Status: ACTIVE | OUTPATIENT
Start: 2019-11-28 | End: 2019-11-28

## 2019-11-28 RX ORDER — TAMSULOSIN HYDROCHLORIDE 0.4 MG/1
0.4 CAPSULE ORAL DAILY
Status: DISCONTINUED | OUTPATIENT
Start: 2019-11-28 | End: 2019-11-30 | Stop reason: HOSPADM

## 2019-11-28 RX ORDER — LANOLIN ALCOHOL/MO/W.PET/CERES
3 CREAM (GRAM) TOPICAL
Status: DISCONTINUED | OUTPATIENT
Start: 2019-11-28 | End: 2019-11-30 | Stop reason: HOSPADM

## 2019-11-28 RX ADMIN — LEVOTHYROXINE SODIUM 100 MCG: 100 TABLET ORAL at 08:18

## 2019-11-28 RX ADMIN — GABAPENTIN 300 MG: 300 CAPSULE ORAL at 19:48

## 2019-11-28 RX ADMIN — OXYCODONE HYDROCHLORIDE 5 MG: 5 TABLET ORAL at 08:17

## 2019-11-28 RX ADMIN — SENNOSIDES AND DOCUSATE SODIUM 2 TABLET: 8.6; 5 TABLET ORAL at 08:17

## 2019-11-28 RX ADMIN — GABAPENTIN 300 MG: 300 CAPSULE ORAL at 08:18

## 2019-11-28 RX ADMIN — BRIMONIDINE TARTRATE 1 DROP: 2 SOLUTION OPHTHALMIC at 08:29

## 2019-11-28 RX ADMIN — ASPIRIN 325 MG: 325 TABLET, DELAYED RELEASE ORAL at 08:18

## 2019-11-28 RX ADMIN — ATENOLOL 25 MG: 25 TABLET ORAL at 08:18

## 2019-11-28 RX ADMIN — FERROUS SULFATE TAB 325 MG (65 MG ELEMENTAL FE) 325 MG: 325 (65 FE) TAB at 08:18

## 2019-11-28 RX ADMIN — CELECOXIB 100 MG: 100 CAPSULE ORAL at 19:48

## 2019-11-28 RX ADMIN — ACETAMINOPHEN 650 MG: 325 TABLET, FILM COATED ORAL at 23:14

## 2019-11-28 RX ADMIN — PAROXETINE HYDROCHLORIDE 20 MG: 20 TABLET, FILM COATED ORAL at 08:18

## 2019-11-28 RX ADMIN — HYDROXYZINE HYDROCHLORIDE 10 MG: 10 TABLET ORAL at 00:35

## 2019-11-28 RX ADMIN — CELECOXIB 100 MG: 100 CAPSULE ORAL at 08:18

## 2019-11-28 RX ADMIN — FUROSEMIDE 20 MG: 20 TABLET ORAL at 08:18

## 2019-11-28 RX ADMIN — MELATONIN TAB 3 MG 3 MG: 3 TAB at 23:14

## 2019-11-28 RX ADMIN — LISINOPRIL 10 MG: 10 TABLET ORAL at 08:18

## 2019-11-28 RX ADMIN — SODIUM CHLORIDE 500 ML: 9 INJECTION, SOLUTION INTRAVENOUS at 11:27

## 2019-11-28 RX ADMIN — CEFAZOLIN SODIUM 2 G: 2 INJECTION, SOLUTION INTRAVENOUS at 00:35

## 2019-11-28 RX ADMIN — ACETAMINOPHEN 650 MG: 325 TABLET, FILM COATED ORAL at 15:43

## 2019-11-28 RX ADMIN — POLYETHYLENE GLYCOL 3350 17 G: 17 POWDER, FOR SOLUTION ORAL at 08:17

## 2019-11-28 RX ADMIN — BRIMONIDINE TARTRATE 1 DROP: 2 SOLUTION OPHTHALMIC at 20:04

## 2019-11-28 RX ADMIN — TAMSULOSIN HYDROCHLORIDE 0.4 MG: 0.4 CAPSULE ORAL at 19:48

## 2019-11-28 RX ADMIN — ACETAMINOPHEN 650 MG: 325 TABLET, FILM COATED ORAL at 08:17

## 2019-11-28 ASSESSMENT — ACTIVITIES OF DAILY LIVING (ADL)
ADLS_ACUITY_SCORE: 21
ADLS_ACUITY_SCORE: 23
ADLS_ACUITY_SCORE: 23
PREVIOUS_RESPONSIBILITIES: MEAL PREP
ADLS_ACUITY_SCORE: 23
ADLS_ACUITY_SCORE: 21
ADLS_ACUITY_SCORE: 21

## 2019-11-28 NOTE — PROGRESS NOTES
Pain: Pt denies pain  Orientation: A&O   Respiratory: Clear, 2.5 L nasal cannula  Cardiac: Pacemaker, apical pulse irrelgular  CMS: Cool bilateral upper and lower extremities, pedal pulses +1 bilaterally, +1 edema L leg  GI: WDL   : Due to void  Activity: 1 assist w/walker   VS: WDL  IV: Infusing  Plan: Continue to monitor pain. Wean O2 return to assisted living at discharge

## 2019-11-28 NOTE — PLAN OF CARE
OT: Evaluation completed, treatment initiated POD#1 L TKA    Patient plan: per [facility] careplan: home with home care RN and PT  Current status: Pt agreeable to activity, attempted supine>sitting with bedrail support, Min A of 1; pt noted with lateral R lean, cues provided to improve posture and sitting balance however pt kept scooting/sliding forward, required additional assist to safely complete sit>stand, FWW, Mod A of 2; pt denied dizziness, wanted to walk to chair; pt completed functional mobility at slow pace, FWW, CGA of 2 with cues for directions; pt stand>sit at chair, FWW, Min A of 2; BPs obtained 85/40 and 99/51; pt resistant to return to bed, required additional education on safety, eventually agreeable, sit>stand, FWW, Mod A of 2; transfer to bed, CGA of 2, FWW; sit>supine with Min A of 2, /45; RN updated on pt status  Anticipated status at discharge: A of 1-2 for transfers and mobility; A for all self cares, medications and meals

## 2019-11-28 NOTE — PLAN OF CARE
Patient plan: home with home PT  Current status: Pt in bed upon arrival, agreeable to PT. Attempted supine<>sit with slight raise of HOB and bed rail, but patient required mod-max A x 1 to sit. Once seated requiring mod A x 1 to remain upright - leaning back posteriorly severely. Took a phone call and required mod A by PT to keep patient seated and PT to hold phone. Attempted to stand, but required three attempts with bed raised. Min A to stand to FWW. Unable to evenly distribute weight and controlled patient back to bed to sit. On next stand was able to perform heavily assisted stand pivot transfer to chair. Max VC for foot placement and FWW placement for safety. Seemed to be having difficulty following commands. Controlled sit by PT back into chair. Left in chair, alarm on, MD present  .Anticipated status at discharge: Likely assist x 1-2 for transfers/mobility and all self cares

## 2019-11-28 NOTE — PROGRESS NOTES
SW spoke with patients family and they have concerns about him coming home in his current state. Met with patient's family to discuss need for skilled nursing facility at discharge. The patient was given a list of skilled nursing facilities which includes the medicare.gov website for a comprehensive list of skilled nursing facilities.     Agreed for referrals to be made to [facility]. They would also like a private room.       HEAVENLY Rojas, [facility]  [phone]

## 2019-11-28 NOTE — PLAN OF CARE
Pt up to chair. Family visiting with pt. Pt attempting to eat. Pt pale and feels drowsy. Bp 77/33 hr 68. Pt given 5mg oxycodone, lasix, lisinpril and atenolol. Pt put back to bed via ceiling lift. Bp rechecked 85/41. Hr 79. Color has returned to pt's face. Pt able to communicate clearly while in bed. Dr FARIAS paged/updated.

## 2019-11-28 NOTE — PROGRESS NOTES
11/28/19 0913   Quick Adds   Type of Visit Initial Occupational Therapy Evaluation   Living Environment   Lives With alone   Living Arrangements independent living facility   Home Accessibility no concerns   Transportation Anticipated family or friend will provide   Living Environment Comment pt lives in independent living facility, typically makes his own breakfast, has other 2 meals in dining room; pt's DIL sets up meds in pill boxes   Self-Care   Usual Activity Tolerance moderate   Current Activity Tolerance moderate   Equipment Currently Used at Home walker, rolling   Functional Level   Ambulation 1-->assistive equipment   Transferring 1-->assistive equipment   Toileting 1-->assistive equipment  (tall toilet, grab bar)   Bathing 3-->assistive equipment and person  (walk-in shower, shower chair, grab bar)   Dressing 1-->assistive equipment  (reacher, sock aid, shoe horn)   Fall history within last six months yes   Number of times patient has fallen within last six months 1   Prior Functional Level Comment mod independent with self cares at baseline   General Information   Onset of Illness/Injury or Date of Surgery - Date 11/27/19   Referring Physician Suzanne TRAN   Patient/Family Goals Statement return home   Additional Occupational Profile Info/Pertinent History of Current Problem POD#1 L TKA   Precautions/Limitations fall precautions   Weight-Bearing Status - LLE weight-bearing as tolerated   Cognitive Status Examination   Orientation orientation to person, place and time   Level of Consciousness alert   Follows Commands (Cognition) WFL   Visual Perception   Visual Perception Wears glasses   Pain Assessment   Patient Currently in Pain Yes, see Vital Sign flowsheet   Mobility   Bed Mobility Comments A of 2   Transfer Skills   Transfer Comments FWW, Mod A of 2   Transfer Skill: Sit to Stand   Level of [place]: Sit/Stand moderate assist (50% patients effort)   Physical Assist/Nonphysical Assist: Sit/Stand  "verbal cues   Transfer Skill: Sit to Stand weight-bearing as tolerated   Assistive Device for Transfer: Sit/Stand rolling walker   Balance   Balance Comments impaired balance seated and standing   Instrumental Activities of Daily Living (IADL)   Previous Responsibilities meal prep   Activities of Daily Living Analysis   Impairments Contributing to Impaired Activities of Daily Living balance impaired;cognition impaired;pain;strength decreased   General Therapy Interventions   Planned Therapy Interventions ADL retraining;IADL retraining;transfer training   Clinical Impression   Criteria for Skilled Therapeutic Interventions Met yes, treatment indicated   OT Diagnosis impaired ability to manage ADL/IADLs   Influenced by the following impairments post-op pain, fatigue, impaired balance, cognition   Assessment of Occupational Performance 1-3 Performance Deficits   Identified Performance Deficits dressing, toileting, g/h tasks   Clinical Decision Making (Complexity) Low complexity   Therapy Frequency Daily   Predicted Duration of Therapy Intervention (days/wks) 2 days   Anticipated Discharge Disposition Other (see comments)  ([facility] careplan: home with homecare)   Risks and Benefits of Treatment have been explained. Yes   Patient, Family & other staff in agreement with plan of care Yes   Clinical Impression Comments demonstrates ability to benefit from skilled OT services   [facility] TM \"6 Clicks\"   2016, Trustees of [facility], under license to [employer].  All rights reserved.   6 Clicks Short Forms Daily Activity Inpatient Short Form   [facility] AM-PAC  \"6 Clicks\" Daily Activity Inpatient Short Form   1. Putting on and taking off regular lower body clothing? 2 - A Lot   2. Bathing (including washing, rinsing, drying)? 2 - A Lot   3. Toileting, which includes using toilet, bedpan or urinal? 2 - A Lot   4. Putting on and taking off regular upper body clothing? 4 - None   5. Taking care of " personal grooming such as brushing teeth? 3 - A Little   6. Eating meals? 3 - A Little   Daily Activity Raw Score (Score out of 24.Lower scores equate to lower levels of function) 16   Total Evaluation Time   Total Evaluation Time (Minutes) 8

## 2019-11-28 NOTE — PROGRESS NOTES
Ortho Daily Progress Note  Pain controlled.  Denies cp or sob.  Difficult to move with PT    Temp:  [96.2  F (35.7  C)-98.2  F (36.8  C)] 98  F (36.7  C)  Pulse:  [66-76] 76  Heart Rate:  [62-82] 81  Resp:  [10-26] 13  BP: (102-167)/(64-91) 129/65  SpO2:  [93 %-100 %] 93 %  Hemoglobin   Date Value Ref Range Status   11/28/2019 9.0 (L) 13.3 - 17.7 g/dL Final   ]    Alert, appropriate, and following commands  Breathing easily without wheeze  Dressing c/d/i, 5/5 df/pf/ehl, SILT, palpable dp pulse    X-rays - well aligned TKA    A/P - 88 year old male s/p L TKA on 11/27/19.  Difficulty mobilizing.  1.  Weightbearing as tolerated.   2.  Physical therapy.   3.  SCD's and Aspirin for DVT prophylaxis, 325 mg daily x6 weeks.   4.  24 hours antibiotics  5.  Followup plan 2-week wound check and dressing off, no x-rays.     6.  8-week follow up with repeat x-rays of the knee.   7.  Home with services vs TCU depending on progress over next days.     [facility]     Monday 1-5 PM  and Thursday 7:30-12:00 AM\  [address] [place] [address]  [phone]     [phone]     Or     Tuesday 7:30-5 PM  [address]   [facility] [address]  [place], [place]        MELISSA WADE MD

## 2019-11-28 NOTE — PLAN OF CARE
A/O.  CMS at baseline.  Dressing D/I.   Denies need for pain medication.  Up with ax1 et walker.  Unable to void, straight cath for 700ml. Pt tolerated well.  Remains on 2.5L. Will continue to monitor.

## 2019-11-28 NOTE — PROGRESS NOTES
[facility]    Medicine Progress Note - Hospitalist Service       Date of Admission:  11/27/2019  Assessment & Plan    An 88-year-old gentleman with past medical history of degenerative joint disease, congestive heart failure, atrial fibrillation status post pacemaker placement who underwent left total knee arthroplasty on 11/27/2019 and admitted postop.  Patient had his most postoperative course until this morning when he got out of bed to return the chair patient developed dizziness, and found to have low blood pressure.  He denies any nausea or vomiting, no chest pain or palpitation.  Patient was placed back to bed given bolus of IV fluids and blood pressure improved.    1.  Postop day 1 status post left total knee arthroplasty.  -Pain control, DVT prophylaxis, PT OT per orthopedic service.  -Patient felt dizzy when out of bed to chair.    2.  Dizziness secondary to hypotension-suspect orthostatic due to blood pressure medications, anemia and postop  -Stopped his atenolol, lisinopril, Lasix  -Monitor blood pressure  -IV fluid 500 mL normal saline bolus, will maintain 100 ml/h for the next 5 hours.  -Gentle hydration due to risk of fluid overload.  -Monitor blood pressure.  -Closely monitor signs for fluid overload as well as he has underlying congestive heart failure and was on Lasix  -Blood pressure is stable may restart his diuretics tomorrow.    3.  Suspect component of acute blood loss anemia.  -Hemoglobin during preop on 11/6/2019 was 12.7.  Today hemoglobin is down to 9.  -Check hemoglobin again in the morning  -There is no sign of active blood loss or bleeding from the surgical site.  -No other significant drop from preop blood work  -Patient is symptomatic again with repeat hemoglobin and see if he needs transfusion.    4.  A. fib/permanent pacemaker.  -Patient was taken of his anticoagulation about a month ago, details of which is not available but he stated due to risk of bleeding  -We will  follow-up regarding anticoagulation with his primary care provider and cardiologist as an outpatient.      Diet: Advance Diet as Tolerated: Regular Diet Adult  Diet    DVT Prophylaxis: Defer to primary service  Larson Catheter: not present  Code Status: Full Code      Disposition Plan   Expected discharge: Defer to primary, patient blood pressure needs to be stabilized expect to stay in the hospital for 1 to 2 days..  Entered: Genaro Ramos MD 11/28/2019, 12:59 PM     I discussed with patient at length the plan of care all his question concerns addressed.  Also discussed with RN.    Genaro Ramos MD  Hospitalist Service  [facility]    ______________________________________________________________________    Interval History   Patient seen and examined, stated he felt dizzy earlier after he got out of bed to chair, no chest pain, no palpitation, no nausea or vomiting.  Tolerated oral intake.    Data reviewed today: I reviewed all medications, new labs and imaging results over the last 24 hours. I personally reviewed .    Physical Exam   Vital Signs: Temp: 98  F (36.7  C) Temp src: Oral BP: 96/51 Pulse: 78 Heart Rate: 81 Resp: 16 SpO2: 93 % O2 Device: None (Room air) Oxygen Delivery: 2.5 LPM  Weight: 180 lbs 0 oz  General Appearance: Alert and oriented, comfortable, not in distress, sitting in a chair eating his breakfast.  CHEST: Good air entry bilaterally no wheezing crackles or rales.  CVS: S1 and S2 alert no gallop or murmur.  GI: Obese, soft, non-tender, nondistended, positive bowel sounds.  Skin: No rash, exanthems or petechia.  Neuro: No focal neurologic deficits, cranial nerves grossly intact    Data   Recent Labs   Lab 11/28/19  0655   WBC 7.0   HGB 9.0*   MCV 95         POTASSIUM 4.5   CHLORIDE 104   CO2 28   BUN 20   CR 0.87   ANIONGAP 4   MANAS 8.2*   GLC 94     No results found for this or any previous visit (from the past 24 hour(s)).  Medications     lactated  ringers 100 mL/hr at 11/27/19 1156     sodium chloride 100 mL/hr at 11/28/19 1253       aspirin  325 mg Oral Daily     brimonidine  1 drop Right Eye BID     celecoxib  100 mg Oral BID     ferrous sulfate  325 mg Oral Daily with breakfast     gabapentin  300 mg Oral TID     levothyroxine  100 mcg Oral Daily     PARoxetine  20 mg Oral QAM     polyethylene glycol  17 g Oral Daily     senna-docusate  2 tablet Oral BID     sodium chloride (PF)  3 mL Intracatheter Q8H

## 2019-11-28 NOTE — PLAN OF CARE
Orientation:A&Ox4  Vs: bp soft while pt was in the chair this morning. Pt put back to bed. 85/41. Iv bolus given then ivf at 100cc. Bp meds discharge for now.  02: 93%ra  LS:clear  GI:bs+, tolerating regular diet.  : Will  bladder scan.   Skin:intact. Moerna left leg ace wrap intact  Activity:up to chair with assist of 1, walker, gait belt.  Pain: tylenol and oxycodone 5mg given this am  Plan: ivf, pt/ot following. Hgb 9.2, Dr DINORA villanueva with results. TCU on discharge.

## 2019-11-28 NOTE — PROVIDER NOTIFICATION
No urine out put this shift. Bladder scanner showed 33-51cc. Pt does not feel like he can go. Dr FARIAS paged with new findings.

## 2019-11-28 NOTE — PROGRESS NOTES
Pt is alert and oriented, lung sounds clear,[place] with 2L of O2, up with assist of one walker and gait. Tolerating regulr diet, passing flatus.+ve bowel sounds.pain controled with prn Atarax,tylenol. Dressing is clean dry and intact. Baseline numbness to bilateral toes.Unable to void, straight cath. with 450ml out.Pt due to void.Will continue ton to monitor.

## 2019-11-29 ENCOUNTER — APPOINTMENT (OUTPATIENT)
Dept: PHYSICAL THERAPY | Facility: CLINIC | Age: 84
DRG: 470 | End: 2019-11-29
Attending: ORTHOPAEDIC SURGERY
Payer: MEDICARE

## 2019-11-29 LAB
ANION GAP SERPL CALCULATED.3IONS-SCNC: 4 MMOL/L (ref 3–14)
BUN SERPL-MCNC: 22 MG/DL (ref 7–30)
CALCIUM SERPL-MCNC: 8.2 MG/DL (ref 8.5–10.1)
CHLORIDE SERPL-SCNC: 105 MMOL/L (ref 94–109)
CO2 SERPL-SCNC: 28 MMOL/L (ref 20–32)
CREAT SERPL-MCNC: 0.88 MG/DL (ref 0.66–1.25)
GFR SERPL CREATININE-BSD FRML MDRD: 76 ML/MIN/{1.73_M2}
GLUCOSE SERPL-MCNC: 94 MG/DL (ref 70–99)
HGB BLD-MCNC: 8.3 G/DL (ref 13.3–17.7)
HGB BLD-MCNC: 8.4 G/DL (ref 13.3–17.7)
POTASSIUM SERPL-SCNC: 4.5 MMOL/L (ref 3.4–5.3)
SODIUM SERPL-SCNC: 137 MMOL/L (ref 133–144)

## 2019-11-29 PROCEDURE — 97530 THERAPEUTIC ACTIVITIES: CPT | Mod: GP | Performed by: PHYSICAL THERAPIST

## 2019-11-29 PROCEDURE — 25000132 ZZH RX MED GY IP 250 OP 250 PS 637: Mod: GY | Performed by: ORTHOPAEDIC SURGERY

## 2019-11-29 PROCEDURE — 97110 THERAPEUTIC EXERCISES: CPT | Mod: GP

## 2019-11-29 PROCEDURE — 85018 HEMOGLOBIN: CPT | Performed by: INTERNAL MEDICINE

## 2019-11-29 PROCEDURE — 97116 GAIT TRAINING THERAPY: CPT | Mod: GP

## 2019-11-29 PROCEDURE — 99232 SBSQ HOSP IP/OBS MODERATE 35: CPT | Performed by: INTERNAL MEDICINE

## 2019-11-29 PROCEDURE — 36415 COLL VENOUS BLD VENIPUNCTURE: CPT | Performed by: INTERNAL MEDICINE

## 2019-11-29 PROCEDURE — 25000132 ZZH RX MED GY IP 250 OP 250 PS 637: Mod: GY | Performed by: PHYSICIAN ASSISTANT

## 2019-11-29 PROCEDURE — 85018 HEMOGLOBIN: CPT | Performed by: PHYSICIAN ASSISTANT

## 2019-11-29 PROCEDURE — 12000000 ZZH R&B MED SURG/OB

## 2019-11-29 PROCEDURE — 97530 THERAPEUTIC ACTIVITIES: CPT | Mod: GP

## 2019-11-29 PROCEDURE — 25000132 ZZH RX MED GY IP 250 OP 250 PS 637: Mod: GY | Performed by: INTERNAL MEDICINE

## 2019-11-29 PROCEDURE — 36415 COLL VENOUS BLD VENIPUNCTURE: CPT | Performed by: PHYSICIAN ASSISTANT

## 2019-11-29 PROCEDURE — 97110 THERAPEUTIC EXERCISES: CPT | Mod: GP | Performed by: PHYSICAL THERAPIST

## 2019-11-29 PROCEDURE — 80048 BASIC METABOLIC PNL TOTAL CA: CPT | Performed by: PHYSICIAN ASSISTANT

## 2019-11-29 RX ORDER — HYDROCODONE BITARTRATE AND ACETAMINOPHEN 5; 325 MG/1; MG/1
1-2 TABLET ORAL EVERY 4 HOURS PRN
Status: DISCONTINUED | OUTPATIENT
Start: 2019-11-29 | End: 2019-11-30 | Stop reason: HOSPADM

## 2019-11-29 RX ORDER — ACETAMINOPHEN 325 MG/1
650 TABLET ORAL EVERY 6 HOURS PRN
Qty: 50 TABLET | Refills: 0 | Status: SHIPPED | OUTPATIENT
Start: 2019-11-29

## 2019-11-29 RX ADMIN — HYDROCODONE BITARTRATE AND ACETAMINOPHEN 1 TABLET: 5; 325 TABLET ORAL at 14:18

## 2019-11-29 RX ADMIN — GABAPENTIN 300 MG: 300 CAPSULE ORAL at 08:12

## 2019-11-29 RX ADMIN — SENNOSIDES AND DOCUSATE SODIUM 1 TABLET: 8.6; 5 TABLET ORAL at 08:15

## 2019-11-29 RX ADMIN — TAMSULOSIN HYDROCHLORIDE 0.4 MG: 0.4 CAPSULE ORAL at 08:13

## 2019-11-29 RX ADMIN — PAROXETINE HYDROCHLORIDE 20 MG: 20 TABLET, FILM COATED ORAL at 08:13

## 2019-11-29 RX ADMIN — BRIMONIDINE TARTRATE 1 DROP: 2 SOLUTION OPHTHALMIC at 19:51

## 2019-11-29 RX ADMIN — FERROUS SULFATE TAB 325 MG (65 MG ELEMENTAL FE) 325 MG: 325 (65 FE) TAB at 13:12

## 2019-11-29 RX ADMIN — ASPIRIN 325 MG: 325 TABLET, DELAYED RELEASE ORAL at 08:12

## 2019-11-29 RX ADMIN — GABAPENTIN 300 MG: 300 CAPSULE ORAL at 19:50

## 2019-11-29 RX ADMIN — LEVOTHYROXINE SODIUM 100 MCG: 100 TABLET ORAL at 08:13

## 2019-11-29 RX ADMIN — CELECOXIB 100 MG: 100 CAPSULE ORAL at 19:50

## 2019-11-29 RX ADMIN — ACETAMINOPHEN 650 MG: 325 TABLET, FILM COATED ORAL at 08:12

## 2019-11-29 RX ADMIN — BRIMONIDINE TARTRATE 1 DROP: 2 SOLUTION OPHTHALMIC at 08:23

## 2019-11-29 RX ADMIN — CELECOXIB 100 MG: 100 CAPSULE ORAL at 08:12

## 2019-11-29 RX ADMIN — MELATONIN TAB 3 MG 3 MG: 3 TAB at 22:56

## 2019-11-29 RX ADMIN — SENNOSIDES AND DOCUSATE SODIUM 2 TABLET: 8.6; 5 TABLET ORAL at 19:50

## 2019-11-29 ASSESSMENT — ACTIVITIES OF DAILY LIVING (ADL)
ADLS_ACUITY_SCORE: 22

## 2019-11-29 NOTE — PLAN OF CARE
Patient plan: per [facility] plan, home with home PT/RN; per pt- likely TCU; per chart SW has sent referrals to TCU  Current status: Pt received supine in bed, agreeable to PT. Engaged in supine TKA exercises prior to mobility to prep for mobility and promote strength and ROM. Pt completes supine>sit with SBA, use of bedrail and cues for technique. Pt reports mild dizziness sitting EOB that passes in less than 30sec. /51 at that time. Pt completes repeated sit<>stand x 4 reps with verbal and tactile cuing with mod-maxA, significant effort required. Pt also performs from slightly elevated (~1in) bed height. Pt reports significant pain with transfer attempts. Pt returns to supine with SBA, increased time/effort. Pt requires maxA to boost toward HOB.   Anticipated status at discharge: Pt will need Ax1 for bed mobility, heavy Ax1-2 for transfers and to take a few steps

## 2019-11-29 NOTE — PLAN OF CARE
Day RN  VS monitored, up w/A2 and ww, drsg C/D/I, CMS+ ex baseline numbness LE's, void 75 ml, PVR max 272 ml, verna reg diet, pt lightheaded after PT and sitting up in chair for awhile, BP 91/46 in chair, increased to 103/55 once supine, pt denied lightheadedness with afternoon PT, possible d/t Gabapentin in morning?, started on Norco this afternoon to see if this manages pain without lightheadedness, Hgb recheck at 1430 today, son updated at b/s, possible discharge to [facility] TCU Saturday, will cont to monitor.

## 2019-11-29 NOTE — PROGRESS NOTES
Your information has been submitted on November 29th, 2019 at 12:12:34 PM [facility]. The confirmation number is [id number]

## 2019-11-29 NOTE — PROVIDER NOTIFICATION
Web based paged hospitalist on call Pt asking for a sleeping pill tonight.  Takes Ambien at home.  If you don't want to restart Ambien could he take melatonin?

## 2019-11-29 NOTE — PROGRESS NOTES
Numerous conversations with multiple family members today. The agreed upon plan is discharge to a TCU.     [facility] has accepted the patient for admission tomorrow after 2pm.     SERG updated the family.       HEAVENLY Rojas, [facility]  [phone]

## 2019-11-29 NOTE — PLAN OF CARE
OT: Attempted to see x2 for OT POC.  Pt refused the first time, too tired to participate.  In with nursing cares on second attempt.

## 2019-11-29 NOTE — PROGRESS NOTES
Orthopedic Surgery  Jake Duane Pfleiger  2019  Admit Date:  2019  POD 2 Days Post-Op  S/P Procedure(s):  Left total knee arthroplasty,  spinal with adductor canal block    Patient resting comfortably in bed.  Pain controlled.  Tolerating oral intake.  No events overnight. Denies CP, SOB, N/V. Continues to have difficulty moving with PT.   Alert and orient to person, place, and time.    Vital Sign Ranges  Temperature Temp  Av.1  F (36.7  C)  Min: 97.4  F (36.3  C)  Max: 99  F (37.2  C)   Blood pressure Systolic (24hrs), Av , Min:73 , Max:129        Diastolic (24hrs), Av, Min:33, Max:65      Pulse Pulse  Av  Min: 68  Max: 80   Respirations Resp  Avg: 15.5  Min: 13  Max: 16   Pulse oximetry SpO2  Av.5 %  Min: 93 %  Max: 95 %      Unlabored breathing without audible wheeze.    Dressing is clean, dry, and intact.   Bilateral calves are soft, non-tender.  Left lower extremity is NVI.  5/5 df/pf/ehl, palpable DP pulse     Labs:  Recent Labs   Lab Test 19  0655 19  0547 19  1603   POTASSIUM 4.5 3.9 4.3     Recent Labs   Lab Test 19  1330 19  0655 19  1251   HGB 9.2* 9.0* 7.8*     Recent Labs   Lab Test 19  1910 19  0631 19  2219   INR 1.10 1.49* 1.80*     Recent Labs   Lab Test 19  0655 19  1603 19  0558    237 237       A/P  88 year old male s/p L TKA on 19.  Difficulty mobilizing.  1.  Weightbearing as tolerated.   2.  Physical therapy.   3.  SCD's and Aspirin for DVT prophylaxis, 325 mg daily x6 weeks.   4.  24 hours antibiotics  5.  Followup plan 2-week wound check and dressing off, no x-rays.     6.  8-week follow up with repeat x-rays of the knee.      Disposition   Anticipate d/c home with services vs. TCU pending on patient's progress.     Rolly Palacios PA-C

## 2019-11-29 NOTE — PLAN OF CARE
A/O, forgetful at times. CMS at baseline. Dressing D/I.  Unable to void yet-fluids encouraged. Tylenol for pain.  Ambulating with Ax2 et walker in room. Tolerating regular diet. Discharge planning pending patient progress. Will continue to monitor.     Addendum: Hospitalist paged of patient's inability to void and bladder scan of 350. Order received for Flomax.

## 2019-11-29 NOTE — PROGRESS NOTES
[facility]    Medicine Progress Note - Hospitalist Service       Date of Admission:  11/27/2019  Assessment & Plan    An 88-year-old gentleman with past medical history of degenerative joint disease, congestive heart failure, atrial fibrillation status post pacemaker placement who underwent left total knee arthroplasty on 11/27/2019 and admitted postop.  Patient had his most postoperative course until this morning when he got out of bed to return the chair patient developed dizziness, and found to have low blood pressure.  He denies any nausea or vomiting, no chest pain or palpitation.  Patient was placed back to bed given bolus of IV fluids and blood pressure improved.    1.  Postop day 2 status post left total knee arthroplasty.  -Pain control, DVT prophylaxis, PT OT per orthopedic service.  -Patient felt dizzy when out of bed to chair again with drop in BP..    2.  Dizziness secondary to hypotension-suspect orthostatic due to blood pressure medications, anemia and postop  -Continue to hold Atenolol, lisinopril, Lasix, until the BP is stable and postural drop in BP.  -Monitor blood pressure  -He got total of 1L IVF yesterday.BP is ok at rest but drops when he stands or sits.  -Gentle hydration as need, risk of fluid overload.  -Monitor blood pressure.  -Closely monitor signs for fluid overload as well as he has underlying congestive heart failure and was on Lasix    3.  Suspect component of acute blood loss anemia.  -Hemoglobin during preop on 11/6/2019 was 12.7 from outside facility.  Today hemoglobin is down to 8.4.  -No indication for transfusion at this point, may consider it if he continues to have orthostatic hypotension and Hgb is less than 8.  -Check hemoglobin in AM.  -There is no sign of active blood loss or bleeding from the surgical site.  -No other significant drop from preop blood work  -Patient is symptomatic again with repeat hemoglobin and see if he needs transfusion.    4.  A.  fib/permanent pacemaker.  -Patient was taken of his anticoagulation about a month ago, details of which is not available but he stated due to risk of bleeding  -He will follow-up regarding anticoagulation with his Primary care provider and Cardiologist as an outpatient.      Diet: Advance Diet as Tolerated: Regular Diet Adult  Diet  Advance Diet as Tolerated    DVT Prophylaxis: Defer to primary service on ASA.  Larson Catheter: not present  Code Status: Full Code      Disposition Plan   Expected discharge: Patient has orthostatic hypotension and not working well with PT at this point, continue to monitor him in hospital. Expect 2 more days in hospital.  Entered: Genaro Ramos MD 11/29/2019, 12:19 PM     I discussed with patient at length the plan of care all his question concerns addressed.  Also discussed with RN.    Genaro Ramos MD  Hospitalist Service  [facility]    ______________________________________________________________________    Interval History   Patient seen and examined, again felt dizzy and his BP dropped when he got up earlier this morning. No chest pain, no palpitation, no nausea or vomiting.  Tolerated oral intake.    Data reviewed today: I reviewed all medications, new labs and imaging results over the last 24 hours. I personally reviewed .    Physical Exam   Vital Signs: Temp: 97.3  F (36.3  C) Temp src: Oral BP: 103/55(supine) Pulse: 78 Heart Rate: 95 Resp: 16 SpO2: 97 % O2 Device: None (Room air)    Weight: 180 lbs 0 oz  GENERAL: Alert and oriented, comfortable, not in distress, sitting in a chair eating his breakfast.  CHEST: Good air entry bilaterally no wheezing crackles or rales.  CVS: S1 and S2 alert no gallop or murmur.  GI: Obese, soft, non-tender, nondistended, positive bowel sounds.  Skin: No rash, exanthems or petechia.  Neuro: No focal neurologic deficits, cranial nerves grossly intact.    Data   Recent Labs   Lab 11/29/19  0638 11/28/19  1330  11/28/19  0655   WBC  --   --  7.0   HGB 8.4* 9.2* 9.0*   MCV  --   --  95   PLT  --   --  162     --  136   POTASSIUM 4.5  --  4.5   CHLORIDE 105  --  104   CO2 28  --  28   BUN 22  --  20   CR 0.88  --  0.87   ANIONGAP 4  --  4   MANAS 8.2*  --  8.2*   GLC 94  --  94     No results found for this or any previous visit (from the past 24 hour(s)).  Medications     lactated ringers 100 mL/hr at 11/27/19 1156       aspirin  325 mg Oral Daily     brimonidine  1 drop Right Eye BID     celecoxib  100 mg Oral BID     ferrous sulfate  325 mg Oral Daily with breakfast     gabapentin  300 mg Oral TID     levothyroxine  100 mcg Oral Daily     PARoxetine  20 mg Oral QAM     polyethylene glycol  17 g Oral Daily     senna-docusate  2 tablet Oral BID     sodium chloride (PF)  3 mL Intracatheter Q8H     tamsulosin  0.4 mg Oral Daily

## 2019-11-29 NOTE — PLAN OF CARE
"Patient plan: per [facility]CO plan, home with home PT/RN; per pt- likely TCU; per chart SW has sent referrals to TCU  Current status: Pt received supine in bed, agreeable to PT. Engaged in supine TKA exercises prior to mobility to prep for mobility and promote strength and ROM. Supine>sit from flat bed without rails with Raf. Needs SBA for sitting balance with tendency for posterior lean. Scooted to EOB with CGA for balance/safety. Pt reports dizziness sitting, reduced with rest. Sit>stand with MaxA and FWW on 2nd attempt; 1st trial, pt able to almost attain standing but unable to get weight over JUDE with heavy posterior lean, needing ModA to return to sitting at EOB. Once standing, needs ModA to maintain balance for pregait weight shifting and marching with FWW. Able to take  a few steps with FWW and ModA to turn and sit at bedside chair. Sit>stand from chair with MaxA and FWW. Pt needs ModA to ambulate 5 steps forward/back at chair; reports \"wooziness\" up. Stand>sit at chair with ModA to control descent.Vitals pre OOB mobility: /70 HR 86, BP post mobility 168/57 HR 89- RN aware. Pt sitting up in chair upon departure, alarm on, all needs in reach, RN updated.    L Knee AAROM 0-10-87    Anticipated status at discharge: Pt will need Ax1 for bed mobility, heavy Ax1-2 for transfers and to take a few steps    "

## 2019-11-30 ENCOUNTER — APPOINTMENT (OUTPATIENT)
Dept: PHYSICAL THERAPY | Facility: CLINIC | Age: 84
DRG: 470 | End: 2019-11-30
Attending: ORTHOPAEDIC SURGERY
Payer: MEDICARE

## 2019-11-30 VITALS
OXYGEN SATURATION: 96 % | SYSTOLIC BLOOD PRESSURE: 156 MMHG | RESPIRATION RATE: 16 BRPM | HEIGHT: 71 IN | WEIGHT: 180 LBS | BODY MASS INDEX: 25.2 KG/M2 | HEART RATE: 78 BPM | DIASTOLIC BLOOD PRESSURE: 80 MMHG | TEMPERATURE: 97.8 F

## 2019-11-30 PROCEDURE — 99232 SBSQ HOSP IP/OBS MODERATE 35: CPT | Performed by: INTERNAL MEDICINE

## 2019-11-30 PROCEDURE — 25000132 ZZH RX MED GY IP 250 OP 250 PS 637: Mod: GY | Performed by: INTERNAL MEDICINE

## 2019-11-30 PROCEDURE — 25000132 ZZH RX MED GY IP 250 OP 250 PS 637: Mod: GY | Performed by: PHYSICIAN ASSISTANT

## 2019-11-30 PROCEDURE — 97530 THERAPEUTIC ACTIVITIES: CPT | Mod: GP | Performed by: PHYSICAL THERAPY ASSISTANT

## 2019-11-30 PROCEDURE — 25000132 ZZH RX MED GY IP 250 OP 250 PS 637: Mod: GY | Performed by: ORTHOPAEDIC SURGERY

## 2019-11-30 RX ORDER — ATENOLOL 25 MG/1
25 TABLET ORAL DAILY
Status: ON HOLD | DISCHARGE
Start: 2019-11-30 | End: 2021-10-09

## 2019-11-30 RX ORDER — LISINOPRIL 10 MG/1
10 TABLET ORAL DAILY
DISCHARGE
Start: 2019-11-30

## 2019-11-30 RX ORDER — ZOLPIDEM TARTRATE 5 MG/1
5 TABLET ORAL
Qty: 15 TABLET | Refills: 0 | Status: SHIPPED | OUTPATIENT
Start: 2019-11-30

## 2019-11-30 RX ORDER — FUROSEMIDE 20 MG
20 TABLET ORAL DAILY
Qty: 30 TABLET | Refills: 1 | Status: ON HOLD | DISCHARGE
Start: 2019-11-30 | End: 2021-10-11

## 2019-11-30 RX ADMIN — GABAPENTIN 300 MG: 300 CAPSULE ORAL at 10:10

## 2019-11-30 RX ADMIN — LEVOTHYROXINE SODIUM 100 MCG: 100 TABLET ORAL at 06:33

## 2019-11-30 RX ADMIN — PAROXETINE HYDROCHLORIDE 20 MG: 20 TABLET, FILM COATED ORAL at 10:10

## 2019-11-30 RX ADMIN — FERROUS SULFATE TAB 325 MG (65 MG ELEMENTAL FE) 325 MG: 325 (65 FE) TAB at 10:10

## 2019-11-30 RX ADMIN — BRIMONIDINE TARTRATE 1 DROP: 2 SOLUTION OPHTHALMIC at 10:08

## 2019-11-30 RX ADMIN — TAMSULOSIN HYDROCHLORIDE 0.4 MG: 0.4 CAPSULE ORAL at 10:09

## 2019-11-30 RX ADMIN — ASPIRIN 325 MG: 325 TABLET, DELAYED RELEASE ORAL at 10:10

## 2019-11-30 RX ADMIN — CELECOXIB 100 MG: 100 CAPSULE ORAL at 10:09

## 2019-11-30 RX ADMIN — HYDROCODONE BITARTRATE AND ACETAMINOPHEN 1 TABLET: 5; 325 TABLET ORAL at 06:33

## 2019-11-30 RX ADMIN — SENNOSIDES AND DOCUSATE SODIUM 2 TABLET: 8.6; 5 TABLET ORAL at 10:11

## 2019-11-30 RX ADMIN — GABAPENTIN 300 MG: 300 CAPSULE ORAL at 13:40

## 2019-11-30 RX ADMIN — POLYETHYLENE GLYCOL 3350 17 G: 17 POWDER, FOR SOLUTION ORAL at 10:11

## 2019-11-30 ASSESSMENT — ACTIVITIES OF DAILY LIVING (ADL)
ADLS_ACUITY_SCORE: 24

## 2019-11-30 NOTE — PLAN OF CARE
Patient plan: per [facility] plan, home with home PT/RN; per pt- likely TCU; per chart SW has sent referrals to TCU for today  Current status: sit-stand with Max A to RW then Max A to maintain standing balance 2 additional stands with Francesca Steady and mod to Max A of 1 then 2 to stand, bed mobility with Max A of 2. ROM in L knee to ~90 in seated. Unable to walk  Anticipated status at discharge: planned discharge to TCU this afternoon. See above for mobility needs currently. Recommend to PT for discharge to TCU. No DME needs

## 2019-11-30 NOTE — PLAN OF CARE
A&O x4. VSS. LS CTA all fields. BS active x4. Dressing to L knee is CDI. Edema to LLE, baseline numbness otherwise CMS intact. Regular diet. Scheduled Tylenol managing pain. Voiding in good amts with mild retention. Discharge to [facility] at 2pm

## 2019-11-30 NOTE — DISCHARGE INSTRUCTIONS
Patient to discharge to [facility]  Address is [address] [place] [address]  Phone number is [phone]  Fax number is [phone]      PAS number: [phone]    Call orthopedic surgeon before or after your follow up appointment if you experience any of these issues or have concerns:  1. Increased/persistent temperature chills and/or sweats. Temp >101  2. increased/uncontrolled pain despite pain medication  3. increased/persistent bleeding, redness, swelling, bruising, drainage (yellow/odorous) or incision is pulling apart  4. Calf pain, swollen/hard/warm area, chest pain or shortness of breath (severe call 717)  5. Weakness in operative leg, knee buckling, numbness and/or tingling. Or if you Fall  6. Nausea, vomiting, constipation and/or diarrhea  7. Too sleepy (you may need to reduce pain medication) call your surgeon's office for guidance  8. Trouble voiding or signs and symptoms of urinary tract infection.  9. Constipation unrelieved by stool softeners (see other instructions below)  10. General feeling illness  11. Uncontrolled bleeding. Cut, incision, nose    Activity instructions:  1. Do exercises at home as instructed by therapist twice a day. Continue outpatient therapy as ordered by your doctor.  2. Ice knee after exercises and therapy or 20 minutes 3-4 times a day to reduce pain and swelling  3. Slowly increase activity back to baseline    Diet Information  Drink plenty of fluids  Eat a regular balanced diet      Other instructions:  1. Notify your dentist of your implant so you can get antibiotics before any dental work or before any invasive procedure  2.  Take an over the counter stool softener (Mirilax or Senna) as directed while on narcotics to ensure bowel movements are regular.  Take a laxative (milk of magnesia and/or a suppository) as directed if no bowel movement in 2 days.  3. Keep track of when you take all medication, especially pain medication. See bottles for instructions and  side effects  4. Use incentive spirometry hourly until you are back to normal activity (helps prevent pneumonia)  5. See medication handouts for medication information and side effects  6. DO NOT DRINK or DRIVE on narcotic pain medication.  7. It is important to take your anti-blood clot medication, see medication handout    *Friends, family and or care givers please read and review all this discharge information and medication sheet    If unable to wake call 911-due to pain medication    Follow up information  Call surgeon to make a follow up appointment @ [id number] days from the day of surgery

## 2019-11-30 NOTE — PLAN OF CARE
Occupational Therapy Discharge Summary    Reason for therapy discharge:    Discharged to transitional care facility.    Progress towards therapy goal(s). See goals on Care Plan in [facility] electronic health record for goal details.  Goals not met.  Barriers to achieving goals:   discharge from facility.    Therapy recommendation(s):    Continued therapy is recommended.  Rationale/Recommendations:   patient is an A of 1-2 for transfers and mobility; A for all self cares, medications and meals. Further therapy recommended to maximize I for safe return home.

## 2019-11-30 NOTE — PROGRESS NOTES
[facility]    Medicine Progress Note - Hospitalist Service       Date of Admission:  11/27/2019  Assessment & Plan    An 88-year-old gentleman with past medical history of degenerative joint disease, congestive heart failure, atrial fibrillation status post pacemaker placement who underwent left total knee arthroplasty on 11/27/2019 and admitted postop.  Patient had his most postoperative course until this morning when he got out of bed to return the chair patient developed dizziness, and found to have low blood pressure.  He denies any nausea or vomiting, no chest pain or palpitation.  Patient was placed back to bed given bolus of IV fluids and blood pressure improved.    1.  Postop day 3 status post left total knee arthroplasty.  -Pain control, DVT prophylaxis, PT OT per orthopedic service.  -Patient felt dizzy when out of bed to chair again with drop in BP..    2.  Dizziness secondary to hypotension-positional and suspected orthostatic due to blood pressure medications, anemia and postop  -Patient is being discharged on atenolol, decrease dose of lisinopril with parameters as well as Lasix.  -Blood pressure is okay today.  -Monitor blood pressure  -Encourage oral intake  -No sign of fluid overload  -Monitor blood pressure.  -Closely monitor signs for fluid overload as well as he has underlying congestive heart failure and was on Lasix    3.  Acute blood loss anemia suspect component of acute blood loss anemia.  -Hemoglobin during preop on 11/6/2019 was 12.7 from outside facility.  Today hemoglobin is down to 8.3, and is a stable last 2 days.  -No indication for transfusion at this point, may consider it if he continues to have orthostatic hypotension and Hgb is less than 8.  -There is no sign of active blood loss or bleeding from the surgical site.  -No other significant drop from preop blood work  -Patient is symptomatic again with repeat hemoglobin and see if he needs transfusion.    4.  A.  fib/permanent pacemaker.  -Patient was taken of his anticoagulation about a month ago, details of which is not available but he stated due to risk of bleeding  -He will follow-up regarding anticoagulation with his Primary care provider and Cardiologist as an outpatient.      Diet: Advance Diet as Tolerated: Regular Diet Adult  Diet  Advance Diet as Tolerated    DVT Prophylaxis: Defer to primary service on ASA.  Larson Catheter: not present  Code Status: Full Code      Disposition Plan   Expected discharge: Patient is being discharged today per orthopedic service.  Medication reconciled and reviewed, discussed with patient at length.  I discussed with patient to check his blood pressure, before restarting his medications.     Genaro Ramos MD 11/30/2019, 12:57 PM     I discussed with patient at length the plan of care all his question concerns addressed.  Also discussed with RN.    Genaro Ramos MD  Hospitalist Service  [facility]    ______________________________________________________________________    Interval History   Patient seen and examined, again felt dizzy and his BP dropped when he got up earlier this morning. No chest pain, no palpitation, no nausea or vomiting.  Tolerated oral intake.    Data reviewed today: I reviewed all medications, new labs and imaging results over the last 24 hours. I personally reviewed .    Physical Exam   Vital Signs: Temp: 97  F (36.1  C) Temp src: Oral BP: 124/62   Heart Rate: 91 Resp: 16 SpO2: 96 % O2 Device: None (Room air)    Weight: 180 lbs 0 oz  GENERAL: Alert and oriented, comfortable, not in distress, sitting in a chair eating his breakfast.  CHEST: Good air entry bilaterally no wheezing crackles or rales.  CVS: S1 and S2 alert no gallop or murmur.  GI: Obese, soft, non-tender, nondistended, positive bowel sounds.  Skin: No rash, exanthems or petechia.  Neuro: No focal neurologic deficits, cranial nerves grossly intact.    Data   Recent  Labs   Lab 11/29/19  1458 11/29/19  0638 11/28/19  1330 11/28/19  0655   WBC  --   --   --  7.0   HGB 8.3* 8.4* 9.2* 9.0*   MCV  --   --   --  95   PLT  --   --   --  162   NA  --  137  --  136   POTASSIUM  --  4.5  --  4.5   CHLORIDE  --  105  --  104   CO2  --  28  --  28   BUN  --  22  --  20   CR  --  0.88  --  0.87   ANIONGAP  --  4  --  4   MANAS  --  8.2*  --  8.2*   GLC  --  94  --  94     No results found for this or any previous visit (from the past 24 hour(s)).  Medications     lactated ringers 100 mL/hr at 11/27/19 1156       aspirin  325 mg Oral Daily     brimonidine  1 drop Right Eye BID     celecoxib  100 mg Oral BID     ferrous sulfate  325 mg Oral Daily with breakfast     gabapentin  300 mg Oral TID     levothyroxine  100 mcg Oral Daily     PARoxetine  20 mg Oral QAM     polyethylene glycol  17 g Oral Daily     senna-docusate  2 tablet Oral BID     sodium chloride (PF)  3 mL Intracatheter Q8H     tamsulosin  0.4 mg Oral Daily

## 2019-12-02 NOTE — DISCHARGE SUMMARY
Discharge Summary    Jake Duane Pfleiger MRN# [id number]   YOB: 1931 Age: 88 year old     Date of Admission:  11/27/2019  Date of Discharge:  12/3/19  Admitting Physician:  Ag Armstrogn MD  Discharge Physician:  Ag Armstrong MD     Primary Provider: Francisco Sweet          Admission Diagnoses:   Osteoarthritis left knee          Discharge Diagnosis:   Same           Surgical Procedure:   Total Knee arthroplasty           Discharge Disposition:     Discharged to short-term care facility           Medications Prior to Admission:     No medications prior to admission.             Discharge Medications:     Discharge Medication List as of 11/30/2019  1:41 PM      START taking these medications    Details   acetaminophen (TYLENOL) 325 MG tablet Take 2 tablets (650 mg) by mouth every 6 hours as needed for mild pain or fever, Disp-50 tablet, R-0, E-Prescribe      aspirin (ASA) 325 MG EC tablet Take 1 tablet (325 mg) by mouth daily, Disp-42 tablet, R-0, E-Prescribe      celecoxib (CELEBREX) 100 MG capsule Take 1 capsule (100 mg) by mouth 2 times daily, Disp-10 capsule, R-0, E-Prescribe      gabapentin (NEURONTIN) 300 MG capsule Take 1 capsule (300 mg) by mouth 3 times daily, Disp-9 capsule, R-0, E-Prescribe      hydrOXYzine (ATARAX) 10 MG tablet Take 1 tablet (10 mg) by mouth every 6 hours as needed for other (adjuvant pain), Disp-15 tablet, R-0, E-Prescribe      ondansetron (ZOFRAN-ODT) 4 MG ODT tab Take 1 tablet (4 mg) by mouth every 6 hours as needed for nausea or vomiting, Disp-8 tablet, R-0, E-Prescribe      oxyCODONE (ROXICODONE) 5 MG tablet Take 1 tablet (5 mg) by mouth every 6 hours as needed for breakthrough pain or pain, Disp-30 tablet, R-0, Local Print      senna-docusate (SENOKOT-S/PERICOLACE) 8.6-50 MG tablet Take 2 tablets by mouth 2 times daily, Disp-15 tablet, R-0, E-Prescribe      traMADol (ULTRAM) 50 MG tablet Take 1 tablet (50 mg) by mouth every 6 hours as needed for  severe pain, Disp-30 tablet, R-0, Local Print         CONTINUE these medications which have CHANGED    Details   atenolol (TENORMIN) 25 MG tablet Take 1 tablet (25 mg) by mouth daily, TransitionalHold for SBP less than 100, HR less than 60      furosemide (LASIX) 20 MG tablet Take 1 tablet (20 mg) by mouth daily, Disp-30 tablet, R-1, TransitionalStart on 12/02/2019 if BP is stable.      lisinopril (PRINIVIL) 10 MG tablet Take 1 tablet (10 mg) by mouth daily, TransitionalHold for SBP less than 110.      zolpidem (AMBIEN) 5 MG tablet Take 1 tablet (5 mg) by mouth nightly as needed for sleep, Disp-15 tablet, R-0, Local Print         CONTINUE these medications which have NOT CHANGED    Details   Ascorbic Acid 500 MG CHEW Take 1,000 mg by mouth every evening, Historical      brimonidine (ALPHAGAN-P) 0.15 % ophthalmic solution Place 1 drop into the right eye 2 times daily, Historical      ferrous sulfate (FEROSUL) 325 (65 Fe) MG tablet Take 325 mg by mouth daily (with breakfast), Historical      fish oil-omega-3 fatty acids (FISH OIL) 1000 MG capsule Take 2 g by mouth daily , Historical      levothyroxine (SYNTHROID) 100 MCG tablet Take 100 mcg by mouth daily , Historical      paroxetine (PAXIL) 20 MG tablet Take 20 mg by mouth every morning., Historical      polyethylene glycol (MIRALAX/GLYCOLAX) packet Take 1 packet by mouth daily as needed , Historical      Wheat Dextrin (BENEFIBER PO) Take by mouth daily as needed , Historical         STOP taking these medications       acetaminophen (TYLENOL 8 HOUR ARTHRITIS PAIN) 650 MG CR tablet Comments:   Reason for Stopping:                     Consultations:     Physical Therapy: Assistance with ambulation and home exercise program.  Occupational Therapy: Assistance with ADLs  Hospital Medicine: Management of medical Co-morbidities            Hospital Course:     The patient was admitted after the surgical procedure.The patient underwent an uneventful Total knee arthroplasty.  Postoperatively, Aspirin was prescribed for anticoagulation. Home medications have been reconciled. oxycodone 5 mg was prescribed for pain.             Discharge Instructions and Follow-Up:          Discharge activity: WBAT with a walker   Discharge follow-up: Follow-up with Dr. Armstrong in ~14 days post-op. [phone]   Outpatient therapy: Should already be arranged by office.  If not, patient should call to schedule 2x/week x 3 weeks.   Home Care agency: None   Supplies and equipment: None        Wound care: May remove Ace bandage 4 days post op. Leave clear Tegaderm dressing intact until follow-up appointment.    Other instructions:      Rolly Palacios PA-C

## 2019-12-05 ENCOUNTER — HOSPITAL ENCOUNTER (OUTPATIENT)
Dept: ULTRASOUND IMAGING | Facility: CLINIC | Age: 84
Discharge: HOME | End: 2019-12-05
Attending: ORTHOPAEDIC SURGERY | Admitting: ORTHOPAEDIC SURGERY
Payer: MEDICARE

## 2019-12-05 DIAGNOSIS — I82.409 DEEP VEIN THROMBOSIS (DVT) (H): ICD-10-CM

## 2019-12-05 PROCEDURE — 93971 EXTREMITY STUDY: CPT | Mod: LT

## 2020-02-28 ENCOUNTER — ANCILLARY PROCEDURE (OUTPATIENT)
Dept: CARDIOLOGY | Facility: CLINIC | Age: 85
End: 2020-02-28
Attending: INTERNAL MEDICINE
Payer: MEDICARE

## 2020-02-28 DIAGNOSIS — Z95.0 PACEMAKER: ICD-10-CM

## 2020-02-28 PROCEDURE — 93294 REM INTERROG EVL PM/LDLS PM: CPT | Performed by: INTERNAL MEDICINE

## 2020-02-28 PROCEDURE — 93296 REM INTERROG EVL PM/IDS: CPT | Performed by: INTERNAL MEDICINE

## 2020-03-10 LAB
MDC_IDC_EPISODE_DTM: NORMAL
MDC_IDC_EPISODE_ID: NORMAL
MDC_IDC_EPISODE_TYPE: NORMAL
MDC_IDC_LEAD_IMPLANT_DT: NORMAL
MDC_IDC_LEAD_LOCATION: NORMAL
MDC_IDC_LEAD_MFG: NORMAL
MDC_IDC_LEAD_MODEL: NORMAL
MDC_IDC_LEAD_POLARITY_TYPE: NORMAL
MDC_IDC_LEAD_SERIAL: NORMAL
MDC_IDC_MSMT_BATTERY_DTM: NORMAL
MDC_IDC_MSMT_BATTERY_REMAINING_LONGEVITY: 116 MO
MDC_IDC_MSMT_BATTERY_REMAINING_PERCENTAGE: 91 %
MDC_IDC_MSMT_BATTERY_RRT_TRIGGER: NORMAL
MDC_IDC_MSMT_BATTERY_STATUS: NORMAL
MDC_IDC_MSMT_BATTERY_VOLTAGE: 2.95 V
MDC_IDC_MSMT_LEADCHNL_RV_IMPEDANCE_VALUE: 410 OHM
MDC_IDC_MSMT_LEADCHNL_RV_LEAD_CHANNEL_STATUS: NORMAL
MDC_IDC_MSMT_LEADCHNL_RV_PACING_THRESHOLD_AMPLITUDE: 0.75 V
MDC_IDC_MSMT_LEADCHNL_RV_PACING_THRESHOLD_PULSEWIDTH: 1 MS
MDC_IDC_MSMT_LEADCHNL_RV_SENSING_INTR_AMPL: 10.9 MV
MDC_IDC_PG_IMPLANT_DTM: NORMAL
MDC_IDC_PG_MFG: NORMAL
MDC_IDC_PG_MODEL: NORMAL
MDC_IDC_PG_SERIAL: NORMAL
MDC_IDC_PG_TYPE: NORMAL
MDC_IDC_SESS_CLINIC_NAME: NORMAL
MDC_IDC_SESS_DTM: NORMAL
MDC_IDC_SESS_REPROGRAMMED: NO
MDC_IDC_SESS_TYPE: NORMAL
MDC_IDC_SET_BRADY_LOWRATE: 60 {BEATS}/MIN
MDC_IDC_SET_BRADY_MAX_SENSOR_RATE: 120 {BEATS}/MIN
MDC_IDC_SET_BRADY_MODE: NORMAL
MDC_IDC_SET_LEADCHNL_RV_PACING_AMPLITUDE: 2 V
MDC_IDC_SET_LEADCHNL_RV_PACING_ANODE_ELECTRODE_1: NORMAL
MDC_IDC_SET_LEADCHNL_RV_PACING_ANODE_LOCATION_1: NORMAL
MDC_IDC_SET_LEADCHNL_RV_PACING_CAPTURE_MODE: NORMAL
MDC_IDC_SET_LEADCHNL_RV_PACING_CATHODE_ELECTRODE_1: NORMAL
MDC_IDC_SET_LEADCHNL_RV_PACING_CATHODE_LOCATION_1: NORMAL
MDC_IDC_SET_LEADCHNL_RV_PACING_POLARITY: NORMAL
MDC_IDC_SET_LEADCHNL_RV_PACING_PULSEWIDTH: 1 MS
MDC_IDC_SET_LEADCHNL_RV_SENSING_ADAPTATION_MODE: NORMAL
MDC_IDC_SET_LEADCHNL_RV_SENSING_ANODE_ELECTRODE_1: NORMAL
MDC_IDC_SET_LEADCHNL_RV_SENSING_ANODE_LOCATION_1: NORMAL
MDC_IDC_SET_LEADCHNL_RV_SENSING_CATHODE_ELECTRODE_1: NORMAL
MDC_IDC_SET_LEADCHNL_RV_SENSING_CATHODE_LOCATION_1: NORMAL
MDC_IDC_SET_LEADCHNL_RV_SENSING_POLARITY: NORMAL
MDC_IDC_SET_LEADCHNL_RV_SENSING_SENSITIVITY: 2 MV
MDC_IDC_STAT_AT_DTM_END: NORMAL
MDC_IDC_STAT_AT_DTM_START: NORMAL
MDC_IDC_STAT_BRADY_DTM_END: NORMAL
MDC_IDC_STAT_BRADY_DTM_START: NORMAL
MDC_IDC_STAT_BRADY_RV_PERCENT_PACED: 13 %
MDC_IDC_STAT_CRT_DTM_END: NORMAL
MDC_IDC_STAT_CRT_DTM_START: NORMAL
MDC_IDC_STAT_HEART_RATE_DTM_END: NORMAL
MDC_IDC_STAT_HEART_RATE_DTM_START: NORMAL
MDC_IDC_STAT_HEART_RATE_VENTRICULAR_MAX: 240 {BEATS}/MIN
MDC_IDC_STAT_HEART_RATE_VENTRICULAR_MEAN: 85 {BEATS}/MIN
MDC_IDC_STAT_HEART_RATE_VENTRICULAR_MIN: 60 {BEATS}/MIN

## 2020-03-26 NOTE — PLAN OF CARE
Problem: Patient Care Overview  Goal: Plan of Care/Patient Progress Review  VSS. Pt up SBA with belt and walker. Denies SOB, but does appear to be present with walks to BR. No c/o pain. Pt has Omega hearing aids. Tolerating clear liquid diet. Right PIV is SL. Pacemaker, Tele- A-fib-CVR, occasional V-paced. Pt had 1 small bm with bright red blood on toilet tissue, hemorrhoids present. BS hypoactive. Colonoscopy done today. Bottom red, barrier cream applied, groin red- ordered Miconazole powder. Pt up in chair most of shift. HGB 10.1.       yes

## 2020-05-26 NOTE — PROGRESS NOTES
"TELEPHONE VISIT    Jake Duane Pfleiger is a 88 year old male who is being evaluated via a billable telephone visit.      The patient has been notified of following:     \"This telephone visit will be conducted via a call between you and your physician/provider. Given concern for spread of COVID 19 we are minimizing in person clinic visits when possible. We have found that certain health care needs can be provided without the need for a physical exam.  This service lets us provide the care you need with a short phone conversation.  If a prescription is necessary we can send it directly to your pharmacy.  If lab work is needed we can place an order for that and you can then stop by our lab to have the test done at a later time. If during the course of the call the physician/provider feels a telephone visit is not appropriate, you will not be charged for this service.\"       I have reviewed and updated the patient's Past Medical History, Social History, Family History and Medication List.    MEDICATIONS:  Current Outpatient Medications   Medication Sig Dispense Refill     acetaminophen (TYLENOL) 325 MG tablet Take 2 tablets (650 mg) by mouth every 6 hours as needed for mild pain or fever 50 tablet 0     Ascorbic Acid 500 MG CHEW Take 1,000 mg by mouth every evening       aspirin (ASA) 325 MG EC tablet Take 1 tablet (325 mg) by mouth daily 42 tablet 0     atenolol (TENORMIN) 25 MG tablet Take 1 tablet (25 mg) by mouth daily       brimonidine (ALPHAGAN-P) 0.15 % ophthalmic solution Place 1 drop into the right eye 2 times daily       celecoxib (CELEBREX) 100 MG capsule Take 1 capsule (100 mg) by mouth 2 times daily 10 capsule 0     ferrous sulfate (FEROSUL) 325 (65 Fe) MG tablet Take 325 mg by mouth daily (with breakfast)       fish oil-omega-3 fatty acids (FISH OIL) 1000 MG capsule Take 2 g by mouth daily        furosemide (LASIX) 20 MG tablet Take 1 tablet (20 mg) by mouth daily 30 tablet 1     gabapentin (NEURONTIN) 300 " MG capsule Take 1 capsule (300 mg) by mouth 3 times daily 9 capsule 0     hydrOXYzine (ATARAX) 10 MG tablet Take 1 tablet (10 mg) by mouth every 6 hours as needed for other (adjuvant pain) 15 tablet 0     levothyroxine (SYNTHROID) 100 MCG tablet Take 100 mcg by mouth daily        lisinopril (PRINIVIL) 10 MG tablet Take 1 tablet (10 mg) by mouth daily       ondansetron (ZOFRAN-ODT) 4 MG ODT tab Take 1 tablet (4 mg) by mouth every 6 hours as needed for nausea or vomiting 8 tablet 0     oxyCODONE (ROXICODONE) 5 MG tablet Take 1 tablet (5 mg) by mouth every 6 hours as needed for breakthrough pain or pain 30 tablet 0     paroxetine (PAXIL) 20 MG tablet Take 20 mg by mouth every morning.       polyethylene glycol (MIRALAX/GLYCOLAX) packet Take 1 packet by mouth daily as needed        senna-docusate (SENOKOT-S/PERICOLACE) 8.6-50 MG tablet Take 2 tablets by mouth 2 times daily 15 tablet 0     Wheat Dextrin (BENEFIBER PO) Take by mouth daily as needed        zolpidem (AMBIEN) 5 MG tablet Take 1 tablet (5 mg) by mouth nightly as needed for sleep 15 tablet 0       ALLERGIES  Patient has no known allergies.     Patient reported vitals:  BP:no machine  Heart rate:  Weight:179.5    SHowley CMA    Review Of Systems  Skin: negative  Eyes: blind left eye  Ears/Nose/Throat: negative  Respiratory: No shortness of breath, dyspnea on exertion, cough, or hemoptysis  Cardiovascular: negative  Gastrointestinal: constipation  Genitourinary: frequency  Musculoskeletal: arthritis  Neurologic: numbness or tingling of feet  Psychiatric: negative  Hematologic/Lymphatic/Immunologic: negative  Endocrine: thyroid disorder  (ROS TAKEN PRIOR TO VISIT)    Patient agreeable and consented for telephone visit:  Yes    HISTORY OF PRESENT ILLNESS  This is a 88 year old male who follows with [facility].  He is a patient of Dr. Ratliff seen in our clinic for permanent atrial fibrillation, chronic diastolic heart failure, and hypertension.       Jorge Luis has a longstanding history of permanent atrial fibrillation.  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 He has remained on Warfarin and Metoprolol.        He suffered a HF exacerbation in 2016 likely related to dietary indiscretion.  His ECHO showed a LVEF 65%, mild aortic stenosis and 1+ AI and grade I LVD.   He was doing well when last seen in clinic 1 1/2 yrs ago.     His last device interrogation (3/2/20) showed 13% V-paced and 1 RVR lasting 136 bpm    Mr Ordoñez had multiple hospitalizations in 2019 for GI bleeds and a fall with resultant shoulder fracture.  His warfarin was stopped and is now on  mg only  There was evidence of recurrent lower GI bleed from diverticulosis. He also underwent an uneventful total knee arthroplasty in November 2019.  His Atenolol was increased. Our visit today is for an annual review    Mr Ordoñez overall is stable.  His bleeding resolved last fall.  He states that he had a colonoscopy and was told everything was ok.  His PMD recommends staying off of Warfarin.  He walks the halls at his assisted living several times a day.  He denies any exertional SOB or CP.  He has not had any recent falls.  He continues to take lasix 20 mg and states that his weights are stable at home.  He denies any palpitations, orthopnea, significant lightheadedness, or peripheral edema.                 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  Repeat interrogation scheduled in a couple of days.  -no warfarin due to recurrent lower GI bleed     HTN:  -on Atenolol 50 mg, Lasix 20 mg, Lisinopril 10 mg  -SBP staying under 140 mmHg on a regular basis at home.    Chronic diastolic HF  -LVEF 65%, normal RV function (2016)  -lasix 20 mg/day  -denies HF symptoms  -weight stable at  179 lbs      Thank you for allowing me to participate in his care.  He will continue with device interrogations and follow up next year with Dr. Ratliff.        I have reviewed the note as documented above.  This accurately captures the substance of my conversation with the patient.      Phone call contact time  Call Started at 11:08 am  Call Ended at 11:25 am    LAILA Harris, CNP

## 2020-05-27 ENCOUNTER — TELEPHONE (OUTPATIENT)
Dept: CARDIOLOGY | Facility: CLINIC | Age: 85
End: 2020-05-27

## 2020-05-27 ENCOUNTER — VIRTUAL VISIT (OUTPATIENT)
Dept: CARDIOLOGY | Facility: CLINIC | Age: 85
End: 2020-05-27
Attending: INTERNAL MEDICINE
Payer: MEDICARE

## 2020-05-27 DIAGNOSIS — Z95.0 CARDIAC PACEMAKER IN SITU: ICD-10-CM

## 2020-05-27 PROCEDURE — 99442: CPT | Performed by: NURSE PRACTITIONER

## 2020-05-27 NOTE — TELEPHONE ENCOUNTER
Received a call back from Rain VALENTINE ([phone]).    ADEOLA christie/Dr. Sweet (12-13-19). Notes are in Care Everywhere.     No recent visits or documentation pertaining to warfarin.     Rain will message Dr. Sweet to review.     Yulissa VALENTINE, BSN  [facility]

## 2020-05-27 NOTE — LETTER
5/27/2020    Herve Swete  [facility] [place] [address]  [place] [address]    RE: Jake Duane Pfleiger       Dear Colleague,    I had the pleasure of seeing Jake Duane Pfleiger in the [facility].    HISTORY OF PRESENT ILLNESS  This is a 88 year old male who follows with [facility].  He is a patient of Dr. Ratliff seen in our clinic for permanent atrial fibrillation, chronic diastolic heart failure, and hypertension.      Jorge Luis has a longstanding history of permanent atrial fibrillation.  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 He has remained on Warfarin and Metoprolol.        He suffered a HF exacerbation in 2016 likely related to dietary indiscretion.  His ECHO showed a LVEF 65%, mild aortic stenosis and 1+ AI and grade I LVD.   He was doing well when last seen in clinic 1 1/2 yrs ago.     His last device interrogation (3/2/20) showed 13% V-paced and 1 RVR lasting 136 bpm    Mr Ordoñez had multiple hospitalizations in 2019 for GI bleeds and a fall with resultant shoulder fracture.  His warfarin was stopped and is now on  mg only  There was evidence of recurrent lower GI bleed from diverticulosis. He also underwent an uneventful total knee arthroplasty in November 2019.  His Atenolol was increased. Our visit today is for an annual review    Mr Ordoñez overall is stable.  His bleeding resolved last fall.  He states that he had a colonoscopy and was told everything was ok.  His PMD recommends staying off of Warfarin.  He walks the halls at his assisted living several times a day.  He denies any exertional SOB or CP.  He has not had any recent falls.  He continues to take lasix 20 mg and states that his weights are stable at home.  He denies any palpitations, orthopnea, significant lightheadedness, or peripheral edema.                 ASSESSMENT AND PLAN    Permanent Atrial Fibrillation:  -S/p PPM due to  tachy-jairo syndrome (2012)  -denies palpitations  -on  mg due to multiple falls/GIB  -device interrogations did show an episode of RVR lasting 136 seconds.  Repeat interrogation scheduled in a couple of days.  -no warfarin due to recurrent lower GI bleed     HTN:  -on Atenolol 50 mg, Lasix 20 mg, Lisinopril 10 mg  -SBP staying under 140 mmHg on a regular basis at home.    Chronic diastolic HF  -LVEF 65%, normal RV function (2016)  -lasix 20 mg/day  -denies HF symptoms  -weight stable at 179 lbs      Thank you for allowing me to participate in his care.  He will continue with device interrogations and follow up next year with Dr. Ratliff.      I have reviewed the note as documented above.  This accurately captures the substance of my conversation with the patient.      Phone call contact time  Call Started at 11:08 am  Call Ended at 11:25 am    Thank you for allowing me to participate in the care of your patient.    Sincerely,     LAILA Pa [facility]

## 2020-05-27 NOTE — TELEPHONE ENCOUNTER
It appears that the patient's Warfarin was stopped to to GIB.  Pls call to get records from PMD about his recommendations to continue to hold warfarin  Thanks, DEBBY

## 2020-05-27 NOTE — TELEPHONE ENCOUNTER
After reviewing records from care everywhere.  His warfarin was stopped (6/2019) due to recurrent lower GI bleed from diverticulosis.  His pmd has recommended ASA only  Thanks, DEBBY

## 2020-05-27 NOTE — TELEPHONE ENCOUNTER
Call PMD. Left message for RN to return my call.     Yulissa RN, BSN  [facility]

## 2020-06-01 ENCOUNTER — ANCILLARY PROCEDURE (OUTPATIENT)
Dept: CARDIOLOGY | Facility: CLINIC | Age: 85
End: 2020-06-01
Attending: INTERNAL MEDICINE
Payer: MEDICARE

## 2020-06-01 DIAGNOSIS — Z95.0 PACEMAKER: ICD-10-CM

## 2020-06-01 DIAGNOSIS — Z95.0 CARDIAC PACEMAKER IN SITU: Primary | ICD-10-CM

## 2020-06-01 PROCEDURE — 93296 REM INTERROG EVL PM/IDS: CPT | Performed by: INTERNAL MEDICINE

## 2020-06-01 PROCEDURE — 93294 REM INTERROG EVL PM/LDLS PM: CPT | Performed by: INTERNAL MEDICINE

## 2020-06-01 NOTE — TELEPHONE ENCOUNTER
Noted and confirmed with PMD.     Yulissa RN, BSN  [facility]

## 2020-06-22 LAB
MDC_IDC_EPISODE_DTM: NORMAL
MDC_IDC_EPISODE_ID: NORMAL
MDC_IDC_EPISODE_TYPE: NORMAL
MDC_IDC_LEAD_IMPLANT_DT: NORMAL
MDC_IDC_LEAD_LOCATION: NORMAL
MDC_IDC_LEAD_MFG: NORMAL
MDC_IDC_LEAD_MODEL: NORMAL
MDC_IDC_LEAD_POLARITY_TYPE: NORMAL
MDC_IDC_LEAD_SERIAL: NORMAL
MDC_IDC_MSMT_BATTERY_DTM: NORMAL
MDC_IDC_MSMT_BATTERY_REMAINING_LONGEVITY: 102 MO
MDC_IDC_MSMT_BATTERY_REMAINING_PERCENTAGE: 81 %
MDC_IDC_MSMT_BATTERY_RRT_TRIGGER: NORMAL
MDC_IDC_MSMT_BATTERY_STATUS: NORMAL
MDC_IDC_MSMT_BATTERY_VOLTAGE: 2.93 V
MDC_IDC_MSMT_LEADCHNL_RV_IMPEDANCE_VALUE: 400 OHM
MDC_IDC_MSMT_LEADCHNL_RV_LEAD_CHANNEL_STATUS: NORMAL
MDC_IDC_MSMT_LEADCHNL_RV_PACING_THRESHOLD_AMPLITUDE: 0.75 V
MDC_IDC_MSMT_LEADCHNL_RV_PACING_THRESHOLD_PULSEWIDTH: 1 MS
MDC_IDC_MSMT_LEADCHNL_RV_SENSING_INTR_AMPL: 11 MV
MDC_IDC_PG_IMPLANT_DTM: NORMAL
MDC_IDC_PG_MFG: NORMAL
MDC_IDC_PG_MODEL: NORMAL
MDC_IDC_PG_SERIAL: NORMAL
MDC_IDC_PG_TYPE: NORMAL
MDC_IDC_SESS_CLINIC_NAME: NORMAL
MDC_IDC_SESS_DTM: NORMAL
MDC_IDC_SESS_REPROGRAMMED: NO
MDC_IDC_SESS_TYPE: NORMAL
MDC_IDC_SET_BRADY_LOWRATE: 60 {BEATS}/MIN
MDC_IDC_SET_BRADY_MAX_SENSOR_RATE: 120 {BEATS}/MIN
MDC_IDC_SET_BRADY_MODE: NORMAL
MDC_IDC_SET_LEADCHNL_RV_PACING_AMPLITUDE: 2 V
MDC_IDC_SET_LEADCHNL_RV_PACING_ANODE_ELECTRODE_1: NORMAL
MDC_IDC_SET_LEADCHNL_RV_PACING_ANODE_LOCATION_1: NORMAL
MDC_IDC_SET_LEADCHNL_RV_PACING_CAPTURE_MODE: NORMAL
MDC_IDC_SET_LEADCHNL_RV_PACING_CATHODE_ELECTRODE_1: NORMAL
MDC_IDC_SET_LEADCHNL_RV_PACING_CATHODE_LOCATION_1: NORMAL
MDC_IDC_SET_LEADCHNL_RV_PACING_POLARITY: NORMAL
MDC_IDC_SET_LEADCHNL_RV_PACING_PULSEWIDTH: 1 MS
MDC_IDC_SET_LEADCHNL_RV_SENSING_ADAPTATION_MODE: NORMAL
MDC_IDC_SET_LEADCHNL_RV_SENSING_ANODE_ELECTRODE_1: NORMAL
MDC_IDC_SET_LEADCHNL_RV_SENSING_ANODE_LOCATION_1: NORMAL
MDC_IDC_SET_LEADCHNL_RV_SENSING_CATHODE_ELECTRODE_1: NORMAL
MDC_IDC_SET_LEADCHNL_RV_SENSING_CATHODE_LOCATION_1: NORMAL
MDC_IDC_SET_LEADCHNL_RV_SENSING_POLARITY: NORMAL
MDC_IDC_SET_LEADCHNL_RV_SENSING_SENSITIVITY: 2 MV
MDC_IDC_STAT_AT_DTM_END: NORMAL
MDC_IDC_STAT_AT_DTM_START: NORMAL
MDC_IDC_STAT_BRADY_DTM_END: NORMAL
MDC_IDC_STAT_BRADY_DTM_START: NORMAL
MDC_IDC_STAT_BRADY_RV_PERCENT_PACED: 22 %
MDC_IDC_STAT_CRT_DTM_END: NORMAL
MDC_IDC_STAT_CRT_DTM_START: NORMAL
MDC_IDC_STAT_HEART_RATE_DTM_END: NORMAL
MDC_IDC_STAT_HEART_RATE_DTM_START: NORMAL
MDC_IDC_STAT_HEART_RATE_VENTRICULAR_MAX: 240 {BEATS}/MIN
MDC_IDC_STAT_HEART_RATE_VENTRICULAR_MEAN: 79 {BEATS}/MIN
MDC_IDC_STAT_HEART_RATE_VENTRICULAR_MIN: 60 {BEATS}/MIN

## 2021-10-02 PROCEDURE — 96376 TX/PRO/DX INJ SAME DRUG ADON: CPT

## 2021-10-08 ENCOUNTER — APPOINTMENT (OUTPATIENT)
Dept: CT IMAGING | Facility: CLINIC | Age: 86
DRG: 394 | End: 2021-10-08
Attending: EMERGENCY MEDICINE
Payer: MEDICARE

## 2021-10-08 ENCOUNTER — HOSPITAL ENCOUNTER (INPATIENT)
Facility: CLINIC | Age: 86
LOS: 2 days | Discharge: HOME HEALTH | DRG: 394 | End: 2021-10-12
Attending: EMERGENCY MEDICINE | Admitting: INTERNAL MEDICINE
Payer: MEDICARE

## 2021-10-08 ENCOUNTER — APPOINTMENT (OUTPATIENT)
Dept: GENERAL RADIOLOGY | Facility: CLINIC | Age: 86
DRG: 394 | End: 2021-10-08
Attending: EMERGENCY MEDICINE
Payer: MEDICARE

## 2021-10-08 DIAGNOSIS — K76.9 HEPATIC LESION: ICD-10-CM

## 2021-10-08 DIAGNOSIS — I42.9 CARDIOMYOPATHY, UNSPECIFIED TYPE (H): ICD-10-CM

## 2021-10-08 DIAGNOSIS — N28.9 RENAL LESION: ICD-10-CM

## 2021-10-08 DIAGNOSIS — M62.81 GENERALIZED MUSCLE WEAKNESS: ICD-10-CM

## 2021-10-08 DIAGNOSIS — I31.39 PERICARDIAL EFFUSION: ICD-10-CM

## 2021-10-08 DIAGNOSIS — R91.8 PULMONARY NODULES: ICD-10-CM

## 2021-10-08 DIAGNOSIS — K59.00 CONSTIPATION, UNSPECIFIED CONSTIPATION TYPE: Primary | ICD-10-CM

## 2021-10-08 DIAGNOSIS — R10.13 ABDOMINAL PAIN, EPIGASTRIC: ICD-10-CM

## 2021-10-08 LAB
ALBUMIN SERPL-MCNC: 3.5 G/DL (ref 3.4–5)
ALBUMIN UR-MCNC: NEGATIVE MG/DL
ALP SERPL-CCNC: 87 U/L (ref 40–150)
ALT SERPL W P-5'-P-CCNC: 48 U/L (ref 0–70)
ANION GAP SERPL CALCULATED.3IONS-SCNC: 6 MMOL/L (ref 3–14)
APPEARANCE UR: CLEAR
AST SERPL W P-5'-P-CCNC: 38 U/L (ref 0–45)
BASOPHILS # BLD AUTO: 0 10E3/UL (ref 0–0.2)
BASOPHILS NFR BLD AUTO: 0 %
BILIRUB SERPL-MCNC: 0.6 MG/DL (ref 0.2–1.3)
BILIRUB UR QL STRIP: NEGATIVE
BUN SERPL-MCNC: 18 MG/DL (ref 7–30)
CALCIUM SERPL-MCNC: 8.9 MG/DL (ref 8.5–10.1)
CHLORIDE BLD-SCNC: 104 MMOL/L (ref 94–109)
CO2 SERPL-SCNC: 27 MMOL/L (ref 20–32)
COLOR UR AUTO: NORMAL
CREAT SERPL-MCNC: 0.85 MG/DL (ref 0.66–1.25)
EOSINOPHIL # BLD AUTO: 0 10E3/UL (ref 0–0.7)
EOSINOPHIL NFR BLD AUTO: 1 %
ERYTHROCYTE [DISTWIDTH] IN BLOOD BY AUTOMATED COUNT: 13.7 % (ref 10–15)
GFR SERPL CREATININE-BSD FRML MDRD: 77 ML/MIN/1.73M2
GLUCOSE BLD-MCNC: 113 MG/DL (ref 70–99)
GLUCOSE UR STRIP-MCNC: NEGATIVE MG/DL
HCT VFR BLD AUTO: 39.1 % (ref 40–53)
HGB BLD-MCNC: 12.5 G/DL (ref 13.3–17.7)
HGB UR QL STRIP: NEGATIVE
IMM GRANULOCYTES # BLD: 0 10E3/UL
IMM GRANULOCYTES NFR BLD: 0 %
KETONES UR STRIP-MCNC: NEGATIVE MG/DL
LACTATE SERPL-SCNC: 1 MMOL/L (ref 0.7–2)
LEUKOCYTE ESTERASE UR QL STRIP: NEGATIVE
LIPASE SERPL-CCNC: 87 U/L (ref 73–393)
LYMPHOCYTES # BLD AUTO: 0.8 10E3/UL (ref 0.8–5.3)
LYMPHOCYTES NFR BLD AUTO: 14 %
MCH RBC QN AUTO: 30.9 PG (ref 26.5–33)
MCHC RBC AUTO-ENTMCNC: 32 G/DL (ref 31.5–36.5)
MCV RBC AUTO: 97 FL (ref 78–100)
MONOCYTES # BLD AUTO: 0.3 10E3/UL (ref 0–1.3)
MONOCYTES NFR BLD AUTO: 6 %
NEUTROPHILS # BLD AUTO: 4.3 10E3/UL (ref 1.6–8.3)
NEUTROPHILS NFR BLD AUTO: 79 %
NITRATE UR QL: NEGATIVE
NRBC # BLD AUTO: 0 10E3/UL
NRBC BLD AUTO-RTO: 0 /100
NT-PROBNP SERPL-MCNC: 3322 PG/ML (ref 0–1800)
PH UR STRIP: 7 [PH] (ref 5–7)
PLATELET # BLD AUTO: 174 10E3/UL (ref 150–450)
POTASSIUM BLD-SCNC: 4.1 MMOL/L (ref 3.4–5.3)
PROT SERPL-MCNC: 7.1 G/DL (ref 6.8–8.8)
RBC # BLD AUTO: 4.05 10E6/UL (ref 4.4–5.9)
RBC URINE: <1 /HPF
SODIUM SERPL-SCNC: 137 MMOL/L (ref 133–144)
SP GR UR STRIP: 1.02 (ref 1–1.03)
SQUAMOUS EPITHELIAL: <1 /HPF
TROPONIN I SERPL-MCNC: <0.015 UG/L (ref 0–0.04)
UROBILINOGEN UR STRIP-MCNC: NORMAL MG/DL
WBC # BLD AUTO: 5.5 10E3/UL (ref 4–11)
WBC URINE: 0 /HPF

## 2021-10-08 PROCEDURE — 71046 X-RAY EXAM CHEST 2 VIEWS: CPT

## 2021-10-08 PROCEDURE — 93005 ELECTROCARDIOGRAM TRACING: CPT

## 2021-10-08 PROCEDURE — 250N000011 HC RX IP 250 OP 636: Performed by: EMERGENCY MEDICINE

## 2021-10-08 PROCEDURE — 83605 ASSAY OF LACTIC ACID: CPT | Performed by: EMERGENCY MEDICINE

## 2021-10-08 PROCEDURE — 80053 COMPREHEN METABOLIC PANEL: CPT | Performed by: EMERGENCY MEDICINE

## 2021-10-08 PROCEDURE — 99285 EMERGENCY DEPT VISIT HI MDM: CPT | Mod: 25

## 2021-10-08 PROCEDURE — 83880 ASSAY OF NATRIURETIC PEPTIDE: CPT | Performed by: EMERGENCY MEDICINE

## 2021-10-08 PROCEDURE — 96361 HYDRATE IV INFUSION ADD-ON: CPT

## 2021-10-08 PROCEDURE — G1004 CDSM NDSC: HCPCS

## 2021-10-08 PROCEDURE — 36415 COLL VENOUS BLD VENIPUNCTURE: CPT | Performed by: EMERGENCY MEDICINE

## 2021-10-08 PROCEDURE — 84484 ASSAY OF TROPONIN QUANT: CPT | Performed by: EMERGENCY MEDICINE

## 2021-10-08 PROCEDURE — 85025 COMPLETE CBC W/AUTO DIFF WBC: CPT | Performed by: EMERGENCY MEDICINE

## 2021-10-08 PROCEDURE — 81001 URINALYSIS AUTO W/SCOPE: CPT | Performed by: EMERGENCY MEDICINE

## 2021-10-08 PROCEDURE — 96374 THER/PROPH/DIAG INJ IV PUSH: CPT

## 2021-10-08 PROCEDURE — 96376 TX/PRO/DX INJ SAME DRUG ADON: CPT

## 2021-10-08 PROCEDURE — 83690 ASSAY OF LIPASE: CPT | Performed by: EMERGENCY MEDICINE

## 2021-10-08 PROCEDURE — 71250 CT THORAX DX C-: CPT | Mod: MG

## 2021-10-08 PROCEDURE — U0005 INFEC AGEN DETEC AMPLI PROBE: HCPCS | Performed by: EMERGENCY MEDICINE

## 2021-10-08 PROCEDURE — 258N000003 HC RX IP 258 OP 636: Performed by: EMERGENCY MEDICINE

## 2021-10-08 RX ORDER — IOPAMIDOL 755 MG/ML
500 INJECTION, SOLUTION INTRAVASCULAR ONCE
Status: COMPLETED | OUTPATIENT
Start: 2021-10-08 | End: 2021-10-08

## 2021-10-08 RX ORDER — MORPHINE SULFATE 2 MG/ML
2 INJECTION, SOLUTION INTRAMUSCULAR; INTRAVENOUS ONCE
Status: COMPLETED | OUTPATIENT
Start: 2021-10-08 | End: 2021-10-08

## 2021-10-08 RX ADMIN — MORPHINE SULFATE 2 MG: 2 INJECTION, SOLUTION INTRAMUSCULAR; INTRAVENOUS at 21:03

## 2021-10-08 RX ADMIN — MORPHINE SULFATE 2 MG: 2 INJECTION, SOLUTION INTRAMUSCULAR; INTRAVENOUS at 23:12

## 2021-10-08 RX ADMIN — SODIUM CHLORIDE 500 ML: 9 INJECTION, SOLUTION INTRAVENOUS at 23:12

## 2021-10-08 RX ADMIN — IOPAMIDOL 91 ML: 755 INJECTION, SOLUTION INTRAVENOUS at 21:42

## 2021-10-08 ASSESSMENT — ENCOUNTER SYMPTOMS
CONSTIPATION: 1
DIARRHEA: 0
VOMITING: 0
SHORTNESS OF BREATH: 1
NAUSEA: 0
ABDOMINAL PAIN: 1

## 2021-10-08 NOTE — LETTER
Nya Curiel RN Case Manager  Inpatient Care Coordination  [facility]   [phone]    New Referral   Home Care PT

## 2021-10-08 NOTE — LETTER
Nya Curiel RN Case Manager  Inpatient Care Coordination  [facility]   [phone]    Discharge Orders  Home Care PT

## 2021-10-09 LAB
ALBUMIN SERPL-MCNC: 3.2 G/DL (ref 3.4–5)
ALP SERPL-CCNC: 62 U/L (ref 40–150)
ALT SERPL W P-5'-P-CCNC: 36 U/L (ref 0–70)
ANION GAP SERPL CALCULATED.3IONS-SCNC: 3 MMOL/L (ref 3–14)
AST SERPL W P-5'-P-CCNC: 25 U/L (ref 0–45)
ATRIAL RATE - MUSE: 78 BPM
BILIRUB SERPL-MCNC: 0.5 MG/DL (ref 0.2–1.3)
BUN SERPL-MCNC: 17 MG/DL (ref 7–30)
CALCIUM SERPL-MCNC: 8.5 MG/DL (ref 8.5–10.1)
CHLORIDE BLD-SCNC: 106 MMOL/L (ref 94–109)
CO2 SERPL-SCNC: 30 MMOL/L (ref 20–32)
CREAT SERPL-MCNC: 0.95 MG/DL (ref 0.66–1.25)
DIASTOLIC BLOOD PRESSURE - MUSE: NORMAL MMHG
ERYTHROCYTE [DISTWIDTH] IN BLOOD BY AUTOMATED COUNT: 13.8 % (ref 10–15)
GFR SERPL CREATININE-BSD FRML MDRD: 70 ML/MIN/1.73M2
GLUCOSE BLD-MCNC: 88 MG/DL (ref 70–99)
HCT VFR BLD AUTO: 36.5 % (ref 40–53)
HGB BLD-MCNC: 11.1 G/DL (ref 13.3–17.7)
INTERPRETATION ECG - MUSE: NORMAL
LACTATE SERPL-SCNC: 0.5 MMOL/L (ref 0.7–2)
MCH RBC QN AUTO: 30.2 PG (ref 26.5–33)
MCHC RBC AUTO-ENTMCNC: 30.4 G/DL (ref 31.5–36.5)
MCV RBC AUTO: 99 FL (ref 78–100)
P AXIS - MUSE: NORMAL DEGREES
PLATELET # BLD AUTO: 165 10E3/UL (ref 150–450)
POTASSIUM BLD-SCNC: 4.4 MMOL/L (ref 3.4–5.3)
PR INTERVAL - MUSE: NORMAL MS
PROT SERPL-MCNC: 6 G/DL (ref 6.8–8.8)
QRS DURATION - MUSE: 82 MS
QT - MUSE: 396 MS
QTC - MUSE: 418 MS
R AXIS - MUSE: 26 DEGREES
RBC # BLD AUTO: 3.68 10E6/UL (ref 4.4–5.9)
SARS-COV-2 RNA RESP QL NAA+PROBE: NEGATIVE
SODIUM SERPL-SCNC: 139 MMOL/L (ref 133–144)
SYSTOLIC BLOOD PRESSURE - MUSE: NORMAL MMHG
T AXIS - MUSE: 38 DEGREES
UPPER GI ENDOSCOPY: NORMAL
VENTRICULAR RATE- MUSE: 67 BPM
WBC # BLD AUTO: 6.1 10E3/UL (ref 4–11)

## 2021-10-09 PROCEDURE — G0378 HOSPITAL OBSERVATION PER HR: HCPCS

## 2021-10-09 PROCEDURE — 36415 COLL VENOUS BLD VENIPUNCTURE: CPT | Performed by: INTERNAL MEDICINE

## 2021-10-09 PROCEDURE — 85027 COMPLETE CBC AUTOMATED: CPT | Performed by: INTERNAL MEDICINE

## 2021-10-09 PROCEDURE — 88342 IMHCHEM/IMCYTCHM 1ST ANTB: CPT | Mod: TC | Performed by: INTERNAL MEDICINE

## 2021-10-09 PROCEDURE — 250N000011 HC RX IP 250 OP 636: Performed by: INTERNAL MEDICINE

## 2021-10-09 PROCEDURE — 999N000099 HC STATISTIC MODERATE SEDATION < 10 MIN: Performed by: INTERNAL MEDICINE

## 2021-10-09 PROCEDURE — 250N000011 HC RX IP 250 OP 636: Performed by: EMERGENCY MEDICINE

## 2021-10-09 PROCEDURE — 99203 OFFICE O/P NEW LOW 30 MIN: CPT | Performed by: INTERNAL MEDICINE

## 2021-10-09 PROCEDURE — 99207 PR CDG-CODE CATEGORY CHANGED: CPT | Performed by: INTERNAL MEDICINE

## 2021-10-09 PROCEDURE — 80053 COMPREHEN METABOLIC PANEL: CPT | Performed by: INTERNAL MEDICINE

## 2021-10-09 PROCEDURE — 250N000013 HC RX MED GY IP 250 OP 250 PS 637: Performed by: INTERNAL MEDICINE

## 2021-10-09 PROCEDURE — 96375 TX/PRO/DX INJ NEW DRUG ADDON: CPT

## 2021-10-09 PROCEDURE — 83605 ASSAY OF LACTIC ACID: CPT | Performed by: INTERNAL MEDICINE

## 2021-10-09 PROCEDURE — 258N000003 HC RX IP 258 OP 636: Performed by: INTERNAL MEDICINE

## 2021-10-09 PROCEDURE — 96376 TX/PRO/DX INJ SAME DRUG ADON: CPT

## 2021-10-09 PROCEDURE — 250N000009 HC RX 250: Performed by: INTERNAL MEDICINE

## 2021-10-09 PROCEDURE — 0DB98ZX EXCISION OF DUODENUM, VIA NATURAL OR ARTIFICIAL OPENING ENDOSCOPIC, DIAGNOSTIC: ICD-10-PCS | Performed by: INTERNAL MEDICINE

## 2021-10-09 PROCEDURE — 43239 EGD BIOPSY SINGLE/MULTIPLE: CPT | Performed by: INTERNAL MEDICINE

## 2021-10-09 PROCEDURE — 0DB68ZX EXCISION OF STOMACH, VIA NATURAL OR ARTIFICIAL OPENING ENDOSCOPIC, DIAGNOSTIC: ICD-10-PCS | Performed by: INTERNAL MEDICINE

## 2021-10-09 RX ORDER — NALOXONE HYDROCHLORIDE 0.4 MG/ML
0.2 INJECTION, SOLUTION INTRAMUSCULAR; INTRAVENOUS; SUBCUTANEOUS
Status: DISCONTINUED | OUTPATIENT
Start: 2021-10-09 | End: 2021-10-09

## 2021-10-09 RX ORDER — ACETAMINOPHEN 325 MG/1
650 TABLET ORAL EVERY 6 HOURS PRN
Status: DISCONTINUED | OUTPATIENT
Start: 2021-10-09 | End: 2021-10-12 | Stop reason: HOSPADM

## 2021-10-09 RX ORDER — ACETAMINOPHEN 650 MG/1
650 SUPPOSITORY RECTAL EVERY 6 HOURS PRN
Status: DISCONTINUED | OUTPATIENT
Start: 2021-10-09 | End: 2021-10-12 | Stop reason: HOSPADM

## 2021-10-09 RX ORDER — NALOXONE HYDROCHLORIDE 0.4 MG/ML
0.4 INJECTION, SOLUTION INTRAMUSCULAR; INTRAVENOUS; SUBCUTANEOUS
Status: DISCONTINUED | OUTPATIENT
Start: 2021-10-09 | End: 2021-10-12 | Stop reason: HOSPADM

## 2021-10-09 RX ORDER — ONDANSETRON 4 MG/1
4 TABLET, ORALLY DISINTEGRATING ORAL EVERY 6 HOURS PRN
Status: DISCONTINUED | OUTPATIENT
Start: 2021-10-09 | End: 2021-10-12 | Stop reason: HOSPADM

## 2021-10-09 RX ORDER — LISINOPRIL 10 MG/1
10 TABLET ORAL DAILY
Status: DISCONTINUED | OUTPATIENT
Start: 2021-10-09 | End: 2021-10-12 | Stop reason: HOSPADM

## 2021-10-09 RX ORDER — PROCHLORPERAZINE MALEATE 5 MG
5 TABLET ORAL EVERY 6 HOURS PRN
Status: DISCONTINUED | OUTPATIENT
Start: 2021-10-09 | End: 2021-10-12 | Stop reason: HOSPADM

## 2021-10-09 RX ORDER — PROCHLORPERAZINE 25 MG
12.5 SUPPOSITORY, RECTAL RECTAL EVERY 12 HOURS PRN
Status: DISCONTINUED | OUTPATIENT
Start: 2021-10-09 | End: 2021-10-12 | Stop reason: HOSPADM

## 2021-10-09 RX ORDER — PAROXETINE 20 MG/1
20 TABLET, FILM COATED ORAL EVERY MORNING
Status: DISCONTINUED | OUTPATIENT
Start: 2021-10-09 | End: 2021-10-12 | Stop reason: HOSPADM

## 2021-10-09 RX ORDER — ZOLPIDEM TARTRATE 5 MG/1
5 TABLET ORAL
Status: DISCONTINUED | OUTPATIENT
Start: 2021-10-09 | End: 2021-10-12 | Stop reason: HOSPADM

## 2021-10-09 RX ORDER — NALOXONE HYDROCHLORIDE 0.4 MG/ML
0.4 INJECTION, SOLUTION INTRAMUSCULAR; INTRAVENOUS; SUBCUTANEOUS
Status: DISCONTINUED | OUTPATIENT
Start: 2021-10-09 | End: 2021-10-09

## 2021-10-09 RX ORDER — FENTANYL CITRATE 50 UG/ML
25-50 INJECTION, SOLUTION INTRAMUSCULAR; INTRAVENOUS EVERY 5 MIN PRN
Status: DISCONTINUED | OUTPATIENT
Start: 2021-10-09 | End: 2021-10-11

## 2021-10-09 RX ORDER — NALOXONE HYDROCHLORIDE 0.4 MG/ML
0.2 INJECTION, SOLUTION INTRAMUSCULAR; INTRAVENOUS; SUBCUTANEOUS
Status: DISCONTINUED | OUTPATIENT
Start: 2021-10-09 | End: 2021-10-12 | Stop reason: HOSPADM

## 2021-10-09 RX ORDER — AMOXICILLIN 250 MG
1 CAPSULE ORAL 2 TIMES DAILY PRN
Status: DISCONTINUED | OUTPATIENT
Start: 2021-10-09 | End: 2021-10-12 | Stop reason: HOSPADM

## 2021-10-09 RX ORDER — ASPIRIN 81 MG/1
81 TABLET ORAL DAILY
Status: DISCONTINUED | OUTPATIENT
Start: 2021-10-09 | End: 2021-10-11

## 2021-10-09 RX ORDER — LEVOTHYROXINE SODIUM 100 UG/1
100 TABLET ORAL DAILY
Status: DISCONTINUED | OUTPATIENT
Start: 2021-10-09 | End: 2021-10-12 | Stop reason: HOSPADM

## 2021-10-09 RX ORDER — ATENOLOL 25 MG/1
25 TABLET ORAL DAILY
Status: DISCONTINUED | OUTPATIENT
Start: 2021-10-09 | End: 2021-10-09

## 2021-10-09 RX ORDER — ONDANSETRON 2 MG/ML
4 INJECTION INTRAMUSCULAR; INTRAVENOUS ONCE
Status: COMPLETED | OUTPATIENT
Start: 2021-10-09 | End: 2021-10-09

## 2021-10-09 RX ORDER — AMOXICILLIN 250 MG
2 CAPSULE ORAL 2 TIMES DAILY PRN
Status: DISCONTINUED | OUTPATIENT
Start: 2021-10-09 | End: 2021-10-12 | Stop reason: HOSPADM

## 2021-10-09 RX ORDER — LATANOPROST 50 UG/ML
1 SOLUTION/ DROPS OPHTHALMIC DAILY
Status: DISCONTINUED | OUTPATIENT
Start: 2021-10-09 | End: 2021-10-12 | Stop reason: HOSPADM

## 2021-10-09 RX ORDER — PANTOPRAZOLE SODIUM 40 MG/1
40 TABLET, DELAYED RELEASE ORAL
Status: DISCONTINUED | OUTPATIENT
Start: 2021-10-09 | End: 2021-10-12 | Stop reason: HOSPADM

## 2021-10-09 RX ORDER — ATENOLOL 25 MG/1
25 TABLET ORAL DAILY
Status: DISCONTINUED | OUTPATIENT
Start: 2021-10-10 | End: 2021-10-12 | Stop reason: HOSPADM

## 2021-10-09 RX ORDER — FLUMAZENIL 0.1 MG/ML
0.2 INJECTION, SOLUTION INTRAVENOUS
Status: DISCONTINUED | OUTPATIENT
Start: 2021-10-09 | End: 2021-10-11

## 2021-10-09 RX ORDER — LIDOCAINE 40 MG/G
CREAM TOPICAL
Status: DISCONTINUED | OUTPATIENT
Start: 2021-10-09 | End: 2021-10-09 | Stop reason: HOSPADM

## 2021-10-09 RX ORDER — LATANOPROST 50 UG/ML
1 SOLUTION/ DROPS OPHTHALMIC DAILY
COMMUNITY

## 2021-10-09 RX ORDER — SODIUM CHLORIDE 9 MG/ML
INJECTION, SOLUTION INTRAVENOUS CONTINUOUS
Status: DISCONTINUED | OUTPATIENT
Start: 2021-10-09 | End: 2021-10-09

## 2021-10-09 RX ORDER — HYDROMORPHONE HCL IN WATER/PF 6 MG/30 ML
0.2 PATIENT CONTROLLED ANALGESIA SYRINGE INTRAVENOUS
Status: DISCONTINUED | OUTPATIENT
Start: 2021-10-09 | End: 2021-10-11

## 2021-10-09 RX ORDER — ONDANSETRON 2 MG/ML
4 INJECTION INTRAMUSCULAR; INTRAVENOUS EVERY 6 HOURS PRN
Status: DISCONTINUED | OUTPATIENT
Start: 2021-10-09 | End: 2021-10-12 | Stop reason: HOSPADM

## 2021-10-09 RX ORDER — FERROUS SULFATE 325(65) MG
325 TABLET ORAL 2 TIMES DAILY
COMMUNITY

## 2021-10-09 RX ORDER — FERROUS SULFATE 325(65) MG
325 TABLET ORAL
Status: DISCONTINUED | OUTPATIENT
Start: 2021-10-09 | End: 2021-10-12

## 2021-10-09 RX ORDER — POLYETHYLENE GLYCOL 3350 17 G/17G
17 POWDER, FOR SOLUTION ORAL DAILY PRN
Status: DISCONTINUED | OUTPATIENT
Start: 2021-10-09 | End: 2021-10-12 | Stop reason: HOSPADM

## 2021-10-09 RX ORDER — ATENOLOL 50 MG/1
25 TABLET ORAL DAILY
COMMUNITY

## 2021-10-09 RX ADMIN — OXYCODONE HYDROCHLORIDE 2.5 MG: 5 TABLET ORAL at 13:15

## 2021-10-09 RX ADMIN — HYDROMORPHONE HYDROCHLORIDE 0.2 MG: 0.2 INJECTION, SOLUTION INTRAMUSCULAR; INTRAVENOUS; SUBCUTANEOUS at 10:49

## 2021-10-09 RX ADMIN — HYDROMORPHONE HYDROCHLORIDE 0.2 MG: 0.2 INJECTION, SOLUTION INTRAMUSCULAR; INTRAVENOUS; SUBCUTANEOUS at 01:19

## 2021-10-09 RX ADMIN — LEVOTHYROXINE SODIUM 100 MCG: 0.1 TABLET ORAL at 13:11

## 2021-10-09 RX ADMIN — MIDAZOLAM 1 MG: 1 INJECTION INTRAMUSCULAR; INTRAVENOUS at 13:59

## 2021-10-09 RX ADMIN — PAROXETINE HYDROCHLORIDE 20 MG: 20 TABLET, FILM COATED ORAL at 13:11

## 2021-10-09 RX ADMIN — Medication 1 MG: at 01:18

## 2021-10-09 RX ADMIN — ACETAMINOPHEN 650 MG: 325 TABLET, FILM COATED ORAL at 06:25

## 2021-10-09 RX ADMIN — TOPICAL ANESTHETIC 1 SPRAY: 200 SPRAY DENTAL; PERIODONTAL at 13:59

## 2021-10-09 RX ADMIN — LISINOPRIL 10 MG: 10 TABLET ORAL at 13:11

## 2021-10-09 RX ADMIN — FENTANYL CITRATE 25 MCG: 50 INJECTION INTRAMUSCULAR; INTRAVENOUS at 13:59

## 2021-10-09 RX ADMIN — ASPIRIN 81 MG: 81 TABLET ORAL at 06:24

## 2021-10-09 RX ADMIN — ONDANSETRON 4 MG: 2 INJECTION INTRAMUSCULAR; INTRAVENOUS at 10:44

## 2021-10-09 RX ADMIN — OXYCODONE HYDROCHLORIDE 2.5 MG: 5 TABLET ORAL at 21:41

## 2021-10-09 RX ADMIN — ONDANSETRON 4 MG: 2 INJECTION INTRAMUSCULAR; INTRAVENOUS at 00:53

## 2021-10-09 RX ADMIN — ATENOLOL 25 MG: 25 TABLET ORAL at 13:11

## 2021-10-09 RX ADMIN — PANTOPRAZOLE SODIUM 40 MG: 40 TABLET, DELAYED RELEASE ORAL at 16:01

## 2021-10-09 RX ADMIN — SODIUM CHLORIDE: 9 INJECTION, SOLUTION INTRAVENOUS at 01:14

## 2021-10-09 RX ADMIN — LATANOPROST 1 DROP: 50 SOLUTION OPHTHALMIC at 15:49

## 2021-10-09 RX ADMIN — ONDANSETRON 4 MG: 2 INJECTION INTRAMUSCULAR; INTRAVENOUS at 18:17

## 2021-10-09 ASSESSMENT — MIFFLIN-ST. JEOR: SCORE: 1502.68

## 2021-10-09 NOTE — PHARMACY-ADMISSION MEDICATION HISTORY
Admission medication history interview status for this patient is complete. See [facility] admission navigator for allergy information, prior to admission medications and immunization status.     Medication history interview done, indicate source(s): Patient and Family - son, Gallito  Medication history resources (including written lists, pill bottles, clinic record): home med list per son, [employer] and Care Everywhere  Pharmacy: [facility]. [facility] for discharge    Changes made to PTA medication list:  Added: latanoprost  Changed: vitamin C 1000mg every day --> 500mg BID. Atenolol 25mg tab daily -->50mg tab take 0.5mg tab daily. ferosul 325mg every day --> 325mg BID. Fish oil 2g every day --> 1g BID.   Reported as Not Taking: -  Removed: aspirin, brimonidine (discontinue/d 9/14/21), celecoxib, gabapentin, hydroxyzine, zofran, oxycodone, senna-doc    Actions taken by pharmacist (provider contacted, etc): Spoke with patient, suggested calling son. Son verified home med list.     Additional medication history information:None    Medication reconciliation/reorder completed by provider prior to medication history?  Y   (Y/N)     Prior to Admission medications    Medication Sig Last Dose Taking? Auth Provider   acetaminophen (TYLENOL) 325 MG tablet Take 2 tablets (650 mg) by mouth every 6 hours as needed for mild pain or fever 10/8/2021 at am Yes Rolly Palacios PA-C   Ascorbic Acid 500 MG CHEW Take 500 mg by mouth 2 times daily  10/8/2021 at pm Yes Unknown, Entered By History   atenolol (TENORMIN) 50 MG tablet Take 25 mg by mouth daily 10/8/2021 at am Yes Unknown, Entered By History   ferrous sulfate (FEROSUL) 325 (65 Fe) MG tablet Take 325 mg by mouth 2 times daily 10/8/2021 at pm Yes Unknown, Entered By History   fish oil-omega-3 fatty acids (FISH OIL) 1000 MG capsule Take 1 g by mouth 2 times daily  10/8/2021 at pm Yes Unknown, Entered By History   furosemide (LASIX) 20 MG tablet Take 1 tablet (20 mg) by mouth  daily 10/8/2021 at am Yes Genaro Ramos MD   latanoprost (XALATAN) 0.005 % ophthalmic solution Place 1 drop into both eyes daily 10/7/2021 at pm Yes Unknown, Entered By History   levothyroxine (SYNTHROID) 100 MCG tablet Take 100 mcg by mouth daily  10/8/2021 at am Yes Unknown, Entered By History   lisinopril (PRINIVIL) 10 MG tablet Take 1 tablet (10 mg) by mouth daily 10/8/2021 at am Yes Genaro Ramos MD   paroxetine (PAXIL) 20 MG tablet Take 20 mg by mouth every morning. 10/8/2021 at am Yes Unknown, Entered By History   polyethylene glycol (MIRALAX/GLYCOLAX) packet Take 1 packet by mouth daily as needed  More than a month at Unknown time Yes Reported, Patient   Wheat Dextrin (BENEFIBER PO) Take by mouth daily as needed  More than a month at Unknown time Yes Unknown, Entered By History   zolpidem (AMBIEN) 5 MG tablet Take 1 tablet (5 mg) by mouth nightly as needed for sleep 10/7/2021 at pm Yes Genaro Ramos MD

## 2021-10-09 NOTE — PLAN OF CARE
RN 0100-07  Pt arrived to the floor approx 0100. Ambulated to bed Ax2 GB/W, fairly unsteady. C/o periumbilical and RUQ abd pain, IV dilaudid given. Apical pulse irregular, pacemaker. NPO for [facility] consult. Will continue to monitor.

## 2021-10-09 NOTE — PROGRESS NOTES
Progress note update    Patient was admitted earlier this morning by Dr. Nieves.  I did discuss the case with him this morning.  I evaluated the patient today and he states that at the moment his abdominal pain is controlled.  He tells me that he probably has had a couple months of abdominal pain that is worse after eating.  He has not had vomiting, diarrhea or constipation.  He has not lost weight over this time period.  He has not had fevers or chills.    I discussed the plan of having GI see him today.  All of his questions were answered.  Agree with plan as outlined by Dr. Nieves from earlier this morning.    Caroline Estrada MD

## 2021-10-09 NOTE — H&P
[facility]  Hospitalist Admission Note  Name: Jake Duane Pfleiger    MRN: [id number]  YOB: 1931    Age: 89 year old  Date of admission: 10/8/2021  Primary care provider: Herve Sweet    Chief Complaint: Abdominal pain    Assessment and Plan:   Abdominal pain, suspect intestinal angina: Patient not the best historian.  He is reporting maybe 4 days of upper abdominal pain that has been intermittent and severe at times.  His son says he has been endorsing similar pain for the past few weeks to months although not as severe.  The pain is worse after eating.  Denies any vomiting, diarrhea, constipation.  He did have 1 day of bright red bloody stools 2 weeks ago, but none since and no melena.  Has had previous cholecystectomy.  His CBC, CMP, lipase, urinalysis, lactic acid are all unremarkable.  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  There are atherosclerotic changes at the origin of the celiac axis and SMA.  If he has been having pain like this for the past few weeks to months it may represent chronic mesenteric ischemia (intestinal angina) as the pain is worse after eating.  Doubt acute ischemia based on the CT scan showing normal bowel along with normal labs including lactic acid.  He is mildly tender in the epigastric area.  He is  hypertensive in the ER in the setting of pain.  PUD in the differential as well.  -Start aspirin 81 mg daily  -Gave some fluids in the ER to see if this helps and holding Lasix initially.  Keep n.p.o. until seen by GI so on NS 75 ml/hr  -Acetaminophen, oxycodone, IV Dilaudid as needed for pain  -Repeat CBC, lactic acid, and CMP in the morning  -Consult [facility] for further work-up, may need EGD, keep n.p.o. until seen  -May need vascular surgery referral or if symptoms unrelenting and he cannot tolerate p.o. may need to transfer hospitals for this evaluation.   Wonder if he may be a candidate for endovascular stenting    A. fib, history of tachybradycardia syndrome: s/p pacemaker.  Not on anticoagulation or aspirin anymore.  He is on atenolol 25 mg daily.  EKG here shows controlled A. Fib.  -Continue his atenolol    Chronic diastolic CHF, HTN: TTE from 2016 shows EF 65 to 70%, mild aortic stenosis, mild aortic regurgitation.  He is chronically on atenolol 25 mg daily, lisinopril 10 mg daily, and Lasix 20 mg daily.  He has trace-1+ bilateral lower extremity edema.  No respiratory symptoms.  BNP elevated at 3,322.  -Continue his atenolol and lisinopril  -Holding Lasix initially to see if any fluids help his abdominal pain if secondary to intestinal angina as above    Hypothyroidism: Resume levothyroxine.    MDD, anxiety: Resume fluoxetine.      DVT Prophylaxis: Low Risk/Ambulatory with no VTE prophylaxis indicated  Code Status: DNR / DNI -discussed with the patient on admission  FEN: N.p.o. until seen by GI, NS at 75 ml/hr  Discharge Dispo: Home  Estimated Disch Date / # of Days until Disch: Admit to observation for persistent abdominal pain.  Anticipate discharge within 48 hours.      History of Present Illness:  Jake Duane Pfleiger is a 89 year old male with PMH including A. fib not on anticoagulation, tachybradycardia syndrome s/p PPM, HTN, hypothyroidism, MDD, anxiety, GERD, and diverticulosis who presents with abdominal pain.  Patient is not the best historian.  He reports having abdominal pain for the last 4 days or so.  Her son says he has been reporting some intermittent abdominal pain for the past few weeks to months.  The patient then says his pain is more severe now.  He points to his epigastric area and mid abdomen as the location of pain.  It does not radiate.  The pain is intermittent and is worse after eating.  He denies any nausea, vomiting, diarrhea, or constipation.  About 2 weeks ago he did have 1 day of bright red bloody stools, but this is only 1 or 2  episodes.  Denies any melena.  He has had previous cholecystectomy.  He does not think he takes an aspirin anymore and is not on any anticoagulation for his A. fib.  He did take his medications today.  He denies any chest pain, cough, shortness of breath.  He does have some chronic leg swelling that is baseline.    History obtained from patient, medical record, and from Dr. Wheeler in the emergency department.  Hypertensive in the /96 that did improve some after receiving some morphine.  Heart rate in the 60s.  Temperature not recorded.  Oxygen is 97% on room air.  WC 5.5, hemoglobin 12.5, platelet count 174.  BMP within normal limits as are LFTs.  Troponin undetectable.  Lactic acid normal at 1.0 and lipase is 87.  BNP is elevated at 3, 322.  Urinalysis has 0 WBCs.  EKG shows A. fib with controlled rate.  CT the abdomen pelvis shows hepatic steatosis, diverticulosis, and dilatation of the celiac axis which could be poststenotic in addition to atherosclerosis of the celiac axis and SMA.  There was questionable pneumothorax seen on the CT abdomen so CT chest was done that was negative for pneumothorax.  Concern for possible intestinal angina.  Doubt acute mesenteric ischemia given normal labs, exam, vital signs.  He received 2 mg IV morphine x2 along with 500 mL normal saline.  Pain is improved.  Admit observation.     Past Medical History reviewed:  Past Medical History:   Diagnosis Date     Anxiety      Arthritis      Atrial fibrillation (H)      Cardiomyopathy (H)      Depression      Diastolic CHF (H) 04/10/2016     Diverticulosis      Gastro-oesophageal reflux disease      GI bleed 10/02/2018    Lower     History of blood transfusion      Hypertension      Insomnia      Nodule of lower lobe of left lung     CT Chest 1/2019-stable     Pacemaker     [employer] Dual Pacemaker     Permanent atrial fibrillation (H)      Right patella fracture      Tachy-jairo syndrome (H)      Thyroid disease     Hypothyrodism       Past Surgical History reviewed:  Past Surgical History:   Procedure Laterality Date     ARTHROPLASTY HIP  2014    left hip replacement     ARTHROPLASTY KNEE      right      ARTHROPLASTY KNEE Left 2019    Procedure: Left total knee arthroplasty;  Surgeon: Ag Armstrong MD;  Location:  OR     ARTHROTOMY KNEE Right 2019    Procedure: 1.  Excision of bony mass, 3 x 3 x 2 cm, underneath right quadriceps tendon in total knee arthroplasty.  2.  Repair of right quadriceps tendon with suture anchor.   ;  Surgeon: Ag Armstrong MD;  Location:  OR     CHOLECYSTECTOMY       COLONOSCOPY N/A 10/3/2018    Procedure: COLONOSCOPY;  COLONOSCOPY Rm.#[id number];  Surgeon: Reese Burroughs MD;  Location:  GI     COLONOSCOPY N/A 10/5/2018    Procedure: COLONOSCOPY;  COLONOSCOPY  Rm.#[id number];  Surgeon: Reese Burroughs MD;  Location:  GI     EYE SURGERY      right eye cataract removal      IMPLANT PACEMAKER  2012    dual chamber      ORTHOPEDIC SURGERY      back surgery      REPAIR TENDON QUADRICEPS Right 2019    Procedure: Repair tendon quadriceps;  Surgeon: Ag Armstrong MD;  Location:  OR     Social History reviewed:  Social History     Tobacco Use     Smoking status: Former Smoker     Packs/day: 0.50     Years: 25.00     Pack years: 12.50     Types: Cigarettes, Pipe, Cigars     Quit date: 1982     Years since quittin.7     Smokeless tobacco: Never Used   Substance Use Topics     Alcohol use: No     Alcohol/week: 0.0 standard drinks     Social History     Social History Narrative     Not on file     Family History reviewed:  Family History   Problem Relation Age of Onset     Other Cancer Mother      Unknown/Adopted Father      Allergies:  No Known Allergies  Medications:  Prior to Admission medications    Medication Sig Last Dose Taking? Auth Provider   acetaminophen (TYLENOL) 325 MG tablet Take 2 tablets (650 mg) by mouth every 6 hours as needed for mild pain or fever    Rolly Palacios PA-C   Ascorbic Acid 500 MG CHEW Take 1,000 mg by mouth every evening   Unknown, Entered By History   aspirin (ASA) 325 MG EC tablet Take 1 tablet (325 mg) by mouth daily  Patient not taking: Reported on 5/27/2020   Rolly Palacios PA-C   atenolol (TENORMIN) 25 MG tablet Take 1 tablet (25 mg) by mouth daily  Patient taking differently: Take 50 mg by mouth daily    Genaro Ramos MD   brimonidine (ALPHAGAN-P) 0.15 % ophthalmic solution Place 1 drop into the right eye 2 times daily   Unknown, Entered By History   celecoxib (CELEBREX) 100 MG capsule Take 1 capsule (100 mg) by mouth 2 times daily  Patient not taking: Reported on 5/27/2020   Rolly Palacios PA-C   ferrous sulfate (FEROSUL) 325 (65 Fe) MG tablet Take 325 mg by mouth daily (with breakfast)   Unknown, Entered By History   fish oil-omega-3 fatty acids (FISH OIL) 1000 MG capsule Take 2 g by mouth daily    Unknown, Entered By History   furosemide (LASIX) 20 MG tablet Take 1 tablet (20 mg) by mouth daily   Genaro Ramos MD   gabapentin (NEURONTIN) 300 MG capsule Take 1 capsule (300 mg) by mouth 3 times daily  Patient not taking: Reported on 5/27/2020   Rolly Palacios PA-C   hydrOXYzine (ATARAX) 10 MG tablet Take 1 tablet (10 mg) by mouth every 6 hours as needed for other (adjuvant pain)  Patient not taking: Reported on 5/27/2020   Rolly Palacios PA-C   levothyroxine (SYNTHROID) 100 MCG tablet Take 100 mcg by mouth daily    Unknown, Entered By History   lisinopril (PRINIVIL) 10 MG tablet Take 1 tablet (10 mg) by mouth daily   Genaro Ramos MD   ondansetron (ZOFRAN-ODT) 4 MG ODT tab Take 1 tablet (4 mg) by mouth every 6 hours as needed for nausea or vomiting  Patient not taking: Reported on 5/27/2020   Rolly Palacios PA-C   oxyCODONE (ROXICODONE) 5 MG tablet Take 1 tablet (5 mg) by mouth every 6 hours as needed for breakthrough pain or pain  Patient not taking: Reported on 5/27/2020   Rolly Palacios PA-C    paroxetine (PAXIL) 20 MG tablet Take 20 mg by mouth every morning.   Unknown, Entered By History   polyethylene glycol (MIRALAX/GLYCOLAX) packet Take 1 packet by mouth daily as needed    Reported, Patient   senna-docusate (SENOKOT-S/PERICOLACE) 8.6-50 MG tablet Take 2 tablets by mouth 2 times daily  Patient not taking: Reported on 5/27/2020   Rolly Palacios PA-C   Wheat Dextrin (BENEFIBER PO) Take by mouth daily as needed    Unknown, Entered By History   zolpidem (AMBIEN) 5 MG tablet Take 1 tablet (5 mg) by mouth nightly as needed for sleep   Genaro Ramos MD     Review of Systems:  A Comprehensive greater than 10 system review of systems was carried out.  Pertinent positives and negatives are noted above.  Otherwise negative.     Physical Exam:  Blood pressure (!) 183/107, pulse 63, resp. rate 15, SpO2 96 %.  Wt Readings from Last 1 Encounters:   11/27/19 81.6 kg (180 lb)     Exam:  Constitutional: Awake, NAD  Eyes: sclera white   HEENT: atraumatic, MMM  Respiratory: no respiratory distress, lungs cta bilaterally, no crackles or wheeze  Cardiovascular: Irregularly, regular rhythm, regular rate.  1/6 systolic murmur  GI: Mild epigastric tenderness to palpation without guarding, soft, not distended, bowel sounds present  Skin: no rash or lesions, acyanotic  Musculoskeletal/extremities: atraumatic, no major deformities.  Trace-1+ bilateral lower extremity edema  Neurologic: A&O, speech clear, moves extremities equally  Psychiatric: calm, cooperative, normal affect    Lab and imaging data personally reviewed:  Labs:  Recent Labs   Lab 10/08/21  2058   WBC 5.5   HGB 12.5*   HCT 39.1*   MCV 97        Recent Labs   Lab 10/08/21  2058      POTASSIUM 4.1   CHLORIDE 104   CO2 27   ANIONGAP 6   *   BUN 18   CR 0.85   GFRESTIMATED 77   MANAS 8.9   PROTTOTAL 7.1   ALBUMIN 3.5   BILITOTAL 0.6   ALKPHOS 87   AST 38   ALT 48     Recent Labs   Lab 10/08/21  2058   NTBNPI 3,322*     Recent Labs   Lab  10/08/21  2058   LACT 1.0     Recent Labs   Lab 10/08/21  2058   LIPASE 87     Recent Labs   Lab 10/08/21  2058   TROPONIN <0.015     Recent Labs   Lab 10/08/21  2307   COLOR Straw   APPEARANCE Clear   URINEGLC Negative   URINEBILI Negative   URINEKETONE Negative   SG 1.025   UBLD Negative   URINEPH 7.0   PROTEIN Negative   NITRITE Negative   LEUKEST Negative   RBCU <1   WBCU 0       EKG: A. fib, controlled rate    Imaging:  Recent Results (from the past 24 hour(s))   CT Abdomen Pelvis w Contrast    Narrative    EXAM: CT ABDOMEN PELVIS W CONTRAST  LOCATION: [facility]  DATE/TIME: 10/8/2021 9:40 PM    INDICATION: Abdominal pain, acute, nonlocalized.  COMPARISON: None.  TECHNIQUE: CT scan of the abdomen and pelvis was performed following injection of IV contrast. Multiplanar reformats were obtained. Dose reduction techniques were used.  CONTRAST: 91 mL Isovue-370    FINDINGS:   LOWER CHEST: Air cysts and/or emphysema at the lung bases noted. Possible incompletely seen left pneumothorax.    HEPATOBILIARY: Diffuse hepatic steatosis. No significant mass. No bile duct dilatation. No calcified gallstones. Cholecystectomy. Low-attenuation subcentimeter liver lesion(s) compatible with benign cysts or other benign lesions. Severe evaluation   follow-up is recommended in a low-risk patient.    PANCREAS: No significant mass, duct dilatation, or inflammatory change.    SPLEEN: Normal size.    ADRENAL GLANDS: No significant nodules.    KIDNEYS/BLADDER: No significant mass, stones, or hydronephrosis. There are simple or benign cysts. No follow up is needed.    BOWEL: Diverticulosis of the colon. No acute inflammatory change. No obstruction.     LYMPH NODES: No lymphadenopathy.    VASCULATURE: There are moderate atherosclerotic changes of the visualized aorta and its branches. There is no evidence of aortic dissection or aneurysm. Mild dilatation of the celiac axis. This could be poststenotic.    PELVIC  ORGANS: No pelvic masses. Fat-containing left inguinal hernia.    MUSCULOSKELETAL: No destructive bony lesions. Spine degenerative changes. No definite fracture.      Impression    IMPRESSION:   1.  Minimally seen lucency in the anterior left lung base, question pneumothorax. Chest x-ray or chest CT may be useful for further evaluation.  2.  Hepatic steatosis.  3.  Extensive diverticulosis without diverticulitis.  4.  Mild dilatation of the celiac axis which could be poststenotic. There are atherosclerotic changes at the origin of the celiac axis and SMA.    The finding of a possible pneumothorax on the left was called to Dr. Wheeler on 10/8/2021 at 10:00 PM.   Chest XR,  PA & LAT    Narrative    EXAM: XR CHEST 2 VW  LOCATION: [facility]  DATE/TIME: 10/8/2021 9:52 PM    INDICATION: SOB.  COMPARISON: None.      Impression    IMPRESSION: Cardiac enlargement. Left-sided pacemaker. No pulmonary infiltrates.   Chest CT w/o contrast    Narrative    EXAM: CT CHEST W/O CONTRAST  LOCATION: [facility]  DATE/TIME: 10/8/2021 10:05 PM    INDICATION: Shortness of breath. Abdominal pain. Possible pneumothorax on abdomen CT.  COMPARISON: Abdomen CT 10/8/2021. Chest CT 2/23/2012.  TECHNIQUE: CT chest without IV contrast. Multiplanar reformats were obtained. Dose reduction techniques were used.  CONTRAST: None.    FINDINGS:   LUNGS AND PLEURA: There is bullous disease at the periphery of the lungs. Large bulla at the left lung base anteriorly corresponds to the lucency on abdomen CT. Overall bullous disease is not significantly changed from the prior exam. Stable 6 mm nodule   in the left lower lobe laterally. No pneumothorax or pleural effusion.    MEDIASTINUM/AXILLAE: Left subclavian cardiac device in place. The heart is enlarged. Very small pericardial effusion. No lymph node enlargement.    CORONARY ARTERY CALCIFICATION: Moderate.    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 Small 1.5 cm indeterminate right renal lesion.    MUSCULOSKELETAL: Degenerative disease in the spine.      Impression    IMPRESSION:   1.  No pneumothorax.  2.  Bullous disease throughout the lungs, similar to the previous exam.  3.  Cardiomegaly and very small pericardial effusion.           Michael Nieves MD  Hospitalist  [facility]

## 2021-10-09 NOTE — ED NOTES
[facility]  ED Nurse Handoff Report    Jake Duane Pfleiger is a 89 year old male   ED Chief complaint: Abdominal Pain  . ED Diagnosis:   Final diagnoses:   Abdominal pain, epigastric   Hepatic lesion   Pulmonary nodules   Renal lesion     Allergies: No Known Allergies    Code Status: Full Code  Activity level - Baseline/Home:  Independent. Activity Level - Current:   Stand by Assist. Lift room needed: No. Bariatric: No   Needed: No   Isolation: No. Infection: Not Applicable.     Vital Signs:   Vitals:    10/08/21 2045 10/08/21 2100 10/08/21 2115 10/08/21 2130   BP: (!) 186/88 (!) 182/93 (!) 167/87 (!) 139/91   Pulse: 68 71 68 63   Resp:   12 15   SpO2: 96% 96% 96% 97%       Cardiac Rhythm:  ,      Pain level:    Patient confused: No. Patient Falls Risk: Yes.   Elimination Status: Has voided   Patient Report - Initial Complaint: Abdominal pain. Focused Assessment: Upper abdominal pain with palpable mass over last day, no constipation   Tests Performed: CT, XR, Labs. Abnormal Results:   Labs Ordered and Resulted from Time of ED Arrival Up to the Time of Departure from the ED   COMPREHENSIVE METABOLIC PANEL - Abnormal; Notable for the following components:       Result Value    Glucose 113 (*)     All other components within normal limits   CBC WITH PLATELETS AND DIFFERENTIAL - Abnormal; Notable for the following components:    RBC Count 4.05 (*)     Hemoglobin 12.5 (*)     Hematocrit 39.1 (*)     All other components within normal limits   NT PROBNP INPATIENT - Abnormal; Notable for the following components:    N terminal Pro BNP Inpatient 3,322 (*)     All other components within normal limits   LIPASE - Normal   LACTIC ACID WHOLE BLOOD - Normal   TROPONIN I - Normal   ROUTINE UA WITH MICROSCOPIC REFLEX TO CULTURE   COVID-19 VIRUS (CORONAVIRUS) BY PCR   CBC WITH PLATELETS & DIFFERENTIAL    Narrative:     The following orders were created for panel order CBC with platelets  differential.  Procedure                               Abnormality         Status                     ---------                               -----------         ------                     CBC with platelets and d...[[id number]]  Abnormal            Final result                 Please view results for these tests on the individual orders.     .   Treatments provided: Morphine, EKG  Family Comments: Son at bedside  OBS brochure/video discussed/provided to patient:  Yes  ED Medications:   Medications   0.9% sodium chloride BOLUS (has no administration in time range)   morphine (PF) injection 2 mg (has no administration in time range)   morphine (PF) injection 2 mg (2 mg Intravenous Given 10/8/21 2103)   sodium chloride (PF) 0.9% PF flush 100 mL (63 mLs Intravenous Given 10/8/21 2142)   iopamidol (ISOVUE-370) solution 500 mL (91 mLs Intravenous Given 10/8/21 2142)     Drips infusing:  Yes  For the majority of the shift, the patient's behavior Green. Interventions performed were NA.    Sepsis treatment initiated: No     Patient tested for COVID 19 prior to admission: YES    ED Nurse Name/Phone Number: Miroslava Fritz RN,   11:07 PM    RECEIVING UNIT ED HANDOFF REVIEW    Above ED Nurse Handoff Report was reviewed: Yes  Reviewed by: Luda Llanes RN on October 9, 2021 at 12:15 AM

## 2021-10-09 NOTE — ED PROVIDER NOTES
History   Chief Complaint:  Abdominal Pain     HPI   Jake Duane Pfleiger is a 89 year old male with history of atrial fibrillation (not anticoagulated 2/2 GI bleed), CHF, hypertension, and hypothyroidism who presents with abdominal pain. He reports constant epigastric abdominal pain for the past 2 days, though notes off and on pain over the past few weeks/months, exacerbated after eating. Two weeks ago, he had a day of bloody stool, though has not noted any persistent symptoms since then.  He notes a history of constipation, though notes BM as of late have been more regular, and actually somewhat loose today. Over the past 3-4 weeks, he has also noted mild shortness of breath. Son reports discussing this with PCP and it was felt it might be related to elevated BP, though no adjustments to BP regimen were made. Denies chest pain, diarrhea, nausea, and vomiting. He is COVID-19 vaccinated but has not been tested recently.    Review of Systems   Respiratory: Positive for shortness of breath.    Cardiovascular: Negative for chest pain.   Gastrointestinal: Positive for abdominal pain and constipation (baseline). Negative for diarrhea, nausea and vomiting.   All other systems reviewed and are negative.      Allergies:  The patient does not have any allergies    Medications:  Lasix  Lisinopril  Zolpidem  Ferrous sulfate  Levothyroxine  Paroxetine  Polyethylene glycol  Wheat dextrin    Past Medical History:     Anxiety  Arthritis  Atrial fibrillation  Cardiomyopathy  Depression  Diastolic CHF  Diverticulosis  GERD  GI bleed  History of blood transfusion  Hypertension  Insomnia  Nodule of lover lobe of left lung  Pacemaker  Right patella fracture  Tachy-jairo syndrome  Thyroid disease  Hip arthritis  Arm fracture  Rectal bleeding  Degenerative joint disease  Bilateral sensorineural hearing loss  Cardiac pacemaker in situ  Macular puckering  Hypothyroidism  Proptosis    Past Surgical History:    Arthroplasty,  hip  Arthroplasty, knee  Cholecystectomy  Colonoscopy  Cataract removal  Implant pacemaker  Back surgery  Repair tendon quadriceps     Family History:    Mother: cancer, cataract  Brother: prostate cancer    Social History:  Presents alone    Physical Exam     Patient Vitals for the past 24 hrs:   BP Pulse Resp SpO2   10/08/21 2315 -- -- -- 96 %   10/08/21 2300 -- -- -- 97 %   10/08/21 2245 -- -- -- 95 %   10/08/21 2230 -- -- -- 96 %   10/08/21 2215 (!) 183/107 -- -- --   10/08/21 2130 (!) 139/91 63 15 97 %   10/08/21 2115 (!) 167/87 68 12 96 %   10/08/21 2100 (!) 182/93 71 -- 96 %   10/08/21 2045 (!) 186/88 68 -- 96 %   10/08/21 2000 (!) 188/96 61 18 98 %       Physical Exam  General:              Well-nourished              Speaking in full sentences  Eyes:              Conjunctiva without injection or scleral icterus  ENT:              Moist mucous membranes              Nares patent              Pinnae normal  Neck:              Full ROM              No stiffness appreciated  Resp:              Lungs CTAB              No crackles, wheezing or audible rubs              Good air movement  CV:                    Normal rate, regular rhythm              S1 and S2 present              No murmur, gallop or rub  GI:              BS present              Abdomen soft without distention              Diffuse mild tenderness, maximal to julissa-umbilical region              Well healed RUQ surgical scar              No guarding or rebound tenderness  Skin:              Warm, dry, well perfused              No rashes or open wounds on exposed skin  MSK:              Moves all extremities              No focal deformities or swelling  Neuro:              Alert              Answers questions appropriately              Moves all extremities equally  Psych:              Normal affect, normal mood    Emergency Department Course   ECG  ECG obtained at 2109, ECG read at 2116  Atrial fibrillation with frequent ventricular-paced  complexes  Abnormal ECG  Rate 67 bpm. NV interval * ms. QRS duration 82 ms. QT/QTc 396/418 ms. P-R-T axes * 26 38.     Imaging:  Chest CT w/o contrast  1.  No pneumothorax.  2.  Bullous disease throughout the lungs, similar to the previous exam.  3.  Cardiomegaly and very small pericardial effusion.    Chest XR,  PA & LAT  Cardiac enlargement. Left-sided pacemaker. No pulmonary infiltrates.    CT Abdomen Pelvis w Contrast  1.  Minimally seen lucency in the anterior left lung base, question pneumothorax. Chest x-ray or chest CT may be useful for further evaluation.  2.  Hepatic steatosis.  3.  Extensive diverticulosis without diverticulitis.  4.  Mild dilatation of the celiac axis which could be poststenotic. There are atherosclerotic changes at the origin of the celiac axis and SMA.    Report per radiology    Laboratory:  CBC: WBC 5.5, HGB 12.5 (L),     CMP: Glucose 113 (H) o/w WNL (Creatinine 0.85)    Troponin (Collected 2130): <0.015    Lactic acid (Collected 2058): 1.0    Lipase: 87    Emergency Department Course:  Reviewed:  I reviewed nursing notes, vitals, past medical history and Care Everywhere    Assessments:  2055 I obtained history and examined the patient as noted above.    2245 I rechecked the patient and explained findings.    Consults:  2158 I spoke with Radiology about a possible pneumothorax.    Interventions:  2103 Morphine 2 mg IV    Disposition:  Care signed out to Dr. Guthrie.    Impression & Plan     CMS Diagnoses: None    Medical Decision Making:  Jorge Luis Ordoñez is a 89-year-old male with a PMH significant for atrial fibrillation (off anticoagulation secondary to GI bleed) who presents to the emergency department accompanied by his son for evaluation of abdominal pain.  Patient is a somewhat limited historian, though describes epigastric and periumbilical pain that has been intermittent for the past few weeks, though constant over the past 2 to 3 days.  A broad differential was considered  including though not limited to, gastritis, peptic ulcer disease, perforation, bowel obstruction, AAA, colitis (inflammatory, ischemic, infectious), mesenteric ischemia, malignancy, UTI, among others.  Precise etiology for patient's abdominal discomfort not entirely clear at this time, though given advanced atherosclerotic changes involving the aorta, celiac, and SMA axis, concern for intestinal ischemia.  He notes exacerbation of pain following consumption of food, and also noted an episode of hematochezia previously.  His current evaluation fortunately reveals no evidence of intestinal perforation, obstruction, or overt inflammatory changes noted on CT.  Lactate presently normal at 1.0 which is reassuring. Laboratory evaluation otherwise reveals unremarkable CMP, and normal lipase.  With regards to abdominal discomfort, he was provided 2 doses of morphine, though remains with persistent pain.  Given concern for possible intestinal ischemia, gentle fluid hydration was administered with 500 cc normal saline.  Care taken not to avoid excess fluid administration given history of CHF.  No pleural effusion or significant pulmonary edema appreciated on exam, though BNP is mildly elevated.  Patient also reports intermittent shortness of breath over the preceding 3 to 4 weeks.  Imaging negative for focal infiltrate, effusion, or pneumothorax.  Note is made of a small pericardial effusion, though unclear if this could be contributing to his symptoms at all.  Hemodynamic status not suggestive of tamponade physiology.  Pulmonary embolism on the differential, though patient currently without tachycardia, hypoxia, or hypotension.  Should shortness of breath persist, further evaluation for PE may be considered, though at this point, will hold on repeat dye load given recent administration.  Results and clinical impression were discussed with the patient and son. They were informed of incidentally noted pulmonary, hepatic and  renal lesions, which can be further evaluated as an outpatient.  We will plan for hospital observation for close monitoring and treatment.  Questions answered prior to admission.    Diagnosis:    ICD-10-CM    1. Abdominal pain, epigastric  R10.13    2. Hepatic lesion  K76.9    3. Pulmonary nodules  R91.8    4. Renal lesion  N28.9    5. Pericardial effusion  I31.3      Scribe Disclosure:  I, Allen Brittany, am serving as a scribe at 8:42 PM on 10/8/2021 to document services personally performed by Fede Wheeler MD based on my observations and the provider's statements to me.            Fede Wheeler MD  10/08/21 [id number]

## 2021-10-09 NOTE — PROGRESS NOTES
Brief GI Note    EGD performed.  See chart review >> Procedures tab.   Called son, Duane. Updated him on results as well.     Dalia Love MD  [facility]

## 2021-10-09 NOTE — ED NOTES
Bed: HW01  Expected date: 10/8/21  Expected time: 8:40 PM  Means of arrival: Ambulance  Comments:  [place]

## 2021-10-09 NOTE — PLAN OF CARE
PRIMARY DIAGNOSIS: ACUTE PAIN  OUTPATIENT/OBSERVATION GOALS TO BE MET BEFORE DISCHARGE:  1. Pain Status: Reporting moderate pain     2. Return to near baseline physical activity: No. Generalized weakness. Requiring A2, walker    3. Cleared for discharge by consultants (if involved): No    Discharge Planner Nurse   Safe discharge environment identified: Yes, assisted living   Barriers to discharge: Awaiting EGD results        Entered by: Darshan Zhu 10/09/2021 2:24 PM     Pt Alert, forgetful. [place]. Does not have his hearing aids in place. Abdomen, soft, tender and mildly distended. Pain managed with oxycodone, IV dilaudid. Last BM 10-8.Voiding. NPO. Pt went down EGD. Will continue to monitor   Please review provider order for any additional goals.   Nurse to notify provider when observation goals have been met and patient is ready for discharge.

## 2021-10-09 NOTE — PRE-PROCEDURE
Pre-Endoscopy History and Physical     Jake Duane Pfleiger MRN# [id number]   YOB: 1931 Age: 89 year old     Date of Procedure: 10/8/2021  Primary care provider: Herve Sweet  Type of Endoscopy: esophagogastroduodenoscopy (upper GI endoscopy)  Reason for Procedure: Abdominal pain  Type of Anesthesia Anticipated: Moderate (conscious) sedation    HPI:    Jorge Luis is a 89 year old male who will be undergoing the above procedure.      A history and physical has been performed. The patient's medications and allergies have been reviewed. The risks and benefits of the procedure and the sedation options and risks were discussed with the patient.  All questions were answered and informed consent was obtained.      He denies a personal or family history of anesthesia complications or bleeding disorders.     No Known Allergies     Current Facility-Administered Medications   Medication     acetaminophen (TYLENOL) tablet 650 mg    Or     acetaminophen (TYLENOL) Suppository 650 mg     aspirin EC tablet 81 mg     atenolol (TENORMIN) tablet 25 mg     fentaNYL (PF) (SUBLIMAZE) injection 25-50 mcg     ferrous sulfate (FEROSUL) tablet 325 mg     flumazenil (ROMAZICON) injection 0.2 mg     HYDROmorphone (DILAUDID) injection 0.2 mg     levothyroxine (SYNTHROID/LEVOTHROID) tablet 100 mcg     lisinopril (ZESTRIL) tablet 10 mg     melatonin tablet 1 mg     midazolam (VERSED) injection 0.5-2 mg     naloxone (NARCAN) injection 0.2 mg    Or     naloxone (NARCAN) injection 0.4 mg    Or     naloxone (NARCAN) injection 0.2 mg    Or     naloxone (NARCAN) injection 0.4 mg     naloxone (NARCAN) injection 0.2 mg    Or     naloxone (NARCAN) injection 0.4 mg    Or     naloxone (NARCAN) injection 0.2 mg    Or     naloxone (NARCAN) injection 0.4 mg     ondansetron (ZOFRAN-ODT) ODT tab 4 mg    Or     ondansetron (ZOFRAN) injection 4 mg     oxyCODONE IR (ROXICODONE) half-tab 2.5-5 mg     PARoxetine (PAXIL) tablet 20 mg     polyethylene  glycol (MIRALAX) Packet 17 g     prochlorperazine (COMPAZINE) injection 5 mg    Or     prochlorperazine (COMPAZINE) tablet 5 mg    Or     prochlorperazine (COMPAZINE) suppository 12.5 mg     senna-docusate (SENOKOT-S/PERICOLACE) 8.6-50 MG per tablet 1 tablet    Or     senna-docusate (SENOKOT-S/PERICOLACE) 8.6-50 MG per tablet 2 tablet     sodium chloride 0.9% infusion     zolpidem (AMBIEN) tablet 5 mg       Patient Active Problem List   Diagnosis     Atrial fibrillation (H)     Hip arthritis     Cardiomyopathy (H)     Hypertension     Tachy-jairo syndrome (H)     GI bleed     Fall     Arm fracture     Rectal bleeding     S/P total knee arthroplasty     Abdominal pain, epigastric     Pulmonary nodules     Hepatic lesion     Renal lesion        Past Medical History:   Diagnosis Date     Anxiety      Arthritis      Atrial fibrillation (H)      Cardiomyopathy (H)      Depression      Diastolic CHF (H) 04/10/2016     Diverticulosis      Gastro-oesophageal reflux disease      GI bleed 10/02/2018    Lower     History of blood transfusion      Hypertension      Insomnia      Nodule of lower lobe of left lung     CT Chest 1/2019-stable     Pacemaker     [employer] Dual Pacemaker     Permanent atrial fibrillation (H)      Right patella fracture      Tachy-jairo syndrome (H)      Thyroid disease     Hypothyrodism         Past Surgical History:   Procedure Laterality Date     ARTHROPLASTY HIP  1/21/2014    left hip replacement     ARTHROPLASTY KNEE      right      ARTHROPLASTY KNEE Left 11/27/2019    Procedure: Left total knee arthroplasty;  Surgeon: Ag Armstrong MD;  Location:  OR     ARTHROTOMY KNEE Right 5/16/2019    Procedure: 1.  Excision of bony mass, 3 x 3 x 2 cm, underneath right quadriceps tendon in total knee arthroplasty.  2.  Repair of right quadriceps tendon with suture anchor.   ;  Surgeon: Ag Armstrong MD;  Location: [facility] OR     CHOLECYSTECTOMY       COLONOSCOPY N/A 10/3/2018    Procedure:  "COLONOSCOPY;  COLONOSCOPY Rm.#[id number];  Surgeon: Reese Burroughs MD;  Location:  GI     COLONOSCOPY N/A 10/5/2018    Procedure: COLONOSCOPY;  COLONOSCOPY  Rm.#[id number];  Surgeon: Reese Burroughs MD;  Location:  GI     EYE SURGERY      right eye cataract removal      IMPLANT PACEMAKER  2012    dual chamber      ORTHOPEDIC SURGERY      back surgery      REPAIR TENDON QUADRICEPS Right 2019    Procedure: Repair tendon quadriceps;  Surgeon: Ag Armstrong MD;  Location:  OR       Social History     Tobacco Use     Smoking status: Former Smoker     Packs/day: 0.50     Years: 25.00     Pack years: 12.50     Types: Cigarettes, Pipe, Cigars     Quit date: 1982     Years since quittin.7     Smokeless tobacco: Never Used   Substance Use Topics     Alcohol use: No     Alcohol/week: 0.0 standard drinks       Family History   Problem Relation Age of Onset     Other Cancer Mother      Unknown/Adopted Father        REVIEW OF SYSTEMS:     5 point ROS negative except as noted above in HPI, including Gen., Resp., CV, GI &  system review.    PHYSICAL EXAM:   BP (!) 163/84   Pulse 60   Temp 97  F (36.1  C) (Temporal)   Resp 28   Ht 1.778 m (5' 10\")   Wt 83.6 kg (184 lb 6.4 oz)   SpO2 94%   BMI 26.46 kg/m   Estimated body mass index is 26.46 kg/m  as calculated from the following:    Height as of this encounter: 1.778 m (5' 10\").    Weight as of this encounter: 83.6 kg (184 lb 6.4 oz).   GENERAL APPEARANCE: alert  MENTAL STATUS: alert  AIRWAY EXAM: Mallampatti Class II (visualization of the soft palate, fauces, and uvula)  RESP: lungs clear to auscultation - no rales, rhonchi or wheezes  CV: irregularly irregular rhythm      DIAGNOSTICS:    n/a      IMPRESSION   ASA Class 3 - Severe systemic disease, but not incapacitating        PLAN:       EGD with concious sedation    The above has been forwarded to the consulting provider.      Signed Electronically by: Dalia Love MD  2021  "

## 2021-10-09 NOTE — CONSULTS
GASTROENTEROLOGY CONSULTATION     IMPRESSION: 89 year old male with history of atrial fibrillation (not on anticoagulation), tachybrady syndrome s/p PPM, HTN, hypothyroidism, GERD, and prior diverticular bleed, who presents with epigastric abdominal pain. His history seems somewhat unreliable. Unclear time course / onset of pain as per patient, it started 2 days ago, is constant / unrelated to eating, but per H&P, son reports weeks to months of pain, intermittent, exacerbated by eating.    LFTs and lipase are normal.  CT abdomen / pelvis with contrast notable for atherosclerotic changes at the origin of the celiac axis and SMA. There was also mild dilatation of the celiac axis which the radiologist noted could be poststenotic.    At this time, will pursue EGD to r/o PUD, H pylori, etc.  If unremarkable, given difficult history, it would be worthwhile to have vascular surgery weigh in on CT findings and possibility of chronic mesenteric ischemia (though the history that patient gives me would not necessarily be consistent with this).     RECOMMENDATIONS:  -- EGD today    Thank you for this consult.  We will continue to follow with you.    --------------------------------------------------------------------------------------------    REASON FOR CONSULT: Abdominal pain    HPI:  Jake Duane Pfleiger is a 89 year old male with history of atrial fibrillation (no on anticoagulation), tachybrady syndrome s/p PPM, HTN, hypothyroidism, GERD, and prior diverticular bleed, who presents with abdominal pain.     His history does not seem particularly reliable, comparing my interview to H&P.  Tells me he has had ~2 days of pain. Located in epigastrium. It is constant. Stabbing in character. No clear relationship to eating. Only alleviating measure is the pain meds he is getting here. No nausea / vomiting. No heartburn. No dysphagia. No odynophagia. Weight stable. Baseline bowel pattern is 1 BM / day to every other day. This is  unchanged recently. No blood in stools. No black / tarry stools.  Does not take NSAIDs.     ROS: A comprehensive ten point review of systems was negative aside from those in mentioned in the HPI.      PAST MEDICAL HISTORY:  Patient Active Problem List    Diagnosis Date Noted     Abdominal pain, epigastric 10/08/2021     Priority: Medium     Pulmonary nodules 10/08/2021     Priority: Medium     Hepatic lesion 10/08/2021     Priority: Medium     Renal lesion 10/08/2021     Priority: Medium     S/P total knee arthroplasty 11/27/2019     Priority: Medium     Rectal bleeding 09/28/2019     Priority: Medium     Fall 05/15/2019     Priority: Medium     Arm fracture 05/15/2019     Priority: Medium     GI bleed 10/02/2018     Priority: Medium     Cardiomyopathy (H)      Priority: Medium     Hypertension      Priority: Medium     Tachy-jairo syndrome (H)      Priority: Medium     Hip arthritis 01/21/2014     Priority: Medium     Atrial fibrillation (H) 02/23/2012     Priority: Medium       MEDICATIONS:   No current facility-administered medications on file prior to encounter.  acetaminophen (TYLENOL) 325 MG tablet, Take 2 tablets (650 mg) by mouth every 6 hours as needed for mild pain or fever  Ascorbic Acid 500 MG CHEW, Take 500 mg by mouth 2 times daily   atenolol (TENORMIN) 50 MG tablet, Take 25 mg by mouth daily  ferrous sulfate (FEROSUL) 325 (65 Fe) MG tablet, Take 325 mg by mouth 2 times daily  fish oil-omega-3 fatty acids (FISH OIL) 1000 MG capsule, Take 1 g by mouth 2 times daily   furosemide (LASIX) 20 MG tablet, Take 1 tablet (20 mg) by mouth daily  latanoprost (XALATAN) 0.005 % ophthalmic solution, Place 1 drop into both eyes daily  levothyroxine (SYNTHROID) 100 MCG tablet, Take 100 mcg by mouth daily   lisinopril (PRINIVIL) 10 MG tablet, Take 1 tablet (10 mg) by mouth daily  paroxetine (PAXIL) 20 MG tablet, Take 20 mg by mouth every morning.  polyethylene glycol (MIRALAX/GLYCOLAX) packet, Take 1 packet by mouth  "daily as needed   Wheat Dextrin (BENEFIBER PO), Take by mouth daily as needed   zolpidem (AMBIEN) 5 MG tablet, Take 1 tablet (5 mg) by mouth nightly as needed for sleep      ALLERGIES: No Known Allergies    SOCIAL HISTORY:  Social History     Tobacco Use     Smoking status: Former Smoker     Packs/day: 0.50     Years: 25.00     Pack years: 12.50     Types: Cigarettes, Pipe, Cigars     Quit date: 1982     Years since quittin.7     Smokeless tobacco: Never Used   Substance Use Topics     Alcohol use: No     Alcohol/week: 0.0 standard drinks     Drug use: No       FAMILY HISTORY:  Family History   Problem Relation Age of Onset     Other Cancer Mother      Unknown/Adopted Father        PHYSICAL EXAM:   /60 (BP Location: Right arm)   Pulse 60   Temp 97  F (36.1  C) (Temporal)   Resp 16   Ht 1.778 m (5' 10\")   Wt 83.6 kg (184 lb 6.4 oz)   SpO2 92%   BMI 26.46 kg/m    General: awake, alert, well appearing  HEENT: mucous membranes moist, no scleral icterus, no conjunctival injection  Cardiovascular: normal rate  Chest: no accessory muscle use  Abdomen: soft, TTP epigastrium  Extremities: no edema  Skin: no rashes or lesions    LABORATORY DATA:   I reviewed the patient's new clinical lab test results.   Recent Labs   Lab Test 10/09/21  0625 10/08/21  2058 19  1458 19  1330 19  0655 19  0020 19  1910 19  1417 19  0631 19  2219 19  2219   WBC 6.1 5.5  --   --  7.0  --   --    < >  --   --   --    HGB 11.1* 12.5* 8.3*   < > 9.0*   < >  --    < > 8.8*   < > 9.3*   MCV 99 97  --   --  95  --   --    < >  --   --   --     174  --   --  162  --   --    < >  --   --   --    INR  --   --   --   --   --   --  1.10  --  1.49*  --  1.80*    < > = values in this interval not displayed.     Recent Labs   Lab Test 10/09/21  0625 10/08/21  2058 11/29/19  0638   POTASSIUM 4.4 4.1 4.5   CHLORIDE 106 104 105   CO2 30 27 28   BUN 17 18 22   ANIONGAP 3 6 4 "     Recent Labs   Lab Test 10/09/21  0625 10/08/21  2307 10/08/21  2058 05/15/19  0339 05/15/19  0131   ALBUMIN 3.2*  --  3.5  --  3.3*   BILITOTAL 0.5  --  0.6  --  0.3   ALT 36  --  48  --  14   AST 25  --  38  --  17   PROTEIN  --  Negative  --  Negative  --    LIPASE  --   --  87  --   --        IMAGING:  EXAM: CT ABDOMEN PELVIS W CONTRAST  LOCATION: [facility]  DATE/TIME: 10/8/2021 9:40 PM  INDICATION: Abdominal pain, acute, nonlocalized.  IMPRESSION:   1.  Minimally seen lucency in the anterior left lung base, question pneumothorax. Chest x-ray or chest CT may be useful for further evaluation.  2.  Hepatic steatosis.  3.  Extensive diverticulosis without diverticulitis.  4.  Mild dilatation of the celiac axis which could be poststenotic. There are atherosclerotic changes at the origin of the celiac axis and SMA.    EXAM: XR CHEST 2 VW  LOCATION: [facility]  DATE/TIME: 10/8/2021 9:52 PM  INDICATION: SOB.                                                      IMPRESSION: Cardiac enlargement. Left-sided pacemaker. No pulmonary infiltrates.    EXAM: CT CHEST W/O CONTRAST  LOCATION: [facility]  DATE/TIME: 10/8/2021 10:05 PM  INDICATION: Shortness of breath. Abdominal pain. Possible pneumothorax on abdomen CT.  IMPRESSION:   1.  No pneumothorax.  2.  Bullous disease throughout the lungs, similar to the previous exam.  3.  Cardiomegaly and very small pericardial effusion.    PRIOR ENDOSCOPY:  -- Colonoscopy 10/5/2018 for hematochezia  Impression:       - Preparation of the colon was poor.                             - Blood in the rectum, in the sigmoid colon, in the                             descending colon and in the distal transverse colon.                             - Diverticulosis in the sigmoid colon and in the                             descending colon.                             - No specimens collected.   Recommendation:            Bleeding source is from the left colon, must be                             diverticular. The next few stools will be bloody as                             the colon is full. If more active bleeding is                             noted, either try for an emergent colonoscopy to                             find the source or try interventional radiology.     -- Colonoscopy 10/3/2018 for hematochezia  Impression:        - One 3 mm polyp in the ascending colon.                             - Diverticulosis in the sigmoid colon.                             - No specimens collected.   Recommendation:           I suspect a diverticular bleed. Full liquid diet                             today. Home tomorrow if stable. Could restart                             coumadin tomorrow. High fiber diet at discharge.       Dalia Love MD  [facility]

## 2021-10-09 NOTE — OR NURSING
Patient tolerated EGD procedure. VSS. Biopsies taken per MD request.     Findings discussed with patient by this writer. Please refer to physician note for further details.     Report given to inpatient MEME Sexton.

## 2021-10-09 NOTE — ED TRIAGE NOTES
Pt presents via EMS for 2d constant epigastric abd pain. Denies vomiting, nausea and diarrhea. ABCs intact, A&O

## 2021-10-10 PROBLEM — I31.39 PERICARDIAL EFFUSION: Status: ACTIVE | Noted: 2021-10-10

## 2021-10-10 LAB
ANION GAP SERPL CALCULATED.3IONS-SCNC: 4 MMOL/L (ref 3–14)
BUN SERPL-MCNC: 16 MG/DL (ref 7–30)
CALCIUM SERPL-MCNC: 8.7 MG/DL (ref 8.5–10.1)
CHLORIDE BLD-SCNC: 105 MMOL/L (ref 94–109)
CO2 SERPL-SCNC: 28 MMOL/L (ref 20–32)
CREAT SERPL-MCNC: 0.98 MG/DL (ref 0.66–1.25)
ERYTHROCYTE [DISTWIDTH] IN BLOOD BY AUTOMATED COUNT: 13.6 % (ref 10–15)
GFR SERPL CREATININE-BSD FRML MDRD: 68 ML/MIN/1.73M2
GLUCOSE BLD-MCNC: 70 MG/DL (ref 70–99)
HCT VFR BLD AUTO: 37.1 % (ref 40–53)
HGB BLD-MCNC: 11.2 G/DL (ref 13.3–17.7)
MCH RBC QN AUTO: 30.4 PG (ref 26.5–33)
MCHC RBC AUTO-ENTMCNC: 30.2 G/DL (ref 31.5–36.5)
MCV RBC AUTO: 101 FL (ref 78–100)
PLATELET # BLD AUTO: 149 10E3/UL (ref 150–450)
POTASSIUM BLD-SCNC: 4 MMOL/L (ref 3.4–5.3)
RBC # BLD AUTO: 3.69 10E6/UL (ref 4.4–5.9)
SODIUM SERPL-SCNC: 137 MMOL/L (ref 133–144)
WBC # BLD AUTO: 6.6 10E3/UL (ref 4–11)

## 2021-10-10 PROCEDURE — 99207 PR CDG-CODE CATEGORY CHANGED: CPT | Performed by: INTERNAL MEDICINE

## 2021-10-10 PROCEDURE — 36415 COLL VENOUS BLD VENIPUNCTURE: CPT | Performed by: INTERNAL MEDICINE

## 2021-10-10 PROCEDURE — 250N000011 HC RX IP 250 OP 636: Performed by: INTERNAL MEDICINE

## 2021-10-10 PROCEDURE — 85027 COMPLETE CBC AUTOMATED: CPT | Performed by: INTERNAL MEDICINE

## 2021-10-10 PROCEDURE — 250N000013 HC RX MED GY IP 250 OP 250 PS 637: Performed by: INTERNAL MEDICINE

## 2021-10-10 PROCEDURE — 120N000001 HC R&B MED SURG/OB

## 2021-10-10 PROCEDURE — G0378 HOSPITAL OBSERVATION PER HR: HCPCS

## 2021-10-10 PROCEDURE — 80048 BASIC METABOLIC PNL TOTAL CA: CPT | Performed by: INTERNAL MEDICINE

## 2021-10-10 PROCEDURE — 99233 SBSQ HOSP IP/OBS HIGH 50: CPT | Performed by: INTERNAL MEDICINE

## 2021-10-10 RX ORDER — HYDRALAZINE HYDROCHLORIDE 20 MG/ML
10 INJECTION INTRAMUSCULAR; INTRAVENOUS EVERY 4 HOURS PRN
Status: DISCONTINUED | OUTPATIENT
Start: 2021-10-10 | End: 2021-10-12 | Stop reason: HOSPADM

## 2021-10-10 RX ADMIN — ZOLPIDEM TARTRATE 5 MG: 5 TABLET, COATED ORAL at 23:59

## 2021-10-10 RX ADMIN — LEVOTHYROXINE SODIUM 100 MCG: 0.1 TABLET ORAL at 09:22

## 2021-10-10 RX ADMIN — ASPIRIN 81 MG: 81 TABLET ORAL at 09:21

## 2021-10-10 RX ADMIN — ACETAMINOPHEN 650 MG: 325 TABLET, FILM COATED ORAL at 09:23

## 2021-10-10 RX ADMIN — FERROUS SULFATE TAB 325 MG (65 MG ELEMENTAL FE) 325 MG: 325 (65 FE) TAB at 09:22

## 2021-10-10 RX ADMIN — ATENOLOL 25 MG: 25 TABLET ORAL at 09:23

## 2021-10-10 RX ADMIN — ONDANSETRON 4 MG: 4 TABLET, ORALLY DISINTEGRATING ORAL at 10:52

## 2021-10-10 RX ADMIN — LISINOPRIL 10 MG: 10 TABLET ORAL at 09:22

## 2021-10-10 RX ADMIN — PANTOPRAZOLE SODIUM 40 MG: 40 TABLET, DELAYED RELEASE ORAL at 16:30

## 2021-10-10 RX ADMIN — ACETAMINOPHEN 650 MG: 325 TABLET, FILM COATED ORAL at 17:43

## 2021-10-10 RX ADMIN — OXYCODONE HYDROCHLORIDE 5 MG: 5 TABLET ORAL at 15:16

## 2021-10-10 RX ADMIN — HYDRALAZINE HYDROCHLORIDE 10 MG: 20 INJECTION INTRAMUSCULAR; INTRAVENOUS at 15:11

## 2021-10-10 RX ADMIN — OXYCODONE HYDROCHLORIDE 5 MG: 5 TABLET ORAL at 10:52

## 2021-10-10 RX ADMIN — LATANOPROST 1 DROP: 50 SOLUTION OPHTHALMIC at 09:24

## 2021-10-10 RX ADMIN — PAROXETINE HYDROCHLORIDE 20 MG: 20 TABLET, FILM COATED ORAL at 09:22

## 2021-10-10 RX ADMIN — OXYCODONE HYDROCHLORIDE 5 MG: 5 TABLET ORAL at 20:20

## 2021-10-10 RX ADMIN — PANTOPRAZOLE SODIUM 40 MG: 40 TABLET, DELAYED RELEASE ORAL at 09:22

## 2021-10-10 ASSESSMENT — ACTIVITIES OF DAILY LIVING (ADL)
ADLS_ACUITY_SCORE: 11
ADLS_ACUITY_SCORE: 14

## 2021-10-10 NOTE — PLAN OF CARE
Pt Ox3. Disoriented to time. VS WDL on room air. Denies pain. CMS intact. Ambulating Ax2. Liquid diet. Discharge back to assisted living when ready. Continue to monitor.

## 2021-10-10 NOTE — UTILIZATION REVIEW
[facility]   Admission Status; Secondary Review Determination   Admission date:  10/8/2021  8:37 PM    Under the authority of the Utilization Management Committee, the utilization review process indicated a secondary review on the above patient. The review outcome is based on review of the medical records, discussions with staff, and applying clinical experience noted on the date of the review.     (x) Inpatient Status Appropriate - This patient's medical care is consistent with medical management for inpatient care and reasonable inpatient medical practice.     RATIONALE FOR DETERMINATION   89-year-old female with a history including atrial fibrillation, tachybradycardia syndrome status post pacemaker, hypertension, and hypertension was admitted on 10/8 with abdominal pain.  EGD showed gastritis.  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  Gastroenterology was consulted and recommended PPI therapy.  Vascular surgery has also been contacted.  This patient is complicated, has multiple ongoing issues, is high risk for clinical deterioration, and has required a greater than 2 midnight hospitalization so per Medicare guidelines is appropriate for inpatient hospitalization at the time of this review.  This recommendation was paged to Dr. Mart.  The severity of illness, intensity of service provided, expected LOS and risk for adverse outcome make the care complex, high risk and appropriate for hospital admission.    At the time of admission with the information available to the attending physician more than 2 nights Hospital complex care was anticipated, based on patient risk of adverse outcome if treated as outpatient and complex care required.  Inpatient admission is appropriate based on the Medicare guidelines.      The information on this document is developed by the utilization review team in order for the business office to ensure compliance. This  only denotes the appropriateness of proper admission status and does not reflect the quality of care rendered.   The definitions of Inpatient Status and Observation Status used in making the determination above are those provided in the CMS Coverage Manual, Chapter 1 and Chapter 6, section 70.4.     Sincerely,   Darvin Cabello DO MPH   Physician Advisor  Utilization Review  [facility]

## 2021-10-10 NOTE — PROGRESS NOTES
"GASTROENTEROLOGY PROGRESS NOTE    IMPRESSION: 89 year old male with history of atrial fibrillation (not on anticoagulation), tachybrady syndrome s/p PPM, HTN, hypothyroidism, GERD, and prior diverticular bleed, who presente with epigastric abdominal pain. Unclear timecourse / onset of pain as per patient, it started a few days ago, is constant / unrelated to eating, but per H&P, son reports weeks to months of pain, intermittent, exacerbated by eating.     LFTs and lipase are normal.  CT abdomen / pelvis with contrast notable for atherosclerotic changes at the origin of the celiac axis and SMA. There was also mild dilatation of the celiac axis which the radiologist noted could be poststenotic. EGD on 10/9 with gastritis, otherwise unremarkable.  Awaiting gastric and duodenal biopsy results.        RECOMMENDATIONS:  -- Continue pantoprazole 40 mg BID (started 10/9 PM)  -- Follow up path  -- ADAT  -- If no improvement with PPI, it may be worthwhile to have vascular surgery weigh in on CT findings and possibility of chronic mesenteric ischemia (though the history that patient gives me would not necessarily be consistent with this).    We will continue to follow with you.    ----------------------------------------------------------------------------------------    SUBJECTIVE:  Continues to report pain. Again states that it's constant. Still getting oxycodone. Pantoprazole started yesterday PM.    OBJECTIVE:  General Appearance:    BP (!) 165/89   Pulse 78   Temp 97.1  F (36.2  C) (Temporal)   Resp 16   Ht 1.778 m (5' 10\")   Wt 83.6 kg (184 lb 6.4 oz)   SpO2 96%   BMI 26.46 kg/m    Temp (24hrs), Av.1  F (36.2  C), Min:97  F (36.1  C), Max:97.3  F (36.3  C)    Patient Vitals for the past 72 hrs:   Weight   10/09/21 0100 83.6 kg (184 lb 6.4 oz)       Intake/Output Summary (Last 24 hours) at 10/10/2021 1054  Last data filed at 10/10/2021 1000  Gross per 24 hour   Intake 425 ml   Output 450 ml   Net -25 ml "       General: awake, alert, well appearing  Abdomen: soft, mildly ttp epigastrium    LABS:  I have reviewed the patient's new clinical lab results:    Recent Labs   Lab Test 10/10/21  0613 10/09/21  0625 10/08/21  2058 09/29/19  0020 09/28/19  1910 03/09/19  1417 03/09/19  0631 03/08/19  2219 03/08/19  2219   WBC 6.6 6.1 5.5   < >  --    < >  --   --   --    HGB 11.2* 11.1* 12.5*   < >  --    < > 8.8*   < > 9.3*   * 99 97   < >  --    < >  --   --   --    * 165 174   < >  --    < >  --   --   --    INR  --   --   --   --  1.10  --  1.49*  --  1.80*    < > = values in this interval not displayed.     Recent Labs   Lab Test 10/10/21  0613 10/09/21  0625 10/08/21  2058   POTASSIUM 4.0 4.4 4.1   CHLORIDE 105 106 104   CO2 28 30 27   BUN 16 17 18   ANIONGAP 4 3 6     Recent Labs   Lab Test 10/09/21  0625 10/08/21  2307 10/08/21  2058 05/15/19  0339 05/15/19  0131   ALBUMIN 3.2*  --  3.5  --  3.3*   BILITOTAL 0.5  --  0.6  --  0.3   ALT 36  --  48  --  14   AST 25  --  38  --  17   PROTEIN  --  Negative  --  Negative  --    LIPASE  --   --  87  --   --        IMAGING:  No new applicable imaging     Dalia Love MD  [facility]

## 2021-10-10 NOTE — PLAN OF CARE
Vital signs stable, pt alert but forgetful , needs reminders.  Tolerated full liquid diet, c/o nausea, given iv zofran with relief. Pain in abdomen treated with oxycodone.  Ambulated with walker, gait belt and 1 assist.  Voiding.  .  Care plan reviewed with pt.

## 2021-10-10 NOTE — PROGRESS NOTES
Care Management Follow Up    Length of Stay (days): 0    Expected Discharge Date: 10/10/2021     Concerns to be Addressed:       Patient plan of care discussed at interdisciplinary rounds: Yes    Anticipated Discharge Disposition:       Anticipated Discharge Services:    Anticipated Discharge DME:      Patient/family educated on Medicare website which has current facility and service quality ratings:    Education Provided on the Discharge Plan:    Patient/Family in Agreement with the Plan:      Referrals Placed by CM/SW:    Private pay costs discussed: Not applicable    Additional Information:  CM contacted [facility] ([phone]) to inquire about patient's existing services. Spoke with Ghislaine - Weekend Manager. She knows the patient. He is at their baseline level of care - no services from staff. He normally ambulates independently with his 4WW. They have his medical records and can increase his level of care if needed. A RN would need to come to [facility] and assess the patient for his current needs. He is able to return to [facility] on Monday when a RN is on site.     RN CC will continue to follow for discharge planning.    Delmy Solo RN, BSN, CPHN, CM  Inpatient Care Coordination - [facility]  [phone]

## 2021-10-10 NOTE — PROGRESS NOTES
[facility]  Hospitalist Progress Note  Allen Mart MD 10/10/21    Reason for Stay (Diagnosis): abdominal pain         Assessment and Plan:      Summary of Stay: Jake Duane Pfleiger is a 89 year old male with PMH including A. fib not on anticoagulation, tachybradycardia syndrome s/p PPM, HTN, hypothyroidism, MDD, anxiety, GERD, and diverticulosis  admitted on 10/8/2021 with abdominal pain felt to be due to an upper GI source.  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  Trial of PPI BID, planning to coordinate w/ vascular surgery.    Problem List/Assessment and Plan:   Abdominal pain, question intestinal angina, does have gastritis: has been endorsing similar pain for the past few weeks to months although not as severe.  The pain is worse after eating.   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  There are atherosclerotic changes at the origin of the celiac axis and SMA.  If he has been having pain like this for the past few weeks to months it may represent chronic mesenteric ischemia.  Doubt acute ischemia based on the CT scan showing normal bowel along with normal labs including lactic acid.    -remains on aspirin 81 mg daily  -Acetaminophen, oxycodone, IV Dilaudid as needed for pain  -Repeat CBC, lactic acid, and CMP in the morning  -Consulted [facility] for further work-up  -EGD showed gastritis and multiple gastric polyps.  -I have a page out to vascular surgery re: question of endovascular stenting.     A. fib, history of tachybradycardia syndrome: s/p pacemaker.  Not on anticoagulation or aspirin anymore chronically.  He is on atenolol 25 mg daily.  EKG here shows controlled A. Fib.  -Continue his atenolol     Chronic diastolic CHF, HTN: TTE from 2016 shows EF 65 to 70%, mild aortic stenosis, mild aortic  "regurgitation.  He is chronically on atenolol 25 mg daily, lisinopril 10 mg daily, and Lasix 20 mg daily.  He has trace-1+ bilateral lower extremity edema.  No respiratory symptoms.  BNP elevated at 3,322.  -Continue his atenolol and lisinopril  -Holding Lasix initially to see if any fluids help his abdominal pain if secondary to intestinal angina as above     Hypothyroidism: Resume levothyroxine.     MDD, anxiety: Resume fluoxetine.        DVT Prophylaxis: Low Risk/Ambulatory with no VTE prophylaxis indicated  Code Status: DNR / DNI -discussed with the patient on admission  Discharge Dispo: Home  Estimated Disch Date / # of Days until Disch: ?tomorrow.  Will depend upon vascular surgery input.          Interval History (Subjective):      I assumed care, EGD results noted.  Has gastritis, polyps.  Page out to vascular surgery.  ?outpatient follow up vs inpatient intervention.  Called the patient's sonGallito for an update and eventually reach him this afternoon                  Physical Exam:      Last Vital Signs:  BP (!) 165/89   Pulse 78   Temp 97.1  F (36.2  C) (Temporal)   Resp 16   Ht 1.778 m (5' 10\")   Wt 83.6 kg (184 lb 6.4 oz)   SpO2 96%   BMI 26.46 kg/m        Intake/Output Summary (Last 24 hours) at 10/10/2021 1351  Last data filed at 10/10/2021 1000  Gross per 24 hour   Intake 425 ml   Output 450 ml   Net -25 ml       General: Alert, awake, no acute distress.  HEENT: NC/AT, eyes anicteric, external occular movements intact, face symmetric.    Cardiac: RRR, S1, S2.  No murmurs appreciated.  Pulmonary: Normal chest rise, normal work of breathing.  Lungs CTA BL  Abdomen: ttp in epigastrium.  soft, non-distended.  Bowel Sounds Present.  No guarding.  Extremities: no deformities.  Warm, well perfused.  Skin: no rashes or lesions noted.  Warm and Dry.  Neuro: No focal deficits noted.  Speech clear.  Coordination and strength grossly normal.  Psych: Appropriate affect.         Medications:      All " current medications were reviewed with changes reflected in problem list.         Data:      All new lab and imaging data was reviewed.   Labs:  Recent Labs   Lab 10/10/21  0613      POTASSIUM 4.0   CHLORIDE 105   CO2 28   ANIONGAP 4   GLC 70   BUN 16   CR 0.98   GFRESTIMATED 68   MANAS 8.7     Recent Labs   Lab 10/10/21  0613   WBC 6.6   HGB 11.2*   HCT 37.1*   *   *      Imaging:   Recent Results (from the past 48 hour(s))   CT Abdomen Pelvis w Contrast    Narrative    EXAM: CT ABDOMEN PELVIS W CONTRAST  LOCATION: [facility]  DATE/TIME: 10/8/2021 9:40 PM    INDICATION: Abdominal pain, acute, nonlocalized.  COMPARISON: None.  TECHNIQUE: CT scan of the abdomen and pelvis was performed following injection of IV contrast. Multiplanar reformats were obtained. Dose reduction techniques were used.  CONTRAST: 91 mL Isovue-370    FINDINGS:   LOWER CHEST: Air cysts and/or emphysema at the lung bases noted. Possible incompletely seen left pneumothorax.    HEPATOBILIARY: Diffuse hepatic steatosis. No significant mass. No bile duct dilatation. No calcified gallstones. Cholecystectomy. Low-attenuation subcentimeter liver lesion(s) compatible with benign cysts or other benign lesions. Severe evaluation   follow-up is recommended in a low-risk patient.    PANCREAS: No significant mass, duct dilatation, or inflammatory change.    SPLEEN: Normal size.    ADRENAL GLANDS: No significant nodules.    KIDNEYS/BLADDER: No significant mass, stones, or hydronephrosis. There are simple or benign cysts. No follow up is needed.    BOWEL: Diverticulosis of the colon. No acute inflammatory change. No obstruction.     LYMPH NODES: No lymphadenopathy.    VASCULATURE: There are moderate atherosclerotic changes of the visualized aorta and its branches. There is no evidence of aortic dissection or aneurysm. Mild dilatation of the celiac axis. This could be poststenotic.    PELVIC ORGANS: No pelvic masses.  Fat-containing left inguinal hernia.    MUSCULOSKELETAL: No destructive bony lesions. Spine degenerative changes. No definite fracture.      Impression    IMPRESSION:   1.  Minimally seen lucency in the anterior left lung base, question pneumothorax. Chest x-ray or chest CT may be useful for further evaluation.  2.  Hepatic steatosis.  3.  Extensive diverticulosis without diverticulitis.  4.  Mild dilatation of the celiac axis which could be poststenotic. There are atherosclerotic changes at the origin of the celiac axis and SMA.    The finding of a possible pneumothorax on the left was called to Dr. Wheeler on 10/8/2021 at 10:00 PM.   Chest XR,  PA & LAT    Narrative    EXAM: XR CHEST 2 VW  LOCATION: [facility]  DATE/TIME: 10/8/2021 9:52 PM    INDICATION: SOB.  COMPARISON: None.      Impression    IMPRESSION: Cardiac enlargement. Left-sided pacemaker. No pulmonary infiltrates.   Chest CT w/o contrast    Narrative    EXAM: CT CHEST W/O CONTRAST  LOCATION: [facility]  DATE/TIME: 10/8/2021 10:05 PM    INDICATION: Shortness of breath. Abdominal pain. Possible pneumothorax on abdomen CT.  COMPARISON: Abdomen CT 10/8/2021. Chest CT 2/23/2012.  TECHNIQUE: CT chest without IV contrast. Multiplanar reformats were obtained. Dose reduction techniques were used.  CONTRAST: None.    FINDINGS:   LUNGS AND PLEURA: There is bullous disease at the periphery of the lungs. Large bulla at the left lung base anteriorly corresponds to the lucency on abdomen CT. Overall bullous disease is not significantly changed from the prior exam. Stable 6 mm nodule   in the left lower lobe laterally. No pneumothorax or pleural effusion.    MEDIASTINUM/AXILLAE: Left subclavian cardiac device in place. The heart is enlarged. Very small pericardial effusion. No lymph node enlargement.    CORONARY ARTERY CALCIFICATION: Moderate.    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 Small 1.5 cm indeterminate right renal lesion.    MUSCULOSKELETAL: Degenerative disease in the spine.      Impression    IMPRESSION:   1.  No pneumothorax.  2.  Bullous disease throughout the lungs, similar to the previous exam.  3.  Cardiomegaly and very small pericardial effusion.             Allen Mart MD , MD.

## 2021-10-10 NOTE — CONSULTS
Consult has been received by the vascular surgery service.  I have reviewed the history in the chart by Dr. Nieves and internal medicine as well as a consultation note by Dr. Love in gastroenterology.  I have not seen or examined the patient.  In the history and physical that was recorded yesterday patient notes upper abdominal pain which is intermittent and severe and worsened by eating. Dr. Love interviewed the patient as well and patient did not give a classic history for chronic mesenteric ischemia.    I have reviewed the CT scan of the abdomen and pelvis that was performed on 8 October 2021.  There is no direct CT scan for comparison of the abdomen and pelvis.  However, in February 2012 patient underwent a CT scan of the chest to rule out pulmonary embolism.  Those images do go down to the level of the celiac axis.    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  There is a widely open celiac axis beyond that.  On 1 cut of the superior mesenteric artery there is also moderate stenosis.    Personally I doubt if chronic mesenteric ischemia is the problem over here.  I understand that he will be getting an upper endoscopy during this hospitalization.    Patient can be safely discharged from vascular surgery standpoint and I will arrange for him to see me in clinic in [place] on 21 October 2021.

## 2021-10-10 NOTE — PLAN OF CARE
PRIMARY DIAGNOSIS: ACUTE PAIN  OUTPATIENT/OBSERVATION GOALS TO BE MET BEFORE DISCHARGE:  1. Pain Status: reporting 8/10 pain. Pain improved to 5/10 with use of tylenol, oxycodone     2. Return to near baseline physical activity: yes, up with A1, walker     3. Cleared for discharge by consultants (if involved): NO    Discharge Planner Nurse   Safe discharge environment identified: yes, back to prior living   Barriers to discharge: Yes, pt needs to tolerate diet, pain improvement. New order for vascular consult        Entered by: Darshan Zhu 10/10/2021 2:25 PM    Pt alert, forgetful. Abdomen soft, tender to LLQ. Bowels active, +flatus. Eat 75% of breakfast, reporting increased pain and nausea with eating. Given zofran PRN. BP elevated. New order for PRN hydralazine.       Please review provider order for any additional goals.   Nurse to notify provider when observation goals have been met and patient is ready for discharge.

## 2021-10-11 ENCOUNTER — APPOINTMENT (OUTPATIENT)
Dept: GENERAL RADIOLOGY | Facility: CLINIC | Age: 86
DRG: 394 | End: 2021-10-11
Attending: INTERNAL MEDICINE
Payer: MEDICARE

## 2021-10-11 LAB — NT-PROBNP SERPL-MCNC: 3458 PG/ML (ref 0–1800)

## 2021-10-11 PROCEDURE — 250N000013 HC RX MED GY IP 250 OP 250 PS 637: Performed by: INTERNAL MEDICINE

## 2021-10-11 PROCEDURE — 83880 ASSAY OF NATRIURETIC PEPTIDE: CPT | Performed by: INTERNAL MEDICINE

## 2021-10-11 PROCEDURE — 71046 X-RAY EXAM CHEST 2 VIEWS: CPT

## 2021-10-11 PROCEDURE — 120N000001 HC R&B MED SURG/OB

## 2021-10-11 PROCEDURE — 99233 SBSQ HOSP IP/OBS HIGH 50: CPT | Performed by: INTERNAL MEDICINE

## 2021-10-11 PROCEDURE — 36415 COLL VENOUS BLD VENIPUNCTURE: CPT | Performed by: INTERNAL MEDICINE

## 2021-10-11 PROCEDURE — 250N000011 HC RX IP 250 OP 636: Performed by: INTERNAL MEDICINE

## 2021-10-11 RX ORDER — DOCUSATE SODIUM 100 MG/1
100 CAPSULE, LIQUID FILLED ORAL DAILY
Status: DISCONTINUED | OUTPATIENT
Start: 2021-10-11 | End: 2021-10-12 | Stop reason: HOSPADM

## 2021-10-11 RX ORDER — FUROSEMIDE 20 MG
20 TABLET ORAL DAILY
Status: DISCONTINUED | OUTPATIENT
Start: 2021-10-11 | End: 2021-10-11

## 2021-10-11 RX ORDER — DOCUSATE SODIUM 100 MG/1
100 CAPSULE, LIQUID FILLED ORAL DAILY
Qty: 7 CAPSULE | Refills: 0 | Status: SHIPPED | OUTPATIENT
Start: 2021-10-11 | End: 2021-10-18

## 2021-10-11 RX ORDER — FUROSEMIDE 20 MG
20 TABLET ORAL DAILY
Status: DISCONTINUED | OUTPATIENT
Start: 2021-10-12 | End: 2021-10-12 | Stop reason: HOSPADM

## 2021-10-11 RX ORDER — PANTOPRAZOLE SODIUM 40 MG/1
40 TABLET, DELAYED RELEASE ORAL
Qty: 60 TABLET | Refills: 0 | Status: SHIPPED | OUTPATIENT
Start: 2021-10-11

## 2021-10-11 RX ORDER — FUROSEMIDE 10 MG/ML
20 INJECTION INTRAMUSCULAR; INTRAVENOUS ONCE
Status: COMPLETED | OUTPATIENT
Start: 2021-10-11 | End: 2021-10-11

## 2021-10-11 RX ADMIN — LISINOPRIL 10 MG: 10 TABLET ORAL at 07:49

## 2021-10-11 RX ADMIN — PANTOPRAZOLE SODIUM 40 MG: 40 TABLET, DELAYED RELEASE ORAL at 06:50

## 2021-10-11 RX ADMIN — OXYCODONE HYDROCHLORIDE 5 MG: 5 TABLET ORAL at 07:49

## 2021-10-11 RX ADMIN — HYDRALAZINE HYDROCHLORIDE 10 MG: 20 INJECTION INTRAMUSCULAR; INTRAVENOUS at 00:00

## 2021-10-11 RX ADMIN — LATANOPROST 1 DROP: 50 SOLUTION OPHTHALMIC at 07:51

## 2021-10-11 RX ADMIN — LEVOTHYROXINE SODIUM 100 MCG: 0.1 TABLET ORAL at 07:49

## 2021-10-11 RX ADMIN — FUROSEMIDE 20 MG: 10 INJECTION, SOLUTION INTRAMUSCULAR; INTRAVENOUS at 13:53

## 2021-10-11 RX ADMIN — PAROXETINE HYDROCHLORIDE 20 MG: 20 TABLET, FILM COATED ORAL at 07:49

## 2021-10-11 RX ADMIN — ASPIRIN 81 MG: 81 TABLET ORAL at 07:49

## 2021-10-11 RX ADMIN — ATENOLOL 25 MG: 25 TABLET ORAL at 07:49

## 2021-10-11 RX ADMIN — ACETAMINOPHEN 650 MG: 325 TABLET, FILM COATED ORAL at 18:00

## 2021-10-11 RX ADMIN — PANTOPRAZOLE SODIUM 40 MG: 40 TABLET, DELAYED RELEASE ORAL at 16:38

## 2021-10-11 RX ADMIN — DOCUSATE SODIUM 100 MG: 100 CAPSULE, LIQUID FILLED ORAL at 10:34

## 2021-10-11 RX ADMIN — ZOLPIDEM TARTRATE 5 MG: 5 TABLET, COATED ORAL at 22:36

## 2021-10-11 RX ADMIN — ACETAMINOPHEN 650 MG: 325 TABLET, FILM COATED ORAL at 10:55

## 2021-10-11 ASSESSMENT — ACTIVITIES OF DAILY LIVING (ADL)
ADLS_ACUITY_SCORE: 11
ADLS_ACUITY_SCORE: 13
ADLS_ACUITY_SCORE: 11

## 2021-10-11 ASSESSMENT — MIFFLIN-ST. JEOR: SCORE: 1499.25

## 2021-10-11 NOTE — PROGRESS NOTES
Care Management Follow Up    Length of Stay (days): 1    Expected Discharge Date: 10/12/2021     Concerns to be Addressed: discharge planning, care coordination/care conferences     Patient plan of care discussed at interdisciplinary rounds: Yes    Anticipated Discharge Disposition: Assisted Living     Anticipated Discharge Services: None  Anticipated Discharge DME: None    Referrals Placed by CM/SW: External Care Coordination  Private pay costs discussed: Not applicable    Additional Information:  CM following for discharge planning. Not medically ready for discharge today. PT consult and recommendations pending.  Spoke with Sheila nursing at [facility] [phone]. Verified that patient does not have any services at [facility].     CM will follow up with patient and family once discharge ready and recommendations made for plan of care. If to increase services at [facility], services could start within 24 hours, medication management within 3-5 days.       Nya Curiel, RN      Nya Curiel RN Case Manager  Inpatient Care Coordination  [facility]   [phone]

## 2021-10-11 NOTE — PLAN OF CARE
RN [id number]  Pt disoriented to situation, up Ax1 GB/W. Denies any abd discomfort or pain. BS hypoactive. Ambien given for sleep. Pt requested oxycodone despite denying pain, writer educated on therapeutic administration and pt verbalized understanding. Tolerating full liq diet. Plan is to go back to [place] on discharge (today?) and follow up with the vascular clinic outpt.

## 2021-10-11 NOTE — PLAN OF CARE
Pt alert, forgetful. [place]. Son brought his his hearing aids this afternoon. Up with LOIS walker. Reported severe abdominal pain this am. Given oxycodone 5mg X1with relief. Oxycodone discontinued. Pain now managed with tylenol. Bowels hypoactive, +flatus. Last BM few days ago. Given colace. Tolerating regular diet.  Voiding. Reported SOB today more than his baseline. Lungs clear. Mild trade edema BLE. Given lasix 20mg IV.  Discharging back to assisted living.

## 2021-10-11 NOTE — DISCHARGE SUMMARY
[facility]  Discharge Summary  Name: Jake Duane Pfleiger    MRN: [id number]  YOB: 1931    Age: 89 year old  Date of Discharge:  10/11/2021  Date of Admission: 10/8/2021  Primary Care Provider: Herve Sweet  Discharge Physician:  Daniel Mart MD  Discharging Service:  Hospitalist      Hospital Course/Discharge Diagnoses:  Jake Duane Pfleiger is a 89 year old male with PMH including A. fib not on anticoagulation, tachybradycardia syndrome s/p PPM, HTN, hypothyroidism, MDD, anxiety, GERD, and diverticulosis  admitted on 10/8/2021 with abdominal pain felt to be due to an upper GI source.  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  Trial of PPI BID, also we did coordinate w/ vascular surgery to arrange short term follow up for re-evaluation.      At this point I suspect his symptoms were primarily due to gastritis but we cannot rule a component of chronic postprandial ischemia.  Am holding his Lasix as he has generally been eating less and I think a low volume status poses some inherent risk for him.  He will be treated conservatively with a trial of a PPI and have reevaluation with vascular surgery in approximately 10 days as an outpatient.  Given the presence of gastritis I will hold off on aspirin for now but that could be reevaluated as well in clinic.     Problem List/Assessment and Plan:   Abdominal pain due to gastritis, rule out intestinal angina as well: has been endorsing similar pain for the past few weeks to months although not as severe.  The pain is worse after eating.   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  There are atherosclerotic changes at the origin of the celiac axis and SMA.  If he has been having pain like this for the past few weeks to months it may represent chronic mesenteric  ischemia.  Doubt acute ischemia based on the CT scan showing normal bowel along with normal labs including lactic acid.    -Consulted [facility] for further work-up  -EGD showed gastritis and multiple gastric polyps.  -I have contacted vascular surgery: they will see him in clinic for re-evaluation on 10/21/21.  -will hold ASA for now given presence of gastritis.  Defer to vascular surgery re: restarting if they feel intestinal angina is playing a role.    -ok to restart his home oral lasix given increased PO intake and his sense that he was getting a bit fluid overloaded while holding this.  --might have a component of constipation now from oxycodone received earlier in this stay.  Start colace x 7 days, reassess.  Hold for loose stools.  No indication for narcotics moving forward at this time.     A. fib, history of tachybradycardia syndrome: s/p pacemaker.  Not on anticoagulation or aspirin anymore chronically.  He is on atenolol 25 mg daily.  EKG here shows controlled A. Fib.  -Continue his atenolol     Chronic diastolic CHF, HTN: TTE from 2016 shows EF 65 to 70%, mild aortic stenosis, mild aortic regurgitation.  He is chronically on atenolol 25 mg daily, lisinopril 10 mg daily, and Lasix 20 mg daily.  He has trace-1+ bilateral lower extremity edema.  No respiratory symptoms.  BNP elevated at 3,322.  -Continue his atenolol and lisinopril     Hypothyroidism: Resume levothyroxine.     MDD, anxiety: Resume fluoxetine.         Discharge Disposition:  Discharged to home     Allergies:  No Known Allergies     Discharge Medications:        Review of your medicines      START taking      Dose / Directions   docusate sodium 100 MG capsule  Commonly known as: COLACE  Used for: Constipation, unspecified constipation type      Dose: 100 mg  Take 1 capsule (100 mg) by mouth daily for 7 days Hold/stop this med if you start having loose stools.  Quantity: 7 capsule  Refills: 0     pantoprazole 40 MG EC tablet  Commonly known  as: PROTONIX      Dose: 40 mg  Take 1 tablet (40 mg) by mouth 2 times daily (before meals)  Quantity: 60 tablet  Refills: 0        CONTINUE these medicines which have NOT CHANGED      Dose / Directions   acetaminophen 325 MG tablet  Commonly known as: TYLENOL  Used for: Status post total left knee replacement      Dose: 650 mg  Take 2 tablets (650 mg) by mouth every 6 hours as needed for mild pain or fever  Quantity: 50 tablet  Refills: 0     Ascorbic Acid 500 MG Chew      Dose: 500 mg  Take 500 mg by mouth 2 times daily  Refills: 0     atenolol 50 MG tablet  Commonly known as: TENORMIN      Dose: 25 mg  Take 25 mg by mouth daily  Refills: 0     BENEFIBER PO      Take by mouth daily as needed  Refills: 0     ferrous sulfate 325 (65 Fe) MG tablet  Commonly known as: FEROSUL      Dose: 325 mg  Take 325 mg by mouth 2 times daily  Refills: 0     fish oil-omega-3 fatty acids 1000 MG capsule      Dose: 1 g  Take 1 g by mouth 2 times daily  Refills: 0     furosemide 20 MG tablet  Commonly known as: LASIX  Used for: Cardiomyopathy, unspecified type (H)      Dose: 20 mg  Take 1 tablet (20 mg) by mouth daily  Refills: 0     latanoprost 0.005 % ophthalmic solution  Commonly known as: XALATAN      Dose: 1 drop  Place 1 drop into both eyes daily  Refills: 0     lisinopril 10 MG tablet  Commonly known as: Prinivil  Used for: Essential hypertension      Dose: 10 mg  Take 1 tablet (10 mg) by mouth daily  Refills: 0     Paxil 20 MG tablet  Generic drug: PARoxetine      Dose: 20 mg  Take 20 mg by mouth every morning.  Refills: 0     polyethylene glycol 17 g packet  Commonly known as: MIRALAX      Dose: 1 packet  Take 1 packet by mouth daily as needed  Refills: 0     Synthroid 100 MCG tablet  Generic drug: levothyroxine      Dose: 100 mcg  Take 100 mcg by mouth daily  Refills: 0     zolpidem 5 MG tablet  Commonly known as: AMBIEN  Used for: Primary insomnia      Dose: 5 mg  Take 1 tablet (5 mg) by mouth nightly as needed for  "sleep  Quantity: 15 tablet  Refills: 0           Where to get your medicines      These medications were sent to [facility] [place], [place] - [address] [facility]  [address] [facility] [address]    Phone: [phone]     docusate sodium 100 MG capsule    pantoprazole 40 MG EC tablet         Condition on Discharge:  Discharge condition: Stable       Code status on discharge: DNR / DNI     History of Illness:  See detailed admission note for full details.    Physical Exam:  Vital signs:  Temp: 97.6  F (36.4  C) Temp src: Temporal BP: (!) 170/80 Pulse: 73   Resp: 16 SpO2: 91 % O2 Device: None (Room air) Oxygen Delivery: 2 LPM Height: 177.8 cm (5' 10\") Weight: 83.6 kg (184 lb 6.4 oz)  Estimated body mass index is 26.46 kg/m  as calculated from the following:    Height as of this encounter: 1.778 m (5' 10\").    Weight as of this encounter: 83.6 kg (184 lb 6.4 oz).    Wt Readings from Last 1 Encounters:   10/09/21 83.6 kg (184 lb 6.4 oz)       General: Alert, awake, no acute distress.  HEENT: NC/AT, eyes anicteric, external occular movements intact, face symmetric.    Cardiac: RRR, S1, S2.  No murmurs appreciated.  Pulmonary: Normal chest rise, normal work of breathing.  Lungs CTA BL  Abdomen: ttp in epigastrium.  soft, non-distended.  Bowel Sounds Present.  No guarding.  Extremities: no deformities.  Warm, well perfused.  Skin: no rashes or lesions noted.  Warm and Dry.  Neuro: No focal deficits noted.  Speech clear.  Coordination and strength grossly normal.  Psych: Appropriate affect.       Procedures other than Imaging:  EGD     Imaging:  Results for orders placed or performed during the hospital encounter of 10/08/21   Chest XR,  PA & LAT    Narrative    EXAM: XR CHEST 2 VW  LOCATION: [facility]  DATE/TIME: 10/8/2021 9:52 PM    INDICATION: SOB.  COMPARISON: None.      Impression    IMPRESSION: Cardiac enlargement. Left-sided pacemaker. No pulmonary " infiltrates.   CT Abdomen Pelvis w Contrast    Narrative    EXAM: CT ABDOMEN PELVIS W CONTRAST  LOCATION: [facility]  DATE/TIME: 10/8/2021 9:40 PM    INDICATION: Abdominal pain, acute, nonlocalized.  COMPARISON: None.  TECHNIQUE: CT scan of the abdomen and pelvis was performed following injection of IV contrast. Multiplanar reformats were obtained. Dose reduction techniques were used.  CONTRAST: 91 mL Isovue-[id number]    FINDINGS:   LOWER CHEST: Air cysts and/or emphysema at the lung bases noted. Possible incompletely seen left pneumothorax.    HEPATOBILIARY: Diffuse hepatic steatosis. No significant mass. No bile duct dilatation. No calcified gallstones. Cholecystectomy. Low-attenuation subcentimeter liver lesion(s) compatible with benign cysts or other benign lesions. Severe evaluation   follow-up is recommended in a low-risk patient.    PANCREAS: No significant mass, duct dilatation, or inflammatory change.    SPLEEN: Normal size.    ADRENAL GLANDS: No significant nodules.    KIDNEYS/BLADDER: No significant mass, stones, or hydronephrosis. There are simple or benign cysts. No follow up is needed.    BOWEL: Diverticulosis of the colon. No acute inflammatory change. No obstruction.     LYMPH NODES: No lymphadenopathy.    VASCULATURE: There are moderate atherosclerotic changes of the visualized aorta and its branches. There is no evidence of aortic dissection or aneurysm. Mild dilatation of the celiac axis. This could be poststenotic.    PELVIC ORGANS: No pelvic masses. Fat-containing left inguinal hernia.    MUSCULOSKELETAL: No destructive bony lesions. Spine degenerative changes. No definite fracture.      Impression    IMPRESSION:   1.  Minimally seen lucency in the anterior left lung base, question pneumothorax. Chest x-ray or chest CT may be useful for further evaluation.  2.  Hepatic steatosis.  3.  Extensive diverticulosis without diverticulitis.  4.  Mild dilatation of the celiac axis which  could be poststenotic. There are atherosclerotic changes at the origin of the celiac axis and SMA.    The finding of a possible pneumothorax on the left was called to Dr. Wheeler on 10/8/2021 at 10:00 PM.   Chest CT w/o contrast    Narrative    EXAM: CT CHEST W/O CONTRAST  LOCATION: [facility]  DATE/TIME: 10/8/2021 10:05 PM    INDICATION: Shortness of breath. Abdominal pain. Possible pneumothorax on abdomen CT.  COMPARISON: Abdomen CT 10/8/2021. Chest CT 2/23/2012.  TECHNIQUE: CT chest without IV contrast. Multiplanar reformats were obtained. Dose reduction techniques were used.  CONTRAST: None.    FINDINGS:   LUNGS AND PLEURA: There is bullous disease at the periphery of the lungs. Large bulla at the left lung base anteriorly corresponds to the lucency on abdomen CT. Overall bullous disease is not significantly changed from the prior exam. Stable 6 mm nodule   in the left lower lobe laterally. No pneumothorax or pleural effusion.    MEDIASTINUM/AXILLAE: Left subclavian cardiac device in place. The heart is enlarged. Very small pericardial effusion. No lymph node enlargement.    CORONARY ARTERY CALCIFICATION: Moderate.    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 Small 1.5 cm indeterminate right renal lesion.    MUSCULOSKELETAL: Degenerative disease in the spine.      Impression    IMPRESSION:   1.  No pneumothorax.  2.  Bullous disease throughout the lungs, similar to the previous exam.  3.  Cardiomegaly and very small pericardial effusion.            Consultations:  Consultation during this admission received from gastroenterology and vascular surgery.       Recent Lab Results:  Recent Labs   Lab 10/10/21  0613 10/09/21  0625 10/08/21  2058   WBC 6.6 6.1 5.5   HGB 11.2* 11.1* 12.5*   HCT 37.1* 36.5* 39.1*   * 99 97   * 165 174          Lab Results   Component Value Date     10/10/2021     10/09/2021    NA  137 10/08/2021     11/29/2019     11/28/2019     09/29/2019    Lab Results   Component Value Date    CHLORIDE 105 10/10/2021    CHLORIDE 106 10/09/2021    CHLORIDE 104 10/08/2021    CHLORIDE 105 11/29/2019    CHLORIDE 104 11/28/2019    CHLORIDE 104 09/29/2019    Lab Results   Component Value Date    BUN 16 10/10/2021    BUN 17 10/09/2021    BUN 18 10/08/2021    BUN 22 11/29/2019    BUN 20 11/28/2019    BUN 20 09/29/2019      Lab Results   Component Value Date    POTASSIUM 4.0 10/10/2021    POTASSIUM 4.4 10/09/2021    POTASSIUM 4.1 10/08/2021    POTASSIUM 4.5 11/29/2019    POTASSIUM 4.5 11/28/2019    POTASSIUM 3.9 09/29/2019    Lab Results   Component Value Date    CO2 28 10/10/2021    CO2 30 10/09/2021    CO2 27 10/08/2021    CO2 28 11/29/2019    CO2 28 11/28/2019    CO2 28 09/29/2019    Lab Results   Component Value Date    CR 0.98 10/10/2021    CR 0.95 10/09/2021    CR 0.85 10/08/2021    CR 0.88 11/29/2019    CR 0.87 11/28/2019    CR 0.80 09/29/2019             Pending Results:    Unresulted Labs Ordered in the Past 30 Days of this Admission     No orders found from 9/8/2021 to 10/9/2021.           Discharge Instructions and Follow-Up:   Discharge Procedure Orders   Home Care PT Referral for Hospital Discharge   Referral Priority: Routine Referral Type: Home Health Therapies & Aides   Number of Visits Requested: 1     Reason for your hospital stay   Order Comments: You were admitted for abdominal pain which appears to be due to gastritis.  You were started on an acid blocking medication called Protonix twice daily.  You also will get an evaluation from vascular surgery on October 21 regarding a possibly stenotic/narrowed celiac trunk (blood vessel that feeds part of your gut).     Follow-up and recommended labs and tests    Order Comments: Follow-up with vascular surgery on October 21 as discussed.    Follow-up with your primary care doctor routinely in the next couple of weeks to arrange for  refills of Protonix and for clinical follow-up.     Activity   Order Comments: Your activity upon discharge: activity as tolerated     Order Specific Question Answer Comments   Is discharge order? Yes      MD face to face encounter   Order Comments: Documentation of Face to Face and Certification for Home Health Services    I certify that patient: Jake Duane Pfleiger is under my care and that I, or a nurse practitioner or physician's assistant working with me, had a face-to-face encounter that meets the physician face-to-face encounter requirements with this patient on: 10/12/2021.    This encounter with the patient was in whole, or in part, for the following medical condition, which is the primary reason for home health care: generalized weakness.    I certify that, based on my findings, the following services are medically necessary home health services: Physical Therapy.    My clinical findings support the need for the above services because: Physical Therapy Services are needed to assess and treat the following functional impairments: generalized weakness.    Further, I certify that my clinical findings support that this patient is homebound (i.e. absences from home require considerable and taxing effort and are for medical reasons or [employer] services or infrequently or of short duration when for other reasons) because: Leaving home is medically contraindicated for the following reason(s): fall risk, generalized weakness..    Based on the above findings. I certify that this patient is confined to the home and needs intermittent skilled nursing care, physical therapy and/or speech therapy.  The patient is under my care, and I have initiated the establishment of the plan of care.  This patient will be followed by a physician who will periodically review the plan of care.  Physician/Provider to provide follow up care: Herve Sweet    [facility] physician (the Medicare certified [facility]ANC[facility] provider):  Allen Mart MD  Physician Signature: See electronic signature associated with these discharge orders.  Date: 10/12/2021     Diet   Order Comments: Follow this diet upon discharge: Diet as tolerated.  I recommend 5 or 6 small meals per day rather than 3 large ones for the time being.  Avoid NSAIDs such as Motrin/ibuprofen/Naproxen etc.     Order Specific Question Answer Comments   Is discharge order? Yes        Total time spent in face to face contact with the patient and coordinating discharge was: 40 Minutes.

## 2021-10-11 NOTE — PLAN OF CARE
Bedside Nurse: [phone]    Pt A&O w/ confusion, VSS, c/o pain to abd PRN oxycodone given. Abd soft and tender. BS active. +flatus. Multiple incontinence episode this shift. Incontinence pads offered. Pt also had and episode of bloody nose minimal blood noted and stopped. Will continue to monitor.

## 2021-10-11 NOTE — PROGRESS NOTES
[facility]  Hospitalist Progress Note  Allen Mart MD 10/11/2021      Reason for Stay (Diagnosis): abdominal pain    Jake Duane Pfleiger is a 89 year old male with PMH including A. fib not on anticoagulation, tachybradycardia syndrome s/p PPM, HTN, hypothyroidism, MDD, anxiety, GERD, and diverticulosis  admitted on 10/8/2021 with abdominal pain felt to be due to an upper GI source.  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  Trial of PPI BID, also we did coordinate w/ vascular surgery to arrange short term follow up for re-evaluation.       At this point I suspect his symptoms were primarily due to gastritis but we cannot rule a component of chronic postprandial ischemia.  Am holding his Lasix as he has generally been eating less and I think a low volume status poses some inherent risk for him.  He will be treated conservatively with a trial of a PPI and have reevaluation with vascular surgery in approximately 10 days as an outpatient.  Given the presence of gastritis I will hold off on aspirin for now but that could be reevaluated as well in clinic.    He remains admitted today due to shortness of breath with borderline hypoxia.  Question mild fluid overload while holding his lasix here.  Giving 20 mg IV x 1, checking CXR.     Problem List/Assessment and Plan:   Abdominal pain due to gastritis, rule out intestinal angina as well: has been endorsing similar pain for the past few weeks to months although not as severe.  The pain is worse after eating.   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  There are atherosclerotic changes at the origin of the celiac axis and SMA.  If he has been having pain like this for the past few weeks to months it may represent chronic mesenteric ischemia.  Doubt acute ischemia based on the  CT scan showing normal bowel along with normal labs including lactic acid.    -Consulted [facility] for further work-up  -EGD showed gastritis and multiple gastric polyps.  -I have contacted vascular surgery: they will see him in clinic for re-evaluation on 10/21/21.  -will hold ASA for now given presence of gastritis.  Defer to vascular surgery re: restarting if they feel intestinal angina is playing a role.    --might have a component of constipation now from oxycodone received earlier in this stay.  Start colace x 7 days, reassess.  Hold for loose stools.  No indication for narcotics moving forward at this time.    Shortness of breath: check cxr, give lasix 20 mg IV x 1.  Restart PO lasix.  Question acute on chronic diastolic chf.  Also will add-on bnp.     A. fib, history of tachybradycardia syndrome: s/p pacemaker.  Not on anticoagulation or aspirin anymore chronically.  He is on atenolol 25 mg daily.  EKG here shows controlled A. Fib.  -Continue his atenolol     Chronic diastolic CHF, HTN: TTE from 2016 shows EF 65 to 70%, mild aortic stenosis, mild aortic regurgitation.  He is chronically on atenolol 25 mg daily, lisinopril 10 mg daily, and Lasix 20 mg daily.  He has trace-1+ bilateral lower extremity edema.  No respiratory symptoms.  BNP elevated at 3,322.  -Continue his atenolol and lisinopril     Hypothyroidism: Resume levothyroxine.     MDD, anxiety: Resume fluoxetine.     Generalized weakness: PT consult today.     DVT Prophylaxis: Low Risk/Ambulatory with no VTE prophylaxis indicated  Code Status: DNR / DNI -discussed with the patient on admission  Discharge Dispo: Home  Estimated Disch Date / # of Days until Disch: ?tomorrow.  Will depend upon vascular surgery input.          Interval History (Subjective):      He remains admitted today due to shortness of breath with borderline hypoxia.  Question mild fluid overload while holding his lasix here.  Giving 20 mg IV x 1, checking CXR.  Consulting PT for  "generalized weakness as well.    Called carlos for an update.                  Physical Exam:      Last Vital Signs:  BP (!) 153/66   Pulse 73   Temp 97.6  F (36.4  C) (Temporal)   Resp 16   Ht 1.778 m (5' 10\")   Wt 83.6 kg (184 lb 6.4 oz)   SpO2 91%   BMI 26.46 kg/m        Intake/Output Summary (Last 24 hours) at 10/10/2021 1351  Last data filed at 10/10/2021 1000  Gross per 24 hour   Intake 425 ml   Output 450 ml   Net -25 ml       General: Alert, awake, no acute distress.  HEENT: NC/AT, eyes anicteric, external occular movements intact, face symmetric.    Cardiac: RRR, S1, S2.  No murmurs appreciated.  Pulmonary: Normal chest rise, normal work of breathing.  Lungs CTA BL  Abdomen: ttp in epigastrium.  soft, non-distended.  Bowel Sounds Present.  No guarding.  Extremities: no deformities.  Warm, well perfused.  Skin: no rashes or lesions noted.  Warm and Dry.  Neuro: No focal deficits noted.  Speech clear.  Coordination and strength grossly normal.  Psych: Appropriate affect.         Medications:      All current medications were reviewed with changes reflected in problem list.         Data:      All new lab and imaging data was reviewed.   Labs:  Recent Labs   Lab 10/10/21  0613      POTASSIUM 4.0   CHLORIDE 105   CO2 28   ANIONGAP 4   GLC 70   BUN 16   CR 0.98   GFRESTIMATED 68   MANAS 8.7     Recent Labs   Lab 10/10/21  0613   WBC 6.6   HGB 11.2*   HCT 37.1*   *   *      Imaging:   Recent Results (from the past 48 hour(s))   CT Abdomen Pelvis w Contrast    Narrative    EXAM: CT ABDOMEN PELVIS W CONTRAST  LOCATION: [facility]  DATE/TIME: 10/8/2021 9:40 PM    INDICATION: Abdominal pain, acute, nonlocalized.  COMPARISON: None.  TECHNIQUE: CT scan of the abdomen and pelvis was performed following injection of IV contrast. Multiplanar reformats were obtained. Dose reduction techniques were used.  CONTRAST: 91 mL Isovue-370    FINDINGS:   LOWER CHEST: Air cysts and/or " emphysema at the lung bases noted. Possible incompletely seen left pneumothorax.    HEPATOBILIARY: Diffuse hepatic steatosis. No significant mass. No bile duct dilatation. No calcified gallstones. Cholecystectomy. Low-attenuation subcentimeter liver lesion(s) compatible with benign cysts or other benign lesions. Severe evaluation   follow-up is recommended in a low-risk patient.    PANCREAS: No significant mass, duct dilatation, or inflammatory change.    SPLEEN: Normal size.    ADRENAL GLANDS: No significant nodules.    KIDNEYS/BLADDER: No significant mass, stones, or hydronephrosis. There are simple or benign cysts. No follow up is needed.    BOWEL: Diverticulosis of the colon. No acute inflammatory change. No obstruction.     LYMPH NODES: No lymphadenopathy.    VASCULATURE: There are moderate atherosclerotic changes of the visualized aorta and its branches. There is no evidence of aortic dissection or aneurysm. Mild dilatation of the celiac axis. This could be poststenotic.    PELVIC ORGANS: No pelvic masses. Fat-containing left inguinal hernia.    MUSCULOSKELETAL: No destructive bony lesions. Spine degenerative changes. No definite fracture.      Impression    IMPRESSION:   1.  Minimally seen lucency in the anterior left lung base, question pneumothorax. Chest x-ray or chest CT may be useful for further evaluation.  2.  Hepatic steatosis.  3.  Extensive diverticulosis without diverticulitis.  4.  Mild dilatation of the celiac axis which could be poststenotic. There are atherosclerotic changes at the origin of the celiac axis and SMA.    The finding of a possible pneumothorax on the left was called to Dr. Wheeler on 10/8/2021 at 10:00 PM.   Chest XR,  PA & LAT    Narrative    EXAM: XR CHEST 2 VW  LOCATION: [facility]  DATE/TIME: 10/8/2021 9:52 PM    INDICATION: SOB.  COMPARISON: None.      Impression    IMPRESSION: Cardiac enlargement. Left-sided pacemaker. No pulmonary infiltrates.   Chest  CT w/o contrast    Narrative    EXAM: CT CHEST W/O CONTRAST  LOCATION: [facility]  DATE/TIME: 10/8/2021 10:05 PM    INDICATION: Shortness of breath. Abdominal pain. Possible pneumothorax on abdomen CT.  COMPARISON: Abdomen CT 10/8/2021. Chest CT 2/23/2012.  TECHNIQUE: CT chest without IV contrast. Multiplanar reformats were obtained. Dose reduction techniques were used.  CONTRAST: None.    FINDINGS:   LUNGS AND PLEURA: There is bullous disease at the periphery of the lungs. Large bulla at the left lung base anteriorly corresponds to the lucency on abdomen CT. Overall bullous disease is not significantly changed from the prior exam. Stable 6 mm nodule   in the left lower lobe laterally. No pneumothorax or pleural effusion.    MEDIASTINUM/AXILLAE: Left subclavian cardiac device in place. The heart is enlarged. Very small pericardial effusion. No lymph node enlargement.    CORONARY ARTERY CALCIFICATION: Moderate.    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 Small 1.5 cm indeterminate right renal lesion.    MUSCULOSKELETAL: Degenerative disease in the spine.      Impression    IMPRESSION:   1.  No pneumothorax.  2.  Bullous disease throughout the lungs, similar to the previous exam.  3.  Cardiomegaly and very small pericardial effusion.             Allen Mart MD , MD.

## 2021-10-11 NOTE — PROGRESS NOTES
"GASTROENTEROLOGY PROGRESS NOTE        SUBJECTIVE:  Tolerating diet. No fever or chills. Abdominal discomfort improving.      OBJECTIVE:    BP (!) 170/80   Pulse 73   Temp 97.6  F (36.4  C) (Temporal)   Resp 16   Ht 1.778 m (5' 10\")   Wt 83.6 kg (184 lb 6.4 oz)   SpO2 91%   BMI 26.46 kg/m    Temp (24hrs), Av.2  F (36.2  C), Min:96.8  F (36  C), Max:97.6  F (36.4  C)    Patient Vitals for the past 72 hrs:   Weight   10/09/21 0100 83.6 kg (184 lb 6.4 oz)       Intake/Output Summary (Last 24 hours) at 10/11/2021 0936  Last data filed at 10/11/2021 0613  Gross per 24 hour   Intake 710 ml   Output 700 ml   Net 10 ml        PHYSICAL EXAM     Constitutional: NAD  Abdomen: soft, non tender, non distended.          Additional Comments:  ROS, FH, SH: See initial GI consult for details.     I have reviewed the patient's new clinical lab results:     Recent Labs   Lab Test 10/10/21  0613 10/09/21  0625 10/08/21  2058 09/29/19  0020 19  1910 19  1417 19  0631 19  2219 19  2219   WBC 6.6 6.1 5.5   < >  --    < >  --   --   --    HGB 11.2* 11.1* 12.5*   < >  --    < > 8.8*   < > 9.3*   * 99 97   < >  --    < >  --   --   --    * 165 174   < >  --    < >  --   --   --    INR  --   --   --   --  1.10  --  1.49*  --  1.80*    < > = values in this interval not displayed.     Recent Labs   Lab Test 10/10/21  0613 10/09/21  0625 10/08/21  2058   POTASSIUM 4.0 4.4 4.1   CHLORIDE 105 106 104   CO2 28 30 27   BUN 16 17 18   ANIONGAP 4 3 6     Recent Labs   Lab Test 10/09/21  0625 10/08/21  2307 10/08/21  2058 05/15/19  0339 05/15/19  0131   ALBUMIN 3.2*  --  3.5  --  3.3*   BILITOTAL 0.5  --  0.6  --  0.3   ALT 36  --  48  --  14   AST 25  --  38  --  17   PROTEIN  --  Negative  --  Negative  --    LIPASE  --   --  87  --   --         ASSESSMENT/ PLAN  Jake Duane Pfleiger is a 89 year old male with history of atrial fibrillation (not on anticoagulation), Diastolic heart failure, HTN, " GERD, and prior diverticular bleed, who presented with epigastric abdominal pain.     1. Abdominal pain : Likely secondary to gastritis seen on EGD 10/9 (awaiting gastric and duodenal biopsies). LFTs and lipase are normal.  CT abdomen / pelvis with contrast notable for atherosclerotic changes at the origin of the celiac axis and SMA. There was also mild dilatation of the celiac axis which the radiologist noted could be poststenotic. Vascular surgery was contacted for an opinion. The patient's symptoms could represent chronic mesenteric ischemia but doubt acute ischemia based on imaging and normal lab work ( normal lactic acid).   -- Continue PPI BID  -- Follow up with vascular surgery as scheduled  -- ASA on hold      Discussed with Dr. Burroughs.  Jeannette Hightower PA-C  [facility] ( [facility])

## 2021-10-12 ENCOUNTER — TELEPHONE (OUTPATIENT)
Dept: OTHER | Facility: CLINIC | Age: 86
End: 2021-10-12

## 2021-10-12 ENCOUNTER — APPOINTMENT (OUTPATIENT)
Dept: PHYSICAL THERAPY | Facility: CLINIC | Age: 86
DRG: 394 | End: 2021-10-12
Attending: INTERNAL MEDICINE
Payer: MEDICARE

## 2021-10-12 VITALS
TEMPERATURE: 97.2 F | DIASTOLIC BLOOD PRESSURE: 61 MMHG | RESPIRATION RATE: 24 BRPM | SYSTOLIC BLOOD PRESSURE: 139 MMHG | BODY MASS INDEX: 26.29 KG/M2 | OXYGEN SATURATION: 97 % | HEART RATE: 66 BPM | WEIGHT: 183.64 LBS | HEIGHT: 70 IN

## 2021-10-12 PROCEDURE — 99239 HOSP IP/OBS DSCHRG MGMT >30: CPT | Performed by: INTERNAL MEDICINE

## 2021-10-12 PROCEDURE — 250N000013 HC RX MED GY IP 250 OP 250 PS 637: Performed by: INTERNAL MEDICINE

## 2021-10-12 PROCEDURE — 97161 PT EVAL LOW COMPLEX 20 MIN: CPT | Mod: GP

## 2021-10-12 RX ORDER — FUROSEMIDE 20 MG
20 TABLET ORAL DAILY
Start: 2021-10-12

## 2021-10-12 RX ORDER — BISACODYL 10 MG
10 SUPPOSITORY, RECTAL RECTAL ONCE
Status: DISCONTINUED | OUTPATIENT
Start: 2021-10-12 | End: 2021-10-12 | Stop reason: HOSPADM

## 2021-10-12 RX ORDER — POLYETHYLENE GLYCOL 3350 17 G/17G
17 POWDER, FOR SOLUTION ORAL DAILY
Status: DISCONTINUED | OUTPATIENT
Start: 2021-10-12 | End: 2021-10-12 | Stop reason: HOSPADM

## 2021-10-12 RX ADMIN — LEVOTHYROXINE SODIUM 100 MCG: 0.1 TABLET ORAL at 08:54

## 2021-10-12 RX ADMIN — FUROSEMIDE 20 MG: 20 TABLET ORAL at 08:54

## 2021-10-12 RX ADMIN — DOCUSATE SODIUM 100 MG: 100 CAPSULE, LIQUID FILLED ORAL at 08:54

## 2021-10-12 RX ADMIN — ATENOLOL 25 MG: 25 TABLET ORAL at 08:54

## 2021-10-12 RX ADMIN — PAROXETINE HYDROCHLORIDE 20 MG: 20 TABLET, FILM COATED ORAL at 08:54

## 2021-10-12 RX ADMIN — LATANOPROST 1 DROP: 50 SOLUTION OPHTHALMIC at 08:57

## 2021-10-12 RX ADMIN — PANTOPRAZOLE SODIUM 40 MG: 40 TABLET, DELAYED RELEASE ORAL at 06:44

## 2021-10-12 RX ADMIN — LISINOPRIL 10 MG: 10 TABLET ORAL at 08:54

## 2021-10-12 RX ADMIN — POLYETHYLENE GLYCOL 3350 17 G: 17 POWDER, FOR SOLUTION ORAL at 08:54

## 2021-10-12 ASSESSMENT — ACTIVITIES OF DAILY LIVING (ADL)
ADLS_ACUITY_SCORE: 14
ADLS_ACUITY_SCORE: 13

## 2021-10-12 NOTE — PROGRESS NOTES
Care Management Discharge Note    Discharge Date: 10/12/2021       Discharge Disposition: Home, Home Care    Discharge Services: None    Discharge DME: None    Discharge Transportation: family or friend will provide    Private pay costs discussed: Not applicable    Education Provided on the Discharge Plan:  Yes  Persons Notified of Discharge Plans: Patient, provider  Patient/Family in Agreement with the Plan: yes    Handoff Referral Completed: No    Additional Information:  CM following for discharge planning. Home Care PT recommended.  Met with patient at bedside. Discussed home care PT recommendation, coverage through Medicare, and choice of agency. Patient is agreeable to home care and has no preference of agency.  Paged provider requesting home care PT orders for discharge.   Patient states will have support from girlfriend in building and from son and daughter in law upon discharge.   Referral for home care sent to [facility]. P: [phone] Fax [phone]        Nya Curiel, MEME Curiel RN Case Manager  Inpatient Care Coordination  [facility]   [phone]

## 2021-10-12 NOTE — PLAN OF CARE
Pt. Is A&O time 4 with mild forgetfulness, for mobility use walker, gain belt, and one assist, pt. Incontinent of urine and wears brief, has pain 5-6 in his stomach, got tylenol for it. Ate 50% of his dinner. Pt. Has bruises on his legs and forearms, and skin tear on his left arm covered with mapolex dressing, pt. Is on RA 96-97% oxygen voiding good, tomorrow will go back to L[facility].

## 2021-10-12 NOTE — PROGRESS NOTES
10/12/21 1200   Quick Adds   Type of Visit Initial PT Evaluation   Living Environment   Living Environment Comments [facility], walks to community meals, does not have any services currently.    Self-Care   Usual Activity Tolerance moderate   Current Activity Tolerance moderate   Equipment Currently Used at Home walker, rolling  (4WW)   Disability/Function   Walking or Climbing Stairs Difficulty yes   Walking or Climbing Stairs ambulation difficulty, requires equipment   Mobility Management 4WW   Fall history within last six months no   General Information   Onset of Illness/Injury or Date of Surgery 10/08/21   Referring Physician Allen Mart MD   Patient/Family Therapy Goals Statement (PT) Return home   Pertinent History of Current Problem (include personal factors and/or comorbidities that impact the POC) 89 year old male with PMH including A. fib not on anticoagulation, tachybradycardia syndrome s/p PPM, HTN, hypothyroidism, MDD, anxiety, GERD, and diverticulosis  admitted on 10/8/2021 with abdominal pain felt to be due to an upper GI source.  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  Trial of PPI BID, also we did coordinate w/ vascular surgery to arrange short term follow up for re-evaluation.     Existing Precautions/Restrictions fall   Cognition   Orientation Status (Cognition) oriented x 3   Affect/Mental Status (Cognition) WNL   Pain Assessment   Patient Currently in Pain No   Posture    Posture Forward head position;Protracted shoulders;Kyphosis   Transfers   Transfer Safety Comments Sit>stand with SBA   Gait/Stairs (Locomotion)   Comment (Gait/Stairs) Ambulates >300' with 4WW and SBA/supervision. Decreased gait speed, x3 standing rest breaks. Pt reports feeling weak compared to baseline.    Balance   Balance Comments Requires B UE support on 4WW for safe dynamic mobility (baseline).    Clinical  Impression   Criteria for Skilled Therapeutic Intervention evaluation only;other (see comments)  (defer treatment to HHPT)   PT Diagnosis (PT) Generalized weakness   Influenced by the following impairments Decreased strength, decreased activity tolerance   Functional limitations due to impairments Decreased IND with ambulation and activity tolerance   Clinical Presentation Stable/Uncomplicated   Clinical Presentation Rationale Medically improving.    Clinical Decision Making (Complexity) low complexity   Therapy Frequency (PT) One time eval and treatment only   Predicted Duration of Therapy Intervention (days/wks) 1 day   Planned Therapy Interventions (PT) balance training;strengthening;progressive activity/exercise  (defer to HHPT)   Risk & Benefits of therapy have been explained evaluation/treatment results reviewed;care plan/treatment goals reviewed;participants included;patient   Clinical Impression Comments Educated pt on rec/benefits of HHPT; pt reports he is not sure he is interested at this time.    PT Discharge Planning    PT Discharge Recommendation (DC Rec) home;home with home care physical therapy   PT Rationale for DC Rec Moving fair, appropriate to DC home when medically ready. Pt would benefit from HHPT to address endurance/strength deficits and for fall prevention. Pt reports he is not sure he is agreeable at this time.    PT Brief overview of current status  Ax1, walker.    Total Evaluation Time   Total Evaluation Time (Minutes) 18

## 2021-10-12 NOTE — PLAN OF CARE
A&Ox4, intermittently confused to situation, forgetful. Ax1 GB/W. C/o mild epigastric pain, but able to sleep soundly despite. Mepilex to L arm over skin tear. Plan to discharge to AL today and follow up w/ vascular outpt.

## 2021-10-12 NOTE — PLAN OF CARE
Discussed discharge instructions with patent and son Gallito. Patient discharged back to his [facility].

## 2021-10-12 NOTE — DISCHARGE INSTRUCTIONS
Your home care referral was sent to [facility]  If you haven't heard from them within the next 24-48 hours,  Please call them at [phone]

## 2021-10-12 NOTE — TELEPHONE ENCOUNTER
ARCHANA to schedule in person OV with Dr. Baeza in [place].    Time Held: 10/21/2021  2:15pm    Appt Note:  new pt (f/u from inpt consult), hx of moderate stenosis just beyond the origin of the celiac axis and moderate stenosis of SMA.     Jenifer JIMENEZ

## 2021-10-12 NOTE — TELEPHONE ENCOUNTER
Pt consulted on by Dr. Baeza while inpt,   Per Dr. Baeza, pt needs in person OV follow up in [place] on on 10/21/21.   Routing to  to coordinate. No imaging.      Appt note: new pt (f/u from inpt consult), hx of moderate stenosis just beyond the origin of the celiac axis and moderate stenosis of SMA.     ARDEN Camarillo, RN  [facility]  Office:  [phone] Fax: [phone]

## 2021-10-13 LAB
PATH REPORT.COMMENTS IMP SPEC: NORMAL
PATH REPORT.COMMENTS IMP SPEC: NORMAL
PATH REPORT.FINAL DX SPEC: NORMAL
PATH REPORT.GROSS SPEC: NORMAL
PATH REPORT.MICROSCOPIC SPEC OTHER STN: NORMAL
PATH REPORT.MICROSCOPIC SPEC OTHER STN: NORMAL
PATH REPORT.RELEVANT HX SPEC: NORMAL
PHOTO IMAGE: NORMAL

## 2021-10-13 PROCEDURE — 88342 IMHCHEM/IMCYTCHM 1ST ANTB: CPT | Mod: 26 | Performed by: PATHOLOGY

## 2021-10-13 PROCEDURE — 88305 TISSUE EXAM BY PATHOLOGIST: CPT | Mod: 26 | Performed by: PATHOLOGY

## 2021-10-14 DIAGNOSIS — Z95.0 CARDIAC PACEMAKER IN SITU: Primary | ICD-10-CM

## 2021-10-19 ENCOUNTER — ANCILLARY PROCEDURE (OUTPATIENT)
Dept: CARDIOLOGY | Facility: CLINIC | Age: 86
End: 2021-10-19
Attending: INTERNAL MEDICINE
Payer: MEDICARE

## 2021-10-19 DIAGNOSIS — Z95.0 CARDIAC PACEMAKER IN SITU: ICD-10-CM

## 2021-10-19 PROCEDURE — 93296 REM INTERROG EVL PM/IDS: CPT | Performed by: INTERNAL MEDICINE

## 2021-10-19 PROCEDURE — 93294 REM INTERROG EVL PM/LDLS PM: CPT | Performed by: INTERNAL MEDICINE

## 2021-10-21 ENCOUNTER — OFFICE VISIT (OUTPATIENT)
Dept: SURGERY | Facility: CLINIC | Age: 86
End: 2021-10-21
Payer: MEDICARE

## 2021-10-21 VITALS
HEIGHT: 70 IN | SYSTOLIC BLOOD PRESSURE: 118 MMHG | DIASTOLIC BLOOD PRESSURE: 86 MMHG | HEART RATE: 68 BPM | OXYGEN SATURATION: 96 % | WEIGHT: 183 LBS | BODY MASS INDEX: 26.2 KG/M2 | RESPIRATION RATE: 16 BRPM

## 2021-10-21 DIAGNOSIS — I77.4 CELIAC ARTERY STENOSIS (H): Primary | ICD-10-CM

## 2021-10-21 PROCEDURE — 99203 OFFICE O/P NEW LOW 30 MIN: CPT | Performed by: SURGERY

## 2021-10-21 ASSESSMENT — MIFFLIN-ST. JEOR: SCORE: 1496.33

## 2021-10-21 NOTE — PROGRESS NOTES
I had the pleasure of seeing Mr. Ordoñez in the vascular surgery clinic today.  He is a 89-year-old male who was admitted recently to the hospital with epigastric pain.  He was worked up with CT scan of the abdomen and an upper endoscopy.    Patient tells me that the pain is present prior to eating.  Pain is not exacerbated by eating.  He does not express any food fear or weight loss.    Upper endoscopy showed gastritis and following that he was placed on Protonix and his symptoms are much improved.    During the work-up he had CT scan of the abdomen and pelvis which showed 50% stenosis at the origin of the celiac axis.  There was no stenosis in the superior mesenteric artery or the inferior mesenteric artery.  I went back and looked at some old imaging and in February 2012 he had a CT scan of the chest to rule out pulmonary embolism.  The cuts go down to the origin of the superior mesenteric artery.  At that time he had about 50% stenosis of the origin of the celiac axis and no stenosis of the superior mesenteric artery.    Diagnosis: 1.  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  2. Abdominal pain unrelated to stenosis of celiac axis.     Plan: I do not believe her symptoms are due to chronic mesenteric ischemia and I would advise against any intervention.

## 2021-10-23 LAB
MDC_IDC_LEAD_IMPLANT_DT: NORMAL
MDC_IDC_LEAD_LOCATION: NORMAL
MDC_IDC_LEAD_MFG: NORMAL
MDC_IDC_LEAD_MODEL: NORMAL
MDC_IDC_LEAD_POLARITY_TYPE: NORMAL
MDC_IDC_LEAD_SERIAL: NORMAL
MDC_IDC_MSMT_BATTERY_DTM: NORMAL
MDC_IDC_MSMT_BATTERY_REMAINING_LONGEVITY: 90 MO
MDC_IDC_MSMT_BATTERY_REMAINING_PERCENTAGE: 73 %
MDC_IDC_MSMT_BATTERY_RRT_TRIGGER: NORMAL
MDC_IDC_MSMT_BATTERY_STATUS: NORMAL
MDC_IDC_MSMT_BATTERY_VOLTAGE: 2.92 V
MDC_IDC_MSMT_LEADCHNL_RV_IMPEDANCE_VALUE: 400 OHM
MDC_IDC_MSMT_LEADCHNL_RV_LEAD_CHANNEL_STATUS: NORMAL
MDC_IDC_MSMT_LEADCHNL_RV_PACING_THRESHOLD_AMPLITUDE: 0.75 V
MDC_IDC_MSMT_LEADCHNL_RV_PACING_THRESHOLD_PULSEWIDTH: 1 MS
MDC_IDC_MSMT_LEADCHNL_RV_SENSING_INTR_AMPL: 12 MV
MDC_IDC_PG_IMPLANT_DTM: NORMAL
MDC_IDC_PG_MFG: NORMAL
MDC_IDC_PG_MODEL: NORMAL
MDC_IDC_PG_SERIAL: NORMAL
MDC_IDC_PG_TYPE: NORMAL
MDC_IDC_SESS_CLINIC_NAME: NORMAL
MDC_IDC_SESS_DTM: NORMAL
MDC_IDC_SESS_REPROGRAMMED: NO
MDC_IDC_SESS_TYPE: NORMAL
MDC_IDC_SET_BRADY_LOWRATE: 60 {BEATS}/MIN
MDC_IDC_SET_BRADY_MAX_SENSOR_RATE: 120 {BEATS}/MIN
MDC_IDC_SET_BRADY_MODE: NORMAL
MDC_IDC_SET_LEADCHNL_RV_PACING_AMPLITUDE: 2 V
MDC_IDC_SET_LEADCHNL_RV_PACING_ANODE_ELECTRODE_1: NORMAL
MDC_IDC_SET_LEADCHNL_RV_PACING_ANODE_LOCATION_1: NORMAL
MDC_IDC_SET_LEADCHNL_RV_PACING_CAPTURE_MODE: NORMAL
MDC_IDC_SET_LEADCHNL_RV_PACING_CATHODE_ELECTRODE_1: NORMAL
MDC_IDC_SET_LEADCHNL_RV_PACING_CATHODE_LOCATION_1: NORMAL
MDC_IDC_SET_LEADCHNL_RV_PACING_POLARITY: NORMAL
MDC_IDC_SET_LEADCHNL_RV_PACING_PULSEWIDTH: 1 MS
MDC_IDC_SET_LEADCHNL_RV_SENSING_ADAPTATION_MODE: NORMAL
MDC_IDC_SET_LEADCHNL_RV_SENSING_ANODE_ELECTRODE_1: NORMAL
MDC_IDC_SET_LEADCHNL_RV_SENSING_ANODE_LOCATION_1: NORMAL
MDC_IDC_SET_LEADCHNL_RV_SENSING_CATHODE_ELECTRODE_1: NORMAL
MDC_IDC_SET_LEADCHNL_RV_SENSING_CATHODE_LOCATION_1: NORMAL
MDC_IDC_SET_LEADCHNL_RV_SENSING_POLARITY: NORMAL
MDC_IDC_SET_LEADCHNL_RV_SENSING_SENSITIVITY: 2 MV
MDC_IDC_STAT_AT_DTM_END: NORMAL
MDC_IDC_STAT_AT_DTM_START: NORMAL
MDC_IDC_STAT_BRADY_DTM_END: NORMAL
MDC_IDC_STAT_BRADY_DTM_START: NORMAL
MDC_IDC_STAT_BRADY_RV_PERCENT_PACED: 34 %
MDC_IDC_STAT_CRT_DTM_END: NORMAL
MDC_IDC_STAT_CRT_DTM_START: NORMAL
MDC_IDC_STAT_HEART_RATE_DTM_END: NORMAL
MDC_IDC_STAT_HEART_RATE_DTM_START: NORMAL
MDC_IDC_STAT_HEART_RATE_VENTRICULAR_MAX: 240 {BEATS}/MIN
MDC_IDC_STAT_HEART_RATE_VENTRICULAR_MEAN: 75 {BEATS}/MIN
MDC_IDC_STAT_HEART_RATE_VENTRICULAR_MIN: 60 {BEATS}/MIN

## 2022-02-03 ENCOUNTER — ANCILLARY PROCEDURE (OUTPATIENT)
Dept: CARDIOLOGY | Facility: CLINIC | Age: 87
End: 2022-02-03
Attending: INTERNAL MEDICINE
Payer: MEDICARE

## 2022-02-03 ENCOUNTER — OFFICE VISIT (OUTPATIENT)
Dept: CARDIOLOGY | Facility: CLINIC | Age: 87
End: 2022-02-03
Payer: MEDICARE

## 2022-02-03 VITALS
HEIGHT: 70 IN | SYSTOLIC BLOOD PRESSURE: 132 MMHG | OXYGEN SATURATION: 96 % | BODY MASS INDEX: 26.48 KG/M2 | DIASTOLIC BLOOD PRESSURE: 80 MMHG | HEART RATE: 65 BPM | WEIGHT: 185 LBS

## 2022-02-03 DIAGNOSIS — I50.32 CHRONIC DIASTOLIC CONGESTIVE HEART FAILURE (H): ICD-10-CM

## 2022-02-03 DIAGNOSIS — Z95.0 CARDIAC PACEMAKER IN SITU: ICD-10-CM

## 2022-02-03 DIAGNOSIS — I48.21 PERMANENT ATRIAL FIBRILLATION (H): Primary | ICD-10-CM

## 2022-02-03 LAB
MDC_IDC_LEAD_IMPLANT_DT: NORMAL
MDC_IDC_LEAD_LOCATION: NORMAL
MDC_IDC_LEAD_MFG: NORMAL
MDC_IDC_LEAD_MODEL: NORMAL
MDC_IDC_LEAD_POLARITY_TYPE: NORMAL
MDC_IDC_LEAD_SERIAL: NORMAL
MDC_IDC_MSMT_CAP_CHARGE_TYPE: NORMAL
MDC_IDC_MSMT_LEADCHNL_RV_IMPEDANCE_VALUE: 400 OHM
MDC_IDC_MSMT_LEADCHNL_RV_PACING_THRESHOLD_AMPLITUDE: 0.75 V
MDC_IDC_MSMT_LEADCHNL_RV_PACING_THRESHOLD_PULSEWIDTH: 1 MS
MDC_IDC_MSMT_LEADCHNL_RV_SENSING_INTR_AMPL: 12 MV
MDC_IDC_PG_IMPLANT_DTM: NORMAL
MDC_IDC_PG_MFG: NORMAL
MDC_IDC_PG_MODEL: NORMAL
MDC_IDC_PG_SERIAL: NORMAL
MDC_IDC_PG_TYPE: NORMAL
MDC_IDC_SESS_CLINIC_NAME: NORMAL
MDC_IDC_SESS_DTM: NORMAL
MDC_IDC_SESS_TYPE: NORMAL
MDC_IDC_SET_ZONE_TYPE: NORMAL
MDC_IDC_SET_ZONE_VENDOR_TYPE: NORMAL

## 2022-02-03 PROCEDURE — 93279 PRGRMG DEV EVAL PM/LDLS PM: CPT | Performed by: INTERNAL MEDICINE

## 2022-02-03 PROCEDURE — 99213 OFFICE O/P EST LOW 20 MIN: CPT | Mod: 25 | Performed by: INTERNAL MEDICINE

## 2022-02-03 ASSESSMENT — MIFFLIN-ST. JEOR: SCORE: 1505.4

## 2022-02-03 NOTE — PROGRESS NOTES
Service Date: 02/03/2022    HISTORY OF PRESENT ILLNESS:    It was my pleasure seeing Mr. Jorge Luis Ordoñez, a delightful 89-year-old gentleman with the following cardiac/medical issues:  A.  Permanent atrial fibrillation with tachycardia/bradycardia.  Single-chamber pacemaker implantation in 2012.  On warfarin until 2020.  Discontinued because of recurrent GI bleeding.  [id number] of 4.  B.  Chronic diastolic CHF.  Hospital admission in 2016 with CHF related to dietary indiscretion.  On chronic furosemide.  C.  Hypertension.  D.  Hypothyroidism.    Mr. Ordoñez was brought to the clinic by his son.  He lives in assisted living.  He has been feeling fairly well.  No recent hospitalization.  He had several hospitalizations in 2019 and 2020 with GI bleeding.  Warfarin was stopped.  He remains off anticoagulation.    He is very modestly physically active.  He has no chest pain, orthopnea, PND or syncope.      PHYSICAL EXAMINATION:    VITAL SIGNS:  Blood pressure initially 161/96.  Second measurement was 132/80 (after 20 minutes), heart rate 65, weight 83.9 kg, BMI 26.  GENERAL:  He is a pleasant elderly gentleman who is hard of hearing, in no distress.  NECK:  Supple without jugular venous distention.  LUNGS:  Completely clear.  CARDIOVASCULAR:  Regular rhythm without gallop, murmur or rub.  ABDOMEN:  Soft.  EXTREMITIES:  No significant edema.      DIAGNOSTIC STUDIES:  - Interrogation of his pacemaker today showed battery life of approximately 7-8 years.  27% ventricular pacing.  Normal device function.  - Most recent ECG from 10/08/2021 showed atrial fibrillation with controlled ventricular rate and intermittent ventricular pacing.      IMPRESSION:    1.  Permanent atrial fibrillation.  Rate is controlled.  His pacemaker is well-functioning.  No longer on anticoagulation.  Not a good candidate for Watchman given advanced age and frailty.  2.  Chronic diastolic CHF.  Well managed on low-dose  furosemide.    RECOMMENDATIONS:  A.  Routine Device Clinic checks.  B.  See ISABELLA in [place] in 18-24 months.    I appreciate the opportunity to be part of his care.      Abby Ratliff MD        cc:  Herve Sweet MD  [facility] [place] [address] [place] [address]        D: 2022   T: 2022   MT: [email or web address]    Name:     GLENN CERRATO  MRN:      [id number]        Account:      [id number]   :      [id number]           Service Date: 2022       Document: [id number]

## 2022-02-03 NOTE — PROGRESS NOTES
HPI and Plan:   See dictation [id number]      Orders Placed This Encounter   Procedures     Follow-Up with Cardiology ISABELLA       No orders of the defined types were placed in this encounter.      There are no discontinued medications.      Encounter Diagnosis   Name Primary?     Paroxysmal atrial fibrillation (H) Yes       CURRENT MEDICATIONS:  Current Outpatient Medications   Medication Sig Dispense Refill     acetaminophen (TYLENOL) 325 MG tablet Take 2 tablets (650 mg) by mouth every 6 hours as needed for mild pain or fever 50 tablet 0     Ascorbic Acid 500 MG CHEW Take 500 mg by mouth 2 times daily        atenolol (TENORMIN) 50 MG tablet Take 25 mg by mouth daily       ferrous sulfate (FEROSUL) 325 (65 Fe) MG tablet Take 325 mg by mouth 2 times daily       fish oil-omega-3 fatty acids (FISH OIL) 1000 MG capsule Take 1 g by mouth 2 times daily        furosemide (LASIX) 20 MG tablet Take 1 tablet (20 mg) by mouth daily       latanoprost (XALATAN) 0.005 % ophthalmic solution Place 1 drop into both eyes daily       levothyroxine (SYNTHROID) 100 MCG tablet Take 100 mcg by mouth daily        lisinopril (PRINIVIL) 10 MG tablet Take 1 tablet (10 mg) by mouth daily       pantoprazole (PROTONIX) 40 MG EC tablet Take 1 tablet (40 mg) by mouth 2 times daily (before meals) 60 tablet 0     paroxetine (PAXIL) 20 MG tablet Take 20 mg by mouth every morning.       polyethylene glycol (MIRALAX/GLYCOLAX) packet Take 1 packet by mouth daily as needed        Wheat Dextrin (BENEFIBER PO) Take by mouth daily as needed        zolpidem (AMBIEN) 5 MG tablet Take 1 tablet (5 mg) by mouth nightly as needed for sleep 15 tablet 0       ALLERGIES   No Known Allergies    PAST MEDICAL HISTORY:  Past Medical History:   Diagnosis Date     Anxiety      Arthritis      Atrial fibrillation (H)      Cardiomyopathy (H)      Depression      Diastolic CHF (H) 04/10/2016     Diverticulosis      Gastro-oesophageal reflux disease      GI bleed 10/02/2018     Lower     History of blood transfusion      Hypertension      Insomnia      Nodule of lower lobe of left lung     CT Chest 1/2019-stable     Pacemaker     [employer] Dual Pacemaker     Permanent atrial fibrillation (H)      Right patella fracture      Tachy-jairo syndrome (H)      Thyroid disease     Hypothyrodism        PAST SURGICAL HISTORY:  Past Surgical History:   Procedure Laterality Date     ARTHROPLASTY HIP  1/21/2014    left hip replacement     ARTHROPLASTY KNEE      right      ARTHROPLASTY KNEE Left 11/27/2019    Procedure: Left total knee arthroplasty;  Surgeon: Ag Armstrong MD;  Location: [facility] OR     ARTHROTOMY KNEE Right 5/16/2019    Procedure: 1.  Excision of bony mass, 3 x 3 x 2 cm, underneath right quadriceps tendon in total knee arthroplasty.  2.  Repair of right quadriceps tendon with suture anchor.   ;  Surgeon: Ag Armstrong MD;  Location:  OR     CHOLECYSTECTOMY       COLONOSCOPY N/A 10/3/2018    Procedure: COLONOSCOPY;  COLONOSCOPY Rm.#227;  Surgeon: Reese Burroughs MD;  Location:  GI     COLONOSCOPY N/A 10/5/2018    Procedure: COLONOSCOPY;  COLONOSCOPY  Rm.#524;  Surgeon: Reese Burroughs MD;  Location:  GI     ESOPHAGOSCOPY, GASTROSCOPY, DUODENOSCOPY (EGD), COMBINED N/A 10/9/2021    Procedure: ESOPHAGOGASTRODUODENOSCOPY, WITH BIOPSies using cold forceps;  Surgeon: Dalia Love MD;  Location:  GI     EYE SURGERY      right eye cataract removal      IMPLANT PACEMAKER  03/2012    dual chamber      ORTHOPEDIC SURGERY      back surgery      REPAIR TENDON QUADRICEPS Right 5/16/2019    Procedure: Repair tendon quadriceps;  Surgeon: Ag Armstrong MD;  Location:  OR       FAMILY HISTORY:  Family History   Problem Relation Age of Onset     Other Cancer Mother      Unknown/Adopted Father        SOCIAL HISTORY:  Social History     Socioeconomic History     Marital status:      Spouse name: None     Number of children: None     Years of education: None      Highest education level: None   Occupational History     None   Tobacco Use     Smoking status: Former Smoker     Packs/day: 0.50     Years: 25.00     Pack years: 12.50     Types: Cigarettes, Pipe, Cigars     Quit date: 1982     Years since quittin.0     Smokeless tobacco: Never Used   Substance and Sexual Activity     Alcohol use: No     Alcohol/week: 0.0 standard drinks     Drug use: No     Sexual activity: None   Other Topics Concern     Parent/sibling w/ CABG, MI or angioplasty before 65F 55M? Not Asked      Service Not Asked     Blood Transfusions Not Asked     Caffeine Concern No     Comment: occ - mostly decaf     Occupational Exposure Not Asked     Hobby Hazards Not Asked     Sleep Concern Not Asked     Stress Concern Not Asked     Weight Concern Not Asked     Special Diet Yes     Comment: smaller portions     Back Care Not Asked     Exercise No     Comment: some walking     Bike Helmet Not Asked     Seat Belt Not Asked     Self-Exams Not Asked   Social History Narrative     None     Social Determinants of Health     Financial Resource Strain: Not on file   Food Insecurity: Not on file   Transportation Needs: Not on file   Physical Activity: Not on file   Stress: Not on file   Social Connections: Not on file   Intimate Partner Violence: Not on file   Housing Stability: Not on file       Review of Systems:  Skin:  Positive for rash right lower leg red in color and looks a bit swollen    Eyes:  Positive for glasses no vision out of left eye   ENT:  Positive for hearing loss hearing aids  Respiratory:  Positive for dyspnea on exertion     Cardiovascular:  lightheadedness;dizziness;Negative for;palpitations;chest pain;edema Positive for;fatigue edema ankles  Gastroenterology: Negative constipation    Genitourinary:  Negative      Musculoskeletal:  Positive for back pain;joint pain;arthritis shoulder pain knee pain hip pain  Neurologic:  Positive for numbness or tingling of hands numb/tingling  in tips of fingers  Psychiatric:  Positive for sleep disturbances takes sleep aid  Heme/Lymph/Imm:  Negative easy bruising    Endocrine:  Positive for thyroid disorder

## 2022-02-03 NOTE — LETTER
2/3/2022    [facility] [address] [place] [address]   [place] [address]    RE: Jorge Luis Duane Pfleiger       Dear Colleague,     I had the pleasure of seeing Jorge Luis Duane Pfleiger in the [facility].  HPI and Plan:   See dictation [id number]      Orders Placed This Encounter   Procedures     Follow-Up with Cardiology ISABELLA       No orders of the defined types were placed in this encounter.      There are no discontinued medications.      Encounter Diagnosis   Name Primary?     Paroxysmal atrial fibrillation (H) Yes       CURRENT MEDICATIONS:  Current Outpatient Medications   Medication Sig Dispense Refill     acetaminophen (TYLENOL) 325 MG tablet Take 2 tablets (650 mg) by mouth every 6 hours as needed for mild pain or fever 50 tablet 0     Ascorbic Acid 500 MG CHEW Take 500 mg by mouth 2 times daily        atenolol (TENORMIN) 50 MG tablet Take 25 mg by mouth daily       ferrous sulfate (FEROSUL) 325 (65 Fe) MG tablet Take 325 mg by mouth 2 times daily       fish oil-omega-3 fatty acids (FISH OIL) 1000 MG capsule Take 1 g by mouth 2 times daily        furosemide (LASIX) 20 MG tablet Take 1 tablet (20 mg) by mouth daily       latanoprost (XALATAN) 0.005 % ophthalmic solution Place 1 drop into both eyes daily       levothyroxine (SYNTHROID) 100 MCG tablet Take 100 mcg by mouth daily        lisinopril (PRINIVIL) 10 MG tablet Take 1 tablet (10 mg) by mouth daily       pantoprazole (PROTONIX) 40 MG EC tablet Take 1 tablet (40 mg) by mouth 2 times daily (before meals) 60 tablet 0     paroxetine (PAXIL) 20 MG tablet Take 20 mg by mouth every morning.       polyethylene glycol (MIRALAX/GLYCOLAX) packet Take 1 packet by mouth daily as needed        Wheat Dextrin (BENEFIBER PO) Take by mouth daily as needed        zolpidem (AMBIEN) 5 MG tablet Take 1 tablet (5 mg) by mouth nightly as needed for sleep 15 tablet 0       ALLERGIES   No Known Allergies    PAST MEDICAL HISTORY:  Past  Medical History:   Diagnosis Date     Anxiety      Arthritis      Atrial fibrillation (H)      Cardiomyopathy (H)      Depression      Diastolic CHF (H) 04/10/2016     Diverticulosis      Gastro-oesophageal reflux disease      GI bleed 10/02/2018    Lower     History of blood transfusion      Hypertension      Insomnia      Nodule of lower lobe of left lung     CT Chest 1/2019-stable     Pacemaker     [employer] Dual Pacemaker     Permanent atrial fibrillation (H)      Right patella fracture      Tachy-jairo syndrome (H)      Thyroid disease     Hypothyrodism        PAST SURGICAL HISTORY:  Past Surgical History:   Procedure Laterality Date     ARTHROPLASTY HIP  1/21/2014    left hip replacement     ARTHROPLASTY KNEE      right      ARTHROPLASTY KNEE Left 11/27/2019    Procedure: Left total knee arthroplasty;  Surgeon: Ag Armstrong MD;  Location: [facility] OR     ARTHROTOMY KNEE Right 5/16/2019    Procedure: 1.  Excision of bony mass, 3 x 3 x 2 cm, underneath right quadriceps tendon in total knee arthroplasty.  2.  Repair of right quadriceps tendon with suture anchor.   ;  Surgeon: Ag Armstrong MD;  Location:  OR     CHOLECYSTECTOMY       COLONOSCOPY N/A 10/3/2018    Procedure: COLONOSCOPY;  COLONOSCOPY Rm.#227;  Surgeon: Reese Burroughs MD;  Location:  GI     COLONOSCOPY N/A 10/5/2018    Procedure: COLONOSCOPY;  COLONOSCOPY  Rm.#524;  Surgeon: Reese Burroughs MD;  Location:  GI     ESOPHAGOSCOPY, GASTROSCOPY, DUODENOSCOPY (EGD), COMBINED N/A 10/9/2021    Procedure: ESOPHAGOGASTRODUODENOSCOPY, WITH BIOPSies using cold forceps;  Surgeon: Dalia Love MD;  Location:  GI     EYE SURGERY      right eye cataract removal      IMPLANT PACEMAKER  03/2012    dual chamber      ORTHOPEDIC SURGERY      back surgery      REPAIR TENDON QUADRICEPS Right 5/16/2019    Procedure: Repair tendon quadriceps;  Surgeon: Ag Armstrong MD;  Location:  OR       FAMILY HISTORY:  Family History   Problem  Relation Age of Onset     Other Cancer Mother      Unknown/Adopted Father        SOCIAL HISTORY:  Social History     Socioeconomic History     Marital status:      Spouse name: None     Number of children: None     Years of education: None     Highest education level: None   Occupational History     None   Tobacco Use     Smoking status: Former Smoker     Packs/day: 0.50     Years: 25.00     Pack years: 12.50     Types: Cigarettes, Pipe, Cigars     Quit date: 1982     Years since quittin.0     Smokeless tobacco: Never Used   Substance and Sexual Activity     Alcohol use: No     Alcohol/week: 0.0 standard drinks     Drug use: No     Sexual activity: None   Other Topics Concern     Parent/sibling w/ CABG, MI or angioplasty before 65F 55M? Not Asked      Service Not Asked     Blood Transfusions Not Asked     Caffeine Concern No     Comment: occ - mostly decaf     Occupational Exposure Not Asked     Hobby Hazards Not Asked     Sleep Concern Not Asked     Stress Concern Not Asked     Weight Concern Not Asked     Special Diet Yes     Comment: smaller portions     Back Care Not Asked     Exercise No     Comment: some walking     Bike Helmet Not Asked     Seat Belt Not Asked     Self-Exams Not Asked   Social History Narrative     None     Social Determinants of Health     Financial Resource Strain: Not on file   Food Insecurity: Not on file   Transportation Needs: Not on file   Physical Activity: Not on file   Stress: Not on file   Social Connections: Not on file   Intimate Partner Violence: Not on file   Housing Stability: Not on file       Review of Systems:  Skin:  Positive for rash right lower leg red in color and looks a bit swollen    Eyes:  Positive for glasses no vision out of left eye   ENT:  Positive for hearing loss hearing aids  Respiratory:  Positive for dyspnea on exertion     Cardiovascular:  lightheadedness;dizziness;Negative for;palpitations;chest pain;edema Positive for;fatigue  edema ankles  Gastroenterology: Negative constipation    Genitourinary:  Negative      Musculoskeletal:  Positive for back pain;joint pain;arthritis shoulder pain knee pain hip pain  Neurologic:  Positive for numbness or tingling of hands numb/tingling in tips of fingers  Psychiatric:  Positive for sleep disturbances takes sleep aid  Heme/Lymph/Imm:  Negative easy bruising    Endocrine:  Positive for thyroid disorder      Thank you for allowing me to participate in the care of your patient.      Sincerely,     Abby Ratliff MD     [facility]  cc:   No referring provider defined for this encounter.

## 2022-05-05 ENCOUNTER — ANCILLARY PROCEDURE (OUTPATIENT)
Dept: CARDIOLOGY | Facility: CLINIC | Age: 87
End: 2022-05-05
Attending: INTERNAL MEDICINE
Payer: MEDICARE

## 2022-05-05 DIAGNOSIS — Z95.0 CARDIAC PACEMAKER IN SITU: ICD-10-CM

## 2022-05-05 PROCEDURE — 93294 REM INTERROG EVL PM/LDLS PM: CPT | Performed by: INTERNAL MEDICINE

## 2022-05-05 PROCEDURE — 93296 REM INTERROG EVL PM/IDS: CPT | Performed by: INTERNAL MEDICINE

## 2022-05-20 LAB
MDC_IDC_LEAD_IMPLANT_DT: NORMAL
MDC_IDC_LEAD_LOCATION: NORMAL
MDC_IDC_LEAD_MFG: NORMAL
MDC_IDC_LEAD_MODEL: NORMAL
MDC_IDC_LEAD_POLARITY_TYPE: NORMAL
MDC_IDC_LEAD_SERIAL: NORMAL
MDC_IDC_MSMT_BATTERY_DTM: NORMAL
MDC_IDC_MSMT_BATTERY_REMAINING_LONGEVITY: 91 MO
MDC_IDC_MSMT_BATTERY_REMAINING_PERCENTAGE: 73 %
MDC_IDC_MSMT_BATTERY_RRT_TRIGGER: NORMAL
MDC_IDC_MSMT_BATTERY_STATUS: NORMAL
MDC_IDC_MSMT_BATTERY_VOLTAGE: 2.92 V
MDC_IDC_MSMT_LEADCHNL_RV_IMPEDANCE_VALUE: 410 OHM
MDC_IDC_MSMT_LEADCHNL_RV_LEAD_CHANNEL_STATUS: NORMAL
MDC_IDC_MSMT_LEADCHNL_RV_PACING_THRESHOLD_AMPLITUDE: 0.75 V
MDC_IDC_MSMT_LEADCHNL_RV_PACING_THRESHOLD_PULSEWIDTH: 1 MS
MDC_IDC_MSMT_LEADCHNL_RV_SENSING_INTR_AMPL: 12 MV
MDC_IDC_PG_IMPLANT_DTM: NORMAL
MDC_IDC_PG_MFG: NORMAL
MDC_IDC_PG_MODEL: NORMAL
MDC_IDC_PG_SERIAL: NORMAL
MDC_IDC_PG_TYPE: NORMAL
MDC_IDC_SESS_CLINIC_NAME: NORMAL
MDC_IDC_SESS_DTM: NORMAL
MDC_IDC_SESS_REPROGRAMMED: NO
MDC_IDC_SESS_TYPE: NORMAL
MDC_IDC_SET_BRADY_LOWRATE: 60 {BEATS}/MIN
MDC_IDC_SET_BRADY_MAX_SENSOR_RATE: 120 {BEATS}/MIN
MDC_IDC_SET_BRADY_MODE: NORMAL
MDC_IDC_SET_LEADCHNL_RV_PACING_AMPLITUDE: 2 V
MDC_IDC_SET_LEADCHNL_RV_PACING_ANODE_ELECTRODE_1: NORMAL
MDC_IDC_SET_LEADCHNL_RV_PACING_ANODE_LOCATION_1: NORMAL
MDC_IDC_SET_LEADCHNL_RV_PACING_CAPTURE_MODE: NORMAL
MDC_IDC_SET_LEADCHNL_RV_PACING_CATHODE_ELECTRODE_1: NORMAL
MDC_IDC_SET_LEADCHNL_RV_PACING_CATHODE_LOCATION_1: NORMAL
MDC_IDC_SET_LEADCHNL_RV_PACING_POLARITY: NORMAL
MDC_IDC_SET_LEADCHNL_RV_PACING_PULSEWIDTH: 1 MS
MDC_IDC_SET_LEADCHNL_RV_SENSING_ADAPTATION_MODE: NORMAL
MDC_IDC_SET_LEADCHNL_RV_SENSING_ANODE_ELECTRODE_1: NORMAL
MDC_IDC_SET_LEADCHNL_RV_SENSING_ANODE_LOCATION_1: NORMAL
MDC_IDC_SET_LEADCHNL_RV_SENSING_CATHODE_ELECTRODE_1: NORMAL
MDC_IDC_SET_LEADCHNL_RV_SENSING_CATHODE_LOCATION_1: NORMAL
MDC_IDC_SET_LEADCHNL_RV_SENSING_POLARITY: NORMAL
MDC_IDC_SET_LEADCHNL_RV_SENSING_SENSITIVITY: 2 MV
MDC_IDC_STAT_AT_DTM_END: NORMAL
MDC_IDC_STAT_AT_DTM_START: NORMAL
MDC_IDC_STAT_BRADY_DTM_END: NORMAL
MDC_IDC_STAT_BRADY_DTM_START: NORMAL
MDC_IDC_STAT_BRADY_RV_PERCENT_PACED: 27 %
MDC_IDC_STAT_CRT_DTM_END: NORMAL
MDC_IDC_STAT_CRT_DTM_START: NORMAL
MDC_IDC_STAT_HEART_RATE_DTM_END: NORMAL
MDC_IDC_STAT_HEART_RATE_DTM_START: NORMAL
MDC_IDC_STAT_HEART_RATE_VENTRICULAR_MAX: 230 {BEATS}/MIN
MDC_IDC_STAT_HEART_RATE_VENTRICULAR_MEAN: 77 {BEATS}/MIN
MDC_IDC_STAT_HEART_RATE_VENTRICULAR_MIN: 60 {BEATS}/MIN

## 2022-08-11 ENCOUNTER — ANCILLARY PROCEDURE (OUTPATIENT)
Dept: CARDIOLOGY | Facility: CLINIC | Age: 87
End: 2022-08-11
Attending: INTERNAL MEDICINE
Payer: MEDICARE

## 2022-08-11 DIAGNOSIS — Z95.0 CARDIAC PACEMAKER IN SITU: ICD-10-CM

## 2022-08-11 PROCEDURE — 93294 REM INTERROG EVL PM/LDLS PM: CPT | Performed by: INTERNAL MEDICINE

## 2022-08-11 PROCEDURE — 93296 REM INTERROG EVL PM/IDS: CPT | Performed by: INTERNAL MEDICINE

## 2022-08-12 DIAGNOSIS — I49.5 SICK SINUS SYNDROME (H): ICD-10-CM

## 2022-08-12 DIAGNOSIS — Z95.0 CARDIAC PACEMAKER IN SITU: Primary | ICD-10-CM

## 2022-08-23 LAB
MDC_IDC_LEAD_IMPLANT_DT: NORMAL
MDC_IDC_LEAD_LOCATION: NORMAL
MDC_IDC_LEAD_MFG: NORMAL
MDC_IDC_LEAD_MODEL: NORMAL
MDC_IDC_LEAD_POLARITY_TYPE: NORMAL
MDC_IDC_LEAD_SERIAL: NORMAL
MDC_IDC_MSMT_BATTERY_DTM: NORMAL
MDC_IDC_MSMT_BATTERY_REMAINING_LONGEVITY: 36 MO
MDC_IDC_MSMT_BATTERY_REMAINING_PERCENTAGE: 29 %
MDC_IDC_MSMT_BATTERY_RRT_TRIGGER: NORMAL
MDC_IDC_MSMT_BATTERY_STATUS: NORMAL
MDC_IDC_MSMT_BATTERY_VOLTAGE: 2.9 V
MDC_IDC_MSMT_LEADCHNL_RV_IMPEDANCE_VALUE: 380 OHM
MDC_IDC_MSMT_LEADCHNL_RV_LEAD_CHANNEL_STATUS: NORMAL
MDC_IDC_MSMT_LEADCHNL_RV_PACING_THRESHOLD_AMPLITUDE: 0.75 V
MDC_IDC_MSMT_LEADCHNL_RV_PACING_THRESHOLD_PULSEWIDTH: 1 MS
MDC_IDC_MSMT_LEADCHNL_RV_SENSING_INTR_AMPL: 10.9 MV
MDC_IDC_PG_IMPLANT_DTM: NORMAL
MDC_IDC_PG_MFG: NORMAL
MDC_IDC_PG_MODEL: NORMAL
MDC_IDC_PG_SERIAL: NORMAL
MDC_IDC_PG_TYPE: NORMAL
MDC_IDC_SESS_CLINIC_NAME: NORMAL
MDC_IDC_SESS_DTM: NORMAL
MDC_IDC_SESS_REPROGRAMMED: NO
MDC_IDC_SESS_TYPE: NORMAL
MDC_IDC_SET_BRADY_LOWRATE: 60 {BEATS}/MIN
MDC_IDC_SET_BRADY_MAX_SENSOR_RATE: 120 {BEATS}/MIN
MDC_IDC_SET_BRADY_MODE: NORMAL
MDC_IDC_SET_LEADCHNL_RV_PACING_AMPLITUDE: 2 V
MDC_IDC_SET_LEADCHNL_RV_PACING_ANODE_ELECTRODE_1: NORMAL
MDC_IDC_SET_LEADCHNL_RV_PACING_ANODE_LOCATION_1: NORMAL
MDC_IDC_SET_LEADCHNL_RV_PACING_CAPTURE_MODE: NORMAL
MDC_IDC_SET_LEADCHNL_RV_PACING_CATHODE_ELECTRODE_1: NORMAL
MDC_IDC_SET_LEADCHNL_RV_PACING_CATHODE_LOCATION_1: NORMAL
MDC_IDC_SET_LEADCHNL_RV_PACING_POLARITY: NORMAL
MDC_IDC_SET_LEADCHNL_RV_PACING_PULSEWIDTH: 1 MS
MDC_IDC_SET_LEADCHNL_RV_SENSING_ADAPTATION_MODE: NORMAL
MDC_IDC_SET_LEADCHNL_RV_SENSING_ANODE_ELECTRODE_1: NORMAL
MDC_IDC_SET_LEADCHNL_RV_SENSING_ANODE_LOCATION_1: NORMAL
MDC_IDC_SET_LEADCHNL_RV_SENSING_CATHODE_ELECTRODE_1: NORMAL
MDC_IDC_SET_LEADCHNL_RV_SENSING_CATHODE_LOCATION_1: NORMAL
MDC_IDC_SET_LEADCHNL_RV_SENSING_POLARITY: NORMAL
MDC_IDC_SET_LEADCHNL_RV_SENSING_SENSITIVITY: 2 MV
MDC_IDC_STAT_AT_DTM_END: NORMAL
MDC_IDC_STAT_AT_DTM_START: NORMAL
MDC_IDC_STAT_BRADY_DTM_END: NORMAL
MDC_IDC_STAT_BRADY_DTM_START: NORMAL
MDC_IDC_STAT_BRADY_RV_PERCENT_PACED: 35 %
MDC_IDC_STAT_CRT_DTM_END: NORMAL
MDC_IDC_STAT_CRT_DTM_START: NORMAL
MDC_IDC_STAT_HEART_RATE_DTM_END: NORMAL
MDC_IDC_STAT_HEART_RATE_DTM_START: NORMAL
MDC_IDC_STAT_HEART_RATE_VENTRICULAR_MAX: 220 {BEATS}/MIN
MDC_IDC_STAT_HEART_RATE_VENTRICULAR_MEAN: 75 {BEATS}/MIN
MDC_IDC_STAT_HEART_RATE_VENTRICULAR_MIN: 60 {BEATS}/MIN

## 2022-11-17 ENCOUNTER — ANCILLARY PROCEDURE (OUTPATIENT)
Dept: CARDIOLOGY | Facility: CLINIC | Age: 87
End: 2022-11-17
Attending: INTERNAL MEDICINE
Payer: MEDICARE

## 2022-11-17 DIAGNOSIS — Z95.0 CARDIAC PACEMAKER IN SITU: ICD-10-CM

## 2022-11-17 DIAGNOSIS — I49.5 SICK SINUS SYNDROME (H): ICD-10-CM

## 2022-11-17 PROCEDURE — 93296 REM INTERROG EVL PM/IDS: CPT | Performed by: INTERNAL MEDICINE

## 2022-11-17 PROCEDURE — 93294 REM INTERROG EVL PM/LDLS PM: CPT | Performed by: INTERNAL MEDICINE

## 2022-11-23 LAB
MDC_IDC_LEAD_IMPLANT_DT: NORMAL
MDC_IDC_LEAD_LOCATION: NORMAL
MDC_IDC_LEAD_MFG: NORMAL
MDC_IDC_LEAD_MODEL: NORMAL
MDC_IDC_LEAD_POLARITY_TYPE: NORMAL
MDC_IDC_LEAD_SERIAL: NORMAL
MDC_IDC_MSMT_BATTERY_DTM: NORMAL
MDC_IDC_MSMT_BATTERY_REMAINING_LONGEVITY: 34 MO
MDC_IDC_MSMT_BATTERY_REMAINING_PERCENTAGE: 27 %
MDC_IDC_MSMT_BATTERY_RRT_TRIGGER: NORMAL
MDC_IDC_MSMT_BATTERY_STATUS: NORMAL
MDC_IDC_MSMT_BATTERY_VOLTAGE: 2.92 V
MDC_IDC_MSMT_LEADCHNL_RV_IMPEDANCE_VALUE: 380 OHM
MDC_IDC_MSMT_LEADCHNL_RV_LEAD_CHANNEL_STATUS: NORMAL
MDC_IDC_MSMT_LEADCHNL_RV_PACING_THRESHOLD_AMPLITUDE: 0.75 V
MDC_IDC_MSMT_LEADCHNL_RV_PACING_THRESHOLD_PULSEWIDTH: 1 MS
MDC_IDC_MSMT_LEADCHNL_RV_SENSING_INTR_AMPL: 10 MV
MDC_IDC_PG_IMPLANT_DTM: NORMAL
MDC_IDC_PG_MFG: NORMAL
MDC_IDC_PG_MODEL: NORMAL
MDC_IDC_PG_SERIAL: NORMAL
MDC_IDC_PG_TYPE: NORMAL
MDC_IDC_SESS_CLINIC_NAME: NORMAL
MDC_IDC_SESS_DTM: NORMAL
MDC_IDC_SESS_REPROGRAMMED: NO
MDC_IDC_SESS_TYPE: NORMAL
MDC_IDC_SET_BRADY_LOWRATE: 60 {BEATS}/MIN
MDC_IDC_SET_BRADY_MAX_SENSOR_RATE: 120 {BEATS}/MIN
MDC_IDC_SET_BRADY_MODE: NORMAL
MDC_IDC_SET_LEADCHNL_RV_PACING_AMPLITUDE: 2 V
MDC_IDC_SET_LEADCHNL_RV_PACING_ANODE_ELECTRODE_1: NORMAL
MDC_IDC_SET_LEADCHNL_RV_PACING_ANODE_LOCATION_1: NORMAL
MDC_IDC_SET_LEADCHNL_RV_PACING_CAPTURE_MODE: NORMAL
MDC_IDC_SET_LEADCHNL_RV_PACING_CATHODE_ELECTRODE_1: NORMAL
MDC_IDC_SET_LEADCHNL_RV_PACING_CATHODE_LOCATION_1: NORMAL
MDC_IDC_SET_LEADCHNL_RV_PACING_POLARITY: NORMAL
MDC_IDC_SET_LEADCHNL_RV_PACING_PULSEWIDTH: 1 MS
MDC_IDC_SET_LEADCHNL_RV_SENSING_ADAPTATION_MODE: NORMAL
MDC_IDC_SET_LEADCHNL_RV_SENSING_ANODE_ELECTRODE_1: NORMAL
MDC_IDC_SET_LEADCHNL_RV_SENSING_ANODE_LOCATION_1: NORMAL
MDC_IDC_SET_LEADCHNL_RV_SENSING_CATHODE_ELECTRODE_1: NORMAL
MDC_IDC_SET_LEADCHNL_RV_SENSING_CATHODE_LOCATION_1: NORMAL
MDC_IDC_SET_LEADCHNL_RV_SENSING_POLARITY: NORMAL
MDC_IDC_SET_LEADCHNL_RV_SENSING_SENSITIVITY: 2 MV
MDC_IDC_STAT_AT_DTM_END: NORMAL
MDC_IDC_STAT_AT_DTM_START: NORMAL
MDC_IDC_STAT_BRADY_DTM_END: NORMAL
MDC_IDC_STAT_BRADY_DTM_START: NORMAL
MDC_IDC_STAT_BRADY_RV_PERCENT_PACED: 34 %
MDC_IDC_STAT_CRT_DTM_END: NORMAL
MDC_IDC_STAT_CRT_DTM_START: NORMAL
MDC_IDC_STAT_HEART_RATE_DTM_END: NORMAL
MDC_IDC_STAT_HEART_RATE_DTM_START: NORMAL
MDC_IDC_STAT_HEART_RATE_VENTRICULAR_MAX: 200 {BEATS}/MIN
MDC_IDC_STAT_HEART_RATE_VENTRICULAR_MEAN: 75 {BEATS}/MIN
MDC_IDC_STAT_HEART_RATE_VENTRICULAR_MIN: 60 {BEATS}/MIN

## 2023-03-08 ENCOUNTER — ANCILLARY PROCEDURE (OUTPATIENT)
Dept: CARDIOLOGY | Facility: CLINIC | Age: 88
End: 2023-03-08
Attending: INTERNAL MEDICINE
Payer: MEDICARE

## 2023-03-08 DIAGNOSIS — I49.5 SICK SINUS SYNDROME (H): ICD-10-CM

## 2023-03-08 DIAGNOSIS — Z95.0 CARDIAC PACEMAKER IN SITU: ICD-10-CM

## 2023-03-08 PROCEDURE — 93279 PRGRMG DEV EVAL PM/LDLS PM: CPT | Performed by: INTERNAL MEDICINE

## 2023-03-15 LAB
MDC_IDC_LEAD_IMPLANT_DT: NORMAL
MDC_IDC_LEAD_LOCATION: NORMAL
MDC_IDC_LEAD_MFG: NORMAL
MDC_IDC_LEAD_MODEL: NORMAL
MDC_IDC_LEAD_POLARITY_TYPE: NORMAL
MDC_IDC_LEAD_SERIAL: NORMAL
MDC_IDC_MSMT_BATTERY_REMAINING_LONGEVITY: 36 MO
MDC_IDC_MSMT_BATTERY_STATUS: NORMAL
MDC_IDC_MSMT_BATTERY_VOLTAGE: 2.89 V
MDC_IDC_MSMT_LEADCHNL_RV_IMPEDANCE_VALUE: 400 OHM
MDC_IDC_MSMT_LEADCHNL_RV_PACING_THRESHOLD_AMPLITUDE: 1 V
MDC_IDC_MSMT_LEADCHNL_RV_PACING_THRESHOLD_AMPLITUDE: 1 V
MDC_IDC_MSMT_LEADCHNL_RV_PACING_THRESHOLD_PULSEWIDTH: 0.5 MS
MDC_IDC_MSMT_LEADCHNL_RV_PACING_THRESHOLD_PULSEWIDTH: 0.5 MS
MDC_IDC_MSMT_LEADCHNL_RV_SENSING_INTR_AMPL: 12 MV
MDC_IDC_PG_IMPLANT_DTM: NORMAL
MDC_IDC_PG_MFG: NORMAL
MDC_IDC_PG_MODEL: NORMAL
MDC_IDC_PG_SERIAL: NORMAL
MDC_IDC_PG_TYPE: NORMAL
MDC_IDC_SESS_CLINIC_NAME: NORMAL
MDC_IDC_SESS_DTM: NORMAL
MDC_IDC_SESS_TYPE: NORMAL
MDC_IDC_SET_BRADY_HYSTRATE: NORMAL
MDC_IDC_SET_BRADY_LOWRATE: 60 {BEATS}/MIN
MDC_IDC_SET_BRADY_MAX_SENSOR_RATE: 120 {BEATS}/MIN
MDC_IDC_SET_BRADY_MODE: NORMAL
MDC_IDC_SET_BRADY_NIGHT_RATE: NORMAL
MDC_IDC_SET_LEADCHNL_RV_PACING_AMPLITUDE: 2 V
MDC_IDC_SET_LEADCHNL_RV_PACING_CAPTURE_MODE: NORMAL
MDC_IDC_SET_LEADCHNL_RV_PACING_POLARITY: NORMAL
MDC_IDC_SET_LEADCHNL_RV_PACING_PULSEWIDTH: 0.5 MS
MDC_IDC_SET_LEADCHNL_RV_SENSING_POLARITY: NORMAL
MDC_IDC_SET_LEADCHNL_RV_SENSING_SENSITIVITY: 2 MV
MDC_IDC_STAT_BRADY_RV_PERCENT_PACED: 34 %

## 2023-06-08 ENCOUNTER — ANCILLARY PROCEDURE (OUTPATIENT)
Dept: CARDIOLOGY | Facility: CLINIC | Age: 88
End: 2023-06-08
Attending: INTERNAL MEDICINE
Payer: MEDICARE

## 2023-06-08 DIAGNOSIS — I49.5 SICK SINUS SYNDROME (H): ICD-10-CM

## 2023-06-08 DIAGNOSIS — Z95.0 CARDIAC PACEMAKER IN SITU: ICD-10-CM

## 2023-06-08 PROCEDURE — 93296 REM INTERROG EVL PM/IDS: CPT | Performed by: INTERNAL MEDICINE

## 2023-06-08 PROCEDURE — 93294 REM INTERROG EVL PM/LDLS PM: CPT | Performed by: INTERNAL MEDICINE

## 2023-06-09 LAB
MDC_IDC_EPISODE_DTM: NORMAL
MDC_IDC_EPISODE_ID: NORMAL
MDC_IDC_EPISODE_TYPE: NORMAL
MDC_IDC_LEAD_IMPLANT_DT: NORMAL
MDC_IDC_LEAD_LOCATION: NORMAL
MDC_IDC_LEAD_MFG: NORMAL
MDC_IDC_LEAD_MODEL: NORMAL
MDC_IDC_LEAD_POLARITY_TYPE: NORMAL
MDC_IDC_LEAD_SERIAL: NORMAL
MDC_IDC_MSMT_BATTERY_DTM: NORMAL
MDC_IDC_MSMT_BATTERY_REMAINING_LONGEVITY: 31 MO
MDC_IDC_MSMT_BATTERY_REMAINING_PERCENTAGE: 23 %
MDC_IDC_MSMT_BATTERY_RRT_TRIGGER: NORMAL
MDC_IDC_MSMT_BATTERY_STATUS: NORMAL
MDC_IDC_MSMT_BATTERY_VOLTAGE: 2.9 V
MDC_IDC_MSMT_LEADCHNL_RV_IMPEDANCE_VALUE: 380 OHM
MDC_IDC_MSMT_LEADCHNL_RV_LEAD_CHANNEL_STATUS: NORMAL
MDC_IDC_MSMT_LEADCHNL_RV_PACING_THRESHOLD_AMPLITUDE: 1 V
MDC_IDC_MSMT_LEADCHNL_RV_PACING_THRESHOLD_PULSEWIDTH: 0.5 MS
MDC_IDC_MSMT_LEADCHNL_RV_SENSING_INTR_AMPL: 10.1 MV
MDC_IDC_PG_IMPLANT_DTM: NORMAL
MDC_IDC_PG_MFG: NORMAL
MDC_IDC_PG_MODEL: NORMAL
MDC_IDC_PG_SERIAL: NORMAL
MDC_IDC_PG_TYPE: NORMAL
MDC_IDC_SESS_CLINIC_NAME: NORMAL
MDC_IDC_SESS_DTM: NORMAL
MDC_IDC_SESS_REPROGRAMMED: NO
MDC_IDC_SESS_TYPE: NORMAL
MDC_IDC_SET_BRADY_LOWRATE: 60 {BEATS}/MIN
MDC_IDC_SET_BRADY_MAX_SENSOR_RATE: 120 {BEATS}/MIN
MDC_IDC_SET_BRADY_MODE: NORMAL
MDC_IDC_SET_LEADCHNL_RV_PACING_AMPLITUDE: 2 V
MDC_IDC_SET_LEADCHNL_RV_PACING_ANODE_ELECTRODE_1: NORMAL
MDC_IDC_SET_LEADCHNL_RV_PACING_ANODE_LOCATION_1: NORMAL
MDC_IDC_SET_LEADCHNL_RV_PACING_CAPTURE_MODE: NORMAL
MDC_IDC_SET_LEADCHNL_RV_PACING_CATHODE_ELECTRODE_1: NORMAL
MDC_IDC_SET_LEADCHNL_RV_PACING_CATHODE_LOCATION_1: NORMAL
MDC_IDC_SET_LEADCHNL_RV_PACING_POLARITY: NORMAL
MDC_IDC_SET_LEADCHNL_RV_PACING_PULSEWIDTH: 0.5 MS
MDC_IDC_SET_LEADCHNL_RV_SENSING_ADAPTATION_MODE: NORMAL
MDC_IDC_SET_LEADCHNL_RV_SENSING_ANODE_ELECTRODE_1: NORMAL
MDC_IDC_SET_LEADCHNL_RV_SENSING_ANODE_LOCATION_1: NORMAL
MDC_IDC_SET_LEADCHNL_RV_SENSING_CATHODE_ELECTRODE_1: NORMAL
MDC_IDC_SET_LEADCHNL_RV_SENSING_CATHODE_LOCATION_1: NORMAL
MDC_IDC_SET_LEADCHNL_RV_SENSING_POLARITY: NORMAL
MDC_IDC_SET_LEADCHNL_RV_SENSING_SENSITIVITY: 2 MV
MDC_IDC_STAT_AT_DTM_END: NORMAL
MDC_IDC_STAT_AT_DTM_START: NORMAL
MDC_IDC_STAT_BRADY_DTM_END: NORMAL
MDC_IDC_STAT_BRADY_DTM_START: NORMAL
MDC_IDC_STAT_BRADY_RV_PERCENT_PACED: 34 %
MDC_IDC_STAT_CRT_DTM_END: NORMAL
MDC_IDC_STAT_CRT_DTM_START: NORMAL
MDC_IDC_STAT_HEART_RATE_DTM_END: NORMAL
MDC_IDC_STAT_HEART_RATE_DTM_START: NORMAL
MDC_IDC_STAT_HEART_RATE_VENTRICULAR_MAX: 220 {BEATS}/MIN
MDC_IDC_STAT_HEART_RATE_VENTRICULAR_MEAN: 75 {BEATS}/MIN
MDC_IDC_STAT_HEART_RATE_VENTRICULAR_MIN: 60 {BEATS}/MIN

## 2023-08-15 ENCOUNTER — APPOINTMENT (OUTPATIENT)
Dept: CT IMAGING | Facility: CLINIC | Age: 88
End: 2023-08-15
Attending: EMERGENCY MEDICINE
Payer: MEDICARE

## 2023-08-15 ENCOUNTER — HOSPITAL ENCOUNTER (EMERGENCY)
Facility: CLINIC | Age: 88
Discharge: HOME | End: 2023-08-15
Attending: EMERGENCY MEDICINE | Admitting: EMERGENCY MEDICINE
Payer: MEDICARE

## 2023-08-15 VITALS
HEART RATE: 60 BPM | SYSTOLIC BLOOD PRESSURE: 169 MMHG | DIASTOLIC BLOOD PRESSURE: 88 MMHG | OXYGEN SATURATION: 96 % | TEMPERATURE: 98.2 F | RESPIRATION RATE: 10 BRPM

## 2023-08-15 DIAGNOSIS — R06.00 DYSPNEA, UNSPECIFIED TYPE: ICD-10-CM

## 2023-08-15 DIAGNOSIS — N28.9 RENAL LESION: ICD-10-CM

## 2023-08-15 DIAGNOSIS — J90 PLEURAL EFFUSION: ICD-10-CM

## 2023-08-15 LAB
ANION GAP SERPL CALCULATED.3IONS-SCNC: 10 MMOL/L (ref 7–15)
BASOPHILS # BLD MANUAL: 0.1 10E3/UL (ref 0–0.2)
BASOPHILS NFR BLD MANUAL: 1 %
BUN SERPL-MCNC: 19.5 MG/DL (ref 8–23)
CALCIUM SERPL-MCNC: 9.4 MG/DL (ref 8.2–9.6)
CHLORIDE SERPL-SCNC: 100 MMOL/L (ref 98–107)
CREAT SERPL-MCNC: 1.23 MG/DL (ref 0.67–1.17)
DEPRECATED HCO3 PLAS-SCNC: 27 MMOL/L (ref 22–29)
EOSINOPHIL # BLD MANUAL: 0 10E3/UL (ref 0–0.7)
EOSINOPHIL NFR BLD MANUAL: 0 %
ERYTHROCYTE [DISTWIDTH] IN BLOOD BY AUTOMATED COUNT: 14.3 % (ref 10–15)
FLUAV RNA SPEC QL NAA+PROBE: NEGATIVE
FLUBV RNA RESP QL NAA+PROBE: NEGATIVE
GFR SERPL CREATININE-BSD FRML MDRD: 55 ML/MIN/1.73M2
GLUCOSE SERPL-MCNC: 83 MG/DL (ref 70–99)
HCT VFR BLD AUTO: 34.1 % (ref 40–53)
HGB BLD-MCNC: 11.1 G/DL (ref 13.3–17.7)
HOLD SPECIMEN: NORMAL
LYMPHOCYTES # BLD MANUAL: 0.9 10E3/UL (ref 0.8–5.3)
LYMPHOCYTES NFR BLD MANUAL: 15 %
MCH RBC QN AUTO: 30.7 PG (ref 26.5–33)
MCHC RBC AUTO-ENTMCNC: 32.6 G/DL (ref 31.5–36.5)
MCV RBC AUTO: 94 FL (ref 78–100)
MONOCYTES # BLD MANUAL: 0.3 10E3/UL (ref 0–1.3)
MONOCYTES NFR BLD MANUAL: 4 %
NEUTROPHILS # BLD MANUAL: 5 10E3/UL (ref 1.6–8.3)
NEUTROPHILS NFR BLD MANUAL: 80 %
NT-PROBNP SERPL-MCNC: 4410 PG/ML (ref 0–1800)
PLAT MORPH BLD: NORMAL
PLATELET # BLD AUTO: 182 10E3/UL (ref 150–450)
POTASSIUM SERPL-SCNC: 4.1 MMOL/L (ref 3.4–5.3)
RBC # BLD AUTO: 3.62 10E6/UL (ref 4.4–5.9)
RBC MORPH BLD: NORMAL
RSV RNA SPEC NAA+PROBE: NEGATIVE
SARS-COV-2 RNA RESP QL NAA+PROBE: NEGATIVE
SODIUM SERPL-SCNC: 137 MMOL/L (ref 136–145)
TROPONIN T SERPL HS-MCNC: 25 NG/L
TROPONIN T SERPL HS-MCNC: 28 NG/L
WBC # BLD AUTO: 6.3 10E3/UL (ref 4–11)

## 2023-08-15 PROCEDURE — 36415 COLL VENOUS BLD VENIPUNCTURE: CPT | Performed by: EMERGENCY MEDICINE

## 2023-08-15 PROCEDURE — 85027 COMPLETE CBC AUTOMATED: CPT | Performed by: EMERGENCY MEDICINE

## 2023-08-15 PROCEDURE — 250N000011 HC RX IP 250 OP 636: Mod: JZ | Performed by: EMERGENCY MEDICINE

## 2023-08-15 PROCEDURE — 80048 BASIC METABOLIC PNL TOTAL CA: CPT | Performed by: EMERGENCY MEDICINE

## 2023-08-15 PROCEDURE — 87637 SARSCOV2&INF A&B&RSV AMP PRB: CPT | Performed by: EMERGENCY MEDICINE

## 2023-08-15 PROCEDURE — 93005 ELECTROCARDIOGRAM TRACING: CPT | Mod: RTG

## 2023-08-15 PROCEDURE — 84484 ASSAY OF TROPONIN QUANT: CPT | Performed by: EMERGENCY MEDICINE

## 2023-08-15 PROCEDURE — 250N000011 HC RX IP 250 OP 636: Performed by: EMERGENCY MEDICINE

## 2023-08-15 PROCEDURE — G1010 CDSM STANSON: HCPCS

## 2023-08-15 PROCEDURE — 258N000003 HC RX IP 258 OP 636: Performed by: EMERGENCY MEDICINE

## 2023-08-15 PROCEDURE — 99285 EMERGENCY DEPT VISIT HI MDM: CPT | Mod: 25

## 2023-08-15 PROCEDURE — 96375 TX/PRO/DX INJ NEW DRUG ADDON: CPT

## 2023-08-15 PROCEDURE — 96361 HYDRATE IV INFUSION ADD-ON: CPT

## 2023-08-15 PROCEDURE — 85007 BL SMEAR W/DIFF WBC COUNT: CPT | Performed by: EMERGENCY MEDICINE

## 2023-08-15 PROCEDURE — 83880 ASSAY OF NATRIURETIC PEPTIDE: CPT | Performed by: EMERGENCY MEDICINE

## 2023-08-15 PROCEDURE — 96374 THER/PROPH/DIAG INJ IV PUSH: CPT | Mod: 59

## 2023-08-15 RX ORDER — HYDROMORPHONE HCL IN WATER/PF 6 MG/30 ML
0.2 PATIENT CONTROLLED ANALGESIA SYRINGE INTRAVENOUS ONCE
Status: COMPLETED | OUTPATIENT
Start: 2023-08-15 | End: 2023-08-15

## 2023-08-15 RX ORDER — IOPAMIDOL 755 MG/ML
500 INJECTION, SOLUTION INTRAVASCULAR ONCE
Status: COMPLETED | OUTPATIENT
Start: 2023-08-15 | End: 2023-08-15

## 2023-08-15 RX ORDER — LORAZEPAM 0.5 MG/1
0.5 TABLET ORAL 2 TIMES DAILY PRN
Qty: 15 TABLET | Refills: 0 | Status: SHIPPED | OUTPATIENT
Start: 2023-08-15

## 2023-08-15 RX ORDER — ONDANSETRON 4 MG/1
4 TABLET, ORALLY DISINTEGRATING ORAL ONCE
Status: COMPLETED | OUTPATIENT
Start: 2023-08-15 | End: 2023-08-15

## 2023-08-15 RX ADMIN — HYDROMORPHONE HYDROCHLORIDE 0.2 MG: 0.2 INJECTION, SOLUTION INTRAMUSCULAR; INTRAVENOUS; SUBCUTANEOUS at 16:27

## 2023-08-15 RX ADMIN — ONDANSETRON 4 MG: 4 TABLET, ORALLY DISINTEGRATING ORAL at 14:50

## 2023-08-15 RX ADMIN — IOPAMIDOL 75 ML: 755 INJECTION, SOLUTION INTRAVENOUS at 15:09

## 2023-08-15 RX ADMIN — SODIUM CHLORIDE 92 ML: 9 INJECTION, SOLUTION INTRAVENOUS at 15:10

## 2023-08-15 ASSESSMENT — ACTIVITIES OF DAILY LIVING (ADL)
ADLS_ACUITY_SCORE: 35
ADLS_ACUITY_SCORE: 35

## 2023-08-15 NOTE — ED PROVIDER NOTES
History   Chief Complaint:  Shortness of Breath and Abdominal Pain    HPI     Jake Duane Pfleiger is a 89 year old male with history of paroxysmal atrial fibrillation, cardiomyopathy, hypertension, congestive heart failure, anxiety presenting to the emergency department via EMS for evaluation of shortness of breath. Jorge Luis reports exacerbating shortness of breath onset two days ago. Denies chest pain, cough, fevers, and leg swelling. He states he feels panicked presently. Denies vomiting, diarrhea, and changes in urination.     Independent Historian:   None - Patient Only    Review of External Notes:   None     Medications:    Atenolol   Lasix   Levothyroxine   Lisinopril   Pantoprazole   Paxil   Ambien     Past Medical History:    Anxiety   Cardiomyopathy    Depression   Diastolic congestive heart failure  Diverticulosis   GERD  GI bleed   Hypertension   Left lower lobe lung nodule   Paroxysmal atrial fibrillation   Tachy-jairo syndrome   Thyroid disease     Past Surgical History:    Left hip arthroplasty   Right knee arthroplasty   Left knee arthroplasty   Cholecystectomy   Colonoscopy x2   EGD  Implant dual chamber pace maker   Back surgery   Right quadriceps tendon repair     Physical Exam   Patient Vitals for the past 24 hrs:   BP Temp Temp src Pulse Resp SpO2   08/15/23 1729 -- -- -- -- -- 96 %   08/15/23 1700 (!) 169/88 -- -- 60 -- 96 %   08/15/23 1630 (!) 170/76 -- -- 60 -- 95 %   08/15/23 1615 (!) 167/74 -- -- -- -- 94 %   08/15/23 1445 (!) 168/88 -- -- 60 10 96 %   08/15/23 1430 (!) 181/88 -- -- 60 19 --   08/15/23 1415 (!) 182/85 -- -- 69 20 --   08/15/23 1400 (!) 174/84 -- -- 60 18 96 %   08/15/23 1345 -- 98.2  F (36.8  C) Temporal 60 22 98 %   08/15/23 1340 (!) 171/76 -- -- 60 21 --   08/15/23 1335 -- -- -- -- -- 96 %     Physical Exam    HEENT:    Oropharynx is moist  Eyes:    Conjunctiva normal  Neck:     Supple, no meningismus.     CV:     Regular rate and rhythm.      No murmurs, rubs or gallops.        No unilateral leg swelling.       2+ radial pulses bilateral.    PULM:    Clear to auscultation bilateral.       No respiratory distress.      Good air exchange.     No rales or wheezing.     No stridor.  ABD:    Soft, non-tender, non-distended.       No pulsatile masses.       No rebound, guarding or rigidity.  MSK:     No gross deformity to all four extremities.   LYMPH:   No cervical lymphadenopathy.  NEURO:   Alert. Good muscle tone, no atrophy.  Skin:    Warm, dry and intact.    Psych:    Anxious      Emergency Department Course   ECG  ECG taken at 1342, ECG read at 1342  Ventricular-paced rhythm   Abnormal ECG   Rate 60 bpm. UT interval * ms. QRS duration 162 ms. QT/QTc 502/502 ms. P-R-T axes * -66 87.     Imaging:  CT Chest (PE) Abdomen Pelvis w Contrast   Final Result   IMPRESSION:   1.  No evidence of acute pulmonary thromboembolic disease. No acute   findings within the chest, abdomen, or pelvis.   2.  Cardiomegaly with marked biatrial enlargement. Mild dilatation of   the pulmonary trunk which can be seen with pulmonary hypertension.   3.  Small right pleural effusion and trace left pleural   fluid/thickening, new.   4.  Enlarged right hilar lymph node, nonspecific. Follow-up could be   performed as clinically warranted.   5.  Small 9 mm lesion arising from the right upper pole kidney appears   to enhance and may represent a small renal cell neoplasm. This is   mildly increased in size compared to 2021.   6.  Multiple additional unchanged chronic findings as above including   basilar predominant bullous lung disease.      SHERI QUICK MD            SYSTEM ID:  [id number]         Report per radiology    Laboratory:  Labs Ordered and Resulted from Time of ED Arrival to Time of ED Departure   BASIC METABOLIC PANEL - Abnormal       Result Value    Sodium 137      Potassium 4.1      Chloride 100      Carbon Dioxide (CO2) 27      Anion Gap 10      Urea Nitrogen 19.5      Creatinine 1.23 (*)     Calcium  9.4      Glucose 83      GFR Estimate 55 (*)    TROPONIN T, HIGH SENSITIVITY - Abnormal    Troponin T, High Sensitivity 28 (*)    CBC WITH PLATELETS AND DIFFERENTIAL - Abnormal    WBC Count 6.3      RBC Count 3.62 (*)     Hemoglobin 11.1 (*)     Hematocrit 34.1 (*)     MCV 94      MCH 30.7      MCHC 32.6      RDW 14.3      Platelet Count 182     NT PROBNP INPATIENT - Abnormal    N terminal Pro BNP Inpatient 4,410 (*)    TROPONIN T, HIGH SENSITIVITY - Abnormal    Troponin T, High Sensitivity 25 (*)    INFLUENZA A/B, RSV, & SARS-COV2 PCR - Normal    Influenza A PCR Negative      Influenza B PCR Negative      RSV PCR Negative      SARS CoV2 PCR Negative     DIFFERENTIAL    % Neutrophils 80      % Lymphocytes 15      % Monocytes 4      % Eosinophils 0      % Basophils 1      Absolute Neutrophils 5.0      Absolute Lymphocytes 0.9      Absolute Monocytes 0.3      Absolute Eosinophils 0.0      Absolute Basophils 0.1      RBC Morphology Confirmed RBC Indices      Platelet Assessment        Value: Automated Count Confirmed. Platelet morphology is normal.     Emergency Department Course & Assessments:    Interventions:  Medications   ondansetron (ZOFRAN ODT) ODT tab 4 mg (4 mg Oral $Given 8/15/23 1450)   HYDROmorphone (DILAUDID) injection 0.2 mg (0.2 mg Intravenous $Given 8/15/23 1627)   0.9% sodium chloride BOLUS (0 mLs Intravenous Stopped 8/15/23 1643)   iopamidol (ISOVUE-370) solution 500 mL (75 mLs Intravenous $Given 8/15/23 1509)     Assessments:  1424 I obtained history and examined the patient as noted above.    1718 I rechecked the patient. I discussed findings and discharge with the patient. All questions answered.      Independent Interpretation (X-rays, CTs, rhythm strip):  None    Consultations/Discussion of Management or Tests:  None     Social Determinants of Health affecting care:   None    Disposition:  The patient was discharged to home.     Impression & Plan      Medical Decision Makin-year-old male  presents with 2 to 3 days history of shortness of breath.  No evidence of acute bronchospasm.  EKG paced.  Troponin x2 reassuring.  CT scan of the chest is negative for pulmonary embolism, pneumonia, pneumothorax, pulmonary edema.  He has relatively small pleural effusions that are unlikely contributing to his symptoms.  Viral swabs negative.  Patient admittedly felt anxious and is likely contributing to his symptoms.  We will give a trial of lorazepam.  Patient not hypoxic and feels back to baseline upon recheck.  Patient and granddaughter comfortable with discharge home.    Patient incidentally noted to have a renal lesion that will require follow-up with PCP as it has mildly increased from prior imaging.    Diagnosis:    ICD-10-CM    1. Dyspnea, unspecified type  R06.00       2. Pleural effusion  J90       3. Renal lesion  N28.9         Discharge Medications:  Discharge Medication List as of 8/15/2023  5:30 PM        START taking these medications    Details   LORazepam (ATIVAN) 0.5 MG tablet Take 1 tablet (0.5 mg) by mouth 2 times daily as needed for anxiety (dyspnea), Disp-15 tablet, R-0, Local Print            Scribe Disclosure:  I, Yamini Ramos, am serving as a scribe at 3:04 PM on 8/15/2023 to document services personally performed by Lenin Stein MD based on my observations and the provider's statements to me.     8/15/2023   Lenin Stein MD Matthews, Jeremiah R, MD  08/15/23 1930

## 2023-08-15 NOTE — ED TRIAGE NOTES
Pt [facility] via EMS from [facility] living facility w/ increasing SOB x2 months, but worsened over the past 2 days, along with abdominal pain. VSS, pt has a pacemaker, BGL: 118. Hx: diverticulosis, reflux.      Triage Assessment       Row Name 08/15/23 5349       Triage Assessment (Adult)    Airway WDL WDL       Respiratory WDL    Respiratory WDL X;rhythm/pattern    Rhythm/Pattern, Respiratory shortness of breath       Skin Circulation/Temperature WDL    Skin Circulation/Temperature WDL X;circulation    Skin Circulation pallor       Cardiac WDL    Cardiac WDL X;rhythm    Pulse Rate & Regularity other (see comments)  paced       Peripheral/Neurovascular WDL    Peripheral Neurovascular WDL WDL       Cognitive/Neuro/Behavioral WDL    Cognitive/Neuro/Behavioral WDL WDL

## 2023-08-16 LAB
ATRIAL RATE - MUSE: 61 BPM
DIASTOLIC BLOOD PRESSURE - MUSE: NORMAL MMHG
INTERPRETATION ECG - MUSE: NORMAL
P AXIS - MUSE: NORMAL DEGREES
PR INTERVAL - MUSE: NORMAL MS
QRS DURATION - MUSE: 162 MS
QT - MUSE: 502 MS
QTC - MUSE: 502 MS
R AXIS - MUSE: -66 DEGREES
SYSTOLIC BLOOD PRESSURE - MUSE: NORMAL MMHG
T AXIS - MUSE: 87 DEGREES
VENTRICULAR RATE- MUSE: 60 BPM

## 2023-09-14 ENCOUNTER — ANCILLARY PROCEDURE (OUTPATIENT)
Dept: CARDIOLOGY | Facility: CLINIC | Age: 88
End: 2023-09-14
Attending: INTERNAL MEDICINE
Payer: MEDICARE

## 2023-09-14 DIAGNOSIS — I49.5 SICK SINUS SYNDROME (H): ICD-10-CM

## 2023-09-14 DIAGNOSIS — Z95.0 CARDIAC PACEMAKER IN SITU: ICD-10-CM

## 2023-09-14 PROCEDURE — 93296 REM INTERROG EVL PM/IDS: CPT | Performed by: INTERNAL MEDICINE

## 2023-09-14 PROCEDURE — 93294 REM INTERROG EVL PM/LDLS PM: CPT | Performed by: INTERNAL MEDICINE

## 2023-09-15 LAB
MDC_IDC_EPISODE_DTM: NORMAL
MDC_IDC_EPISODE_DTM: NORMAL
MDC_IDC_EPISODE_ID: NORMAL
MDC_IDC_EPISODE_ID: NORMAL
MDC_IDC_EPISODE_TYPE: NORMAL
MDC_IDC_EPISODE_TYPE: NORMAL
MDC_IDC_LEAD_IMPLANT_DT: NORMAL
MDC_IDC_LEAD_LOCATION: NORMAL
MDC_IDC_LEAD_MFG: NORMAL
MDC_IDC_LEAD_MODEL: NORMAL
MDC_IDC_LEAD_POLARITY_TYPE: NORMAL
MDC_IDC_LEAD_SERIAL: NORMAL
MDC_IDC_MSMT_BATTERY_DTM: NORMAL
MDC_IDC_MSMT_BATTERY_REMAINING_LONGEVITY: 29 MO
MDC_IDC_MSMT_BATTERY_REMAINING_PERCENTAGE: 21 %
MDC_IDC_MSMT_BATTERY_RRT_TRIGGER: NORMAL
MDC_IDC_MSMT_BATTERY_STATUS: NORMAL
MDC_IDC_MSMT_BATTERY_VOLTAGE: 2.89 V
MDC_IDC_MSMT_LEADCHNL_RV_IMPEDANCE_VALUE: 380 OHM
MDC_IDC_MSMT_LEADCHNL_RV_LEAD_CHANNEL_STATUS: NORMAL
MDC_IDC_MSMT_LEADCHNL_RV_PACING_THRESHOLD_AMPLITUDE: 1 V
MDC_IDC_MSMT_LEADCHNL_RV_PACING_THRESHOLD_PULSEWIDTH: 0.5 MS
MDC_IDC_MSMT_LEADCHNL_RV_SENSING_INTR_AMPL: 10.3 MV
MDC_IDC_PG_IMPLANT_DTM: NORMAL
MDC_IDC_PG_MFG: NORMAL
MDC_IDC_PG_MODEL: NORMAL
MDC_IDC_PG_SERIAL: NORMAL
MDC_IDC_PG_TYPE: NORMAL
MDC_IDC_SESS_CLINIC_NAME: NORMAL
MDC_IDC_SESS_DTM: NORMAL
MDC_IDC_SESS_REPROGRAMMED: NO
MDC_IDC_SESS_TYPE: NORMAL
MDC_IDC_SET_BRADY_LOWRATE: 60 {BEATS}/MIN
MDC_IDC_SET_BRADY_MAX_SENSOR_RATE: 120 {BEATS}/MIN
MDC_IDC_SET_BRADY_MODE: NORMAL
MDC_IDC_SET_LEADCHNL_RV_PACING_AMPLITUDE: 2 V
MDC_IDC_SET_LEADCHNL_RV_PACING_ANODE_ELECTRODE_1: NORMAL
MDC_IDC_SET_LEADCHNL_RV_PACING_ANODE_LOCATION_1: NORMAL
MDC_IDC_SET_LEADCHNL_RV_PACING_CAPTURE_MODE: NORMAL
MDC_IDC_SET_LEADCHNL_RV_PACING_CATHODE_ELECTRODE_1: NORMAL
MDC_IDC_SET_LEADCHNL_RV_PACING_CATHODE_LOCATION_1: NORMAL
MDC_IDC_SET_LEADCHNL_RV_PACING_POLARITY: NORMAL
MDC_IDC_SET_LEADCHNL_RV_PACING_PULSEWIDTH: 0.5 MS
MDC_IDC_SET_LEADCHNL_RV_SENSING_ADAPTATION_MODE: NORMAL
MDC_IDC_SET_LEADCHNL_RV_SENSING_ANODE_ELECTRODE_1: NORMAL
MDC_IDC_SET_LEADCHNL_RV_SENSING_ANODE_LOCATION_1: NORMAL
MDC_IDC_SET_LEADCHNL_RV_SENSING_CATHODE_ELECTRODE_1: NORMAL
MDC_IDC_SET_LEADCHNL_RV_SENSING_CATHODE_LOCATION_1: NORMAL
MDC_IDC_SET_LEADCHNL_RV_SENSING_POLARITY: NORMAL
MDC_IDC_SET_LEADCHNL_RV_SENSING_SENSITIVITY: 2 MV
MDC_IDC_STAT_AT_DTM_END: NORMAL
MDC_IDC_STAT_AT_DTM_START: NORMAL
MDC_IDC_STAT_BRADY_DTM_END: NORMAL
MDC_IDC_STAT_BRADY_DTM_START: NORMAL
MDC_IDC_STAT_BRADY_RV_PERCENT_PACED: 43 %
MDC_IDC_STAT_CRT_DTM_END: NORMAL
MDC_IDC_STAT_CRT_DTM_START: NORMAL
MDC_IDC_STAT_HEART_RATE_DTM_END: NORMAL
MDC_IDC_STAT_HEART_RATE_DTM_START: NORMAL
MDC_IDC_STAT_HEART_RATE_VENTRICULAR_MAX: 210 {BEATS}/MIN
MDC_IDC_STAT_HEART_RATE_VENTRICULAR_MEAN: 73 {BEATS}/MIN
MDC_IDC_STAT_HEART_RATE_VENTRICULAR_MIN: 60 {BEATS}/MIN

## 2023-10-18 ENCOUNTER — APPOINTMENT (OUTPATIENT)
Dept: GENERAL RADIOLOGY | Facility: CLINIC | Age: 88
End: 2023-10-18
Attending: EMERGENCY MEDICINE
Payer: MEDICARE

## 2023-10-18 ENCOUNTER — HOSPITAL ENCOUNTER (EMERGENCY)
Facility: CLINIC | Age: 88
Discharge: HOME | End: 2023-10-18
Attending: EMERGENCY MEDICINE | Admitting: EMERGENCY MEDICINE
Payer: MEDICARE

## 2023-10-18 VITALS
TEMPERATURE: 97.7 F | DIASTOLIC BLOOD PRESSURE: 90 MMHG | OXYGEN SATURATION: 96 % | SYSTOLIC BLOOD PRESSURE: 162 MMHG | RESPIRATION RATE: 28 BRPM | HEART RATE: 68 BPM

## 2023-10-18 DIAGNOSIS — R06.00 DYSPNEA, UNSPECIFIED TYPE: ICD-10-CM

## 2023-10-18 LAB
ANION GAP SERPL CALCULATED.3IONS-SCNC: 11 MMOL/L (ref 7–15)
BASE EXCESS BLDV CALC-SCNC: 4 MMOL/L (ref -7.7–1.9)
BASO+EOS+MONOS # BLD AUTO: ABNORMAL 10*3/UL
BASO+EOS+MONOS NFR BLD AUTO: ABNORMAL %
BASOPHILS # BLD AUTO: 0 10E3/UL (ref 0–0.2)
BASOPHILS NFR BLD AUTO: 0 %
BUN SERPL-MCNC: 18.1 MG/DL (ref 8–23)
CALCIUM SERPL-MCNC: 9.6 MG/DL (ref 8.2–9.6)
CHLORIDE SERPL-SCNC: 102 MMOL/L (ref 98–107)
CREAT SERPL-MCNC: 1.19 MG/DL (ref 0.67–1.17)
DEPRECATED HCO3 PLAS-SCNC: 28 MMOL/L (ref 22–29)
EGFRCR SERPLBLD CKD-EPI 2021: 57 ML/MIN/1.73M2
EOSINOPHIL # BLD AUTO: 0 10E3/UL (ref 0–0.7)
EOSINOPHIL NFR BLD AUTO: 1 %
ERYTHROCYTE [DISTWIDTH] IN BLOOD BY AUTOMATED COUNT: 14.4 % (ref 10–15)
FLUAV RNA SPEC QL NAA+PROBE: NEGATIVE
FLUBV RNA RESP QL NAA+PROBE: NEGATIVE
GLUCOSE SERPL-MCNC: 81 MG/DL (ref 70–99)
HCO3 BLDV-SCNC: 30 MMOL/L (ref 21–28)
HCT VFR BLD AUTO: 36.5 % (ref 40–53)
HGB BLD-MCNC: 11.8 G/DL (ref 13.3–17.7)
HOLD SPECIMEN: NORMAL
HOLD SPECIMEN: NORMAL
IMM GRANULOCYTES # BLD: 0 10E3/UL
IMM GRANULOCYTES NFR BLD: 0 %
LYMPHOCYTES # BLD AUTO: 0.9 10E3/UL (ref 0.8–5.3)
LYMPHOCYTES NFR BLD AUTO: 15 %
MCH RBC QN AUTO: 30.6 PG (ref 26.5–33)
MCHC RBC AUTO-ENTMCNC: 32.3 G/DL (ref 31.5–36.5)
MCV RBC AUTO: 95 FL (ref 78–100)
MONOCYTES # BLD AUTO: 0.6 10E3/UL (ref 0–1.3)
MONOCYTES NFR BLD AUTO: 9 %
NEUTROPHILS # BLD AUTO: 4.4 10E3/UL (ref 1.6–8.3)
NEUTROPHILS NFR BLD AUTO: 75 %
NRBC # BLD AUTO: 0 10E3/UL
NRBC BLD AUTO-RTO: 0 /100
O2/TOTAL GAS SETTING VFR VENT: 0 %
OXYHGB MFR BLDV: 34 % (ref 70–75)
PCO2 BLDV: 52 MM HG (ref 40–50)
PH BLDV: 7.37 [PH] (ref 7.32–7.43)
PLATELET # BLD AUTO: 168 10E3/UL (ref 150–450)
PO2 BLDV: 24 MM HG (ref 25–47)
POTASSIUM SERPL-SCNC: 4 MMOL/L (ref 3.4–5.3)
RBC # BLD AUTO: 3.85 10E6/UL (ref 4.4–5.9)
RSV RNA SPEC NAA+PROBE: NEGATIVE
SARS-COV-2 RNA RESP QL NAA+PROBE: NEGATIVE
SODIUM SERPL-SCNC: 141 MMOL/L (ref 135–145)
TROPONIN T SERPL HS-MCNC: 25 NG/L
TROPONIN T SERPL HS-MCNC: 29 NG/L
WBC # BLD AUTO: 5.9 10E3/UL (ref 4–11)

## 2023-10-18 PROCEDURE — 85025 COMPLETE CBC W/AUTO DIFF WBC: CPT | Performed by: EMERGENCY MEDICINE

## 2023-10-18 PROCEDURE — 84484 ASSAY OF TROPONIN QUANT: CPT | Performed by: EMERGENCY MEDICINE

## 2023-10-18 PROCEDURE — 71046 X-RAY EXAM CHEST 2 VIEWS: CPT

## 2023-10-18 PROCEDURE — 36415 COLL VENOUS BLD VENIPUNCTURE: CPT | Performed by: EMERGENCY MEDICINE

## 2023-10-18 PROCEDURE — 82805 BLOOD GASES W/O2 SATURATION: CPT | Performed by: EMERGENCY MEDICINE

## 2023-10-18 PROCEDURE — 93005 ELECTROCARDIOGRAM TRACING: CPT

## 2023-10-18 PROCEDURE — 99285 EMERGENCY DEPT VISIT HI MDM: CPT | Mod: 25

## 2023-10-18 PROCEDURE — 80048 BASIC METABOLIC PNL TOTAL CA: CPT | Performed by: EMERGENCY MEDICINE

## 2023-10-18 PROCEDURE — 87637 SARSCOV2&INF A&B&RSV AMP PRB: CPT | Performed by: EMERGENCY MEDICINE

## 2023-10-18 ASSESSMENT — ACTIVITIES OF DAILY LIVING (ADL)
ADLS_ACUITY_SCORE: 35
ADLS_ACUITY_SCORE: 35

## 2023-10-18 NOTE — ED PROVIDER NOTES
History     Chief Complaint:  Shortness of Breath    The history is provided by the patient.      Jake Duane Pfleiger is a 89 year old male with history of cardiomyopathy, hypertension, hyperlipidemia, and diastolic CHF presenting for evaluation of shortness of breath. Jorge Luis explains that he has been experiencing intermittent episodes of shortness of breath since August. He denies noticing patterns of exacerbation, noting that it occurs even when sitting. He states that the shortness of breath seems to happen often in the evening. He denies chest pain, cough, fevers, leg swelling, and weight gain. He denies seeing a pulmonologist. Jorge Luis's son explains that he was prescribed lorazepam in August but has not taken it.    Independent Historian:   The patient's son supplements and endorses the above history.    Review of External Notes:   I reviewed ED note from 8/15/2023 for shortness of breath.  CT PE study was undertaken in which there was no pulmonary embolism but noted to have cardiomegaly, small right pleural effusion but no acute findings to account for symptoms    Medications:    Tenormin  Lasix  Levothyroxine  Prinivil  Ativan  Protonix  Paxil  Ambien    Past Medical History:    Anxiety  Arthritis  Atrial fibrillation  Cardiomyopathy  Depression  Diastolic CHF  Diverticulosis  GERD  GI bleed  Blood transfusion  Hypertension  Insomnia  Nodule of lower lobe of left lung  Pacemaker  Right patella fracture  Tachy-jairo syndrome  Thyroid disease  Hepatic lesion  Hyponatremia  DJD of hip    Past Surgical History:    Arthroplasty hip  Arthroplasty knee, right  Arthroplasty knee, left  Arthrotomy knee  Cholecystectomy  Colonoscopy x2  EGD  Right eye cataract removal  Implant pacemaker  Back surgery  Repair tendon quadriceps, right  Lumbar laminectomy  Hemorrhoidectomy    Physical Exam   Patient Vitals for the past 24 hrs:   BP Temp Temp src Pulse Resp SpO2   10/18/23 1700 (!) 175/96 -- -- 68 28 91 %   10/18/23 1645 (!)  162/103 -- -- 72 25 96 %   10/18/23 1630 (!) 175/107 -- -- 64 22 96 %   10/18/23 1620 (!) 165/91 -- -- 69 25 96 %   10/18/23 1610 (!) 187/86 -- -- 73 26 96 %   10/18/23 1559 -- -- -- 64 24 94 %   10/18/23 1500 (!) 182/94 -- -- 64 17 92 %   10/18/23 1415 128/66 97.7  F (36.5  C) Temporal 60 20 100 %     Physical Exam      HEENT:    Oropharynx is moist, without lesions or trismus.  Eyes:    Conjunctiva normal  Neck:     Supple, no meningismus.     CV:     Regular rate and rhythm.      No murmurs, rubs or gallops.       No unilateral leg swelling.       2+ radial pulses bilateral.    PULM:    Clear to auscultation bilateral.       No respiratory distress.      Good air exchange.     No rales or wheezing.     No stridor.  ABD:    Soft, non-tender, non-distended.       No pulsatile masses.       No rebound, guarding or rigidity.  MSK:     No gross deformity to all four extremities.   LYMPH:   No cervical lymphadenopathy.  NEURO:   Alert. Good muscle tone, no atrophy.  Skin:    Warm, dry and intact.    Psych:    Mood is good and affect is appropriate.      Emergency Department Course   ECG  ECG taken at 1506, ECG read at 1515  Atrial fibrillation with frequent ventricular-paced complexes  ST & T wave abnormality, consider anterolateral ischemia  Prolonged QT  Abnormal ECG  Rate 70 bpm. ND interval * ms. QRS duration 86 ms. QT/QTc 438/473 ms. P-R-T axes * 4 -19.     Imaging:  Chest XR,  PA & LAT   Final Result   IMPRESSION: Mild enlargement of the cardiac silhouette, not   significantly changed from prior CT. Pacemaker lead overlying the   right ventricle. Pulmonary vascular congestion with mild edema and   small bilateral pleural effusions. No moon airspace consolidation or   pneumothorax. No acute bony abnormality. Degenerative disc disease   throughout the thoracic spine.      KOREY RENNER MD            SYSTEM ID:  [id number]         Laboratory:  Labs Ordered and Resulted from Time of ED Arrival to Time of ED  Departure   BASIC METABOLIC PANEL - Abnormal       Result Value    Sodium 141      Potassium 4.0      Chloride 102      Carbon Dioxide (CO2) 28      Anion Gap 11      Urea Nitrogen 18.1      Creatinine 1.19 (*)     GFR Estimate 57 (*)     Calcium 9.6      Glucose 81     BLOOD GAS VENOUS WITH OXYHEMOGLOBIN - Abnormal    pH Venous 7.37      pCO2 Venous 52 (*)     pO2 Venous 24 (*)     Bicarbonate Venous 30 (*)     FIO2 0      Oxyhemoglobin Venous 34 (*)     Base Excess/Deficit 4.0 (*)    TROPONIN T, HIGH SENSITIVITY - Abnormal    Troponin T, High Sensitivity 29 (*)    CBC WITH PLATELETS AND DIFFERENTIAL - Abnormal    WBC Count 5.9      RBC Count 3.85 (*)     Hemoglobin 11.8 (*)     Hematocrit 36.5 (*)     MCV 95      MCH 30.6      MCHC 32.3      RDW 14.4      Platelet Count 168      % Neutrophils 75      % Lymphocytes 15      % Monocytes 9      Mids % (Monos, Eos, Basos)        % Eosinophils 1      % Basophils 0      % Immature Granulocytes 0      NRBCs per 100 WBC 0      Absolute Neutrophils 4.4      Absolute Lymphocytes 0.9      Absolute Monocytes 0.6      Mids Abs (Monos, Eos, Basos)        Absolute Eosinophils 0.0      Absolute Basophils 0.0      Absolute Immature Granulocytes 0.0      Absolute NRBCs 0.0     TROPONIN T, HIGH SENSITIVITY - Abnormal    Troponin T, High Sensitivity 25 (*)    INFLUENZA A/B, RSV, & SARS-COV2 PCR - Normal    Influenza A PCR Negative      Influenza B PCR Negative      RSV PCR Negative      SARS CoV2 PCR Negative        Emergency Department Course & Assessments:       Interventions:  Medications - No data to display     Assessments:  1429 I obtained history and examined the patient as noted above.   5013 I discussed findings and discharge with the patient. All questions answered.     Independent Interpretation (X-rays, CTs, rhythm strip):  I independent reviewed chest x-ray once there is no pneumothorax, enlarging pleural effusion or profound pulmonary edema    Consultations/Discussion of  Management or Tests:  None        Social Determinants of Health affecting care:   None    Disposition:  The patient was discharged to home.     Impression & Plan    Medical Decision Makin-year-old male presents with ongoing dyspnea for the last 2 months.  He has no evidence of acute bronchospasm.  EKG without ischemic changes.  Troponin is elevated consistent with prior levels.  2 hour delta troponin undertaken and unremarkable.  Chest x-ray has rule out evolving/enlarging pleural effusion.  No significant pulmonary edema.  Basic laboratory studies are at his baseline.  I believe his dyspnea is likely related to multifactorial sources including advanced age, anxiety, CHF, deconditioning.  Notification for admission or further work-up from the ED as this is clearly a persistent process.  Patient referred to pulmonology and recommended follow-up with PCP.  Return to ED for worsening symptoms.    Diagnosis:    ICD-10-CM    1. Dyspnea, unspecified type  R06.00         Scribe Disclosure:  Sunday PERRY, am serving as a scribe at 2:28 PM on 10/18/2023 to document services personally performed by Lenin Stein MD based on my observations and the provider's statements to me.   10/18/2023   Lenin Stein MD Matthews, Jeremiah R, MD  10/18/23 1905

## 2023-10-18 NOTE — ED TRIAGE NOTES
Patient states he had SOB started 2 weeks ago- worse today. Son states patient has a history of anxiety and gave him Ativan pta- that seems to have helped.   ABC intact alert and no distress.      Triage Assessment (Adult)       Row Name 10/18/23 9435          Triage Assessment    Airway WDL WDL        Respiratory WDL    Respiratory WDL WDL        Cardiac WDL    Cardiac WDL WDL        Peripheral/Neurovascular WDL    Peripheral Neurovascular WDL WDL        Cognitive/Neuro/Behavioral WDL    Cognitive/Neuro/Behavioral WDL WDL

## 2023-10-19 LAB
ATRIAL RATE - MUSE: 93 BPM
DIASTOLIC BLOOD PRESSURE - MUSE: NORMAL MMHG
INTERPRETATION ECG - MUSE: NORMAL
P AXIS - MUSE: NORMAL DEGREES
PR INTERVAL - MUSE: NORMAL MS
QRS DURATION - MUSE: 86 MS
QT - MUSE: 438 MS
QTC - MUSE: 473 MS
R AXIS - MUSE: 4 DEGREES
SYSTOLIC BLOOD PRESSURE - MUSE: NORMAL MMHG
T AXIS - MUSE: -19 DEGREES
VENTRICULAR RATE- MUSE: 70 BPM

## 2024-01-12 ENCOUNTER — ANCILLARY PROCEDURE (OUTPATIENT)
Dept: CARDIOLOGY | Facility: CLINIC | Age: 89
End: 2024-01-12
Attending: INTERNAL MEDICINE
Payer: MEDICARE

## 2024-01-12 DIAGNOSIS — Z95.0 CARDIAC PACEMAKER IN SITU: ICD-10-CM

## 2024-01-12 PROCEDURE — 93294 REM INTERROG EVL PM/LDLS PM: CPT | Performed by: INTERNAL MEDICINE

## 2024-01-12 PROCEDURE — 93296 REM INTERROG EVL PM/IDS: CPT | Performed by: INTERNAL MEDICINE

## 2024-01-15 LAB
MDC_IDC_LEAD_CONNECTION_STATUS: NORMAL
MDC_IDC_LEAD_IMPLANT_DT: NORMAL
MDC_IDC_LEAD_LOCATION: NORMAL
MDC_IDC_LEAD_MFG: NORMAL
MDC_IDC_LEAD_MODEL: NORMAL
MDC_IDC_LEAD_POLARITY_TYPE: NORMAL
MDC_IDC_LEAD_SERIAL: NORMAL
MDC_IDC_MSMT_BATTERY_DTM: NORMAL
MDC_IDC_MSMT_BATTERY_REMAINING_LONGEVITY: 25 MO
MDC_IDC_MSMT_BATTERY_REMAINING_PERCENTAGE: 19 %
MDC_IDC_MSMT_BATTERY_RRT_TRIGGER: NORMAL
MDC_IDC_MSMT_BATTERY_STATUS: NORMAL
MDC_IDC_MSMT_BATTERY_VOLTAGE: 2.89 V
MDC_IDC_MSMT_LEADCHNL_RV_IMPEDANCE_VALUE: 380 OHM
MDC_IDC_MSMT_LEADCHNL_RV_LEAD_CHANNEL_STATUS: NORMAL
MDC_IDC_MSMT_LEADCHNL_RV_PACING_THRESHOLD_AMPLITUDE: 1 V
MDC_IDC_MSMT_LEADCHNL_RV_PACING_THRESHOLD_PULSEWIDTH: 0.5 MS
MDC_IDC_MSMT_LEADCHNL_RV_SENSING_INTR_AMPL: 11 MV
MDC_IDC_PG_IMPLANT_DTM: NORMAL
MDC_IDC_PG_MFG: NORMAL
MDC_IDC_PG_MODEL: NORMAL
MDC_IDC_PG_SERIAL: NORMAL
MDC_IDC_PG_TYPE: NORMAL
MDC_IDC_SESS_CLINIC_NAME: NORMAL
MDC_IDC_SESS_DTM: NORMAL
MDC_IDC_SESS_REPROGRAMMED: NO
MDC_IDC_SESS_TYPE: NORMAL
MDC_IDC_SET_BRADY_LOWRATE: 60 {BEATS}/MIN
MDC_IDC_SET_BRADY_MAX_SENSOR_RATE: 120 {BEATS}/MIN
MDC_IDC_SET_BRADY_MODE: NORMAL
MDC_IDC_SET_LEADCHNL_RV_PACING_AMPLITUDE: 2 V
MDC_IDC_SET_LEADCHNL_RV_PACING_ANODE_ELECTRODE_1: NORMAL
MDC_IDC_SET_LEADCHNL_RV_PACING_ANODE_LOCATION_1: NORMAL
MDC_IDC_SET_LEADCHNL_RV_PACING_CAPTURE_MODE: NORMAL
MDC_IDC_SET_LEADCHNL_RV_PACING_CATHODE_ELECTRODE_1: NORMAL
MDC_IDC_SET_LEADCHNL_RV_PACING_CATHODE_LOCATION_1: NORMAL
MDC_IDC_SET_LEADCHNL_RV_PACING_POLARITY: NORMAL
MDC_IDC_SET_LEADCHNL_RV_PACING_PULSEWIDTH: 0.5 MS
MDC_IDC_SET_LEADCHNL_RV_SENSING_ADAPTATION_MODE: NORMAL
MDC_IDC_SET_LEADCHNL_RV_SENSING_ANODE_ELECTRODE_1: NORMAL
MDC_IDC_SET_LEADCHNL_RV_SENSING_ANODE_LOCATION_1: NORMAL
MDC_IDC_SET_LEADCHNL_RV_SENSING_CATHODE_ELECTRODE_1: NORMAL
MDC_IDC_SET_LEADCHNL_RV_SENSING_CATHODE_LOCATION_1: NORMAL
MDC_IDC_SET_LEADCHNL_RV_SENSING_POLARITY: NORMAL
MDC_IDC_SET_LEADCHNL_RV_SENSING_SENSITIVITY: 2 MV
MDC_IDC_STAT_AT_DTM_END: NORMAL
MDC_IDC_STAT_AT_DTM_START: NORMAL
MDC_IDC_STAT_BRADY_DTM_END: NORMAL
MDC_IDC_STAT_BRADY_DTM_START: NORMAL
MDC_IDC_STAT_BRADY_RV_PERCENT_PACED: 73 %
MDC_IDC_STAT_CRT_DTM_END: NORMAL
MDC_IDC_STAT_CRT_DTM_START: NORMAL
MDC_IDC_STAT_HEART_RATE_DTM_END: NORMAL
MDC_IDC_STAT_HEART_RATE_DTM_START: NORMAL
MDC_IDC_STAT_HEART_RATE_VENTRICULAR_MAX: 210 {BEATS}/MIN
MDC_IDC_STAT_HEART_RATE_VENTRICULAR_MEAN: 68 {BEATS}/MIN
MDC_IDC_STAT_HEART_RATE_VENTRICULAR_MIN: 60 {BEATS}/MIN

## 2024-04-11 ENCOUNTER — TELEPHONE (OUTPATIENT)
Dept: CARDIOLOGY | Facility: CLINIC | Age: 89
End: 2024-04-11
Payer: MEDICARE

## 2024-04-11 DIAGNOSIS — I49.5 SICK SINUS SYNDROME (H): ICD-10-CM

## 2024-04-11 DIAGNOSIS — Z95.0 CARDIAC PACEMAKER IN SITU: Primary | ICD-10-CM

## 2024-04-11 DIAGNOSIS — I42.9 CARDIOMYOPATHY (H): ICD-10-CM

## 2024-04-11 DIAGNOSIS — I31.39 PERICARDIAL EFFUSION: ICD-10-CM

## 2024-04-11 DIAGNOSIS — I49.5 TACHY-BRADY SYNDROME (H): ICD-10-CM

## 2024-04-11 DIAGNOSIS — I48.91 ATRIAL FIBRILLATION (H): ICD-10-CM

## 2024-04-11 NOTE — TELEPHONE ENCOUNTER
Fax received from Dr. Rolly Salmon from [facility]:      Patient has not been seen in the clinic since 2/3/2022. Needs to be seen by an ISABELLA for medical clearance. Order placed.    Called patient's son Gallito. He is aware that his dad needs to be seen in clinic by a provider for clearance. He wants the appt to be done on the same day as his device check to avoid multiple trips if possible.   He does want to get this done as soon as possible as his dad has been dealing with his dental issues for over a month.    Gallito was transferred to our urgent scheduling line for assistance.    NEDA VALENTINE

## 2024-04-12 NOTE — TELEPHONE ENCOUNTER
Linda Yadav RN  You21 hours ago (1:03 PM)     PATRICIA Jones,  I would recommend you give the form to Radha and she can give to the chart  who has him for that day. Thanks!       Unfortunately Laya has not been in clinic. I faxed form to [employer] today to ensure form is received and filled out after patient's appt with Dr. Coy on Monday.    NEDA VALENTINE

## 2024-04-15 ENCOUNTER — OFFICE VISIT (OUTPATIENT)
Dept: CARDIOLOGY | Facility: CLINIC | Age: 89
End: 2024-04-15
Payer: MEDICARE

## 2024-04-15 ENCOUNTER — ANCILLARY PROCEDURE (OUTPATIENT)
Dept: CARDIOLOGY | Facility: CLINIC | Age: 89
End: 2024-04-15
Attending: INTERNAL MEDICINE
Payer: MEDICARE

## 2024-04-15 VITALS
HEIGHT: 69 IN | WEIGHT: 173.9 LBS | HEART RATE: 60 BPM | SYSTOLIC BLOOD PRESSURE: 178 MMHG | OXYGEN SATURATION: 97 % | BODY MASS INDEX: 25.76 KG/M2 | DIASTOLIC BLOOD PRESSURE: 75 MMHG

## 2024-04-15 DIAGNOSIS — Z95.0 CARDIAC PACEMAKER IN SITU: ICD-10-CM

## 2024-04-15 DIAGNOSIS — I49.5 SICK SINUS SYNDROME (H): ICD-10-CM

## 2024-04-15 DIAGNOSIS — I48.0 PAROXYSMAL ATRIAL FIBRILLATION (H): ICD-10-CM

## 2024-04-15 DIAGNOSIS — I42.9 CARDIOMYOPATHY, UNSPECIFIED TYPE (H): ICD-10-CM

## 2024-04-15 DIAGNOSIS — I31.39 PERICARDIAL EFFUSION: ICD-10-CM

## 2024-04-15 DIAGNOSIS — I49.5 TACHY-BRADY SYNDROME (H): ICD-10-CM

## 2024-04-15 PROCEDURE — 93280 PM DEVICE PROGR EVAL DUAL: CPT | Performed by: INTERNAL MEDICINE

## 2024-04-15 PROCEDURE — 99214 OFFICE O/P EST MOD 30 MIN: CPT | Mod: 25 | Performed by: INTERNAL MEDICINE

## 2024-04-15 NOTE — PROGRESS NOTES
CARDIOLOGY CLINIC CONSULTATION    PRIMARY CARE PHYSICIAN:  Herve Sweet    HISTORY OF PRESENT ILLNESS:  This is a very pleasant 89-year-old male who has been referred to cardiology as an urgent add-on visit for dental clearance.  The patient is here with his son today.  The patient walks with a walker and lives in an assisted living.  Doing quite okay for being 89 years of age.  He used to follow-up with my EP partner last seen in 2022.  He has the following set of pertinent medical issues    A.  Permanent atrial fibrillation with tachycardia/bradycardia.  Single-chamber pacemaker implantation in 2012.  On warfarin until 2020.  Discontinued because of recurrent GI bleeding.  [id number] of 4.  He was deemed not to be a fit candidate for Watchman device due to age and frailty.  B.  Chronic diastolic CHF.  Hospital admission in 2016 with CHF related to dietary indiscretion.  Not on diuretics at this time  C.  Hypertension.  D.  Hypothyroidism.    The patient is expecting tooth removal under local anesthesia with Novocain.  Cardiology consultation was requested.  The patient denies any cardiovascular symptoms no angina heart failure syncope presyncope.  He walks with a walker no recent falls.  His most recent pacemaker interrogation revealed permanent atrial fibrillation and 34% ventricular pacing.  As mentioned he is not on anticoagulation due to recurrent bleeding.  Despite walking with a walker, I do believe the patient's functional capacity is greater than 4 METS.  He is anticipating a low risk surgery.    PAST MEDICAL HISTORY:  Past Medical History:   Diagnosis Date    Anxiety     Arthritis     Atrial fibrillation (H)     Cardiomyopathy (H)     Depression     Diastolic CHF (H) 04/10/2016    Diverticulosis     Gastro-oesophageal reflux disease     GI bleed 10/02/2018    Lower    History of blood transfusion     Hypertension     Insomnia     Nodule of lower lobe of left lung     CT Chest 1/2019-stable     Pacemaker     [employer] Dual Pacemaker    Permanent atrial fibrillation (H)     Right patella fracture     Tachy-jairo syndrome (H)     Thyroid disease     Hypothyrodism        MEDICATIONS:  Current Outpatient Medications   Medication Sig Dispense Refill    acetaminophen (TYLENOL) 325 MG tablet Take 2 tablets (650 mg) by mouth every 6 hours as needed for mild pain or fever 50 tablet 0    Ascorbic Acid 500 MG CHEW Take 500 mg by mouth 2 times daily       atenolol (TENORMIN) 50 MG tablet Take 25 mg by mouth daily      ferrous sulfate (FEROSUL) 325 (65 Fe) MG tablet Take 325 mg by mouth 2 times daily      furosemide (LASIX) 20 MG tablet Take 1 tablet (20 mg) by mouth daily      latanoprost (XALATAN) 0.005 % ophthalmic solution Place 1 drop into both eyes daily      levothyroxine (SYNTHROID) 100 MCG tablet Take 100 mcg by mouth daily       lisinopril (PRINIVIL) 10 MG tablet Take 1 tablet (10 mg) by mouth daily      LORazepam (ATIVAN) 0.5 MG tablet Take 1 tablet (0.5 mg) by mouth 2 times daily as needed for anxiety (dyspnea) 15 tablet 0    pantoprazole (PROTONIX) 40 MG EC tablet Take 1 tablet (40 mg) by mouth 2 times daily (before meals) 60 tablet 0    paroxetine (PAXIL) 20 MG tablet Take 20 mg by mouth every morning.      polyethylene glycol (MIRALAX/GLYCOLAX) packet Take 1 packet by mouth daily as needed       Wheat Dextrin (BENEFIBER PO) Take by mouth daily as needed       zolpidem (AMBIEN) 5 MG tablet Take 1 tablet (5 mg) by mouth nightly as needed for sleep 15 tablet 0    fish oil-omega-3 fatty acids (FISH OIL) 1000 MG capsule Take 1 g by mouth 2 times daily  (Patient not taking: Reported on 4/15/2024)       No current facility-administered medications for this visit.       SOCIAL HISTORY:  I have reviewed this patient's social history and updated it with pertinent information if needed. Jorge Luis Duane Pfleiger  reports that he quit smoking about 42 years ago. His smoking use included cigarettes, pipe, and cigars. He  started smoking about 67 years ago. He has a 12.5 pack-year smoking history. He has never used smokeless tobacco. He reports that he does not drink alcohol and does not use drugs.    PHYSICAL EXAM:  Pulse:  [60] 60  BP: (178-180)/(75-77) 178/75  SpO2:  [97 %] 97 %  173 lbs 14.4 oz    Constitutional: alert, no distress  Respiratory: Good bilateral air entry  Cardiovascular: Regular heart sounds with a harsh systolic murmur but S2 is preserved.  Murmur is peaking in mid systole there is no edema  GI: nondistended  Neuropsychiatric: appropriate affact    ASSESSMENT: Pertinent issues addressed/ reviewed during this cardiology visit  Permanent atrial fibrillation  Sick sinus syndrome/tachybradycardia syndrome status post single-chamber VVI pacemaker  Systolic murmur  Preoperative consultation    RECOMMENDATIONS:  In regards to preoperative workup, patient is quite compensated on exam without any acute heart failure or ACS or EP related symptoms.  He can proceed with his dental work with low risk of expected cardiovascular complications.  Given his harsh systolic murmur, I will get an echocardiogram he has not had an many years.  I suspect he has some degree of aortic stenosis however this would not change preoperative recommendations as mentioned above.    Will follow the results of the echocardiogram once available.  He can proceed with dental work    It was a pleasure seeing this patient in clinic today. Please do not hesitate to contact me with any future questions.     ADOLPH Maldonado, [facility]  Cardiology - [facility]  April 15, 2024    Review of the result(s) of each unique test - Last CBC BMP lipids ECG device interrogation echo     The level of medical decision making during this visit was of moderate complexity.    This note was completed in part using dictation via the Dragon voice recognition software. Some word and grammatical errors may occur and must be interpreted in the appropriate clinical context.  If  there are any questions pertaining to this issue, please contact me for further clarification.

## 2024-04-15 NOTE — LETTER
4/15/2024    Herve TadeonteTed  [address] [facility] [address]    RE: Jorge Luis Duane Smita       Dear Colleague,     I had the pleasure of seeing Jake Duane Pfleiger in the [facility].  CARDIOLOGY CLINIC CONSULTATION    PRIMARY CARE PHYSICIAN:  Herve Sweet    HISTORY OF PRESENT ILLNESS:  This is a very pleasant 89-year-old male who has been referred to cardiology as an urgent add-on visit for dental clearance.  The patient is here with his son today.  The patient walks with a walker and lives in an assisted living.  Doing quite okay for being 89 years of age.  He used to follow-up with my EP partner last seen in 2022.  He has the following set of pertinent medical issues    A.  Permanent atrial fibrillation with tachycardia/bradycardia.  Single-chamber pacemaker implantation in 2012.  On warfarin until 2020.  Discontinued because of recurrent GI bleeding.  [id number] of 4.  He was deemed not to be a fit candidate for Watchman device due to age and frailty.  B.  Chronic diastolic CHF.  Hospital admission in 2016 with CHF related to dietary indiscretion.  Not on diuretics at this time  C.  Hypertension.  D.  Hypothyroidism.    The patient is expecting tooth removal under local anesthesia with Novocain.  Cardiology consultation was requested.  The patient denies any cardiovascular symptoms no angina heart failure syncope presyncope.  He walks with a walker no recent falls.  His most recent pacemaker interrogation revealed permanent atrial fibrillation and 34% ventricular pacing.  As mentioned he is not on anticoagulation due to recurrent bleeding.  Despite walking with a walker, I do believe the patient's functional capacity is greater than 4 METS.  He is anticipating a low risk surgery.    PAST MEDICAL HISTORY:  Past Medical History:   Diagnosis Date    Anxiety     Arthritis     Atrial fibrillation (H)     Cardiomyopathy (H)     Depression     Diastolic CHF (H)  04/10/2016    Diverticulosis     Gastro-oesophageal reflux disease     GI bleed 10/02/2018    Lower    History of blood transfusion     Hypertension     Insomnia     Nodule of lower lobe of left lung     CT Chest 1/2019-stable    Pacemaker     [employer] Dual Pacemaker    Permanent atrial fibrillation (H)     Right patella fracture     Tachy-jairo syndrome (H)     Thyroid disease     Hypothyrodism        MEDICATIONS:  Current Outpatient Medications   Medication Sig Dispense Refill    acetaminophen (TYLENOL) 325 MG tablet Take 2 tablets (650 mg) by mouth every 6 hours as needed for mild pain or fever 50 tablet 0    Ascorbic Acid 500 MG CHEW Take 500 mg by mouth 2 times daily       atenolol (TENORMIN) 50 MG tablet Take 25 mg by mouth daily      ferrous sulfate (FEROSUL) 325 (65 Fe) MG tablet Take 325 mg by mouth 2 times daily      furosemide (LASIX) 20 MG tablet Take 1 tablet (20 mg) by mouth daily      latanoprost (XALATAN) 0.005 % ophthalmic solution Place 1 drop into both eyes daily      levothyroxine (SYNTHROID) 100 MCG tablet Take 100 mcg by mouth daily       lisinopril (PRINIVIL) 10 MG tablet Take 1 tablet (10 mg) by mouth daily      LORazepam (ATIVAN) 0.5 MG tablet Take 1 tablet (0.5 mg) by mouth 2 times daily as needed for anxiety (dyspnea) 15 tablet 0    pantoprazole (PROTONIX) 40 MG EC tablet Take 1 tablet (40 mg) by mouth 2 times daily (before meals) 60 tablet 0    paroxetine (PAXIL) 20 MG tablet Take 20 mg by mouth every morning.      polyethylene glycol (MIRALAX/GLYCOLAX) packet Take 1 packet by mouth daily as needed       Wheat Dextrin (BENEFIBER PO) Take by mouth daily as needed       zolpidem (AMBIEN) 5 MG tablet Take 1 tablet (5 mg) by mouth nightly as needed for sleep 15 tablet 0    fish oil-omega-3 fatty acids (FISH OIL) 1000 MG capsule Take 1 g by mouth 2 times daily  (Patient not taking: Reported on 4/15/2024)       No current facility-administered medications for this visit.       SOCIAL  HISTORY:  I have reviewed this patient's social history and updated it with pertinent information if needed. Jake Duane Pfleiger  reports that he quit smoking about 42 years ago. His smoking use included cigarettes, pipe, and cigars. He started smoking about 67 years ago. He has a 12.5 pack-year smoking history. He has never used smokeless tobacco. He reports that he does not drink alcohol and does not use drugs.    PHYSICAL EXAM:  Pulse:  [60] 60  BP: (178-180)/(75-77) 178/75  SpO2:  [97 %] 97 %  173 lbs 14.4 oz    Constitutional: alert, no distress  Respiratory: Good bilateral air entry  Cardiovascular: Regular heart sounds with a harsh systolic murmur but S2 is preserved.  Murmur is peaking in mid systole there is no edema  GI: nondistended  Neuropsychiatric: appropriate affact    ASSESSMENT: Pertinent issues addressed/ reviewed during this cardiology visit  Permanent atrial fibrillation  Sick sinus syndrome/tachybradycardia syndrome status post single-chamber VVI pacemaker  Systolic murmur  Preoperative consultation    RECOMMENDATIONS:  In regards to preoperative workup, patient is quite compensated on exam without any acute heart failure or ACS or EP related symptoms.  He can proceed with his dental work with low risk of expected cardiovascular complications.  Given his harsh systolic murmur, I will get an echocardiogram he has not had an many years.  I suspect he has some degree of aortic stenosis however this would not change preoperative recommendations as mentioned above.    Will follow the results of the echocardiogram once available.  He can proceed with dental work    It was a pleasure seeing this patient in clinic today. Please do not hesitate to contact me with any future questions.     ADOLPH Maldonado, [facility]  Cardiology - [facility]  April 15, 2024    Review of the result(s) of each unique test - Last CBC BMP lipids ECG device interrogation echo     The level of medical decision making during this  visit was of moderate complexity.    This note was completed in part using dictation via the Dragon voice recognition software. Some word and grammatical errors may occur and must be interpreted in the appropriate clinical context.  If there are any questions pertaining to this issue, please contact me for further clarification.    Thank you for allowing me to participate in the care of your patient.      Sincerely,     Jaime Coy MD     [facility]  cc:   Rene Stephens MD  [address]  [place] [address]

## 2024-04-16 ENCOUNTER — TELEPHONE (OUTPATIENT)
Dept: CARDIOLOGY | Facility: CLINIC | Age: 89
End: 2024-04-16
Payer: MEDICARE

## 2024-04-16 NOTE — TELEPHONE ENCOUNTER
I called pt's son back. He said he needs 's office visit note with clearance for dental procedure to be faxed to [facility] in [place]. He said he gave  the contact info at the appt. I called [facility] and was told to fax the note to [phone]. Note has been right faxed. Juan Carlos VALENTINE April 16, 2024, 10:52 AM

## 2024-04-16 NOTE — TELEPHONE ENCOUNTER
[facility] Call Center    Phone Message    May a detailed message be left on voicemail: yes     Reason for Call: Other: Pt son called in wanting to speak with care armando thorpe regarding oral surgry, please call back for further discussion, says has not received the clearance. Wants the clearance to be sent to oral surgeon      Action Taken: Other: cardiology    Travel Screening: Not Applicable    Thank you!  [facility]

## 2024-04-17 NOTE — TELEPHONE ENCOUNTER
Office visit note faxed to number providerJuventino Gomez Gallito updated by phone.  Lois Goldsmith RN on 4/17/2024 at 4:00 PM

## 2024-04-17 NOTE — TELEPHONE ENCOUNTER
Gallito called regarding [facility] in [place] still have not receive the fax. Please refax to [phone]. Once fax again please call Gallito. Thank you

## 2024-04-22 LAB
MDC_IDC_LEAD_CONNECTION_STATUS: NORMAL
MDC_IDC_LEAD_IMPLANT_DT: NORMAL
MDC_IDC_LEAD_LOCATION: NORMAL
MDC_IDC_LEAD_MFG: NORMAL
MDC_IDC_LEAD_MODEL: NORMAL
MDC_IDC_LEAD_POLARITY_TYPE: NORMAL
MDC_IDC_LEAD_SERIAL: NORMAL
MDC_IDC_MSMT_BATTERY_REMAINING_LONGEVITY: 24 MO
MDC_IDC_MSMT_BATTERY_STATUS: NORMAL
MDC_IDC_MSMT_BATTERY_VOLTAGE: 2.87 V
MDC_IDC_MSMT_LEADCHNL_RV_IMPEDANCE_VALUE: 375 OHM
MDC_IDC_MSMT_LEADCHNL_RV_PACING_THRESHOLD_AMPLITUDE: 0.88 V
MDC_IDC_MSMT_LEADCHNL_RV_PACING_THRESHOLD_AMPLITUDE: 1 V
MDC_IDC_MSMT_LEADCHNL_RV_PACING_THRESHOLD_PULSEWIDTH: 0.5 MS
MDC_IDC_MSMT_LEADCHNL_RV_PACING_THRESHOLD_PULSEWIDTH: 0.5 MS
MDC_IDC_MSMT_LEADCHNL_RV_SENSING_INTR_AMPL: 10.8 MV
MDC_IDC_PG_IMPLANT_DTM: NORMAL
MDC_IDC_PG_MFG: NORMAL
MDC_IDC_PG_MODEL: NORMAL
MDC_IDC_PG_SERIAL: NORMAL
MDC_IDC_PG_TYPE: NORMAL
MDC_IDC_SESS_CLINIC_NAME: NORMAL
MDC_IDC_SESS_DTM: NORMAL
MDC_IDC_SESS_TYPE: NORMAL
MDC_IDC_SET_BRADY_HYSTRATE: 50 {BEATS}/MIN
MDC_IDC_SET_BRADY_LOWRATE: 60 {BEATS}/MIN
MDC_IDC_SET_BRADY_MAX_SENSOR_RATE: 120 {BEATS}/MIN
MDC_IDC_SET_BRADY_MODE: NORMAL
MDC_IDC_SET_BRADY_NIGHT_RATE: NORMAL
MDC_IDC_SET_LEADCHNL_RV_PACING_AMPLITUDE: 1.12
MDC_IDC_SET_LEADCHNL_RV_PACING_CAPTURE_MODE: NORMAL
MDC_IDC_SET_LEADCHNL_RV_PACING_POLARITY: NORMAL
MDC_IDC_SET_LEADCHNL_RV_PACING_PULSEWIDTH: 0.5 MS
MDC_IDC_SET_LEADCHNL_RV_SENSING_POLARITY: NORMAL
MDC_IDC_SET_LEADCHNL_RV_SENSING_SENSITIVITY: 2 MV
MDC_IDC_STAT_BRADY_RV_PERCENT_PACED: 74 %

## 2024-05-22 ENCOUNTER — HOSPITAL ENCOUNTER (OUTPATIENT)
Dept: CARDIOLOGY | Facility: CLINIC | Age: 89
Discharge: HOME | End: 2024-05-22
Attending: INTERNAL MEDICINE | Admitting: INTERNAL MEDICINE
Payer: MEDICARE

## 2024-05-22 DIAGNOSIS — I48.0 PAROXYSMAL ATRIAL FIBRILLATION (H): ICD-10-CM

## 2024-05-22 DIAGNOSIS — I49.5 TACHY-BRADY SYNDROME (H): ICD-10-CM

## 2024-05-22 DIAGNOSIS — I31.39 PERICARDIAL EFFUSION: ICD-10-CM

## 2024-05-22 LAB — LVEF ECHO: NORMAL

## 2024-05-22 PROCEDURE — 93306 TTE W/DOPPLER COMPLETE: CPT

## 2024-05-22 PROCEDURE — 93306 TTE W/DOPPLER COMPLETE: CPT | Mod: 26 | Performed by: INTERNAL MEDICINE

## 2024-05-31 ENCOUNTER — CARE COORDINATION (OUTPATIENT)
Dept: CARDIOLOGY | Facility: CLINIC | Age: 89
End: 2024-05-31
Payer: MEDICARE

## 2024-05-31 DIAGNOSIS — I42.9 CARDIOMYOPATHY (H): Primary | ICD-10-CM

## 2024-05-31 DIAGNOSIS — I35.0 AORTIC STENOSIS: ICD-10-CM

## 2024-05-31 DIAGNOSIS — I10 PRIMARY HYPERTENSION: ICD-10-CM

## 2024-05-31 NOTE — PROGRESS NOTES
Called pt son Duane per note he is pt's contact. No answer. Left detailed VM informing them of pt's echo result and follow up plan. Roxie Watkins RN on 5/31/2024 at 12:52 PM        Echocardiogram Complete   Result Care Coordination  Result Notes     Jaime Coy MD  5/25/2024  2:56 PM CDT Back to Top      Thank you.  Follow-up in 1 year in cardiology clinic with an echocardiogram.  She has moderate aortic stenosis.  No further workup required from my standpoint before dental workup.

## 2024-08-06 ENCOUNTER — ANCILLARY PROCEDURE (OUTPATIENT)
Dept: CARDIOLOGY | Facility: CLINIC | Age: 89
End: 2024-08-06
Attending: INTERNAL MEDICINE
Payer: COMMERCIAL

## 2024-08-06 DIAGNOSIS — Z95.0 CARDIAC PACEMAKER IN SITU: ICD-10-CM

## 2024-08-06 PROCEDURE — 93296 REM INTERROG EVL PM/IDS: CPT | Performed by: INTERNAL MEDICINE

## 2024-08-06 PROCEDURE — 93294 REM INTERROG EVL PM/LDLS PM: CPT | Performed by: INTERNAL MEDICINE

## 2024-08-07 LAB
MDC_IDC_LEAD_CONNECTION_STATUS: NORMAL
MDC_IDC_LEAD_IMPLANT_DT: NORMAL
MDC_IDC_LEAD_LOCATION: NORMAL
MDC_IDC_LEAD_MFG: NORMAL
MDC_IDC_LEAD_MODEL: NORMAL
MDC_IDC_LEAD_POLARITY_TYPE: NORMAL
MDC_IDC_LEAD_SERIAL: NORMAL
MDC_IDC_MSMT_BATTERY_DTM: NORMAL
MDC_IDC_MSMT_BATTERY_REMAINING_LONGEVITY: 20 MO
MDC_IDC_MSMT_BATTERY_REMAINING_PERCENTAGE: 14 %
MDC_IDC_MSMT_BATTERY_RRT_TRIGGER: NORMAL
MDC_IDC_MSMT_BATTERY_STATUS: NORMAL
MDC_IDC_MSMT_BATTERY_VOLTAGE: 2.86 V
MDC_IDC_MSMT_LEADCHNL_RV_IMPEDANCE_VALUE: 400 OHM
MDC_IDC_MSMT_LEADCHNL_RV_LEAD_CHANNEL_STATUS: NORMAL
MDC_IDC_MSMT_LEADCHNL_RV_PACING_THRESHOLD_AMPLITUDE: 0.88 V
MDC_IDC_MSMT_LEADCHNL_RV_PACING_THRESHOLD_PULSEWIDTH: 0.5 MS
MDC_IDC_MSMT_LEADCHNL_RV_SENSING_INTR_AMPL: 10.3 MV
MDC_IDC_PG_IMPLANT_DTM: NORMAL
MDC_IDC_PG_MFG: NORMAL
MDC_IDC_PG_MODEL: NORMAL
MDC_IDC_PG_SERIAL: NORMAL
MDC_IDC_PG_TYPE: NORMAL
MDC_IDC_SESS_CLINIC_NAME: NORMAL
MDC_IDC_SESS_DTM: NORMAL
MDC_IDC_SESS_REPROGRAMMED: NO
MDC_IDC_SESS_TYPE: NORMAL
MDC_IDC_SET_BRADY_HYSTRATE: 50 {BEATS}/MIN
MDC_IDC_SET_BRADY_LOWRATE: 60 {BEATS}/MIN
MDC_IDC_SET_BRADY_MAX_SENSOR_RATE: 120 {BEATS}/MIN
MDC_IDC_SET_BRADY_MODE: NORMAL
MDC_IDC_SET_LEADCHNL_RV_PACING_AMPLITUDE: 1.12
MDC_IDC_SET_LEADCHNL_RV_PACING_ANODE_ELECTRODE_1: NORMAL
MDC_IDC_SET_LEADCHNL_RV_PACING_ANODE_LOCATION_1: NORMAL
MDC_IDC_SET_LEADCHNL_RV_PACING_CAPTURE_MODE: NORMAL
MDC_IDC_SET_LEADCHNL_RV_PACING_CATHODE_ELECTRODE_1: NORMAL
MDC_IDC_SET_LEADCHNL_RV_PACING_CATHODE_LOCATION_1: NORMAL
MDC_IDC_SET_LEADCHNL_RV_PACING_POLARITY: NORMAL
MDC_IDC_SET_LEADCHNL_RV_PACING_PULSEWIDTH: 0.5 MS
MDC_IDC_SET_LEADCHNL_RV_SENSING_ADAPTATION_MODE: NORMAL
MDC_IDC_SET_LEADCHNL_RV_SENSING_ANODE_ELECTRODE_1: NORMAL
MDC_IDC_SET_LEADCHNL_RV_SENSING_ANODE_LOCATION_1: NORMAL
MDC_IDC_SET_LEADCHNL_RV_SENSING_CATHODE_ELECTRODE_1: NORMAL
MDC_IDC_SET_LEADCHNL_RV_SENSING_CATHODE_LOCATION_1: NORMAL
MDC_IDC_SET_LEADCHNL_RV_SENSING_POLARITY: NORMAL
MDC_IDC_SET_LEADCHNL_RV_SENSING_SENSITIVITY: 2 MV
MDC_IDC_STAT_AT_DTM_END: NORMAL
MDC_IDC_STAT_AT_DTM_START: NORMAL
MDC_IDC_STAT_BRADY_DTM_END: NORMAL
MDC_IDC_STAT_BRADY_DTM_START: NORMAL
MDC_IDC_STAT_BRADY_RV_PERCENT_PACED: 60 %
MDC_IDC_STAT_CRT_DTM_END: NORMAL
MDC_IDC_STAT_CRT_DTM_START: NORMAL
MDC_IDC_STAT_HEART_RATE_DTM_END: NORMAL
MDC_IDC_STAT_HEART_RATE_DTM_START: NORMAL
MDC_IDC_STAT_HEART_RATE_VENTRICULAR_MAX: 230 {BEATS}/MIN
MDC_IDC_STAT_HEART_RATE_VENTRICULAR_MEAN: 68 {BEATS}/MIN
MDC_IDC_STAT_HEART_RATE_VENTRICULAR_MIN: 40 {BEATS}/MIN

## 2024-11-12 ENCOUNTER — ANCILLARY PROCEDURE (OUTPATIENT)
Dept: CARDIOLOGY | Facility: CLINIC | Age: 89
End: 2024-11-12
Attending: INTERNAL MEDICINE
Payer: COMMERCIAL

## 2024-11-12 DIAGNOSIS — Z95.0 CARDIAC PACEMAKER IN SITU: ICD-10-CM

## 2024-11-12 LAB
MDC_IDC_LEAD_CONNECTION_STATUS: NORMAL
MDC_IDC_LEAD_IMPLANT_DT: NORMAL
MDC_IDC_LEAD_LOCATION: NORMAL
MDC_IDC_LEAD_MFG: NORMAL
MDC_IDC_LEAD_MODEL: NORMAL
MDC_IDC_LEAD_POLARITY_TYPE: NORMAL
MDC_IDC_LEAD_SERIAL: NORMAL
MDC_IDC_MSMT_BATTERY_DTM: NORMAL
MDC_IDC_MSMT_BATTERY_REMAINING_LONGEVITY: 17 MO
MDC_IDC_MSMT_BATTERY_REMAINING_PERCENTAGE: 12 %
MDC_IDC_MSMT_BATTERY_RRT_TRIGGER: NORMAL
MDC_IDC_MSMT_BATTERY_STATUS: NORMAL
MDC_IDC_MSMT_BATTERY_VOLTAGE: 2.84 V
MDC_IDC_MSMT_LEADCHNL_RV_IMPEDANCE_VALUE: 390 OHM
MDC_IDC_MSMT_LEADCHNL_RV_LEAD_CHANNEL_STATUS: NORMAL
MDC_IDC_MSMT_LEADCHNL_RV_PACING_THRESHOLD_AMPLITUDE: 0.88 V
MDC_IDC_MSMT_LEADCHNL_RV_PACING_THRESHOLD_PULSEWIDTH: 0.5 MS
MDC_IDC_MSMT_LEADCHNL_RV_SENSING_INTR_AMPL: 12 MV
MDC_IDC_PG_IMPLANT_DTM: NORMAL
MDC_IDC_PG_MFG: NORMAL
MDC_IDC_PG_MODEL: NORMAL
MDC_IDC_PG_SERIAL: NORMAL
MDC_IDC_PG_TYPE: NORMAL
MDC_IDC_SESS_CLINIC_NAME: NORMAL
MDC_IDC_SESS_DTM: NORMAL
MDC_IDC_SESS_REPROGRAMMED: NO
MDC_IDC_SESS_TYPE: NORMAL
MDC_IDC_SET_BRADY_HYSTRATE: 50 {BEATS}/MIN
MDC_IDC_SET_BRADY_LOWRATE: 60 {BEATS}/MIN
MDC_IDC_SET_BRADY_MAX_SENSOR_RATE: 120 {BEATS}/MIN
MDC_IDC_SET_BRADY_MODE: NORMAL
MDC_IDC_SET_LEADCHNL_RV_PACING_AMPLITUDE: 1.12
MDC_IDC_SET_LEADCHNL_RV_PACING_ANODE_ELECTRODE_1: NORMAL
MDC_IDC_SET_LEADCHNL_RV_PACING_ANODE_LOCATION_1: NORMAL
MDC_IDC_SET_LEADCHNL_RV_PACING_CAPTURE_MODE: NORMAL
MDC_IDC_SET_LEADCHNL_RV_PACING_CATHODE_ELECTRODE_1: NORMAL
MDC_IDC_SET_LEADCHNL_RV_PACING_CATHODE_LOCATION_1: NORMAL
MDC_IDC_SET_LEADCHNL_RV_PACING_POLARITY: NORMAL
MDC_IDC_SET_LEADCHNL_RV_PACING_PULSEWIDTH: 0.5 MS
MDC_IDC_SET_LEADCHNL_RV_SENSING_ADAPTATION_MODE: NORMAL
MDC_IDC_SET_LEADCHNL_RV_SENSING_ANODE_ELECTRODE_1: NORMAL
MDC_IDC_SET_LEADCHNL_RV_SENSING_ANODE_LOCATION_1: NORMAL
MDC_IDC_SET_LEADCHNL_RV_SENSING_CATHODE_ELECTRODE_1: NORMAL
MDC_IDC_SET_LEADCHNL_RV_SENSING_CATHODE_LOCATION_1: NORMAL
MDC_IDC_SET_LEADCHNL_RV_SENSING_POLARITY: NORMAL
MDC_IDC_SET_LEADCHNL_RV_SENSING_SENSITIVITY: 2 MV
MDC_IDC_STAT_AT_DTM_END: NORMAL
MDC_IDC_STAT_AT_DTM_START: NORMAL
MDC_IDC_STAT_BRADY_DTM_END: NORMAL
MDC_IDC_STAT_BRADY_DTM_START: NORMAL
MDC_IDC_STAT_BRADY_RV_PERCENT_PACED: 60 %
MDC_IDC_STAT_CRT_DTM_END: NORMAL
MDC_IDC_STAT_CRT_DTM_START: NORMAL
MDC_IDC_STAT_HEART_RATE_DTM_END: NORMAL
MDC_IDC_STAT_HEART_RATE_DTM_START: NORMAL
MDC_IDC_STAT_HEART_RATE_VENTRICULAR_MAX: 200 {BEATS}/MIN
MDC_IDC_STAT_HEART_RATE_VENTRICULAR_MEAN: 67 {BEATS}/MIN
MDC_IDC_STAT_HEART_RATE_VENTRICULAR_MIN: 40 {BEATS}/MIN

## 2024-11-12 PROCEDURE — 93294 REM INTERROG EVL PM/LDLS PM: CPT | Performed by: INTERNAL MEDICINE

## 2024-11-12 PROCEDURE — 93296 REM INTERROG EVL PM/IDS: CPT | Performed by: INTERNAL MEDICINE

## 2025-02-19 ENCOUNTER — ANCILLARY PROCEDURE (OUTPATIENT)
Dept: CARDIOLOGY | Facility: CLINIC | Age: OVER 89
End: 2025-02-19
Attending: INTERNAL MEDICINE
Payer: COMMERCIAL

## 2025-02-19 DIAGNOSIS — Z95.0 CARDIAC PACEMAKER IN SITU: Primary | ICD-10-CM

## 2025-02-19 DIAGNOSIS — I42.9 CARDIOMYOPATHY (H): ICD-10-CM

## 2025-02-19 DIAGNOSIS — Z95.0 CARDIAC PACEMAKER IN SITU: ICD-10-CM

## 2025-02-19 LAB
MDC_IDC_LEAD_CONNECTION_STATUS: NORMAL
MDC_IDC_LEAD_IMPLANT_DT: NORMAL
MDC_IDC_LEAD_LOCATION: NORMAL
MDC_IDC_LEAD_MFG: NORMAL
MDC_IDC_LEAD_MODEL: NORMAL
MDC_IDC_LEAD_POLARITY_TYPE: NORMAL
MDC_IDC_LEAD_SERIAL: NORMAL
MDC_IDC_MSMT_BATTERY_DTM: NORMAL
MDC_IDC_MSMT_BATTERY_REMAINING_LONGEVITY: 14 MO
MDC_IDC_MSMT_BATTERY_REMAINING_PERCENTAGE: 10 %
MDC_IDC_MSMT_BATTERY_RRT_TRIGGER: NORMAL
MDC_IDC_MSMT_BATTERY_STATUS: NORMAL
MDC_IDC_MSMT_BATTERY_VOLTAGE: 2.83 V
MDC_IDC_MSMT_LEADCHNL_RV_IMPEDANCE_VALUE: 360 OHM
MDC_IDC_MSMT_LEADCHNL_RV_LEAD_CHANNEL_STATUS: NORMAL
MDC_IDC_MSMT_LEADCHNL_RV_PACING_THRESHOLD_AMPLITUDE: 0.88 V
MDC_IDC_MSMT_LEADCHNL_RV_PACING_THRESHOLD_PULSEWIDTH: 0.5 MS
MDC_IDC_MSMT_LEADCHNL_RV_SENSING_INTR_AMPL: 11.2 MV
MDC_IDC_PG_IMPLANT_DTM: NORMAL
MDC_IDC_PG_MFG: NORMAL
MDC_IDC_PG_MODEL: NORMAL
MDC_IDC_PG_SERIAL: NORMAL
MDC_IDC_PG_TYPE: NORMAL
MDC_IDC_SESS_CLINIC_NAME: NORMAL
MDC_IDC_SESS_DTM: NORMAL
MDC_IDC_SESS_REPROGRAMMED: NO
MDC_IDC_SESS_TYPE: NORMAL
MDC_IDC_SET_BRADY_HYSTRATE: 50 {BEATS}/MIN
MDC_IDC_SET_BRADY_LOWRATE: 60 {BEATS}/MIN
MDC_IDC_SET_BRADY_MAX_SENSOR_RATE: 120 {BEATS}/MIN
MDC_IDC_SET_BRADY_MODE: NORMAL
MDC_IDC_SET_LEADCHNL_RV_PACING_AMPLITUDE: 1.12
MDC_IDC_SET_LEADCHNL_RV_PACING_ANODE_ELECTRODE_1: NORMAL
MDC_IDC_SET_LEADCHNL_RV_PACING_ANODE_LOCATION_1: NORMAL
MDC_IDC_SET_LEADCHNL_RV_PACING_CAPTURE_MODE: NORMAL
MDC_IDC_SET_LEADCHNL_RV_PACING_CATHODE_ELECTRODE_1: NORMAL
MDC_IDC_SET_LEADCHNL_RV_PACING_CATHODE_LOCATION_1: NORMAL
MDC_IDC_SET_LEADCHNL_RV_PACING_POLARITY: NORMAL
MDC_IDC_SET_LEADCHNL_RV_PACING_PULSEWIDTH: 0.5 MS
MDC_IDC_SET_LEADCHNL_RV_SENSING_ADAPTATION_MODE: NORMAL
MDC_IDC_SET_LEADCHNL_RV_SENSING_ANODE_ELECTRODE_1: NORMAL
MDC_IDC_SET_LEADCHNL_RV_SENSING_ANODE_LOCATION_1: NORMAL
MDC_IDC_SET_LEADCHNL_RV_SENSING_CATHODE_ELECTRODE_1: NORMAL
MDC_IDC_SET_LEADCHNL_RV_SENSING_CATHODE_LOCATION_1: NORMAL
MDC_IDC_SET_LEADCHNL_RV_SENSING_POLARITY: NORMAL
MDC_IDC_SET_LEADCHNL_RV_SENSING_SENSITIVITY: 2 MV
MDC_IDC_STAT_AT_DTM_END: NORMAL
MDC_IDC_STAT_AT_DTM_START: NORMAL
MDC_IDC_STAT_BRADY_DTM_END: NORMAL
MDC_IDC_STAT_BRADY_DTM_START: NORMAL
MDC_IDC_STAT_BRADY_RV_PERCENT_PACED: 33 %
MDC_IDC_STAT_CRT_DTM_END: NORMAL
MDC_IDC_STAT_CRT_DTM_START: NORMAL
MDC_IDC_STAT_HEART_RATE_DTM_END: NORMAL
MDC_IDC_STAT_HEART_RATE_DTM_START: NORMAL
MDC_IDC_STAT_HEART_RATE_VENTRICULAR_MAX: 210 {BEATS}/MIN
MDC_IDC_STAT_HEART_RATE_VENTRICULAR_MEAN: 72 {BEATS}/MIN
MDC_IDC_STAT_HEART_RATE_VENTRICULAR_MIN: 50 {BEATS}/MIN

## (undated) DEVICE — LINEN FULL SHEET 5511

## (undated) DEVICE — Device

## (undated) DEVICE — IMM KNEE 20"

## (undated) DEVICE — SU PDO 1 STRATAFIX 36X36CM CTX TAPERPOINT SXPD2B405

## (undated) DEVICE — DRSG XEROFORM 1X8"

## (undated) DEVICE — GLOVE PROTEXIS POWDER FREE 6.5 ORTHOPEDIC 2D73ET65

## (undated) DEVICE — LINEN HALF SHEET 5512

## (undated) DEVICE — SU VICRYL 0 CT-1 27" J340H

## (undated) DEVICE — SOL NACL 0.9% IRRIG 1000ML BOTTLE 2F7124

## (undated) DEVICE — CAST PADDING 6" STERILE 9046S

## (undated) DEVICE — GLOVE PROTEXIS POWDER FREE 8.0 ORTHOPEDIC 2D73ET80

## (undated) DEVICE — BONE CEMENT MIXEVAC III HI VAC KIT  0206-015-000

## (undated) DEVICE — SU MONOCRYL 4-0 PS-2 18" UND Y496G

## (undated) DEVICE — ADH SKIN CLOSURE PREMIERPRO EXOFIN 1.0ML 3470

## (undated) DEVICE — DRSG TEGADERM 4X10" 1627

## (undated) DEVICE — DRAPE IOBAN INCISE 23X17" 6650EZ

## (undated) DEVICE — GLOVE PROTEXIS BLUE W/NEU-THERA 6.5  2D73EB65

## (undated) DEVICE — BLADE SAW SAGITTAL STRK 18X90X1.27MM HD SYS 6 6118-127-090

## (undated) DEVICE — SOL NACL 0.9% 20ML VIAL 4888-20

## (undated) DEVICE — SU VICRYL 1 CT 36" J959H

## (undated) DEVICE — HOOD FLYTE W/PEELAWAY 408-800-100

## (undated) DEVICE — SU STRATAFIX 2-0 MH 36" SXPD2B412

## (undated) DEVICE — GLOVE PROTEXIS BLUE W/NEU-THERA 7.5  2D73EB75

## (undated) DEVICE — SOL WATER IRRIG 1000ML BOTTLE 2F7114

## (undated) DEVICE — GLOVE PROTEXIS BLUE W/NEU-THERA 8.0  2D73EB80

## (undated) DEVICE — PREP CHLORAPREP 26ML TINTED ORANGE  260815

## (undated) DEVICE — DRSG GAUZE 4X4" 8044

## (undated) DEVICE — TOURNIQUET CUFF 30" REPRO BLUE 60-7070-105

## (undated) DEVICE — SOL NACL 0.9% IRRIG 1000ML BOTTLE 07138-09

## (undated) DEVICE — SUCTION MANIFOLD NEPTUNE 2 SYS 4 PORT 0702-020-000

## (undated) DEVICE — BAG CLEAR TRASH 1.3M 39X33" P4040C

## (undated) DEVICE — ESU GROUND PAD ADULT W/CORD E7507

## (undated) DEVICE — IMP ANCHOR ARTHREX BIO-SWIVELOCK 4.75X22MM AR-2324BCC-2: Type: IMPLANTABLE DEVICE | Status: NON-FUNCTIONAL

## (undated) DEVICE — SU NDL MAYO 1824-4

## (undated) DEVICE — PACK TOTAL KNEE BOXED LATEX FREE PO15TKFCT

## (undated) DEVICE — TUBING OXYGEN CANNULA NASAL 7FT

## (undated) DEVICE — PACK LOWER EXTREMITY RIDGES

## (undated) DEVICE — LINEN ORTHO ACL PACK 5447

## (undated) DEVICE — DRAPE STOCKINETTE IMPERVIOUS 12" 1587

## (undated) DEVICE — GLOVE PROTEXIS POWDER FREE 7.5 ORTHOPEDIC 2D73ET75

## (undated) DEVICE — DRAPE CONVERTORS U-DRAPE 60X72" 8476

## (undated) DEVICE — SU VICRYL 2-0 CT-1 27" J339H

## (undated) DEVICE — NDL BLUNT 18GA 1" W/O FILTER 305181

## (undated) DEVICE — BNDG ELASTIC 6" DBL LENGTH UNSTERILE 6611-16

## (undated) DEVICE — DRSG ABDOMINAL 07 1/2X8" 7197D

## (undated) DEVICE — ENDO FORCEP ENDOJAW BIOPSY 2.8MMX160CM FB-220K

## (undated) DEVICE — SPONGE LAP 18X18" X8435

## (undated) DEVICE — SUCTION TIP YANKAUER W/O VENT K86

## (undated) DEVICE — KIT ENDO TURNOVER/PROCEDURE W/CLEAN A SCOPE LINERS 103888

## (undated) DEVICE — SET HANDPIECE INTERPULSE W/COAXIAL FAN SPRAY TIP 0210118000

## (undated) DEVICE — SU ETHIBOND 1 CT-1 30" X425H

## (undated) RX ORDER — CELECOXIB 200 MG/1
CAPSULE ORAL
Status: DISPENSED
Start: 2019-11-27

## (undated) RX ORDER — VANCOMYCIN HYDROCHLORIDE 1 G/20ML
INJECTION, POWDER, LYOPHILIZED, FOR SOLUTION INTRAVENOUS
Status: DISPENSED
Start: 2019-11-27

## (undated) RX ORDER — FENTANYL CITRATE 50 UG/ML
INJECTION, SOLUTION INTRAMUSCULAR; INTRAVENOUS
Status: DISPENSED
Start: 2019-05-16

## (undated) RX ORDER — PROPOFOL 10 MG/ML
INJECTION, EMULSION INTRAVENOUS
Status: DISPENSED
Start: 2019-05-16

## (undated) RX ORDER — PHENYLEPHRINE HCL IN 0.9% NACL 1 MG/10 ML
SYRINGE (ML) INTRAVENOUS
Status: DISPENSED
Start: 2019-05-16

## (undated) RX ORDER — GLYCOPYRROLATE 0.2 MG/ML
INJECTION INTRAMUSCULAR; INTRAVENOUS
Status: DISPENSED
Start: 2019-05-16

## (undated) RX ORDER — PROPOFOL 10 MG/ML
INJECTION, EMULSION INTRAVENOUS
Status: DISPENSED
Start: 2019-11-27

## (undated) RX ORDER — ONDANSETRON 2 MG/ML
INJECTION INTRAMUSCULAR; INTRAVENOUS
Status: DISPENSED
Start: 2019-05-16

## (undated) RX ORDER — EPHEDRINE SULFATE 50 MG/ML
INJECTION, SOLUTION INTRAMUSCULAR; INTRAVENOUS; SUBCUTANEOUS
Status: DISPENSED
Start: 2019-05-16

## (undated) RX ORDER — BUPIVACAINE HYDROCHLORIDE AND EPINEPHRINE 5; 5 MG/ML; UG/ML
INJECTION, SOLUTION EPIDURAL; INTRACAUDAL; PERINEURAL
Status: DISPENSED
Start: 2019-05-16

## (undated) RX ORDER — FENTANYL CITRATE 50 UG/ML
INJECTION, SOLUTION INTRAMUSCULAR; INTRAVENOUS
Status: DISPENSED
Start: 2018-10-03

## (undated) RX ORDER — LIDOCAINE HYDROCHLORIDE 10 MG/ML
INJECTION, SOLUTION EPIDURAL; INFILTRATION; INTRACAUDAL; PERINEURAL
Status: DISPENSED
Start: 2019-05-16

## (undated) RX ORDER — FENTANYL CITRATE 50 UG/ML
INJECTION, SOLUTION INTRAMUSCULAR; INTRAVENOUS
Status: DISPENSED
Start: 2019-11-27

## (undated) RX ORDER — DEXAMETHASONE SODIUM PHOSPHATE 4 MG/ML
INJECTION, SOLUTION INTRA-ARTICULAR; INTRALESIONAL; INTRAMUSCULAR; INTRAVENOUS; SOFT TISSUE
Status: DISPENSED
Start: 2019-05-16

## (undated) RX ORDER — ONDANSETRON 2 MG/ML
INJECTION INTRAMUSCULAR; INTRAVENOUS
Status: DISPENSED
Start: 2019-11-27

## (undated) RX ORDER — ACETAMINOPHEN 500 MG
TABLET ORAL
Status: DISPENSED
Start: 2019-11-27

## (undated) RX ORDER — FENTANYL CITRATE 50 UG/ML
INJECTION, SOLUTION INTRAMUSCULAR; INTRAVENOUS
Status: DISPENSED
Start: 2018-10-05

## (undated) RX ORDER — CEFAZOLIN SODIUM 2 G/100ML
INJECTION, SOLUTION INTRAVENOUS
Status: DISPENSED
Start: 2019-11-27

## (undated) RX ORDER — CEFAZOLIN SODIUM 2 G/100ML
INJECTION, SOLUTION INTRAVENOUS
Status: DISPENSED
Start: 2019-05-16

## (undated) RX ORDER — PHENYLEPHRINE HCL IN 0.9% NACL 1 MG/10 ML
SYRINGE (ML) INTRAVENOUS
Status: DISPENSED
Start: 2019-11-27

## (undated) RX ORDER — GLYCOPYRROLATE 0.2 MG/ML
INJECTION INTRAMUSCULAR; INTRAVENOUS
Status: DISPENSED
Start: 2019-11-27

## (undated) RX ORDER — KETAMINE HCL IN 0.9 % NACL 50 MG/5 ML
SYRINGE (ML) INTRAVENOUS
Status: DISPENSED
Start: 2019-11-27

## (undated) RX ORDER — LIDOCAINE HYDROCHLORIDE 10 MG/ML
INJECTION, SOLUTION EPIDURAL; INFILTRATION; INTRACAUDAL; PERINEURAL
Status: DISPENSED
Start: 2019-11-27